# Patient Record
Sex: MALE | Race: WHITE | Employment: OTHER | URBAN - NONMETROPOLITAN AREA
[De-identification: names, ages, dates, MRNs, and addresses within clinical notes are randomized per-mention and may not be internally consistent; named-entity substitution may affect disease eponyms.]

---

## 2017-04-02 DIAGNOSIS — J45.21 MILD INTERMITTENT ASTHMA WITH EXACERBATION: ICD-10-CM

## 2017-04-07 ENCOUNTER — TELEPHONE (OUTPATIENT)
Dept: PULMONOLOGY | Age: 72
End: 2017-04-07

## 2017-04-12 ENCOUNTER — TELEPHONE (OUTPATIENT)
Dept: CARDIOLOGY | Age: 72
End: 2017-04-12

## 2017-04-13 ENCOUNTER — TELEPHONE (OUTPATIENT)
Dept: CARDIOLOGY | Age: 72
End: 2017-04-13

## 2017-06-21 ENCOUNTER — TELEPHONE (OUTPATIENT)
Dept: PULMONOLOGY | Age: 72
End: 2017-06-21

## 2017-06-21 ENCOUNTER — TELEPHONE (OUTPATIENT)
Dept: ADMINISTRATIVE | Age: 72
End: 2017-06-21

## 2017-07-20 ENCOUNTER — OFFICE VISIT (OUTPATIENT)
Dept: PULMONOLOGY | Age: 72
End: 2017-07-20
Payer: MEDICARE

## 2017-07-20 VITALS
DIASTOLIC BLOOD PRESSURE: 78 MMHG | OXYGEN SATURATION: 95 % | WEIGHT: 288.6 LBS | SYSTOLIC BLOOD PRESSURE: 136 MMHG | HEART RATE: 60 BPM | HEIGHT: 74 IN | BODY MASS INDEX: 37.04 KG/M2

## 2017-07-20 DIAGNOSIS — J45.20 MILD INTERMITTENT ASTHMA WITHOUT COMPLICATION: ICD-10-CM

## 2017-07-20 DIAGNOSIS — Z99.89 OSA ON CPAP: Primary | ICD-10-CM

## 2017-07-20 DIAGNOSIS — G47.33 OSA ON CPAP: Primary | ICD-10-CM

## 2017-07-20 PROCEDURE — 99213 OFFICE O/P EST LOW 20 MIN: CPT | Performed by: INTERNAL MEDICINE

## 2017-07-24 ENCOUNTER — HOSPITAL ENCOUNTER (INPATIENT)
Age: 72
LOS: 1 days | Discharge: HOME | DRG: 202 | End: 2017-07-25
Attending: FAMILY MEDICINE | Admitting: FAMILY MEDICINE
Payer: MEDICARE

## 2017-07-24 ENCOUNTER — APPOINTMENT (OUTPATIENT)
Dept: CT IMAGING | Age: 72
DRG: 202 | End: 2017-07-24
Payer: MEDICARE

## 2017-07-24 DIAGNOSIS — J18.9 PNEUMONIA DUE TO ORGANISM: ICD-10-CM

## 2017-07-24 DIAGNOSIS — J45.901 ASTHMA EXACERBATION: Primary | ICD-10-CM

## 2017-07-24 LAB
ALLEN TEST: POSITIVE
ANION GAP SERPL CALCULATED.3IONS-SCNC: 15 MEQ/L (ref 8–16)
BASE EXCESS (CALCULATED): 3.8 MMOL/L (ref -2.5–2.5)
BASOPHILS # BLD: 0.7 %
BASOPHILS ABSOLUTE: 0.1 THOU/MM3 (ref 0–0.1)
BUN BLDV-MCNC: 16 MG/DL (ref 7–22)
CALCIUM SERPL-MCNC: 8.6 MG/DL (ref 8.5–10.5)
CHLORIDE BLD-SCNC: 103 MEQ/L (ref 98–111)
CO2: 25 MEQ/L (ref 23–33)
COLLECTED BY:: ABNORMAL
CREAT SERPL-MCNC: 0.9 MG/DL (ref 0.4–1.2)
DEVICE: ABNORMAL
EOSINOPHIL # BLD: 7.5 %
EOSINOPHILS ABSOLUTE: 0.7 THOU/MM3 (ref 0–0.4)
FLU A ANTIGEN: NEGATIVE
FLU B ANTIGEN: NEGATIVE
GFR SERPL CREATININE-BSD FRML MDRD: 82 ML/MIN/1.73M2
GLUCOSE BLD-MCNC: 108 MG/DL (ref 70–108)
HCO3: 30 MMOL/L (ref 23–28)
HCT VFR BLD CALC: 42.7 % (ref 42–52)
HEMOGLOBIN: 14.5 GM/DL (ref 14–18)
LACTIC ACID: 1.5 MMOL/L (ref 0.5–2.2)
LYMPHOCYTES # BLD: 10.4 %
LYMPHOCYTES ABSOLUTE: 1 THOU/MM3 (ref 1–4.8)
MCH RBC QN AUTO: 32.8 PG (ref 27–31)
MCHC RBC AUTO-ENTMCNC: 34 GM/DL (ref 33–37)
MCV RBC AUTO: 96.3 FL (ref 80–94)
MONOCYTES # BLD: 8.2 %
MONOCYTES ABSOLUTE: 0.8 THOU/MM3 (ref 0.4–1.3)
NUCLEATED RED BLOOD CELLS: 0 /100 WBC
O2 SATURATION: 94 %
OSMOLALITY CALCULATION: 286.7 MOSMOL/KG (ref 275–300)
PCO2: 49 MMHG (ref 35–45)
PDW BLD-RTO: 13.8 % (ref 11.5–14.5)
PH BLOOD GAS: 7.39 (ref 7.35–7.45)
PLATELET # BLD: 188 THOU/MM3 (ref 130–400)
PMV BLD AUTO: 9.4 MCM (ref 7.4–10.4)
PO2: 72 MMHG (ref 71–104)
POTASSIUM SERPL-SCNC: 4 MEQ/L (ref 3.5–5.2)
PRO-BNP: 122.7 PG/ML (ref 0–900)
PROCALCITONIN: 0.06 NG/ML (ref 0.01–0.09)
RBC # BLD: 4.43 MILL/MM3 (ref 4.7–6.1)
RBC # BLD: NORMAL 10*6/UL
SEG NEUTROPHILS: 73.2 %
SEGMENTED NEUTROPHILS ABSOLUTE COUNT: 7.2 THOU/MM3 (ref 1.8–7.7)
SODIUM BLD-SCNC: 143 MEQ/L (ref 135–145)
SOURCE, BLOOD GAS: ABNORMAL
TROPONIN T: < 0.01 NG/ML
WBC # BLD: 9.8 THOU/MM3 (ref 4.8–10.8)

## 2017-07-24 PROCEDURE — 1200000000 HC SEMI PRIVATE

## 2017-07-24 PROCEDURE — 96375 TX/PRO/DX INJ NEW DRUG ADDON: CPT

## 2017-07-24 PROCEDURE — 71275 CT ANGIOGRAPHY CHEST: CPT

## 2017-07-24 PROCEDURE — 87040 BLOOD CULTURE FOR BACTERIA: CPT

## 2017-07-24 PROCEDURE — 84145 PROCALCITONIN (PCT): CPT

## 2017-07-24 PROCEDURE — 6360000002 HC RX W HCPCS: Performed by: FAMILY MEDICINE

## 2017-07-24 PROCEDURE — 99222 1ST HOSP IP/OBS MODERATE 55: CPT | Performed by: FAMILY MEDICINE

## 2017-07-24 PROCEDURE — 80048 BASIC METABOLIC PNL TOTAL CA: CPT

## 2017-07-24 PROCEDURE — 99285 EMERGENCY DEPT VISIT HI MDM: CPT

## 2017-07-24 PROCEDURE — 6370000000 HC RX 637 (ALT 250 FOR IP): Performed by: FAMILY MEDICINE

## 2017-07-24 PROCEDURE — 93005 ELECTROCARDIOGRAM TRACING: CPT

## 2017-07-24 PROCEDURE — 84484 ASSAY OF TROPONIN QUANT: CPT

## 2017-07-24 PROCEDURE — 94644 CONT INHLJ TX 1ST HOUR: CPT

## 2017-07-24 PROCEDURE — 82803 BLOOD GASES ANY COMBINATION: CPT

## 2017-07-24 PROCEDURE — 36415 COLL VENOUS BLD VENIPUNCTURE: CPT

## 2017-07-24 PROCEDURE — 94645 CONT INHLJ TX EACH ADDL HOUR: CPT

## 2017-07-24 PROCEDURE — 85025 COMPLETE CBC W/AUTO DIFF WBC: CPT

## 2017-07-24 PROCEDURE — 6360000004 HC RX CONTRAST MEDICATION: Performed by: FAMILY MEDICINE

## 2017-07-24 PROCEDURE — 83605 ASSAY OF LACTIC ACID: CPT

## 2017-07-24 PROCEDURE — 96374 THER/PROPH/DIAG INJ IV PUSH: CPT

## 2017-07-24 PROCEDURE — 83880 ASSAY OF NATRIURETIC PEPTIDE: CPT

## 2017-07-24 PROCEDURE — 87804 INFLUENZA ASSAY W/OPTIC: CPT

## 2017-07-24 PROCEDURE — 36600 WITHDRAWAL OF ARTERIAL BLOOD: CPT

## 2017-07-24 PROCEDURE — 2580000003 HC RX 258: Performed by: FAMILY MEDICINE

## 2017-07-24 RX ORDER — METHYLPREDNISOLONE SODIUM SUCCINATE 125 MG/2ML
125 INJECTION, POWDER, LYOPHILIZED, FOR SOLUTION INTRAMUSCULAR; INTRAVENOUS ONCE
Status: COMPLETED | OUTPATIENT
Start: 2017-07-24 | End: 2017-07-24

## 2017-07-24 RX ORDER — CLONIDINE HYDROCHLORIDE 0.2 MG/1
0.2 TABLET ORAL ONCE
Status: COMPLETED | OUTPATIENT
Start: 2017-07-24 | End: 2017-07-24

## 2017-07-24 RX ORDER — LEVOFLOXACIN 5 MG/ML
500 INJECTION, SOLUTION INTRAVENOUS ONCE
Status: COMPLETED | OUTPATIENT
Start: 2017-07-24 | End: 2017-07-25

## 2017-07-24 RX ADMIN — IOPAMIDOL 80 ML: 755 INJECTION, SOLUTION INTRAVENOUS at 22:23

## 2017-07-24 RX ADMIN — MAGNESIUM SULFATE HEPTAHYDRATE 2 G: 500 INJECTION, SOLUTION INTRAMUSCULAR; INTRAVENOUS at 22:02

## 2017-07-24 RX ADMIN — METHYLPREDNISOLONE SODIUM SUCCINATE 125 MG: 125 INJECTION, POWDER, FOR SOLUTION INTRAMUSCULAR; INTRAVENOUS at 21:12

## 2017-07-24 RX ADMIN — CLONIDINE HYDROCHLORIDE 0.2 MG: 0.2 TABLET ORAL at 21:44

## 2017-07-24 RX ADMIN — ALBUTEROL SULFATE 0.5 MG/KG/HR: 2.5 SOLUTION RESPIRATORY (INHALATION) at 21:18

## 2017-07-24 RX ADMIN — ALBUTEROL SULFATE 0.5 MG/KG/HR: 2.5 SOLUTION RESPIRATORY (INHALATION) at 22:37

## 2017-07-24 RX ADMIN — LEVOFLOXACIN 500 MG: 5 INJECTION, SOLUTION INTRAVENOUS at 23:29

## 2017-07-24 ASSESSMENT — ENCOUNTER SYMPTOMS
VOMITING: 0
EYE REDNESS: 0
RHINORRHEA: 0
SHORTNESS OF BREATH: 1
COUGH: 1
ABDOMINAL PAIN: 0
EYE DISCHARGE: 0
NAUSEA: 0
SORE THROAT: 0
BACK PAIN: 0
DIARRHEA: 0
WHEEZING: 1

## 2017-07-25 VITALS
OXYGEN SATURATION: 93 % | DIASTOLIC BLOOD PRESSURE: 71 MMHG | HEART RATE: 78 BPM | BODY MASS INDEX: 36.7 KG/M2 | HEIGHT: 74 IN | SYSTOLIC BLOOD PRESSURE: 135 MMHG | WEIGHT: 286 LBS | RESPIRATION RATE: 16 BRPM | TEMPERATURE: 98.4 F

## 2017-07-25 PROBLEM — J18.9 CAP (COMMUNITY ACQUIRED PNEUMONIA): Status: ACTIVE | Noted: 2017-07-25

## 2017-07-25 LAB
ANION GAP SERPL CALCULATED.3IONS-SCNC: 20 MEQ/L (ref 8–16)
BASOPHILS # BLD: 0.3 %
BASOPHILS ABSOLUTE: 0 THOU/MM3 (ref 0–0.1)
BUN BLDV-MCNC: 20 MG/DL (ref 7–22)
CALCIUM SERPL-MCNC: 8.8 MG/DL (ref 8.5–10.5)
CHLORIDE BLD-SCNC: 101 MEQ/L (ref 98–111)
CO2: 23 MEQ/L (ref 23–33)
CREAT SERPL-MCNC: 1.1 MG/DL (ref 0.4–1.2)
EOSINOPHIL # BLD: 0 %
EOSINOPHILS ABSOLUTE: 0 THOU/MM3 (ref 0–0.4)
GFR SERPL CREATININE-BSD FRML MDRD: 65 ML/MIN/1.73M2
GLUCOSE BLD-MCNC: 160 MG/DL (ref 70–108)
HCT VFR BLD CALC: 42.5 % (ref 42–52)
HEMOGLOBIN: 14.5 GM/DL (ref 14–18)
LYMPHOCYTES # BLD: 3.5 %
LYMPHOCYTES ABSOLUTE: 0.3 THOU/MM3 (ref 1–4.8)
MCH RBC QN AUTO: 32.9 PG (ref 27–31)
MCHC RBC AUTO-ENTMCNC: 34 GM/DL (ref 33–37)
MCV RBC AUTO: 96.6 FL (ref 80–94)
MONOCYTES # BLD: 0.5 %
MONOCYTES ABSOLUTE: 0 THOU/MM3 (ref 0.4–1.3)
NUCLEATED RED BLOOD CELLS: 0 /100 WBC
PDW BLD-RTO: 13.5 % (ref 11.5–14.5)
PLATELET # BLD: 195 THOU/MM3 (ref 130–400)
PMV BLD AUTO: 9 MCM (ref 7.4–10.4)
POTASSIUM SERPL-SCNC: 3.9 MEQ/L (ref 3.5–5.2)
RBC # BLD: 4.4 MILL/MM3 (ref 4.7–6.1)
RBC # BLD: NORMAL 10*6/UL
SEG NEUTROPHILS: 95.7 %
SEGMENTED NEUTROPHILS ABSOLUTE COUNT: 9 THOU/MM3 (ref 1.8–7.7)
SODIUM BLD-SCNC: 144 MEQ/L (ref 135–145)
TROPONIN T: < 0.01 NG/ML
TROPONIN T: < 0.01 NG/ML
WBC # BLD: 9.4 THOU/MM3 (ref 4.8–10.8)

## 2017-07-25 PROCEDURE — G0009 ADMIN PNEUMOCOCCAL VACCINE: HCPCS | Performed by: INTERNAL MEDICINE

## 2017-07-25 PROCEDURE — 2580000003 HC RX 258: Performed by: FAMILY MEDICINE

## 2017-07-25 PROCEDURE — 80048 BASIC METABOLIC PNL TOTAL CA: CPT

## 2017-07-25 PROCEDURE — 36415 COLL VENOUS BLD VENIPUNCTURE: CPT

## 2017-07-25 PROCEDURE — 99239 HOSP IP/OBS DSCHRG MGMT >30: CPT | Performed by: INTERNAL MEDICINE

## 2017-07-25 PROCEDURE — 6360000002 HC RX W HCPCS: Performed by: INTERNAL MEDICINE

## 2017-07-25 PROCEDURE — 6360000002 HC RX W HCPCS: Performed by: FAMILY MEDICINE

## 2017-07-25 PROCEDURE — 84484 ASSAY OF TROPONIN QUANT: CPT

## 2017-07-25 PROCEDURE — 6370000000 HC RX 637 (ALT 250 FOR IP): Performed by: FAMILY MEDICINE

## 2017-07-25 PROCEDURE — 90670 PCV13 VACCINE IM: CPT | Performed by: INTERNAL MEDICINE

## 2017-07-25 PROCEDURE — 85025 COMPLETE CBC W/AUTO DIFF WBC: CPT

## 2017-07-25 RX ORDER — OMEPRAZOLE 20 MG/1
20 CAPSULE, DELAYED RELEASE ORAL DAILY
Status: DISCONTINUED | OUTPATIENT
Start: 2017-07-25 | End: 2017-07-25 | Stop reason: CLARIF

## 2017-07-25 RX ORDER — FLUTICASONE PROPIONATE 50 MCG
1 SPRAY, SUSPENSION (ML) NASAL DAILY
Status: DISCONTINUED | OUTPATIENT
Start: 2017-07-25 | End: 2017-07-25 | Stop reason: HOSPADM

## 2017-07-25 RX ORDER — PANTOPRAZOLE SODIUM 40 MG/1
40 TABLET, DELAYED RELEASE ORAL
Status: DISCONTINUED | OUTPATIENT
Start: 2017-07-25 | End: 2017-07-25 | Stop reason: HOSPADM

## 2017-07-25 RX ORDER — ACETAMINOPHEN 325 MG/1
650 TABLET ORAL EVERY 4 HOURS PRN
Status: DISCONTINUED | OUTPATIENT
Start: 2017-07-25 | End: 2017-07-25 | Stop reason: HOSPADM

## 2017-07-25 RX ORDER — MONTELUKAST SODIUM 10 MG/1
10 TABLET ORAL NIGHTLY
Status: DISCONTINUED | OUTPATIENT
Start: 2017-07-25 | End: 2017-07-25 | Stop reason: HOSPADM

## 2017-07-25 RX ORDER — SODIUM CHLORIDE 0.9 % (FLUSH) 0.9 %
10 SYRINGE (ML) INJECTION PRN
Status: DISCONTINUED | OUTPATIENT
Start: 2017-07-25 | End: 2017-07-25 | Stop reason: HOSPADM

## 2017-07-25 RX ORDER — METHYLPREDNISOLONE SODIUM SUCCINATE 40 MG/ML
40 INJECTION, POWDER, LYOPHILIZED, FOR SOLUTION INTRAMUSCULAR; INTRAVENOUS EVERY 6 HOURS
Status: DISCONTINUED | OUTPATIENT
Start: 2017-07-25 | End: 2017-07-25 | Stop reason: HOSPADM

## 2017-07-25 RX ORDER — ALBUTEROL SULFATE 2.5 MG/3ML
2.5 SOLUTION RESPIRATORY (INHALATION) EVERY 6 HOURS PRN
Status: DISCONTINUED | OUTPATIENT
Start: 2017-07-25 | End: 2017-07-25 | Stop reason: HOSPADM

## 2017-07-25 RX ORDER — PREDNISONE 20 MG/1
20 TABLET ORAL DAILY
Qty: 5 TABLET | Refills: 0 | Status: SHIPPED | OUTPATIENT
Start: 2017-07-25 | End: 2017-07-30

## 2017-07-25 RX ORDER — VERAPAMIL HYDROCHLORIDE 240 MG/1
240 TABLET, FILM COATED, EXTENDED RELEASE ORAL EVERY MORNING
Status: DISCONTINUED | OUTPATIENT
Start: 2017-07-25 | End: 2017-07-25 | Stop reason: HOSPADM

## 2017-07-25 RX ORDER — MULTIVITAMIN WITH FOLIC ACID 400 MCG
1 TABLET ORAL DAILY
Status: DISCONTINUED | OUTPATIENT
Start: 2017-07-25 | End: 2017-07-25 | Stop reason: HOSPADM

## 2017-07-25 RX ORDER — SODIUM CHLORIDE 0.9 % (FLUSH) 0.9 %
10 SYRINGE (ML) INJECTION EVERY 12 HOURS SCHEDULED
Status: DISCONTINUED | OUTPATIENT
Start: 2017-07-25 | End: 2017-07-25 | Stop reason: HOSPADM

## 2017-07-25 RX ORDER — ONDANSETRON 2 MG/ML
4 INJECTION INTRAMUSCULAR; INTRAVENOUS EVERY 6 HOURS PRN
Status: DISCONTINUED | OUTPATIENT
Start: 2017-07-25 | End: 2017-07-25 | Stop reason: HOSPADM

## 2017-07-25 RX ORDER — DOXYCYCLINE HYCLATE 100 MG
100 TABLET ORAL 2 TIMES DAILY
Qty: 14 TABLET | Refills: 0 | Status: SHIPPED | OUTPATIENT
Start: 2017-07-25 | End: 2017-08-01

## 2017-07-25 RX ORDER — PRAVASTATIN SODIUM 20 MG
20 TABLET ORAL DAILY
Status: DISCONTINUED | OUTPATIENT
Start: 2017-07-25 | End: 2017-07-25 | Stop reason: HOSPADM

## 2017-07-25 RX ADMIN — PANTOPRAZOLE SODIUM 40 MG: 40 TABLET, DELAYED RELEASE ORAL at 08:36

## 2017-07-25 RX ADMIN — PNEUMOCOCCAL 13-VALENT CONJUGATE VACCINE 0.5 ML: 2.2; 2.2; 2.2; 2.2; 2.2; 4.4; 2.2; 2.2; 2.2; 2.2; 2.2; 2.2; 2.2 INJECTION, SUSPENSION INTRAMUSCULAR at 13:15

## 2017-07-25 RX ADMIN — METHYLPREDNISOLONE SODIUM SUCCINATE 40 MG: 40 INJECTION, POWDER, FOR SOLUTION INTRAMUSCULAR; INTRAVENOUS at 08:36

## 2017-07-25 RX ADMIN — ENOXAPARIN SODIUM 40 MG: 40 INJECTION SUBCUTANEOUS at 08:36

## 2017-07-25 RX ADMIN — Medication 10 ML: at 08:41

## 2017-07-25 RX ADMIN — VERAPAMIL HYDROCHLORIDE 240 MG: 240 TABLET, FILM COATED, EXTENDED RELEASE ORAL at 08:36

## 2017-07-25 RX ADMIN — FLUTICASONE PROPIONATE 1 SPRAY: 50 SPRAY, METERED NASAL at 08:36

## 2017-07-25 RX ADMIN — THERA TABS 1 TABLET: TAB at 08:36

## 2017-07-25 RX ADMIN — METHYLPREDNISOLONE SODIUM SUCCINATE 40 MG: 40 INJECTION, POWDER, FOR SOLUTION INTRAMUSCULAR; INTRAVENOUS at 04:16

## 2017-07-25 ASSESSMENT — PAIN SCALES - GENERAL
PAINLEVEL_OUTOF10: 0

## 2017-07-26 LAB
EKG ATRIAL RATE: 77 BPM
EKG P AXIS: 78 DEGREES
EKG P-R INTERVAL: 136 MS
EKG Q-T INTERVAL: 388 MS
EKG QRS DURATION: 106 MS
EKG QTC CALCULATION (BAZETT): 439 MS
EKG R AXIS: -40 DEGREES
EKG T AXIS: 71 DEGREES
EKG VENTRICULAR RATE: 77 BPM

## 2017-07-30 LAB
BLOOD CULTURE, ROUTINE: NORMAL
BLOOD CULTURE, ROUTINE: NORMAL

## 2017-08-07 ENCOUNTER — TELEPHONE (OUTPATIENT)
Dept: PULMONOLOGY | Age: 72
End: 2017-08-07

## 2017-10-27 ENCOUNTER — OFFICE VISIT (OUTPATIENT)
Dept: CARDIOLOGY CLINIC | Age: 72
End: 2017-10-27
Payer: MEDICARE

## 2017-10-27 VITALS
DIASTOLIC BLOOD PRESSURE: 64 MMHG | SYSTOLIC BLOOD PRESSURE: 122 MMHG | BODY MASS INDEX: 35.16 KG/M2 | HEART RATE: 54 BPM | WEIGHT: 274 LBS | HEIGHT: 74 IN

## 2017-10-27 DIAGNOSIS — I25.10 CORONARY ARTERY DISEASE INVOLVING NATIVE CORONARY ARTERY OF NATIVE HEART WITHOUT ANGINA PECTORIS: ICD-10-CM

## 2017-10-27 DIAGNOSIS — E78.01 FAMILIAL HYPERCHOLESTEROLEMIA: ICD-10-CM

## 2017-10-27 DIAGNOSIS — I10 ESSENTIAL HYPERTENSION: Primary | ICD-10-CM

## 2017-10-27 PROCEDURE — 99213 OFFICE O/P EST LOW 20 MIN: CPT | Performed by: NUCLEAR MEDICINE

## 2017-10-27 NOTE — PROGRESS NOTES
mg by mouth every morning. Indications: Per patient taking medication for migraines prevention. No current facility-administered medications for this visit. Allergies   Allergen Reactions    Molds & Smuts     Sulfa Antibiotics Other (See Comments)     congestion     Health Maintenance   Topic Date Due    DTaP/Tdap/Td vaccine (1 - Tdap) 06/25/1962    Colon cancer screen colonoscopy  06/25/1993    Zostavax vaccine  06/25/2003    Flu vaccine (1) 09/01/2017    Pneumococcal low/med risk (2 of 2 - PPSV23) 07/25/2018    Lipid screen  07/11/2021       Subjective:  Review of Systems  General:   No fever, no chills, No fatigue or weight loss  Pulmonary:    No dyspnea, no wheezing  Cardiac:    Denies recent chest pain,   GI:     No nausea or vomiting, no abdominal pain  Neuro:    No dizziness or light headedness,   Musculoskeletal:  No recent active issues  Extremities:   No edema, good peripheral pulses      Objective:  Physical Exam  /64   Pulse 54   Ht 6' 2\" (1.88 m)   Wt 274 lb (124.3 kg)   BMI 35.18 kg/m²   General:   Well developed, well nourished  Lungs:   Clear to auscultation  Heart:    Normal S1 S2, Slight murmur. no rubs, no gallops  Abdomen:   Soft, non tender, no organomegalies, positive bowel sounds  Extremities:   No edema, no cyanosis, good peripheral pulses  Neurological:   Awake, alert, oriented. No obvious focal deficits  Musculoskelatal:  No obvious deformities    Assessment:     1. Essential hypertension     2. Familial hypercholesterolemia     3. Coronary artery disease involving native coronary artery of native heart without angina pectoris     cardiac stable   ECG in office was done today. I reviewed the ECG. No acute findings      Plan:  No Follow-up on file. As above  Continue risk factor modification and medical management  Thank you for allowing me to participate in the care of your patient.  Please don't hesitate to contact me regarding any further issues related to the patient care    Orders Placed:  No orders of the defined types were placed in this encounter. Medications Prescribed:  No orders of the defined types were placed in this encounter. Discussed use, benefit, and side effects of prescribed medications. All patient questions answered. Pt voiced understanding. Instructed to continue current medications, diet and exercise. Continue risk factor modification and medical management. Patient agreed with treatment plan. Follow up as directed.     Electronically signed by Roopa Heart MD on 10/27/2017 at 10:40 AM

## 2017-10-30 ENCOUNTER — OFFICE VISIT (OUTPATIENT)
Dept: PULMONOLOGY | Age: 72
End: 2017-10-30
Payer: MEDICARE

## 2017-10-30 VITALS
DIASTOLIC BLOOD PRESSURE: 64 MMHG | BODY MASS INDEX: 35.16 KG/M2 | WEIGHT: 274 LBS | HEART RATE: 60 BPM | SYSTOLIC BLOOD PRESSURE: 122 MMHG | HEIGHT: 74 IN | TEMPERATURE: 98.3 F | OXYGEN SATURATION: 96 % | RESPIRATION RATE: 18 BRPM

## 2017-10-30 DIAGNOSIS — Z99.89 OSA ON CPAP: ICD-10-CM

## 2017-10-30 DIAGNOSIS — G47.33 OSA ON CPAP: ICD-10-CM

## 2017-10-30 DIAGNOSIS — J45.41 MODERATE PERSISTENT ASTHMA WITH ACUTE EXACERBATION: Primary | ICD-10-CM

## 2017-10-30 PROCEDURE — 99214 OFFICE O/P EST MOD 30 MIN: CPT | Performed by: PHYSICIAN ASSISTANT

## 2017-10-30 RX ORDER — FLUTICASONE PROPIONATE 50 MCG
1 SPRAY, SUSPENSION (ML) NASAL DAILY PRN
Qty: 1 BOTTLE | Refills: 0 | Status: ON HOLD
Start: 2017-10-30 | End: 2018-08-17

## 2017-10-30 RX ORDER — BUDESONIDE AND FORMOTEROL FUMARATE DIHYDRATE 80; 4.5 UG/1; UG/1
2 AEROSOL RESPIRATORY (INHALATION) 2 TIMES DAILY
Qty: 3 INHALER | Refills: 3 | Status: SHIPPED | OUTPATIENT
Start: 2017-10-30 | End: 2018-04-26

## 2017-10-30 ASSESSMENT — ENCOUNTER SYMPTOMS
WHEEZING: 0
HEARTBURN: 0
SPUTUM PRODUCTION: 0
EYES NEGATIVE: 1
COUGH: 1
GASTROINTESTINAL NEGATIVE: 1
VOMITING: 0
SHORTNESS OF BREATH: 0
SORE THROAT: 0
BACK PAIN: 0
HEMOPTYSIS: 0
NAUSEA: 0

## 2017-10-30 NOTE — PROGRESS NOTES
SURGICAL HISTORY:  Past Surgical History:   Procedure Laterality Date    COLONOSCOPY      EYE SURGERY      SKIN BIOPSY      TONSILLECTOMY      TUR  1990     SOCIAL HISTORY:  Social History   Substance Use Topics    Smoking status: Never Smoker    Smokeless tobacco: Never Used    Alcohol use No     ALLERGIES:  Allergies   Allergen Reactions    Molds & Smuts     Sulfa Antibiotics Other (See Comments)     congestion     FAMILY HISTORY:  Family History   Problem Relation Age of Onset    Diabetes Mother     COPD Mother     Cancer Father      CURRENT MEDICATIONS:  Current Outpatient Prescriptions   Medication Sig Dispense Refill    fluticasone (FLONASE) 50 MCG/ACT nasal spray 1 spray by Nasal route daily as needed Morning and night 1 Bottle 0    budesonide-formoterol (SYMBICORT) 80-4.5 MCG/ACT AERO Inhale 2 puffs into the lungs 2 times daily 3 Inhaler 3    CPAP Machine MISC by Does not apply route Please change CPAP to APAP (autoCPAP) range 13 - 20- cwp. 1 each 0    Multiple Vitamin (MULTI-VITAMIN DAILY PO) Take 1 tablet by mouth daily       albuterol (PROVENTIL) (2.5 MG/3ML) 0.083% nebulizer solution Take 3 mLs by nebulization every 4 hours as needed for Wheezing or Shortness of Breath 360 vial 3    montelukast (SINGULAIR) 10 MG tablet Take 10 mg by mouth daily       pravastatin (PRAVACHOL) 20 MG tablet Take 20 mg by mouth daily      OMEPRAZOLE PO Take 20 mg by mouth daily.  verapamil (VERELAN PM) 240 MG CR capsule Take 240 mg by mouth every morning. Indications: Per patient taking medication for migraines prevention. No current facility-administered medications for this visit. Douglas RIDLEY   Review of Systems   Constitutional: Negative. Negative for chills, fever and weight loss. HENT: Negative. Negative for congestion and sore throat. Eyes: Negative. Respiratory: Positive for cough. Negative for hemoptysis, sputum production, shortness of breath and wheezing. Cardiovascular: Negative. Negative for chest pain and leg swelling. Gastrointestinal: Negative. Negative for heartburn, nausea and vomiting. Genitourinary: Negative. Musculoskeletal: Negative. Negative for back pain and myalgias. Skin: Negative. Neurological: Negative. Negative for dizziness, tremors, weakness and headaches. Psychiatric/Behavioral: Negative. All other systems reviewed and are negative. Physical exam   /64   Pulse 60   Temp 98.3 °F (36.8 °C) (Oral)   Resp 18   Ht 6' 2\" (1.88 m)   Wt 274 lb (124.3 kg)   SpO2 96% Comment: R/A at rest  BMI 35.18 kg/m²    Neck Circumference -   18  Mallampati - 2   Physical Exam   Constitutional: He is oriented to person, place, and time and well-developed, well-nourished, and in no distress. HENT:   Head: Normocephalic and atraumatic. Mouth/Throat: No oropharyngeal exudate. Eyes: Conjunctivae and EOM are normal. Pupils are equal, round, and reactive to light. Neck: Normal range of motion. Neck supple. Cardiovascular: Normal rate, regular rhythm and normal heart sounds. Exam reveals no friction rub. No murmur heard. Pulmonary/Chest: Effort normal and breath sounds normal. No respiratory distress. He has no wheezes. He has no rales. Abdominal: Bowel sounds are normal. He exhibits no distension. There is no tenderness. Musculoskeletal: Normal range of motion. He exhibits no edema or tenderness. Neurological: He is alert and oriented to person, place, and time. Skin: Skin is warm and dry. No rash noted. Psychiatric: Affect and judgment normal.   Nursing note and vitals reviewed. Test results   CTA- 7/24/2017  No PE. Possible developing peribronchial infiltrates in the lingula and left lower lobe          Assessment     1. Moderate persistent asthma with acute exacerbation  Spirometry with bronchodilator   2.  CHARIS on CPAP           Plan   His asthma is not under good control currently- he has had 2 flare ups this year which required prednisone despite ICS  Will stop Qvar and start on Symbicort 2 puffs BID  Symbicort should improve his asthma exacerbations   He needs updated Spirometry also for FAA clearance  He needs  FAA medical clearance for asthma    Will see Joe Alcocer back in: 3 months to ensure stability in asthma  He also needs CHARIS follow up    Luis Angel Brink PA-C, MPAS  10/30/2017

## 2017-11-08 ENCOUNTER — HOSPITAL ENCOUNTER (OUTPATIENT)
Dept: PULMONOLOGY | Age: 72
Discharge: HOME | End: 2017-11-08
Payer: MEDICARE

## 2017-11-08 DIAGNOSIS — J45.41 MODERATE PERSISTENT ASTHMA WITH ACUTE EXACERBATION: ICD-10-CM

## 2017-11-08 PROCEDURE — 94060 EVALUATION OF WHEEZING: CPT

## 2017-11-20 ENCOUNTER — TELEPHONE (OUTPATIENT)
Dept: PULMONOLOGY | Age: 72
End: 2017-11-20

## 2018-04-26 ENCOUNTER — OFFICE VISIT (OUTPATIENT)
Dept: PULMONOLOGY | Age: 73
End: 2018-04-26
Payer: MEDICARE

## 2018-04-26 VITALS
TEMPERATURE: 97.8 F | HEIGHT: 74 IN | SYSTOLIC BLOOD PRESSURE: 130 MMHG | HEART RATE: 55 BPM | DIASTOLIC BLOOD PRESSURE: 76 MMHG | WEIGHT: 261.6 LBS | BODY MASS INDEX: 33.57 KG/M2 | OXYGEN SATURATION: 97 %

## 2018-04-26 DIAGNOSIS — G47.33 OSA ON CPAP: ICD-10-CM

## 2018-04-26 DIAGNOSIS — Z99.89 OSA ON CPAP: ICD-10-CM

## 2018-04-26 DIAGNOSIS — J45.20 MILD INTERMITTENT ASTHMA WITHOUT COMPLICATION: Primary | ICD-10-CM

## 2018-04-26 PROCEDURE — 99213 OFFICE O/P EST LOW 20 MIN: CPT | Performed by: PHYSICIAN ASSISTANT

## 2018-04-26 RX ORDER — BUDESONIDE AND FORMOTEROL FUMARATE DIHYDRATE 160; 4.5 UG/1; UG/1
2 AEROSOL RESPIRATORY (INHALATION) 2 TIMES DAILY
Qty: 3 INHALER | Refills: 3 | Status: SHIPPED | OUTPATIENT
Start: 2018-04-26 | End: 2020-07-13 | Stop reason: SDUPTHER

## 2018-04-26 ASSESSMENT — ENCOUNTER SYMPTOMS
COUGH: 1
SINUS PAIN: 0
SHORTNESS OF BREATH: 0
WHEEZING: 0
VOMITING: 0
SORE THROAT: 0
SPUTUM PRODUCTION: 0
ORTHOPNEA: 0
NAUSEA: 0
HEMOPTYSIS: 0
GASTROINTESTINAL NEGATIVE: 1
EYES NEGATIVE: 1
HEARTBURN: 0
BACK PAIN: 0

## 2018-07-15 ENCOUNTER — APPOINTMENT (OUTPATIENT)
Dept: GENERAL RADIOLOGY | Age: 73
End: 2018-07-15
Payer: MEDICARE

## 2018-07-15 ENCOUNTER — HOSPITAL ENCOUNTER (EMERGENCY)
Age: 73
Discharge: HOME | End: 2018-07-15
Attending: EMERGENCY MEDICINE
Payer: MEDICARE

## 2018-07-15 VITALS
HEART RATE: 58 BPM | TEMPERATURE: 98.3 F | DIASTOLIC BLOOD PRESSURE: 61 MMHG | BODY MASS INDEX: 37.22 KG/M2 | OXYGEN SATURATION: 94 % | SYSTOLIC BLOOD PRESSURE: 142 MMHG | HEIGHT: 74 IN | RESPIRATION RATE: 16 BRPM | WEIGHT: 290 LBS

## 2018-07-15 DIAGNOSIS — R06.00 DYSPNEA, UNSPECIFIED TYPE: Primary | ICD-10-CM

## 2018-07-15 DIAGNOSIS — J45.20 MILD INTERMITTENT ASTHMA WITHOUT COMPLICATION: ICD-10-CM

## 2018-07-15 LAB
ANION GAP SERPL CALCULATED.3IONS-SCNC: 11 MEQ/L (ref 8–16)
BASOPHILS # BLD: 1.1 %
BASOPHILS ABSOLUTE: 0.1 THOU/MM3 (ref 0–0.1)
BUN BLDV-MCNC: 16 MG/DL (ref 7–22)
CALCIUM SERPL-MCNC: 8.4 MG/DL (ref 8.5–10.5)
CHLORIDE BLD-SCNC: 107 MEQ/L (ref 98–111)
CO2: 26 MEQ/L (ref 23–33)
CREAT SERPL-MCNC: 0.9 MG/DL (ref 0.4–1.2)
EKG ATRIAL RATE: 59 BPM
EKG P AXIS: 68 DEGREES
EKG P-R INTERVAL: 142 MS
EKG Q-T INTERVAL: 456 MS
EKG QRS DURATION: 120 MS
EKG QTC CALCULATION (BAZETT): 451 MS
EKG R AXIS: -26 DEGREES
EKG T AXIS: 85 DEGREES
EKG VENTRICULAR RATE: 59 BPM
EOSINOPHIL # BLD: 8.6 %
EOSINOPHILS ABSOLUTE: 0.6 THOU/MM3 (ref 0–0.4)
ERYTHROCYTE [DISTWIDTH] IN BLOOD BY AUTOMATED COUNT: 12.5 % (ref 11.5–14.5)
ERYTHROCYTE [DISTWIDTH] IN BLOOD BY AUTOMATED COUNT: 45.2 FL (ref 35–45)
GFR SERPL CREATININE-BSD FRML MDRD: 82 ML/MIN/1.73M2
GLUCOSE BLD-MCNC: 109 MG/DL (ref 70–108)
HCT VFR BLD CALC: 41.8 % (ref 42–52)
HEMOGLOBIN: 13.8 GM/DL (ref 14–18)
IMMATURE GRANS (ABS): 0.02 THOU/MM3 (ref 0–0.07)
IMMATURE GRANULOCYTES: 0.3 %
LYMPHOCYTES # BLD: 13.8 %
LYMPHOCYTES ABSOLUTE: 1 THOU/MM3 (ref 1–4.8)
MCH RBC QN AUTO: 32.5 PG (ref 26–33)
MCHC RBC AUTO-ENTMCNC: 33 GM/DL (ref 32.2–35.5)
MCV RBC AUTO: 98.4 FL (ref 80–94)
MONOCYTES # BLD: 8.2 %
MONOCYTES ABSOLUTE: 0.6 THOU/MM3 (ref 0.4–1.3)
NUCLEATED RED BLOOD CELLS: 0 /100 WBC
OSMOLALITY CALCULATION: 288.6 MOSMOL/KG (ref 275–300)
PLATELET # BLD: 198 THOU/MM3 (ref 130–400)
PMV BLD AUTO: 10.5 FL (ref 9.4–12.4)
POTASSIUM SERPL-SCNC: 3.9 MEQ/L (ref 3.5–5.2)
PRO-BNP: 88 PG/ML (ref 0–1800)
RBC # BLD: 4.25 MILL/MM3 (ref 4.7–6.1)
SEG NEUTROPHILS: 68 %
SEGMENTED NEUTROPHILS ABSOLUTE COUNT: 5 THOU/MM3 (ref 1.8–7.7)
SODIUM BLD-SCNC: 144 MEQ/L (ref 135–145)
TROPONIN T: < 0.01 NG/ML
WBC # BLD: 7.4 THOU/MM3 (ref 4.8–10.8)

## 2018-07-15 PROCEDURE — 99285 EMERGENCY DEPT VISIT HI MDM: CPT

## 2018-07-15 PROCEDURE — 84484 ASSAY OF TROPONIN QUANT: CPT

## 2018-07-15 PROCEDURE — 93005 ELECTROCARDIOGRAM TRACING: CPT | Performed by: EMERGENCY MEDICINE

## 2018-07-15 PROCEDURE — 94640 AIRWAY INHALATION TREATMENT: CPT

## 2018-07-15 PROCEDURE — 6370000000 HC RX 637 (ALT 250 FOR IP): Performed by: EMERGENCY MEDICINE

## 2018-07-15 PROCEDURE — 80048 BASIC METABOLIC PNL TOTAL CA: CPT

## 2018-07-15 PROCEDURE — 85025 COMPLETE CBC W/AUTO DIFF WBC: CPT

## 2018-07-15 PROCEDURE — 83880 ASSAY OF NATRIURETIC PEPTIDE: CPT

## 2018-07-15 PROCEDURE — 71045 X-RAY EXAM CHEST 1 VIEW: CPT

## 2018-07-15 PROCEDURE — 36415 COLL VENOUS BLD VENIPUNCTURE: CPT

## 2018-07-15 RX ORDER — IPRATROPIUM BROMIDE AND ALBUTEROL SULFATE 2.5; .5 MG/3ML; MG/3ML
1 SOLUTION RESPIRATORY (INHALATION) ONCE
Status: COMPLETED | OUTPATIENT
Start: 2018-07-15 | End: 2018-07-15

## 2018-07-15 RX ADMIN — IPRATROPIUM BROMIDE AND ALBUTEROL SULFATE 1 AMPULE: .5; 3 SOLUTION RESPIRATORY (INHALATION) at 03:02

## 2018-07-15 NOTE — ED NOTES
Pt resting on cot, respires easy and unlabored. Pt stated breathing has improved since duoneb treatment. Updated on POC. Lights dimmed for comfort. Will monitor.       Lowell Del Real RN  07/15/18 [id number]

## 2018-07-15 NOTE — ED PROVIDER NOTES
pulse is 58. His respiration is 16 and oxygen saturation is 94%. Physical Exam   Constitutional:  well-developed and well-nourished. HENT: Head: Normocephalic, atraumatic, Bilateral external ears normal, Oropharynx mosit, No oral exudates, Nose normal.   Eyes: PERRL, EOMI, Conjunctiva normal, No discharge. No scleral icterus  Neck: Normal range of motion, No tenderness, Supple  Lympatics: No lymphadenopathy. Cardiovascular: Normal rate, regular rhythm, S1 normal and S2 normal.  Exam reveals no gallop. Pulmonary/Chest: Effort normal and breath sounds normal. No accessory muscle usage or stridor. No respiratory distress. no wheezes. has no rales. exhibits no tenderness. Abdominal: Hypoactive bowel sounds. Obese. Soft. exhibits no distension. There is no tenderness. There is no rebound and no guarding. Genitourinary:   Extremities: No edema, no tenderness, no cyanosis, no clubbing. Musculoskeletal: Good range of motion in major joints is observed. No major deformities noted. Neurological: Alert and oriented ×3, normal motor function, normal sensory function, no focal deficits. GCS 15  Skin: Skin is warm, dry and intact. No rash noted. No erythema. Psychiatric: Affect normal, judgment normal, mood normal.  DIFFERENTIAL DIAGNOSIS:   Pneumonia, bronchitis, asthma exacerbation, CHF, URI, Viral illness. DIAGNOSTIC RESULTS     EKG: All EKG's are interpreted by the Emergency Department Physician who either signs or Co-signs this chart in the absence of a cardiologist.      RADIOLOGY: non-plain film images(s) such as CT, Ultrasound and MRI are read by the radiologist.  Plain radiographic images are visualized and preliminarily interpreted by the emergency physician unless otherwise stated below. XR CHEST PORTABLE   Final Result      Central pulmonary venous congestion suggestive of very mild CHF without pulmonary edema. Mild bibasilar atelectasis.             **This report has been created using

## 2018-07-15 NOTE — ED NOTES
Ambulated pt in the hernandez with portable pulse ox. Pt's Sp02 with ambulation 95%, heart rate 72. Pt denied SOB with ambulation. Dr. Edilma Nayak notified.       Susanne Escudero RN  07/15/18 0080

## 2018-07-16 PROCEDURE — 93010 ELECTROCARDIOGRAM REPORT: CPT | Performed by: INTERNAL MEDICINE

## 2018-07-19 ENCOUNTER — HOSPITAL ENCOUNTER (OUTPATIENT)
Dept: GENERAL RADIOLOGY | Age: 73
Discharge: HOME | End: 2018-07-19
Payer: MEDICARE

## 2018-07-19 ENCOUNTER — HOSPITAL ENCOUNTER (OUTPATIENT)
Age: 73
Discharge: HOME | End: 2018-07-19
Payer: MEDICARE

## 2018-07-19 DIAGNOSIS — J44.1 COPD EXACERBATION (HCC): ICD-10-CM

## 2018-07-19 PROCEDURE — 71046 X-RAY EXAM CHEST 2 VIEWS: CPT

## 2018-07-25 ENCOUNTER — TELEPHONE (OUTPATIENT)
Dept: CARDIOLOGY CLINIC | Age: 73
End: 2018-07-25

## 2018-08-12 ENCOUNTER — HOSPITAL ENCOUNTER (INPATIENT)
Facility: HOSPITAL | Age: 73
LOS: 1 days | Discharge: HOME | DRG: 176 | End: 2018-08-13
Attending: EMERGENCY MEDICINE | Admitting: HOSPITALIST
Payer: MEDICARE

## 2018-08-12 DIAGNOSIS — R60.0 LEG EDEMA, RIGHT: ICD-10-CM

## 2018-08-12 DIAGNOSIS — I26.99 OTHER ACUTE PULMONARY EMBOLISM WITHOUT ACUTE COR PULMONALE: Primary | ICD-10-CM

## 2018-08-12 DIAGNOSIS — I26.99 PULMONARY EMBOLISM: ICD-10-CM

## 2018-08-12 DIAGNOSIS — R06.00 DYSPNEA: ICD-10-CM

## 2018-08-12 PROBLEM — Z85.828 HISTORY OF SKIN CANCER: Status: ACTIVE | Noted: 2018-08-12

## 2018-08-12 PROBLEM — J45.909 ASTHMA: Status: ACTIVE | Noted: 2018-08-12

## 2018-08-12 PROBLEM — G47.33 OSA ON CPAP: Status: ACTIVE | Noted: 2018-08-12

## 2018-08-12 PROBLEM — I82.431 ACUTE DEEP VEIN THROMBOSIS (DVT) OF RIGHT POPLITEAL VEIN: Status: ACTIVE | Noted: 2018-08-12

## 2018-08-12 PROBLEM — J44.9 COPD (CHRONIC OBSTRUCTIVE PULMONARY DISEASE): Status: ACTIVE | Noted: 2018-08-12

## 2018-08-12 LAB
ALBUMIN SERPL BCP-MCNC: 3.2 G/DL
ALLENS TEST: ABNORMAL
ALP SERPL-CCNC: 85 U/L
ALT SERPL W/O P-5'-P-CCNC: 13 U/L
ANION GAP SERPL CALC-SCNC: 10 MMOL/L
AST SERPL-CCNC: 20 U/L
BASOPHILS # BLD AUTO: 0.04 K/UL
BASOPHILS # BLD AUTO: 0.05 K/UL
BASOPHILS NFR BLD: 0.4 %
BASOPHILS NFR BLD: 0.5 %
BILIRUB SERPL-MCNC: 1.3 MG/DL
BNP SERPL-MCNC: 39 PG/ML
BNP SERPL-MCNC: 42 PG/ML
BUN SERPL-MCNC: 14 MG/DL
CALCIUM SERPL-MCNC: 8.8 MG/DL
CHLORIDE SERPL-SCNC: 104 MMOL/L
CO2 SERPL-SCNC: 25 MMOL/L
CREAT SERPL-MCNC: 1 MG/DL
DIFFERENTIAL METHOD: ABNORMAL
DIFFERENTIAL METHOD: ABNORMAL
EOSINOPHIL # BLD AUTO: 0 K/UL
EOSINOPHIL # BLD AUTO: 0.8 K/UL
EOSINOPHIL NFR BLD: 0.2 %
EOSINOPHIL NFR BLD: 9.1 %
ERYTHROCYTE [DISTWIDTH] IN BLOOD BY AUTOMATED COUNT: 12.7 %
ERYTHROCYTE [DISTWIDTH] IN BLOOD BY AUTOMATED COUNT: 12.7 %
EST. GFR  (AFRICAN AMERICAN): >60 ML/MIN/1.73 M^2
EST. GFR  (NON AFRICAN AMERICAN): >60 ML/MIN/1.73 M^2
FACT X PPP CHRO-ACNC: 0.8 IU/ML
FACT X PPP CHRO-ACNC: <0.1 IU/ML
FACT X PPP CHRO-ACNC: >1.64 IU/ML
GLUCOSE SERPL-MCNC: 105 MG/DL
HCO3 UR-SCNC: 28.7 MMOL/L (ref 24–28)
HCT VFR BLD AUTO: 44 %
HCT VFR BLD AUTO: 44.7 %
HGB BLD-MCNC: 14.6 G/DL
HGB BLD-MCNC: 14.8 G/DL
IMM GRANULOCYTES # BLD AUTO: 0.05 K/UL
IMM GRANULOCYTES # BLD AUTO: 0.05 K/UL
IMM GRANULOCYTES NFR BLD AUTO: 0.5 %
IMM GRANULOCYTES NFR BLD AUTO: 0.5 %
LYMPHOCYTES # BLD AUTO: 0.3 K/UL
LYMPHOCYTES # BLD AUTO: 1.2 K/UL
LYMPHOCYTES NFR BLD: 13.4 %
LYMPHOCYTES NFR BLD: 2.6 %
MCH RBC QN AUTO: 32.9 PG
MCH RBC QN AUTO: 33.5 PG
MCHC RBC AUTO-ENTMCNC: 33.1 G/DL
MCHC RBC AUTO-ENTMCNC: 33.2 G/DL
MCV RBC AUTO: 101 FL
MCV RBC AUTO: 99 FL
MONOCYTES # BLD AUTO: 0.1 K/UL
MONOCYTES # BLD AUTO: 0.7 K/UL
MONOCYTES NFR BLD: 0.8 %
MONOCYTES NFR BLD: 7.4 %
NEUTROPHILS # BLD AUTO: 6.3 K/UL
NEUTROPHILS # BLD AUTO: 9.4 K/UL
NEUTROPHILS NFR BLD: 69.1 %
NEUTROPHILS NFR BLD: 95.5 %
NRBC BLD-RTO: 0 /100 WBC
NRBC BLD-RTO: 0 /100 WBC
PCO2 BLDA: 53.2 MMHG (ref 35–45)
PH SMN: 7.34 [PH] (ref 7.35–7.45)
PLATELET # BLD AUTO: 206 K/UL
PLATELET # BLD AUTO: 207 K/UL
PMV BLD AUTO: 10.2 FL
PMV BLD AUTO: 10.2 FL
PO2 BLDA: 21 MMHG (ref 40–60)
POC BE: 3 MMOL/L
POC SATURATED O2: 31 % (ref 95–100)
POC TCO2: 30 MMOL/L (ref 24–29)
POTASSIUM SERPL-SCNC: 4.2 MMOL/L
PROT SERPL-MCNC: 5.8 G/DL
RBC # BLD AUTO: 4.36 M/UL
RBC # BLD AUTO: 4.5 M/UL
SAMPLE: ABNORMAL
SITE: ABNORMAL
SODIUM SERPL-SCNC: 139 MMOL/L
TROPONIN I SERPL DL<=0.01 NG/ML-MCNC: 0.04 NG/ML
TROPONIN I SERPL DL<=0.01 NG/ML-MCNC: 0.05 NG/ML
WBC # BLD AUTO: 9.15 K/UL
WBC # BLD AUTO: 9.88 K/UL

## 2018-08-12 PROCEDURE — 85520 HEPARIN ASSAY: CPT

## 2018-08-12 PROCEDURE — 84484 ASSAY OF TROPONIN QUANT: CPT

## 2018-08-12 PROCEDURE — 93010 ELECTROCARDIOGRAM REPORT: CPT | Mod: ,,, | Performed by: INTERNAL MEDICINE

## 2018-08-12 PROCEDURE — 63600175 PHARM REV CODE 636 W HCPCS: Performed by: EMERGENCY MEDICINE

## 2018-08-12 PROCEDURE — 85025 COMPLETE CBC W/AUTO DIFF WBC: CPT

## 2018-08-12 PROCEDURE — 85520 HEPARIN ASSAY: CPT | Mod: 91

## 2018-08-12 PROCEDURE — 94761 N-INVAS EAR/PLS OXIMETRY MLT: CPT

## 2018-08-12 PROCEDURE — 84484 ASSAY OF TROPONIN QUANT: CPT | Mod: 91

## 2018-08-12 PROCEDURE — 99223 1ST HOSP IP/OBS HIGH 75: CPT | Mod: AI,,, | Performed by: HOSPITALIST

## 2018-08-12 PROCEDURE — 11000001 HC ACUTE MED/SURG PRIVATE ROOM

## 2018-08-12 PROCEDURE — 82803 BLOOD GASES ANY COMBINATION: CPT

## 2018-08-12 PROCEDURE — 63600175 PHARM REV CODE 636 W HCPCS: Performed by: STUDENT IN AN ORGANIZED HEALTH CARE EDUCATION/TRAINING PROGRAM

## 2018-08-12 PROCEDURE — 83880 ASSAY OF NATRIURETIC PEPTIDE: CPT | Mod: 91

## 2018-08-12 PROCEDURE — 94640 AIRWAY INHALATION TREATMENT: CPT

## 2018-08-12 PROCEDURE — 93005 ELECTROCARDIOGRAM TRACING: CPT

## 2018-08-12 PROCEDURE — 25000242 PHARM REV CODE 250 ALT 637 W/ HCPCS: Performed by: STUDENT IN AN ORGANIZED HEALTH CARE EDUCATION/TRAINING PROGRAM

## 2018-08-12 PROCEDURE — 94660 CPAP INITIATION&MGMT: CPT

## 2018-08-12 PROCEDURE — 99900035 HC TECH TIME PER 15 MIN (STAT)

## 2018-08-12 PROCEDURE — 25500020 PHARM REV CODE 255: Performed by: EMERGENCY MEDICINE

## 2018-08-12 PROCEDURE — 27000221 HC OXYGEN, UP TO 24 HOURS

## 2018-08-12 PROCEDURE — 83880 ASSAY OF NATRIURETIC PEPTIDE: CPT

## 2018-08-12 PROCEDURE — 99285 EMERGENCY DEPT VISIT HI MDM: CPT | Mod: ,,, | Performed by: EMERGENCY MEDICINE

## 2018-08-12 PROCEDURE — 80053 COMPREHEN METABOLIC PANEL: CPT

## 2018-08-12 PROCEDURE — 99285 EMERGENCY DEPT VISIT HI MDM: CPT | Mod: 25

## 2018-08-12 PROCEDURE — 25000242 PHARM REV CODE 250 ALT 637 W/ HCPCS: Performed by: HOSPITALIST

## 2018-08-12 PROCEDURE — 96365 THER/PROPH/DIAG IV INF INIT: CPT

## 2018-08-12 PROCEDURE — 36415 COLL VENOUS BLD VENIPUNCTURE: CPT

## 2018-08-12 RX ORDER — MULTIVITAMIN
1 TABLET ORAL DAILY
COMMUNITY

## 2018-08-12 RX ORDER — HEPARIN SODIUM,PORCINE/D5W 25000/250
18 INTRAVENOUS SOLUTION INTRAVENOUS CONTINUOUS
Status: DISCONTINUED | OUTPATIENT
Start: 2018-08-12 | End: 2018-08-12

## 2018-08-12 RX ORDER — MONTELUKAST SODIUM 10 MG/1
10 TABLET ORAL NIGHTLY
Status: DISCONTINUED | OUTPATIENT
Start: 2018-08-12 | End: 2018-08-13 | Stop reason: HOSPADM

## 2018-08-12 RX ORDER — ACETAMINOPHEN 325 MG/1
650 TABLET ORAL EVERY 6 HOURS PRN
Status: DISCONTINUED | OUTPATIENT
Start: 2018-08-12 | End: 2018-08-13 | Stop reason: HOSPADM

## 2018-08-12 RX ORDER — SODIUM CHLORIDE 0.9 % (FLUSH) 0.9 %
5 SYRINGE (ML) INJECTION
Status: DISCONTINUED | OUTPATIENT
Start: 2018-08-12 | End: 2018-08-13 | Stop reason: HOSPADM

## 2018-08-12 RX ORDER — VERAPAMIL HYDROCHLORIDE 240 MG/1
240 TABLET, FILM COATED, EXTENDED RELEASE ORAL NIGHTLY
COMMUNITY

## 2018-08-12 RX ORDER — MONTELUKAST SODIUM 10 MG/1
10 TABLET ORAL NIGHTLY
COMMUNITY

## 2018-08-12 RX ORDER — PRAVASTATIN SODIUM 20 MG/1
20 TABLET ORAL DAILY
Status: DISCONTINUED | OUTPATIENT
Start: 2018-08-13 | End: 2018-08-13 | Stop reason: HOSPADM

## 2018-08-12 RX ORDER — HEPARIN SODIUM,PORCINE/D5W 25000/250
10 INTRAVENOUS SOLUTION INTRAVENOUS CONTINUOUS
Status: DISCONTINUED | OUTPATIENT
Start: 2018-08-12 | End: 2018-08-12

## 2018-08-12 RX ORDER — IPRATROPIUM BROMIDE AND ALBUTEROL SULFATE 2.5; .5 MG/3ML; MG/3ML
3 SOLUTION RESPIRATORY (INHALATION) EVERY 4 HOURS
Status: DISCONTINUED | OUTPATIENT
Start: 2018-08-12 | End: 2018-08-13 | Stop reason: HOSPADM

## 2018-08-12 RX ORDER — IPRATROPIUM BROMIDE AND ALBUTEROL SULFATE 2.5; .5 MG/3ML; MG/3ML
3 SOLUTION RESPIRATORY (INHALATION)
Status: COMPLETED | OUTPATIENT
Start: 2018-08-12 | End: 2018-08-12

## 2018-08-12 RX ORDER — OMEPRAZOLE 20 MG/1
20 CAPSULE, DELAYED RELEASE ORAL DAILY
COMMUNITY

## 2018-08-12 RX ORDER — BUDESONIDE AND FORMOTEROL FUMARATE DIHYDRATE 160; 4.5 UG/1; UG/1
2 AEROSOL RESPIRATORY (INHALATION) EVERY 12 HOURS
Status: DISCONTINUED | OUTPATIENT
Start: 2018-08-12 | End: 2018-08-13 | Stop reason: HOSPADM

## 2018-08-12 RX ORDER — PRAVASTATIN SODIUM 20 MG/1
20 TABLET ORAL DAILY
COMMUNITY

## 2018-08-12 RX ORDER — VERAPAMIL HYDROCHLORIDE 120 MG/1
240 TABLET, FILM COATED, EXTENDED RELEASE ORAL NIGHTLY
Status: DISCONTINUED | OUTPATIENT
Start: 2018-08-12 | End: 2018-08-13 | Stop reason: HOSPADM

## 2018-08-12 RX ORDER — ALBUTEROL SULFATE 90 UG/1
2 AEROSOL, METERED RESPIRATORY (INHALATION) EVERY 6 HOURS PRN
COMMUNITY

## 2018-08-12 RX ORDER — HEPARIN SODIUM,PORCINE/D5W 25000/250
18 INTRAVENOUS SOLUTION INTRAVENOUS CONTINUOUS
Status: DISPENSED | OUTPATIENT
Start: 2018-08-12 | End: 2018-08-13

## 2018-08-12 RX ADMIN — HEPARIN SODIUM AND DEXTROSE 17 UNITS/KG/HR: 10000; 5 INJECTION INTRAVENOUS at 10:08

## 2018-08-12 RX ADMIN — HEPARIN SODIUM AND DEXTROSE 10 UNITS/KG/HR: 10000; 5 INJECTION INTRAVENOUS at 02:08

## 2018-08-12 RX ADMIN — HEPARIN SODIUM AND DEXTROSE 18 UNITS/KG/HR: 10000; 5 INJECTION INTRAVENOUS at 03:08

## 2018-08-12 RX ADMIN — IOHEXOL 100 ML: 350 INJECTION, SOLUTION INTRAVENOUS at 01:08

## 2018-08-12 RX ADMIN — IPRATROPIUM BROMIDE AND ALBUTEROL SULFATE 3 ML: .5; 3 SOLUTION RESPIRATORY (INHALATION) at 01:08

## 2018-08-12 RX ADMIN — IPRATROPIUM BROMIDE AND ALBUTEROL SULFATE 3 ML: .5; 3 SOLUTION RESPIRATORY (INHALATION) at 10:08

## 2018-08-12 NOTE — ED NOTES
LOC: The patient is awake and alert; oriented x 3 and speaking appropriately.  APPEARANCE: Patient resting comfortably, patient is clean and well groomed  SKIN: warm and dry, normal skin turgor & moist mucus membranes, skin intact, no breakdown noted.  MUSCULOSKELETAL: Patient moving all extremities well, no obvious swelling or deformities noted  RESPIRATORY: Airway is open and patent, SOB w/ slight exertion noted , productive cough w/ clear mucus, respirations are spontaneous, normal effort and rate  CARDIAC: Patient has a normal rate,  peripheral edema and redness rt lower leg noted, capillary refill < 3 seconds; No complaints of chest pain   ABDOMEN: Soft and non tender to palpation,obese  . Bowel sounds present x 4

## 2018-08-12 NOTE — PROGRESS NOTES
Progress Note  Cardiology Service    Admit Date: 8/12/2018   LOS: 0 days     SUBJECTIVE:     Follow up for: <principal problem not specified>    Interval History:   Mr. Cervantes 73 y.o male with mhx of COPD, Asthma, RENEE and Migraine present to ED with SOB and right ankle edema. His right ankle erythema and edema started about 36 hrs ago. Patient lives in [place] and drove down 13 hours to [place] to be onboard a cruise ship today.   Upon ED evaluation he was diagnosed with  Right DVT (popliteal vein thrombosis) and right segmental lobe PE. His pulse oxygenation was 93% on 4 L Ns.   EKG showed NSR with LAD. Troponin was negative. BNP was normal.   Bedside echo showed normal LVEF and preserved RV function and negative Fernandez sign.   He also complained of unproductive cough. Patient denied fever, chill, n/v, and diarrhea.         Scheduled Meds:   heparin (PORCINE)  40 Units/kg (Adjusted) Intravenous Once     Continuous Infusions:   heparin (porcine) in D5W       PRN Meds:    Review of patient's allergies indicates:   Allergen Reactions    Sulfa (sulfonamide antibiotics) Shortness Of Breath       OBJECTIVE:     Vital Signs (Most Recent)  Temp: 97.6 °F (36.4 °C) (08/12/18 1330)  Pulse: 91 (08/12/18 1401)  Resp: 20 (08/12/18 1311)  BP: (!) 143/67 (08/12/18 1401)  SpO2: (!) 94 % (08/12/18 1401)    Vital Signs Range (Last 24H):  Temp:  [97.6 °F (36.4 °C)-98.3 °F (36.8 °C)]   Pulse:  [87-99]   Resp:  [20-24]   BP: (142-160)/(67-80)   SpO2:  [92 %-99 %]     I & O (Last 24H):No intake or output data in the 24 hours ending 08/12/18 1427         Physical Exam  Gen: NAD  Head/Eyes/Ears/Nose: NCAT, MMM   Neck: Soft, supple, no JVD   Lung: decreased breath sounds bilaterally, no wheezes  Heart: Normal S1/S2, regular rate and rhythm, no gallops, normal PMI  Abdomen: Soft, NT/ND, NABS, no masses, no guarding/rebounding, no HSM, no ascitis  Extremities: No LE edema bilaterally, 2+ pulses bilaterally in upper/lower extremities    Skin: Normal color and turgor. No rashes, no petechia, no ecchymoses.   Neuro: AAOx3    Labs:     Recent Labs   Lab  08/12/18   1030  08/12/18   1355   WBC  9.15  9.88   HGB  14.6  14.8   HCT  44.0  44.7   PLT  207  206   LYMPH  13.4*  1.2  2.6*  0.3*   MONO  7.4  0.7  0.8*  0.1*   EOSINOPHIL  9.1*  0.2       No results for input(s): APTT, INR, PTT in the last 168 hours.     Recent Labs   Lab  08/12/18   1030   GLU  105   CALCIUM  8.8   ALBUMIN  3.2*   PROT  5.8*   NA  139   K  4.2   CO2  25   CL  104   BUN  14   CREATININE  1.0   ALKPHOS  85   ALT  13   AST  20   BILITOT  1.3*     Estimated Creatinine Clearance: 92 mL/min (based on SCr of 1 mg/dL).    Recent Labs   Lab  08/12/18   1030   BNP  39       No results for input(s): LDH in the last 168 hours.    Microbiology Results (last 7 days)     ** No results found for the last 168 hours. **            ASSESSMENT AND PLAN:     # Right lung PE and right lower leg DVT  -Not clear if this is a provoked event  -Continue anticoagulation and investigate other possible hypercoagulable states  -Currently does not meet clinical criteria for catheter thrombolysis  -Admit to medicine for anticoagulation and further work up  -Case discussed with Dr Benito Mahoney MD  Interventional Cardiovascular Fellow, PGY VII  Pager: [phone]  8/12/2018 2:32 PM

## 2018-08-12 NOTE — SUBJECTIVE & OBJECTIVE
Past Medical History:   Diagnosis Date    Cancer     Hyperlipidemia     Migraine headache     Pneumonia     Shingles     Skin cancer        Past Surgical History:   Procedure Laterality Date    CARDIAC SURGERY      COLONOSCOPY      TONSILLECTOMY      TRANSURETHRAL RESECTION OF PROSTATE         Review of patient's allergies indicates:   Allergen Reactions    Sulfa (sulfonamide antibiotics) Shortness Of Breath       No current facility-administered medications on file prior to encounter.      Current Outpatient Medications on File Prior to Encounter   Medication Sig    albuterol (PROAIR HFA) 90 mcg/actuation inhaler Inhale 2 puffs into the lungs every 6 (six) hours as needed for Wheezing. Rescue    budesonide/formoterol fumarate (SYMBICORT INHL) Inhale into the lungs.    montelukast (SINGULAIR) 10 mg tablet Take 10 mg by mouth every evening.    multivitamin (THERAGRAN) per tablet Take 1 tablet by mouth once daily.    omeprazole (PRILOSEC) 20 MG capsule Take 20 mg by mouth once daily.    pravastatin (PRAVACHOL) 20 MG tablet Take 20 mg by mouth once daily.    verapamil (CALAN-SR) 240 MG CR tablet Take 240 mg by mouth every evening.     Family History     None        Tobacco Use    Smoking status: Former Smoker     Types: Pipe    Smokeless tobacco: Never Used   Substance and Sexual Activity    Alcohol use: No     Frequency: Never    Drug use: No    Sexual activity: Not on file     Review of Systems   Constitutional: Negative for activity change, appetite change, chills, fatigue, fever and unexpected weight change.   Respiratory: Positive for cough and shortness of breath. Negative for wheezing.    Cardiovascular: Positive for leg swelling. Negative for chest pain and palpitations.   Gastrointestinal: Negative for abdominal distention, abdominal pain, anal bleeding and blood in stool.   Genitourinary: Negative for flank pain, hematuria and urgency.   Musculoskeletal: Negative for arthralgias, back  pain and gait problem.   Neurological: Negative for light-headedness, numbness and headaches.   All other systems reviewed and are negative.    Objective:     Vital Signs (Most Recent):  Temp: 97.6 °F (36.4 °C) (08/12/18 1330)  Pulse: 91 (08/12/18 1401)  Resp: 20 (08/12/18 1311)  BP: (!) 143/67 (08/12/18 1401)  SpO2: (!) 94 % (08/12/18 1401) Vital Signs (24h Range):  Temp:  [97.6 °F (36.4 °C)-98.3 °F (36.8 °C)] 97.6 °F (36.4 °C)  Pulse:  [87-99] 91  Resp:  [20-24] 20  SpO2:  [92 %-99 %] 94 %  BP: (142-160)/(67-80) 143/67     Weight: 131.5 kg (290 lb)  Body mass index is 37.23 kg/m².    Physical Exam   Constitutional: He is oriented to person, place, and time. He appears well-nourished. No distress.   HENT:   Head: Normocephalic and atraumatic.   Nose: Nose normal.   Mouth/Throat: Oropharynx is clear and moist. No oropharyngeal exudate.   Eyes: Conjunctivae and EOM are normal. Pupils are equal, round, and reactive to light. Right eye exhibits no discharge. Left eye exhibits no discharge. No scleral icterus.   Neck: Neck supple. No JVD present. No tracheal deviation present. No thyromegaly present.   Cardiovascular: Normal rate, regular rhythm, normal heart sounds and intact distal pulses. Exam reveals no friction rub.   No murmur heard.  Pulmonary/Chest: Effort normal and breath sounds normal. No stridor. No respiratory distress. He has no wheezes. He has no rales. He exhibits no tenderness.   Abdominal: Soft. Bowel sounds are normal. He exhibits no distension and no mass. There is no tenderness. There is no rebound and no guarding.   Musculoskeletal: Normal range of motion. He exhibits no edema, tenderness or deformity.   RLE with swelling, erythema to tibial region   Lymphadenopathy:     He has no cervical adenopathy.   Neurological: He is alert and oriented to person, place, and time. No cranial nerve deficit. He exhibits normal muscle tone. Coordination normal.   Skin: Skin is warm and dry. No rash noted. He is  not diaphoretic. No erythema. No pallor.   Psychiatric: He has a normal mood and affect. His behavior is normal. Judgment and thought content normal.   Vitals reviewed.        CRANIAL NERVES     CN III, IV, VI   Pupils are equal, round, and reactive to light.  Extraocular motions are normal.        Significant Labs: All pertinent labs within the past 24 hours have been reviewed.    Significant Imaging: I have reviewed and interpreted all pertinent imaging results/findings within the past 24 hours.

## 2018-08-12 NOTE — H&P
[facility] Hospital Medicine  History & Physical    Patient Name: Quentin Cervantes  MRN: [id number]  Admission Date: 8/12/2018  Attending Physician: Lynn Robles MD   Primary Care Provider: No primary care provider on file.    Hospital Medicine Team: [facility] HOSP MED G Lynn Robles MD     Patient information was obtained from patient, spouse/SO and ER records.     Subjective:     Principal Problem:Pulmonary embolus    Chief Complaint:   Chief Complaint   Patient presents with    Shortness of Breath     3 breathing tmts prior to arrival. HX of copd. DC from hospital last week. Right leg swelling and right leg is warm to touch. Pt recieved solumedrol prior to arrival.         HPI: 73M with COPD/asthma, HLD, migraines, RENEE on CPAP, and prior history of skin cancer a few years ago (removed, resolved) who presents to the ED for complaints of shortness of breath for the past 1-2 days. He also has right lower extremity swelling, redness, and pain for the same duration. He drove to [place] from [place] (13 hr ride) with his family to board a cruise to [place]. He denies fever, wheezing, chest pain, or new cough.     Past Medical History:   Diagnosis Date    Cancer     Hyperlipidemia     Migraine headache     Pneumonia     Shingles     Skin cancer        Past Surgical History:   Procedure Laterality Date    CARDIAC SURGERY      COLONOSCOPY      TONSILLECTOMY      TRANSURETHRAL RESECTION OF PROSTATE         Review of patient's allergies indicates:   Allergen Reactions    Sulfa (sulfonamide antibiotics) Shortness Of Breath       No current facility-administered medications on file prior to encounter.      Current Outpatient Medications on File Prior to Encounter   Medication Sig    albuterol (PROAIR HFA) 90 mcg/actuation inhaler Inhale 2 puffs into the lungs every 6 (six) hours as needed for Wheezing. Rescue    budesonide/formoterol fumarate (SYMBICORT INHL) Inhale into the lungs.    montelukast  (SINGULAIR) 10 mg tablet Take 10 mg by mouth every evening.    multivitamin (THERAGRAN) per tablet Take 1 tablet by mouth once daily.    omeprazole (PRILOSEC) 20 MG capsule Take 20 mg by mouth once daily.    pravastatin (PRAVACHOL) 20 MG tablet Take 20 mg by mouth once daily.    verapamil (CALAN-SR) 240 MG CR tablet Take 240 mg by mouth every evening.     Family History     None        Tobacco Use    Smoking status: Former Smoker     Types: Pipe    Smokeless tobacco: Never Used   Substance and Sexual Activity    Alcohol use: No     Frequency: Never    Drug use: No    Sexual activity: Not on file     Review of Systems   Constitutional: Negative for activity change, appetite change, chills, fatigue, fever and unexpected weight change.   Respiratory: Positive for cough and shortness of breath. Negative for wheezing.    Cardiovascular: Positive for leg swelling. Negative for chest pain and palpitations.   Gastrointestinal: Negative for abdominal distention, abdominal pain, anal bleeding and blood in stool.   Genitourinary: Negative for flank pain, hematuria and urgency.   Musculoskeletal: Negative for arthralgias, back pain and gait problem.   Neurological: Negative for light-headedness, numbness and headaches.   All other systems reviewed and are negative.    Objective:     Vital Signs (Most Recent):  Temp: 97.6 °F (36.4 °C) (08/12/18 1330)  Pulse: 91 (08/12/18 1401)  Resp: 20 (08/12/18 1311)  BP: (!) 143/67 (08/12/18 1401)  SpO2: (!) 94 % (08/12/18 1401) Vital Signs (24h Range):  Temp:  [97.6 °F (36.4 °C)-98.3 °F (36.8 °C)] 97.6 °F (36.4 °C)  Pulse:  [87-99] 91  Resp:  [20-24] 20  SpO2:  [92 %-99 %] 94 %  BP: (142-160)/(67-80) 143/67     Weight: 131.5 kg (290 lb)  Body mass index is 37.23 kg/m².    Physical Exam   Constitutional: He is oriented to person, place, and time. He appears well-nourished. No distress.   HENT:   Head: Normocephalic and atraumatic.   Nose: Nose normal.   Mouth/Throat: Oropharynx is  "clear and moist. No oropharyngeal exudate.   Eyes: Conjunctivae and EOM are normal. Pupils are equal, round, and reactive to light. Right eye exhibits no discharge. Left eye exhibits no discharge. No scleral icterus.   Neck: Neck supple. No JVD present. No tracheal deviation present. No thyromegaly present.   Cardiovascular: Normal rate, regular rhythm, normal heart sounds and intact distal pulses. Exam reveals no friction rub.   No murmur heard.  Pulmonary/Chest: Effort normal and breath sounds normal. No stridor. No respiratory distress. He has no wheezes. He has no rales. He exhibits no tenderness.   Abdominal: Soft. Bowel sounds are normal. He exhibits no distension and no mass. There is no tenderness. There is no rebound and no guarding.   Musculoskeletal: Normal range of motion. He exhibits no edema, tenderness or deformity.   RLE with swelling, erythema to tibial region   Lymphadenopathy:     He has no cervical adenopathy.   Neurological: He is alert and oriented to person, place, and time. No cranial nerve deficit. He exhibits normal muscle tone. Coordination normal.   Skin: Skin is warm and dry. No rash noted. He is not diaphoretic. No erythema. No pallor.   Psychiatric: He has a normal mood and affect. His behavior is normal. Judgment and thought content normal.   Vitals reviewed.        CRANIAL NERVES     CN III, IV, VI   Pupils are equal, round, and reactive to light.  Extraocular motions are normal.        Significant Labs: All pertinent labs within the past 24 hours have been reviewed.    Significant Imaging: I have reviewed and interpreted all pertinent imaging results/findings within the past 24 hours.    Assessment/Plan:     * Pulmonary embolus    -Presents with shortness of breath, CTA performed which revealed:  "Right pulmonary embolus extending from right pulmonary artery into the segmental branches of the right upper, middle, and lower lobes.  Additional filling defects within the segmental " "branches of the left upper and lower lobes may represent additional thrombus.  Please note evaluation of the segmental and subsegmental arteries is limited by poor contrast bolus timing. Questionable enlargement of the right ventricle with straightening of the intraventricular septum.  Right heart strain cannot be excluded."  -History of skin cancer in the past, but patient says it was fully removed and issue is resolved. Etiology most likely from immobility during long care ride. Will have patient follow up with home dermatologist.   -Vital signs normal. Troponin mildly elevated, 0.04 - will repeat to make sure downtrending. BNP is in process. -Possible right heart strain on bedside echo by Cardiology.  -Interventional Cardiology was consulted in the ED. No indication for thrombolysis.   -Started on heparin gtt, will continue overnight and plan to transition to NOAC tomorrow if remains clinically stable.   -Formal echo ordered.             Acute deep vein thrombosis (DVT) of right popliteal vein    -DVT u/s revealed complete occlusion of R popliteal and posterior tibial veins  -See plan above          History of skin cancer    -Needs follow up with home dermatologist to make sure no recurrence of skin cancer given acute venous embolism.          RENEE on CPAP    -Stable, resume home CPAP.          Asthma    -No acute issues. Resume home Symbicort, Singulair.           COPD (chronic obstructive pulmonary disease)    -No acute issues. Resume home Symbicort.  -Duonekiana ordered            VTE Risk Mitigation (From admission, onward)        Ordered     heparin 25,000 units in dextrose 5% 250 mL (100 units/mL) bolus from bag; ADDITIONAL PRN BOLUS  As needed (PRN)      08/12/18 1527     heparin 25,000 units in dextrose 5% 250 mL (100 units/mL) bolus from bag; ADDITIONAL PRN BOLUS  As needed (PRN)      08/12/18 1527     heparin 25,000 units in dextrose 5% 250 mL (100 units/mL) infusion  Continuous      08/12/18 6546     " Reason for no Mechanical VTE Prophylaxis  Once      08/12/18 1455     IP VTE HIGH RISK PATIENT  Once      08/12/18 1455             Lynn Robles MD  Department of Hospital Medicine   [facility]

## 2018-08-12 NOTE — ED PROVIDER NOTES
Encounter Date: 8/12/2018       History     Chief Complaint   Patient presents with    Shortness of Breath     3 breathing tmts prior to arrival. HX of copd. DC from hospital last week. Right leg swelling and right leg is warm to touch. Pt recieved solumedrol prior to arrival.      Mr. Cervantes 73 y.o male with mhx of COPD, Asthma, RENEE and Migraine present to ED with SOB and right ankle edema. His right ankle erythema and edema started about 36 hrs ago. Patient lives in [place] and drove down 13 hours to [place] to be onboard a cruise ship today. He uses Prednisolone, albuterol  Montelukast ,and budesonide to control his COPD symptoms. Uses CPAP at night to sleep. They noticed his right ankle was becoming erythema and edematous but they took long road trip down to [place]. Today his SOB got worse and his ankle became warm, painful.  He has slight non productive cough but patient denied fever, chest pain, chill, leg swelling, nausea, and vomiting.           Review of patient's allergies indicates:   Allergen Reactions    Sulfa (sulfonamide antibiotics) Shortness Of Breath     Past Medical History:   Diagnosis Date    Cancer     Hyperlipidemia     Migraine headache     Pneumonia     Shingles     Skin cancer      Past Surgical History:   Procedure Laterality Date    CARDIAC SURGERY      COLONOSCOPY      TONSILLECTOMY      TRANSURETHRAL RESECTION OF PROSTATE       History reviewed. No pertinent family history.  Social History     Tobacco Use    Smoking status: Former Smoker     Types: Pipe    Smokeless tobacco: Never Used   Substance Use Topics    Alcohol use: No     Frequency: Never    Drug use: No     Review of Systems   Constitutional: Negative for activity change, appetite change, chills, diaphoresis, fatigue and fever.   HENT: Negative for congestion, sinus pain and sore throat.    Respiratory: Positive for cough, shortness of breath and wheezing. Negative for chest tightness.    Cardiovascular:  Positive for leg swelling. Negative for chest pain and palpitations.   Gastrointestinal: Negative for abdominal distention, abdominal pain, diarrhea, nausea and vomiting.   Genitourinary: Negative for difficulty urinating and dysuria.   Skin: Positive for color change and rash.   Neurological: Negative for dizziness, weakness and headaches.   Psychiatric/Behavioral: Negative for agitation.       Physical Exam     Initial Vitals [08/12/18 1014]   BP Pulse Resp Temp SpO2   (!) 160/80 99 20 98.3 °F (36.8 °C) (!) 94 %      MAP       --         Physical Exam    Constitutional: He appears well-developed and well-nourished.  Non-toxic appearance. He does not appear ill. No distress.   HENT:   Head: Normocephalic and atraumatic.   Mouth/Throat: Oropharynx is clear and moist.   Eyes: EOM are normal. Pupils are equal, round, and reactive to light.   Neck: Normal range of motion. Neck supple. No JVD present.   Cardiovascular: Normal rate and normal heart sounds. Exam reveals decreased pulses (Could not palpate DP ). Exam reveals no friction rub.    No murmur heard.  Patient tachycardic    Pulmonary/Chest: Effort normal. No accessory muscle usage. No tachypnea. No respiratory distress. He has wheezes in the right upper field. He has no rhonchi. He has no rales.   Abdominal: Soft. Bowel sounds are normal. He exhibits no distension. There is no tenderness.   Musculoskeletal: Normal range of motion.        Right lower leg: He exhibits tenderness and edema.        Left lower leg: Normal.   Neurological: He is alert and oriented to person, place, and time.   Skin: Skin is warm. Capillary refill takes less than 2 seconds. There is erythema.   Skin changes such as thin and flaky skin noticed.    Psychiatric: He has a normal mood and affect. His behavior is normal.         ED Course   Procedures  Labs Reviewed   COMPREHENSIVE METABOLIC PANEL   CBC W/ AUTO DIFFERENTIAL   TROPONIN I   B-TYPE NATRIURETIC PEPTIDE     EKG Readings:  (Independently Interpreted)   Initial Reading: No STEMI.   Normal sinus, left axis deviation, Prolong QTc 494ms.        Imaging Results    None          Medical Decision Making:   Initial Assessment:   Mr. Cervantes 73y.o male with mhx of COPD, Asthma, and Migraine presented to ED today with SOB and right leg swelling. We ordered CBC, CMP, Troponin, BNP, CXR, U/S of Right lower limb, 12 lead EKG, and breathing treatment. Our ddx are but not limited to exacerbation of COPD or asthmas, PE, And sepsis due to Cellulitis of right ankle.   ED Management:  12:45 PM  - ISTAT VBG showed primary respiratory acidosis most likely due to chronic condition.   - CMP did not show any abnormality.  - CBC did not reveal elevated white count which could mean   12:48 PM  -U/S revealed Complete occlusion of the right popliteal and posterior tibial veins. Indicating DVT of right lower limb.   -  Ordered CTA to rule out PE.  - Tropnin elevated 0.043, BNP 39 possible indication for PE.   2:50 PM  - CT result showed that patient has thrombus is identified within the distal right pulmonary artery extending into the segmental branches supplying the right upper, middle, and lower lobes.  Filling defects are also noted within the segmental branches of the left upper and left lower lobes consistent with additional pulmonary emboli.  - Cardiology U/S showed not much of right ventricular strain.   - Patient has diagnosis with PE and will be admitted to hospital medicine.                           Clinical Impression:   The primary encounter diagnosis was Other acute pulmonary embolism without acute cor pulmonale. Diagnoses of Dyspnea and Leg edema, right were also pertinent to this visit.                             Kendell Hernandez MD  Resident  08/12/18 2410

## 2018-08-12 NOTE — ED TRIAGE NOTES
Arrives per EMS from hotel - w/ wife. Pt has been having increasing SOB and difficulty breathing since  Last night using his inhaler and neb w/out relief. Uses CPAP at night.  Has cellulitis in rt lower leg that began last few days. Pt drove from [place] here  approx 12-14 hrs . Denies CO/fever/n/v/abd pain.

## 2018-08-12 NOTE — HPI
73M with COPD/asthma, HLD, migraines, RENEE on CPAP, and prior history of skin cancer a few years ago (removed, resolved) who presents to the ED for complaints of shortness of breath for the past 1-2 days. He also has right lower extremity swelling, redness, and pain for the same duration. He drove to [place] from [place] (13 hr ride) with his family to board a cruise to [place]. He denies fever, wheezing, chest pain, or new cough.     Admitted for acute PE and lower extremity DVT. Possible R heart strain on bedside echo by Cards. Mildly elevated troponin.

## 2018-08-12 NOTE — ASSESSMENT & PLAN NOTE
"-Presents with shortness of breath, CTA performed which revealed:  "Right pulmonary embolus extending from right pulmonary artery into the segmental branches of the right upper, middle, and lower lobes.  Additional filling defects within the segmental branches of the left upper and lower lobes may represent additional thrombus.  Please note evaluation of the segmental and subsegmental arteries is limited by poor contrast bolus timing. Questionable enlargement of the right ventricle with straightening of the intraventricular septum.  Right heart strain cannot be excluded."  -History of skin cancer in the past, but patient says it was fully removed and issue is resolved. Etiology most likely from immobility during long care ride. Will have patient follow up with home dermatologist.   -Vital signs normal. Troponin mildly elevated, 0.04 - will repeat to make sure downtrending. BNP is in process. -Possible right heart strain on bedside echo by Cardiology.  -Interventional Cardiology was consulted in the ED. No indication for thrombolysis.   -Started on heparin gtt, will continue overnight and plan to transition to NOAC tomorrow if remains clinically stable.   -Formal echo ordered.       "

## 2018-08-12 NOTE — ASSESSMENT & PLAN NOTE
-Needs follow up with home dermatologist to make sure no recurrence of skin cancer given acute venous embolism.

## 2018-08-13 VITALS
RESPIRATION RATE: 18 BRPM | WEIGHT: 290 LBS | TEMPERATURE: 99 F | HEIGHT: 74 IN | OXYGEN SATURATION: 92 % | HEART RATE: 71 BPM | BODY MASS INDEX: 37.22 KG/M2 | DIASTOLIC BLOOD PRESSURE: 65 MMHG | SYSTOLIC BLOOD PRESSURE: 123 MMHG

## 2018-08-13 LAB
ALBUMIN SERPL BCP-MCNC: 2.7 G/DL
ALP SERPL-CCNC: 74 U/L
ALT SERPL W/O P-5'-P-CCNC: 12 U/L
ANION GAP SERPL CALC-SCNC: 10 MMOL/L
AORTIC VALVE REGURGITATION: ABNORMAL
AORTIC VALVE STENOSIS: ABNORMAL
AST SERPL-CCNC: 15 U/L
BILIRUB SERPL-MCNC: 0.4 MG/DL
BUN SERPL-MCNC: 20 MG/DL
CALCIUM SERPL-MCNC: 8.8 MG/DL
CHLORIDE SERPL-SCNC: 107 MMOL/L
CO2 SERPL-SCNC: 24 MMOL/L
CREAT SERPL-MCNC: 0.9 MG/DL
DIASTOLIC DYSFUNCTION: YES
EST. GFR  (AFRICAN AMERICAN): >60 ML/MIN/1.73 M^2
EST. GFR  (NON AFRICAN AMERICAN): >60 ML/MIN/1.73 M^2
ESTIMATED PA SYSTOLIC PRESSURE: 68.23
FACT X PPP CHRO-ACNC: 0.71 IU/ML
FACT X PPP CHRO-ACNC: 1.64 IU/ML
GLUCOSE SERPL-MCNC: 138 MG/DL
POTASSIUM SERPL-SCNC: 4 MMOL/L
PROT SERPL-MCNC: 5.7 G/DL
RETIRED EF AND QEF - SEE NOTES: 60 (ref 55–65)
SODIUM SERPL-SCNC: 141 MMOL/L
TRICUSPID VALVE REGURGITATION: ABNORMAL
TROPONIN I SERPL DL<=0.01 NG/ML-MCNC: 0.02 NG/ML

## 2018-08-13 PROCEDURE — 94640 AIRWAY INHALATION TREATMENT: CPT

## 2018-08-13 PROCEDURE — 93306 TTE W/DOPPLER COMPLETE: CPT

## 2018-08-13 PROCEDURE — 63600175 PHARM REV CODE 636 W HCPCS: Performed by: HOSPITALIST

## 2018-08-13 PROCEDURE — 84484 ASSAY OF TROPONIN QUANT: CPT

## 2018-08-13 PROCEDURE — 93306 TTE W/DOPPLER COMPLETE: CPT | Mod: 26,,, | Performed by: INTERNAL MEDICINE

## 2018-08-13 PROCEDURE — 85520 HEPARIN ASSAY: CPT | Mod: 91

## 2018-08-13 PROCEDURE — 80053 COMPREHEN METABOLIC PANEL: CPT

## 2018-08-13 PROCEDURE — 25000003 PHARM REV CODE 250: Performed by: HOSPITALIST

## 2018-08-13 PROCEDURE — 36415 COLL VENOUS BLD VENIPUNCTURE: CPT

## 2018-08-13 PROCEDURE — 25000242 PHARM REV CODE 250 ALT 637 W/ HCPCS: Performed by: HOSPITALIST

## 2018-08-13 PROCEDURE — 94761 N-INVAS EAR/PLS OXIMETRY MLT: CPT

## 2018-08-13 PROCEDURE — 99238 HOSP IP/OBS DSCHRG MGMT 30/<: CPT | Mod: ,,, | Performed by: HOSPITALIST

## 2018-08-13 RX ADMIN — BUDESONIDE AND FORMOTEROL FUMARATE DIHYDRATE 2 PUFF: 160; 4.5 AEROSOL RESPIRATORY (INHALATION) at 09:08

## 2018-08-13 RX ADMIN — IPRATROPIUM BROMIDE AND ALBUTEROL SULFATE 3 ML: .5; 3 SOLUTION RESPIRATORY (INHALATION) at 03:08

## 2018-08-13 RX ADMIN — IPRATROPIUM BROMIDE AND ALBUTEROL SULFATE 3 ML: .5; 3 SOLUTION RESPIRATORY (INHALATION) at 04:08

## 2018-08-13 RX ADMIN — IPRATROPIUM BROMIDE AND ALBUTEROL SULFATE 3 ML: .5; 3 SOLUTION RESPIRATORY (INHALATION) at 11:08

## 2018-08-13 RX ADMIN — RIVAROXABAN 15 MG: 15 TABLET, FILM COATED ORAL at 05:08

## 2018-08-13 RX ADMIN — THERA TABS 1 TABLET: TAB at 09:08

## 2018-08-13 RX ADMIN — HEPARIN SODIUM AND DEXTROSE 16 UNITS/KG/HR: 10000; 5 INJECTION INTRAVENOUS at 01:08

## 2018-08-13 RX ADMIN — RIVAROXABAN 15 MG: 15 TABLET, FILM COATED ORAL at 11:08

## 2018-08-13 NOTE — PLAN OF CARE
CM met with patient and spouse for discharge planning.  Patient is from [place], came to [place] to go on a cruise and ended up in the hospital.  Patient states his wife is staying at the [facility].  He is expecting to be discharged today.  Plan is to discharge from hospital, they will stay in the [facility] for a couple of days and then fly back to [place].      Pharmacy:  [facility]    [facility]   [facility]    PCP:  Dr. Shae Murrell    Payor: [employer]ARE / Plan: [employer] MEDIC[employer] PLAN PPO / Product Type: Medicare Advantage /      08/13/18 1429   Discharge Assessment   Assessment Type Discharge Planning Assessment   Confirmed/corrected address and phone number on facesheet? Yes   Assessment information obtained from? Patient   Expected Length of Stay (days) 3   Communicated expected length of stay with patient/caregiver yes   Prior to hospitilization cognitive status: Alert/Oriented   Prior to hospitalization functional status: Independent   Current cognitive status: Alert/Oriented   Current Functional Status: Independent   Facility Arrived From: Emergency room   Lives With spouse   Able to Return to Prior Arrangements yes   Is patient able to care for self after discharge? Yes   Who are your caregiver(s) and their phone number(s)? Maria T Cervantes - spouse  [phone]   Patient's perception of discharge disposition home or selfcare   Readmission Within The Last 30 Days no previous admission in last 30 days   Patient currently being followed by outpatient case management? No   Patient currently receives any other outside agency services? No   Equipment Currently Used at Home shower chair;bedside commode   Do you have any problems affording any of your prescribed medications? No   Is the patient taking medications as prescribed? yes   Does the patient have transportation home? Yes   Transportation Available family or friend will provide   Does the patient receive services at the  [facility]? No   Discharge Plan A Home with family   Discharge Plan B Home Health   Patient/Family In Agreement With Plan yes

## 2018-08-13 NOTE — PROGRESS NOTES
AVS d/c instructions given to patient, voices understanding. PIV d/cesar x2 sites, LAC 18g cath, RH 20g cath, without redness, edema, d/c, tenderness,  pressure held x5mins, bleeding controlled, gauze dressing applied. Hospital w/c transport ordered, waiting.

## 2018-08-13 NOTE — PLAN OF CARE
Plan is to discharge to the [facility] and patient will fly home with spouse in a couple of days.      CM called patient about making follow up appointment for him and he stated he would rather do it himself.       08/13/18 1702   Final Note   Assessment Type Final Discharge Note   Discharge Disposition Home   Right Care Referral Info   Post Acute Recommendation No Care

## 2018-08-13 NOTE — PLAN OF CARE
Problem: Patient Care Overview  Goal: Plan of Care Review  Outcome: Ongoing (interventions implemented as appropriate)  Report received, care assumed. Safety:  call light in reach, patient oriented to room & instructed how to notify nurse if assistance is needed, questions & concerns addressed, bed in lowest position with wheels locked & side rails up X 2, fall precautions followed, patient free from fall & injury thus far this shift;  VTE/bleeding precautions maintained.  Activity:  patient up per self, weight shifted at least every other hour.  Neurological:  patient A&O X 4, follows commands, equal  strength & dorsi/plantarflexion, neuro checks performed as ordered & WDL.  Respiratory:  patient tolerates room air without distress, denies SOB.  Cardiac:  Denies chest pain, BP stable.  HR stable.  NSR on telemetry, tele discontinued  Afebrile this shift.  GI:  Patient tolerates PO intake well, denies nausea, LBM 8/11/18.  :  patient voids clear yellow urine without foul odor spontaneously & without difficulty, adequate output for shift.  Skin:  CDI.  Devices:  PIV CDI, negative for s/sx of infection & infiltration.  Pain:  patient denies pain.  Will continue to monitor patient. POC reviewed w patient. Continuous heparin drip on-going.

## 2018-08-14 NOTE — DISCHARGE SUMMARY
"[facility] Hospital Medicine  Discharge Summary      Patient Name: Quentin Cervantes  MRN: [id number]  Admission Date: 8/12/2018  Hospital Length of Stay: 1 days  Discharge Date and Time: 8/13/2018  6:57 PM  Attending Physician: Natalee att. providers found   Discharging Provider: Lynn Robles MD  Primary Care Provider: Primary Doctor Natalee  [facility] Medicine Team: [facility] [place] [facility] the [facility]  Lynn Robles MD    HPI:   73M with COPD/asthma, HLD, migraines, RENEE on CPAP, and prior history of skin cancer a few years ago (removed, resolved) who presents to the ED for complaints of shortness of breath for the past 1-2 days. He also has right lower extremity swelling, redness, and pain for the same duration. He drove to [place] from [place] (13 hr ride) with his family to board a cruise to [place]. He denies fever, wheezing, chest pain, or new cough.     Admitted for acute PE and lower extremity DVT. Possible R heart strain on bedside echo by Cards. Mildly elevated troponin.    * No surgery found *      Hospital Course:   Pulmonary embolus     -Presents with shortness of breath, CTA performed which revealed:  "Right pulmonary embolus extending from right pulmonary artery into the segmental branches of the right upper, middle, and lower lobes.  Additional filling defects within the segmental branches of the left upper and lower lobes may represent additional thrombus.  Please note evaluation of the segmental and subsegmental arteries is limited by poor contrast bolus timing. Questionable enlargement of the right ventricle with straightening of the intraventricular septum.  Right heart strain cannot be excluded."  -History of skin cancer in the past, but patient says it was fully removed and issue is resolved. Etiology most likely from immobility during long care ride. Will have patient follow up with home dermatologist.   -Vital signs normal. Troponin mildly elevated, 0.04- quickly downtrended. BNP normal.   -Possible right " heart strain on bedside echo by Cardiology. None on formal echo, only diastolic dysfunction.  -Interventional Cardiology was consulted in the ED. No indication for thrombolysis.   -Started on heparin gtt, continued overnight, transitioned to oral Xarelto on day of discharge. Risks and benefits of anticoagulation explained to patient and wife.                   Acute deep vein thrombosis (DVT) of right popliteal vein     -DVT u/s revealed complete occlusion of R popliteal and posterior tibial veins  -See plan above             History of skin cancer     -Needs follow up with home dermatologist to make sure no recurrence of skin cancer given acute venous embolism.             RENEE on CPAP     -Stable, resume home CPAP.             Asthma     -No acute issues. Resume home Symbicort, Singulair.              COPD (chronic obstructive pulmonary disease)     -No acute issues. Resume home Symbicort.            Consults:     No new Assessment & Plan notes have been filed under this hospital service since the last note was generated.  Service: Hospital Medicine    Final Active Diagnoses:    Diagnosis Date Noted POA    PRINCIPAL PROBLEM:  Pulmonary embolus [I26.99] 08/12/2018 Yes    Acute deep vein thrombosis (DVT) of right popliteal vein [I82.431] 08/12/2018 Yes    COPD (chronic obstructive pulmonary disease) [J44.9] 08/12/2018 Yes    Asthma [J45.909] 08/12/2018 Yes    RENEE on CPAP [G47.33, Z99.89] 08/12/2018 Not Applicable    History of skin cancer [Z85.828] 08/12/2018 Not Applicable      Problems Resolved During this Admission:       Discharged Condition: good    Disposition: Home or Self Care    Follow Up:  Follow-up Information     Schedule an appointment as soon as possible for a visit with Primary Doctor No.               Patient Instructions:      Diet Cardiac       Significant Diagnostic Studies: Labs:   BMP:   Recent Labs   Lab  08/13/18   0332   GLU  138*   NA  141   K  4.0   CL  107   CO2  24   BUN  20    CREATININE  0.9   CALCIUM  8.8    and CBC   Recent Labs   Lab  08/12/18   1355   WBC  9.88   HGB  14.8   HCT  44.7   PLT  206       Pending Diagnostic Studies:     None         Medications:  Reconciled Home Medications:      Medication List      START taking these medications    * XARELTO 15 mg Tab  Generic drug:  rivaroxaban  Take 1 tablet (15 mg total) by mouth 2 (two) times daily with meals. for 21 days     * rivaroxaban 20 mg Tab  Commonly known as:  XARELTO  Take 1 tablet (20 mg total) by mouth daily with dinner or evening meal.         * This list has 2 medication(s) that are the same as other medications prescribed for you. Read the directions carefully, and ask your doctor or other care provider to review them with you.            CONTINUE taking these medications    montelukast 10 mg tablet  Commonly known as:  SINGULAIR  Take 10 mg by mouth every evening.     multivitamin per tablet  Commonly known as:  THERAGRAN  Take 1 tablet by mouth once daily.     omeprazole 20 MG capsule  Commonly known as:  PRILOSEC  Take 20 mg by mouth once daily.     pravastatin 20 MG tablet  Commonly known as:  PRAVACHOL  Take 20 mg by mouth once daily.     PROAIR HFA 90 mcg/actuation inhaler  Generic drug:  albuterol  Inhale 2 puffs into the lungs every 6 (six) hours as needed for Wheezing. Rescue     SYMBICORT INHL  Inhale into the lungs.     verapamil 240 MG CR tablet  Commonly known as:  CALAN-SR  Take 240 mg by mouth every evening.            Indwelling Lines/Drains at time of discharge:   Lines/Drains/Airways          None          Time spent on the discharge of patient: 29 minutes  Patient was seen and examined on the date of discharge and determined to be suitable for discharge.         Lynn Robles MD  Department of Hospital Medicine  [facility]

## 2018-08-14 NOTE — HOSPITAL COURSE
"Pulmonary embolus     -Presents with shortness of breath, CTA performed which revealed:  "Right pulmonary embolus extending from right pulmonary artery into the segmental branches of the right upper, middle, and lower lobes.  Additional filling defects within the segmental branches of the left upper and lower lobes may represent additional thrombus.  Please note evaluation of the segmental and subsegmental arteries is limited by poor contrast bolus timing. Questionable enlargement of the right ventricle with straightening of the intraventricular septum.  Right heart strain cannot be excluded."  -History of skin cancer in the past, but patient says it was fully removed and issue is resolved. Etiology most likely from immobility during long care ride. Will have patient follow up with home dermatologist.   -Vital signs normal. Troponin mildly elevated, 0.04- quickly downtrended. BNP normal.   -Possible right heart strain on bedside echo by Cardiology. None on formal echo, only diastolic dysfunction.  -Interventional Cardiology was consulted in the ED. No indication for thrombolysis.   -Started on heparin gtt, continued overnight, transitioned to oral Xarelto on day of discharge. Risks and benefits of anticoagulation explained to patient and wife.                   Acute deep vein thrombosis (DVT) of right popliteal vein     -DVT u/s revealed complete occlusion of R popliteal and posterior tibial veins  -See plan above             History of skin cancer     -Needs follow up with home dermatologist to make sure no recurrence of skin cancer given acute venous embolism.             RENEE on CPAP     -Stable, resume home CPAP.             Asthma     -No acute issues. Resume home Symbicort, Singulair.              COPD (chronic obstructive pulmonary disease)     -No acute issues. Resume home Symbicort.         "

## 2018-08-16 ENCOUNTER — HOSPITAL ENCOUNTER (INPATIENT)
Age: 73
LOS: 3 days | Discharge: HOME HEALTH | DRG: 202 | End: 2018-08-20
Attending: EMERGENCY MEDICINE | Admitting: FAMILY MEDICINE
Payer: MEDICARE

## 2018-08-16 ENCOUNTER — APPOINTMENT (OUTPATIENT)
Dept: GENERAL RADIOLOGY | Age: 73
DRG: 202 | End: 2018-08-16
Payer: MEDICARE

## 2018-08-16 ENCOUNTER — TELEPHONE (OUTPATIENT)
Dept: PULMONOLOGY | Age: 73
End: 2018-08-16

## 2018-08-16 DIAGNOSIS — Z86.711 HISTORY OF PULMONARY EMBOLUS (PE): ICD-10-CM

## 2018-08-16 DIAGNOSIS — J44.1 COPD EXACERBATION (HCC): Primary | ICD-10-CM

## 2018-08-16 DIAGNOSIS — R06.02 SHORTNESS OF BREATH: ICD-10-CM

## 2018-08-16 DIAGNOSIS — Z79.01 ON CONTINUOUS ORAL ANTICOAGULATION: ICD-10-CM

## 2018-08-16 LAB
ALBUMIN SERPL-MCNC: 3.7 G/DL (ref 3.5–5.1)
ALP BLD-CCNC: 79 U/L (ref 38–126)
ALT SERPL-CCNC: 14 U/L (ref 11–66)
ANION GAP SERPL CALCULATED.3IONS-SCNC: 11 MEQ/L (ref 8–16)
AST SERPL-CCNC: 22 U/L (ref 5–40)
BASOPHILS # BLD: 0.5 %
BASOPHILS ABSOLUTE: 0 THOU/MM3 (ref 0–0.1)
BILIRUB SERPL-MCNC: 0.3 MG/DL (ref 0.3–1.2)
BUN BLDV-MCNC: 15 MG/DL (ref 7–22)
CALCIUM SERPL-MCNC: 8.9 MG/DL (ref 8.5–10.5)
CHLORIDE BLD-SCNC: 103 MEQ/L (ref 98–111)
CO2: 26 MEQ/L (ref 23–33)
CREAT SERPL-MCNC: 1.1 MG/DL (ref 0.4–1.2)
EKG ATRIAL RATE: 75 BPM
EKG P AXIS: 65 DEGREES
EKG P-R INTERVAL: 124 MS
EKG Q-T INTERVAL: 408 MS
EKG QRS DURATION: 110 MS
EKG QTC CALCULATION (BAZETT): 452 MS
EKG R AXIS: -32 DEGREES
EKG T AXIS: 78 DEGREES
EKG VENTRICULAR RATE: 74 BPM
EOSINOPHIL # BLD: 12.3 %
EOSINOPHILS ABSOLUTE: 1.1 THOU/MM3 (ref 0–0.4)
ERYTHROCYTE [DISTWIDTH] IN BLOOD BY AUTOMATED COUNT: 12.8 % (ref 11.5–14.5)
ERYTHROCYTE [DISTWIDTH] IN BLOOD BY AUTOMATED COUNT: 46.1 FL (ref 35–45)
GFR SERPL CREATININE-BSD FRML MDRD: 65 ML/MIN/1.73M2
GLUCOSE BLD-MCNC: 130 MG/DL (ref 70–108)
HCT VFR BLD CALC: 42.1 % (ref 42–52)
HEMOGLOBIN: 14.2 GM/DL (ref 14–18)
IMMATURE GRANS (ABS): 0.06 THOU/MM3 (ref 0–0.07)
IMMATURE GRANULOCYTES: 0.6 %
LYMPHOCYTES # BLD: 9.2 %
LYMPHOCYTES ABSOLUTE: 0.9 THOU/MM3 (ref 1–4.8)
MAGNESIUM: 2.1 MG/DL (ref 1.6–2.4)
MCH RBC QN AUTO: 33 PG (ref 26–33)
MCHC RBC AUTO-ENTMCNC: 33.7 GM/DL (ref 32.2–35.5)
MCV RBC AUTO: 97.9 FL (ref 80–94)
MONOCYTES # BLD: 6.6 %
MONOCYTES ABSOLUTE: 0.6 THOU/MM3 (ref 0.4–1.3)
NUCLEATED RED BLOOD CELLS: 0 /100 WBC
OSMOLALITY CALCULATION: 282 MOSMOL/KG (ref 275–300)
PLATELET # BLD: 278 THOU/MM3 (ref 130–400)
PMV BLD AUTO: 9.8 FL (ref 9.4–12.4)
POTASSIUM SERPL-SCNC: 4.3 MEQ/L (ref 3.5–5.2)
PRO-BNP: 148 PG/ML (ref 0–1800)
RBC # BLD: 4.3 MILL/MM3 (ref 4.7–6.1)
SEG NEUTROPHILS: 70.8 %
SEGMENTED NEUTROPHILS ABSOLUTE COUNT: 6.6 THOU/MM3 (ref 1.8–7.7)
SODIUM BLD-SCNC: 140 MEQ/L (ref 135–145)
TOTAL PROTEIN: 5.8 G/DL (ref 6.1–8)
TROPONIN T: < 0.01 NG/ML
TSH SERPL DL<=0.05 MIU/L-ACNC: 2.55 UIU/ML (ref 0.4–4.2)
WBC # BLD: 9.3 THOU/MM3 (ref 4.8–10.8)

## 2018-08-16 PROCEDURE — 83735 ASSAY OF MAGNESIUM: CPT

## 2018-08-16 PROCEDURE — 99285 EMERGENCY DEPT VISIT HI MDM: CPT

## 2018-08-16 PROCEDURE — 71046 X-RAY EXAM CHEST 2 VIEWS: CPT

## 2018-08-16 PROCEDURE — 93005 ELECTROCARDIOGRAM TRACING: CPT | Performed by: EMERGENCY MEDICINE

## 2018-08-16 PROCEDURE — 85025 COMPLETE CBC W/AUTO DIFF WBC: CPT

## 2018-08-16 PROCEDURE — 83880 ASSAY OF NATRIURETIC PEPTIDE: CPT

## 2018-08-16 PROCEDURE — 80053 COMPREHEN METABOLIC PANEL: CPT

## 2018-08-16 PROCEDURE — 84484 ASSAY OF TROPONIN QUANT: CPT

## 2018-08-16 PROCEDURE — 84443 ASSAY THYROID STIM HORMONE: CPT

## 2018-08-16 PROCEDURE — 36415 COLL VENOUS BLD VENIPUNCTURE: CPT

## 2018-08-16 NOTE — TELEPHONE ENCOUNTER
Pt called in stating he is having a difficult time breathing, really SOB. He informed me that he went on vacation recently for a cruise and did not make it to the cruise. He ended up at [facility] where he was discharged, traveled to [place] and ended up being admitted there also. They diagnosed him with a blood clot in his lung in [place] and put him on a heparin drip, then on Xarelto at discharge. He states they just got back into town last night and his SOB is not getting better. He has used nebulizer treatments throughout the night that do seem to help. Advised pt to go to the ER as we do not have a provider who can see him on an urgent basis at this time. Pt verbalized understanding.

## 2018-08-17 ENCOUNTER — APPOINTMENT (OUTPATIENT)
Dept: CT IMAGING | Age: 73
DRG: 202 | End: 2018-08-17
Payer: MEDICARE

## 2018-08-17 PROBLEM — J44.1 COPD WITH ACUTE EXACERBATION (HCC): Status: RESOLVED | Noted: 2018-08-17 | Resolved: 2018-08-17

## 2018-08-17 PROBLEM — J18.9 CAP (COMMUNITY ACQUIRED PNEUMONIA): Status: RESOLVED | Noted: 2017-07-25 | Resolved: 2018-08-17

## 2018-08-17 PROBLEM — I26.99 PULMONARY EMBOLUS (HCC): Status: ACTIVE | Noted: 2018-08-12

## 2018-08-17 PROBLEM — I82.431 ACUTE DEEP VEIN THROMBOSIS (DVT) OF RIGHT POPLITEAL VEIN (HCC): Status: ACTIVE | Noted: 2018-08-12

## 2018-08-17 PROBLEM — J44.1 COPD EXACERBATION (HCC): Status: ACTIVE | Noted: 2018-08-17

## 2018-08-17 PROBLEM — J45.41 MODERATE PERSISTENT ASTHMA WITH ACUTE EXACERBATION: Status: ACTIVE | Noted: 2018-08-17

## 2018-08-17 LAB
ALLEN TEST: ABNORMAL
ALLEN TEST: POSITIVE
BACTERIA: ABNORMAL /HPF
BASE EXCESS (CALCULATED): 1.1 MMOL/L (ref -2.5–2.5)
BASE EXCESS (CALCULATED): 1.7 MMOL/L (ref -2.5–2.5)
BILIRUBIN URINE: NEGATIVE
BLOOD, URINE: ABNORMAL
CASTS 2: ABNORMAL /LPF
CASTS UA: ABNORMAL /LPF
CHARACTER, URINE: CLEAR
COLLECTED BY:: ABNORMAL
COLLECTED BY:: NORMAL
COLOR: YELLOW
CRYSTALS, UA: ABNORMAL
DEVICE: ABNORMAL
DEVICE: NORMAL
EPITHELIAL CELLS, UA: ABNORMAL /HPF
FLU A ANTIGEN: NEGATIVE
FLU B ANTIGEN: NEGATIVE
GLUCOSE BLD-MCNC: 108 MG/DL (ref 70–108)
GLUCOSE BLD-MCNC: 120 MG/DL (ref 70–108)
GLUCOSE URINE: NEGATIVE MG/DL
GRAM STAIN RESULT: NORMAL
HCO3: 26 MMOL/L (ref 23–28)
HCO3: 28 MMOL/L (ref 23–28)
IFIO2: 3
IFIO2: 4
KETONES, URINE: NEGATIVE
LEUKOCYTE ESTERASE, URINE: NEGATIVE
MISCELLANEOUS 2: ABNORMAL
NITRITE, URINE: NEGATIVE
O2 SATURATION: 96 %
O2 SATURATION: 97 %
PCO2: 43 MMHG (ref 35–45)
PCO2: 47 MMHG (ref 35–45)
PH BLOOD GAS: 7.38 (ref 7.35–7.45)
PH BLOOD GAS: 7.4 (ref 7.35–7.45)
PH UA: 6
PO2: 82 MMHG (ref 71–104)
PO2: 89 MMHG (ref 71–104)
PROTEIN UA: NEGATIVE
RBC URINE: ABNORMAL /HPF
RENAL EPITHELIAL, UA: ABNORMAL
SOURCE, BLOOD GAS: ABNORMAL
SOURCE, BLOOD GAS: NORMAL
SPECIFIC GRAVITY, URINE: 1.01 (ref 1–1.03)
UROBILINOGEN, URINE: 0.2 EU/DL
WBC UA: ABNORMAL /HPF
YEAST: ABNORMAL

## 2018-08-17 PROCEDURE — 70486 CT MAXILLOFACIAL W/O DYE: CPT

## 2018-08-17 PROCEDURE — 2709999900 HC NON-CHARGEABLE SUPPLY

## 2018-08-17 PROCEDURE — 36415 COLL VENOUS BLD VENIPUNCTURE: CPT

## 2018-08-17 PROCEDURE — 87205 SMEAR GRAM STAIN: CPT

## 2018-08-17 PROCEDURE — 6360000002 HC RX W HCPCS: Performed by: FAMILY MEDICINE

## 2018-08-17 PROCEDURE — 6370000000 HC RX 637 (ALT 250 FOR IP): Performed by: FAMILY MEDICINE

## 2018-08-17 PROCEDURE — 94640 AIRWAY INHALATION TREATMENT: CPT

## 2018-08-17 PROCEDURE — 6360000002 HC RX W HCPCS: Performed by: NURSE PRACTITIONER

## 2018-08-17 PROCEDURE — 6360000002 HC RX W HCPCS: Performed by: INTERNAL MEDICINE

## 2018-08-17 PROCEDURE — A6250 SKIN SEAL PROTECT MOISTURIZR: HCPCS

## 2018-08-17 PROCEDURE — 6370000000 HC RX 637 (ALT 250 FOR IP): Performed by: INTERNAL MEDICINE

## 2018-08-17 PROCEDURE — 82948 REAGENT STRIP/BLOOD GLUCOSE: CPT

## 2018-08-17 PROCEDURE — 99223 1ST HOSP IP/OBS HIGH 75: CPT | Performed by: NURSE PRACTITIONER

## 2018-08-17 PROCEDURE — 2700000000 HC OXYGEN THERAPY PER DAY

## 2018-08-17 PROCEDURE — 87804 INFLUENZA ASSAY W/OPTIC: CPT

## 2018-08-17 PROCEDURE — 87040 BLOOD CULTURE FOR BACTERIA: CPT

## 2018-08-17 PROCEDURE — 2580000003 HC RX 258: Performed by: INTERNAL MEDICINE

## 2018-08-17 PROCEDURE — 1200000000 HC SEMI PRIVATE

## 2018-08-17 PROCEDURE — 2580000003 HC RX 258: Performed by: NURSE PRACTITIONER

## 2018-08-17 PROCEDURE — 81001 URINALYSIS AUTO W/SCOPE: CPT

## 2018-08-17 PROCEDURE — 36600 WITHDRAWAL OF ARTERIAL BLOOD: CPT

## 2018-08-17 PROCEDURE — 82803 BLOOD GASES ANY COMBINATION: CPT

## 2018-08-17 PROCEDURE — 93010 ELECTROCARDIOGRAM REPORT: CPT | Performed by: NUCLEAR MEDICINE

## 2018-08-17 PROCEDURE — 99222 1ST HOSP IP/OBS MODERATE 55: CPT | Performed by: INTERNAL MEDICINE

## 2018-08-17 PROCEDURE — 6370000000 HC RX 637 (ALT 250 FOR IP): Performed by: NURSE PRACTITIONER

## 2018-08-17 RX ORDER — ONDANSETRON 4 MG/1
4 TABLET, ORALLY DISINTEGRATING ORAL EVERY 8 HOURS PRN
Status: DISCONTINUED | OUTPATIENT
Start: 2018-08-17 | End: 2018-08-20 | Stop reason: HOSPADM

## 2018-08-17 RX ORDER — IPRATROPIUM BROMIDE AND ALBUTEROL SULFATE 2.5; .5 MG/3ML; MG/3ML
1 SOLUTION RESPIRATORY (INHALATION)
Status: DISCONTINUED | OUTPATIENT
Start: 2018-08-17 | End: 2018-08-17

## 2018-08-17 RX ORDER — SODIUM CHLORIDE 0.9 % (FLUSH) 0.9 %
10 SYRINGE (ML) INJECTION PRN
Status: DISCONTINUED | OUTPATIENT
Start: 2018-08-17 | End: 2018-08-20 | Stop reason: HOSPADM

## 2018-08-17 RX ORDER — DOCUSATE SODIUM 100 MG/1
100 CAPSULE, LIQUID FILLED ORAL 2 TIMES DAILY PRN
Status: DISCONTINUED | OUTPATIENT
Start: 2018-08-17 | End: 2018-08-20 | Stop reason: HOSPADM

## 2018-08-17 RX ORDER — PREDNISONE 20 MG/1
20 TABLET ORAL 2 TIMES DAILY
Status: DISCONTINUED | OUTPATIENT
Start: 2018-08-17 | End: 2018-08-17

## 2018-08-17 RX ORDER — ALBUTEROL SULFATE 2.5 MG/3ML
2.5 SOLUTION RESPIRATORY (INHALATION) EVERY 4 HOURS PRN
Status: DISCONTINUED | OUTPATIENT
Start: 2018-08-17 | End: 2018-08-17

## 2018-08-17 RX ORDER — DEXTROSE MONOHYDRATE 25 G/50ML
12.5 INJECTION, SOLUTION INTRAVENOUS PRN
Status: DISCONTINUED | OUTPATIENT
Start: 2018-08-17 | End: 2018-08-20 | Stop reason: HOSPADM

## 2018-08-17 RX ORDER — PREDNISONE 20 MG/1
40 TABLET ORAL DAILY
Status: DISCONTINUED | OUTPATIENT
Start: 2018-08-18 | End: 2018-08-17

## 2018-08-17 RX ORDER — IBUPROFEN 200 MG
400 TABLET ORAL EVERY 6 HOURS PRN
Status: ON HOLD | COMMUNITY
End: 2018-08-20 | Stop reason: HOSPADM

## 2018-08-17 RX ORDER — IPRATROPIUM BROMIDE AND ALBUTEROL SULFATE 2.5; .5 MG/3ML; MG/3ML
1 SOLUTION RESPIRATORY (INHALATION) EVERY 4 HOURS
COMMUNITY
End: 2018-09-18 | Stop reason: ALTCHOICE

## 2018-08-17 RX ORDER — SODIUM CHLORIDE 0.9 % (FLUSH) 0.9 %
10 SYRINGE (ML) INJECTION EVERY 12 HOURS SCHEDULED
Status: DISCONTINUED | OUTPATIENT
Start: 2018-08-17 | End: 2018-08-20 | Stop reason: HOSPADM

## 2018-08-17 RX ORDER — ACETAMINOPHEN 325 MG/1
650 TABLET ORAL EVERY 4 HOURS PRN
Status: DISCONTINUED | OUTPATIENT
Start: 2018-08-17 | End: 2018-08-20 | Stop reason: HOSPADM

## 2018-08-17 RX ORDER — MONTELUKAST SODIUM 10 MG/1
10 TABLET ORAL DAILY
Status: DISCONTINUED | OUTPATIENT
Start: 2018-08-17 | End: 2018-08-20 | Stop reason: HOSPADM

## 2018-08-17 RX ORDER — PRAVASTATIN SODIUM 20 MG
20 TABLET ORAL NIGHTLY
Status: DISCONTINUED | OUTPATIENT
Start: 2018-08-17 | End: 2018-08-20 | Stop reason: HOSPADM

## 2018-08-17 RX ORDER — METHYLPREDNISOLONE SODIUM SUCCINATE 40 MG/ML
40 INJECTION, POWDER, LYOPHILIZED, FOR SOLUTION INTRAMUSCULAR; INTRAVENOUS EVERY 12 HOURS
Status: DISCONTINUED | OUTPATIENT
Start: 2018-08-17 | End: 2018-08-19

## 2018-08-17 RX ORDER — CLOTRIMAZOLE AND BETAMETHASONE DIPROPIONATE 10; .5 MG/ML; MG/ML
LOTION TOPICAL 2 TIMES DAILY
COMMUNITY
End: 2018-10-26 | Stop reason: ALTCHOICE

## 2018-08-17 RX ORDER — VERAPAMIL HYDROCHLORIDE 240 MG/1
240 TABLET, FILM COATED, EXTENDED RELEASE ORAL EVERY MORNING
Status: DISCONTINUED | OUTPATIENT
Start: 2018-08-17 | End: 2018-08-20 | Stop reason: HOSPADM

## 2018-08-17 RX ORDER — ACETAMINOPHEN 325 MG/1
650 TABLET ORAL EVERY 6 HOURS PRN
Status: ON HOLD | COMMUNITY
End: 2019-11-20 | Stop reason: HOSPADM

## 2018-08-17 RX ORDER — ALBUTEROL SULFATE 2.5 MG/3ML
2.5 SOLUTION RESPIRATORY (INHALATION)
Status: DISCONTINUED | OUTPATIENT
Start: 2018-08-17 | End: 2018-08-20 | Stop reason: HOSPADM

## 2018-08-17 RX ORDER — FLUTICASONE PROPIONATE 50 MCG
1 SPRAY, SUSPENSION (ML) NASAL DAILY PRN
Status: DISCONTINUED | OUTPATIENT
Start: 2018-08-17 | End: 2018-08-20 | Stop reason: HOSPADM

## 2018-08-17 RX ORDER — OMEPRAZOLE 20 MG/1
20 CAPSULE, DELAYED RELEASE ORAL DAILY
COMMUNITY

## 2018-08-17 RX ORDER — NICOTINE POLACRILEX 4 MG
15 LOZENGE BUCCAL PRN
Status: DISCONTINUED | OUTPATIENT
Start: 2018-08-17 | End: 2018-08-20 | Stop reason: HOSPADM

## 2018-08-17 RX ORDER — DEXTROSE MONOHYDRATE 50 MG/ML
100 INJECTION, SOLUTION INTRAVENOUS PRN
Status: DISCONTINUED | OUTPATIENT
Start: 2018-08-17 | End: 2018-08-20 | Stop reason: HOSPADM

## 2018-08-17 RX ADMIN — RIVAROXABAN 15 MG: 15 TABLET, FILM COATED ORAL at 18:17

## 2018-08-17 RX ADMIN — Medication 10 ML: at 09:31

## 2018-08-17 RX ADMIN — IPRATROPIUM BROMIDE AND ALBUTEROL SULFATE 1 AMPULE: .5; 3 SOLUTION RESPIRATORY (INHALATION) at 11:29

## 2018-08-17 RX ADMIN — Medication 10 ML: at 21:47

## 2018-08-17 RX ADMIN — MONTELUKAST SODIUM 10 MG: 10 TABLET, FILM COATED ORAL at 09:29

## 2018-08-17 RX ADMIN — PREDNISONE 20 MG: 20 TABLET ORAL at 09:29

## 2018-08-17 RX ADMIN — ALBUTEROL SULFATE 2.5 MG: 2.5 SOLUTION RESPIRATORY (INHALATION) at 10:32

## 2018-08-17 RX ADMIN — PRAVASTATIN SODIUM 20 MG: 20 TABLET ORAL at 21:47

## 2018-08-17 RX ADMIN — PIPERACILLIN SODIUM AND TAZOBACTAM SODIUM 3.38 G: 3; .375 INJECTION, POWDER, LYOPHILIZED, FOR SOLUTION INTRAVENOUS at 23:51

## 2018-08-17 RX ADMIN — PIPERACILLIN SODIUM AND TAZOBACTAM SODIUM 3.38 G: 3; .375 INJECTION, POWDER, LYOPHILIZED, FOR SOLUTION INTRAVENOUS at 18:17

## 2018-08-17 RX ADMIN — IPRATROPIUM BROMIDE AND ALBUTEROL SULFATE 1 AMPULE: .5; 3 SOLUTION RESPIRATORY (INHALATION) at 07:50

## 2018-08-17 RX ADMIN — RIVAROXABAN 15 MG: 15 TABLET, FILM COATED ORAL at 11:01

## 2018-08-17 RX ADMIN — METHYLPREDNISOLONE SODIUM SUCCINATE 40 MG: 40 INJECTION, POWDER, FOR SOLUTION INTRAMUSCULAR; INTRAVENOUS at 18:17

## 2018-08-17 RX ADMIN — ALBUTEROL SULFATE 2.5 MG: 2.5 SOLUTION RESPIRATORY (INHALATION) at 04:11

## 2018-08-17 RX ADMIN — ALBUTEROL SULFATE 2.5 MG: 2.5 SOLUTION RESPIRATORY (INHALATION) at 20:13

## 2018-08-17 RX ADMIN — AZITHROMYCIN MONOHYDRATE 500 MG: 500 INJECTION, POWDER, LYOPHILIZED, FOR SOLUTION INTRAVENOUS at 11:01

## 2018-08-17 RX ADMIN — ALBUTEROL SULFATE 2.5 MG: 2.5 SOLUTION RESPIRATORY (INHALATION) at 15:39

## 2018-08-17 RX ADMIN — VERAPAMIL HYDROCHLORIDE 240 MG: 240 TABLET, FILM COATED, EXTENDED RELEASE ORAL at 09:29

## 2018-08-17 RX ADMIN — Medication 2 PUFF: at 07:50

## 2018-08-17 ASSESSMENT — ENCOUNTER SYMPTOMS
NAUSEA: 0
VOMITING: 0
ABDOMINAL PAIN: 0
COUGH: 1
SORE THROAT: 0
BLOOD IN STOOL: 0
DIARRHEA: 0
BACK PAIN: 0
WHEEZING: 0
SHORTNESS OF BREATH: 1

## 2018-08-17 ASSESSMENT — PAIN SCALES - GENERAL
PAINLEVEL_OUTOF10: 0
PAINLEVEL_OUTOF10: 0

## 2018-08-17 NOTE — ED NOTES
Pt is assisted to and from the bathroom. PT is updated on POC and pending admission. Will continue to monitor the patient.      Carley Mosher RN  08/17/18 0626

## 2018-08-17 NOTE — CARE COORDINATION
8/17/18, 2:02 PM      Ru Flores       Admitted from: ED 8/16/2018/ [address] day: 0   Location: [address] [place] [address] Reason for admit: COPD exacerbation ([facility] [J44.1] Status: inpatient  Admit order signed?: no  PMH:  has a past medical history of Arthritis; Asthma; Cancer ([facility] [address] [facility] Chickenpox; Hyperlipidemia; Measles; Migraines; Mumps; and Sleep apnea. Procedure: no  Pertinent abnormal Imaging: Chest xray:  Stable chest negative for active pathology. Age-related marking changes are noted.           Medications:  Scheduled Meds:   mometasone-formoterol  2 puff Inhalation BID    verapamil  240 mg Oral QAM    pravastatin  20 mg Oral Nightly    montelukast  10 mg Oral Daily    ipratropium-albuterol  1 ampule Inhalation Q4H WA    sodium chloride flush  10 mL Intravenous 2 times per day    azithromycin  500 mg Intravenous Q24H    rivaroxaban  15 mg Oral BID WC    [START ON 8/18/2018] predniSONE  40 mg Oral Daily     Continuous Infusions:   Pertinent Info/Orders/Treatment Plan: ( recently treated for blot clot in lung that was diagnosed in [place]) Xarelto continued. I&O. Daily weights. Pulmonology consult for copd exacerbation,  Diet: DIET GENERAL;   DVT Prophylaxis: Xarelto stand-alone if home medication  Smoking status:  reports that he has never smoked.  He has never used smokeless tobacco.   Influenza Vaccination Screening Completed: n/a  Pneumonia Vaccination Screening Completed: yes  PCP: YESY White CNP  Readmission: no  Readmission Risk Score: 8%    Discharge Planning  Current Residence:  Private Residence  Living Arrangements:  Spouse/Significant Other   Support Systems:  Spouse/Significant Other, Parent, Children  Current Services PTA:     Potential Assistance Needed:  N/A  Potential Assistance Purchasing Medications:  No  Does patient want to participate in local refill/ meds to beds program?  No  Type of Home Care Services:  None  Patient expects to be discharged to:  home  Expected Discharge date:  08/21/18  Follow Up Appointment: Best Day/ Time: Monday AM    Discharge Plan: I spoke with Danika Aguilar, his wife and his daughter. He plans to return home with his wife at discharge. Has Cpap and a nebulizer at home. Would like HH at discharge.  SW consulted     Evaluation: yes

## 2018-08-17 NOTE — ED PROVIDER NOTES
reviewed. DIFFERENTIAL DIAGNOSIS:   COPD exacerbation, chest pain, PE, less likely ACS    DIAGNOSTIC RESULTS     EKG: All EKG's are interpreted by the Emergency Department Physician who either signs or Co-signs this chart in the absence of a cardiologist.    Interpreted by MD Pérez Portillo. Rate: 74 bpm  FL interval: 124 ms  QRS duration: 110 ms  QTc: 452 ms  P-R-T axes: 65, -32, 78  Normal sinus rhythm  Left axis deviation,   Incomplete RBBB  Minimal voltage criteria for LVH, may be normal variant  Septal infarct, age undetermined  No STEMI  Compared to old EKG on 07-    RADIOLOGY: non-plain film images(s) such as CT, Ultrasound and MRI are read by the radiologist.    XR CHEST STANDARD (2 VW)   Final Result   Stable chest negative for active pathology. Age-related marking changes are noted. **This report has been created using voice recognition software. It may contain minor errors which are inherent in voice recognition technology. **      Final report electronically signed by Dr. Tresa Vieyra on 8/16/2018 10:40 PM          [x] Visualized and interpreted by me   [x] Radiologist's Wet Read Report Reviewed   [] Discussed with Radiologist.    Lia Saleh:   Results for orders placed or performed during the hospital encounter of 08/16/18   CBC auto differential   Result Value Ref Range    WBC 9.3 4.8 - 10.8 thou/mm3    RBC 4.30 (L) 4.70 - 6.10 mill/mm3    Hemoglobin 14.2 14.0 - 18.0 gm/dl    Hematocrit 42.1 42.0 - 52.0 %    MCV 97.9 (H) 80.0 - 94.0 fL    MCH 33.0 26.0 - 33.0 pg    MCHC 33.7 32.2 - 35.5 gm/dl    RDW-CV 12.8 11.5 - 14.5 %    RDW-SD 46.1 (H) 35.0 - 45.0 fL    Platelets 979 886 - 829 thou/mm3    MPV 9.8 9.4 - 12.4 fL    Seg Neutrophils 70.8 %    Lymphocytes 9.2 %    Monocytes 6.6 %    Eosinophils 12.3 %    Basophils 0.5 %    Immature Granulocytes 0.6 %    Segs Absolute 6.6 1.8 - 7.7 thou/mm3    Lymphocytes # 0.9 (L) 1.0 - 4.8 thou/mm3    Monocytes # 0.6 0.4 - 1.3 thou/mm3 Eosinophils # 1.1 (H) 0.0 - 0.4 thou/mm3    Basophils # 0.0 0.0 - 0.1 thou/mm3    Immature Grans (Abs) 0.06 0.00 - 0.07 thou/mm3    nRBC 0 /100 wbc   Comprehensive Metabolic Panel   Result Value Ref Range    Glucose 130 (H) 70 - 108 mg/dL    CREATININE 1.1 0.4 - 1.2 mg/dL    BUN 15 7 - 22 mg/dL    Sodium 140 135 - 145 meq/L    Potassium 4.3 3.5 - 5.2 meq/L    Chloride 103 98 - 111 meq/L    CO2 26 23 - 33 meq/L    Calcium 8.9 8.5 - 10.5 mg/dL    AST 22 5 - 40 U/L    Alkaline Phosphatase 79 38 - 126 U/L    Total Protein 5.8 (L) 6.1 - 8.0 g/dL    Alb 3.7 3.5 - 5.1 g/dL    Total Bilirubin 0.3 0.3 - 1.2 mg/dL    ALT 14 11 - 66 U/L   Troponin   Result Value Ref Range    Troponin T < 0.010 ng/ml   Brain Natriuretic Peptide   Result Value Ref Range    Pro-.0 0.0 - 1800.0 pg/mL   Magnesium   Result Value Ref Range    Magnesium 2.1 1.6 - 2.4 mg/dL   TSH without Reflex   Result Value Ref Range    TSH 2.550 0.400 - 4.20 uIU/mL   Anion Gap   Result Value Ref Range    Anion Gap 11.0 8.0 - 16.0 meq/L   Osmolality   Result Value Ref Range    Osmolality Calc 282.0 275.0 - 300 mOsmol/kg   Glomerular Filtration Rate, Estimated   Result Value Ref Range    Est, Glom Filt Rate 65 (A) ml/min/1.73m2   Blood gas, arterial   Result Value Ref Range    pH, Blood Gas 7.40 7.35 - 7.45    PCO2 43 35 - 45 mmhg    PO2 89 71 - 104 mmhg    HCO3 26 23 - 28 mmol/l    Base Excess (Calculated) 1.1 -2.5 - 2.5 mmol/l    O2 Sat 97 %    IFIO2 3     DEVICE Cannula     Mark Test Positive     Source: L Radial     COLLECTED BY: [id number]    EKG SOB   Result Value Ref Range    Ventricular Rate 74 BPM    Atrial Rate 75 BPM    P-R Interval 124 ms    QRS Duration 110 ms    Q-T Interval 408 ms    QTc Calculation (Bazett) 452 ms    P Axis 65 degrees    R Axis -32 degrees    T Axis 78 degrees       EMERGENCY DEPARTMENT COURSE:   Vitals:    Vitals:    08/16/18 2225 08/17/18 0003 08/17/18 0106 08/17/18 0220   BP: (!) 140/76 (!) 153/76 (!) 155/69 (!) 154/84   Pulse: 76 86 75 75   Resp: 26 24 22 21   Temp: 99 °F (37.2 °C)      TempSrc: Oral      SpO2: 93% 96% 97% 96%   Weight: 290 lb (131.5 kg)      Height: 6' 2\" (1.88 m)        Patient was seen and evaluated emergency department. History physical were completed. Diagnostic labs and imaging studies reviewed. Workup demonstrates no evidence of acute coronary syndrome or pneumonia. The patient has persistent tachypnea and required supplemental O2 to maintain his oxygen levels at a normal range. I recommended consideration for admission and contacted the hospitalist service and they graciously will admit and assume further care and orders for patient at this time. FINAL IMPRESSION      1. COPD exacerbation ([facility] [address]    2. Shortness of breath    3. History of pulmonary embolus (PE)    4. On continuous oral anticoagulation          DISPOSITION/PLAN   DISPOSITION Admitted 08/17/2018 03:14:00 AM      PATIENT REFERRED TO:  Mala Luo MD  [address], APRN [facility]  [address]  [phone]          Scribe:  Mari Lakhani 8/17/18 12:25 AM Scribing for and in the presence of Dr. Bettie Bernardo M.D. Signed by: Jacob Lerner, 08/17/18 3:20 AM    Provider:  I personally performed the services described in the documentation, reviewed and edited the documentation which was dictated to the scribe in my presence, and it accurately records my words and actions.     Dr. Bettie Bernardo M.D 8/17/18 3:20 AM          Bettie Bernardo MD  08/17/18 3564

## 2018-08-17 NOTE — PLAN OF CARE
Problem: Impaired respiratory status  Goal: Clear lung sounds  Clear lung sounds    Outcome: Ongoing  Improve breath sounds, increase aeration and decrease WOB.

## 2018-08-17 NOTE — ED NOTES
Pt is resting on cart with call light in reach. Pt is updated on POC and pending results. Will continue to monitor the patient.      Rachael Montgomery RN  08/17/18 2812

## 2018-08-17 NOTE — ED NOTES
Pt is resting on cart with call light in reach. PT is updated on POC and pending results. Will continue to monitor the patient.      Abdirizak Major RN  08/17/18 0248

## 2018-08-17 NOTE — CONSULTS
paroxysmal nocturnal dyspnea. He denies any fever, chills or rigors at this time. PMHx   Past Medical History      Diagnosis Date    Arthritis     Asthma     Cancer ([address]     skin    Chickenpox     Hyperlipidemia     Measles     Migraines     Mumps     Sleep apnea       Past Surgical History        Procedure Laterality Date    COLONOSCOPY      CORONARY ANGIOPLASTY N/A 2017/2018    Dr. Ifrah ORTEGA  1990     Meds    Current Medications    mometasone-formoterol  2 puff Inhalation BID    verapamil  240 mg Oral QAM    pravastatin  20 mg Oral Nightly    montelukast  10 mg Oral Daily    ipratropium-albuterol  1 ampule Inhalation Q4H WA    sodium chloride flush  10 mL Intravenous 2 times per day    azithromycin  500 mg Intravenous Q24H    rivaroxaban  15 mg Oral BID WC    [START ON 8/18/2018] predniSONE  40 mg Oral Daily     fluticasone, sodium chloride flush, docusate sodium, ondansetron, acetaminophen  IV Drips/Infusions    Home Medications  Prescriptions Prior to Admission: rivaroxaban (XARELTO) 15 MG TABS tablet, 15 mg twice a day for 21 days and then 20 mg once daily  acetaminophen (TYLENOL) 325 MG tablet, Take 650 mg by mouth every 6 hours as needed for Pain  ibuprofen (ADVIL;MOTRIN) 200 MG tablet, Take 400 mg by mouth every 6 hours as needed for Pain  ipratropium-albuterol (DUONEB) 0.5-2.5 (3) MG/3ML SOLN nebulizer solution, Inhale 1 vial into the lungs every 4 hours  omeprazole (PRILOSEC) 20 MG delayed release capsule, Take 20 mg by mouth daily  clotrimazole-betamethasone (LOTRISONE) 1-0.05 % lotion, Apply topically 2 times daily Apply topically 2 times daily. budesonide-formoterol (SYMBICORT) 160-4.5 MCG/ACT AERO, Inhale 2 puffs into the lungs 2 times daily  CPAP Machine MISC, by Does not apply route Please change CPAP to APAP (autoCPAP) range 13 - 20- cwp.   Multiple Vitamin (MULTI-VITAMIN DAILY PO), Take 1 tablet by mouth daily Skin: No itching. Vitals     height is 6' 2\" (1.88 m) and weight is 290 lb (131.5 kg). His oral temperature is 98.9 °F (37.2 °C). His blood pressure is 166/95 (abnormal) and his pulse is 80. His respiration is 20 and oxygen saturation is 96%. Body mass index is 37.23 kg/m². SUPPLEMENTAL O2: O2 Flow Rate (L/min): 4 L/min       I/O      Intake/Output Summary (Last 24 hours) at 08/17/18 1308  Last data filed at 08/17/18 1259   Gross per 24 hour   Intake                0 ml   Output              900 ml   Net             -900 ml     No intake/output data recorded. Patient Vitals for the past 96 hrs (Last 3 readings):   Weight   08/16/18 2225 290 lb (131.5 kg)       Exam   General Appearance: moderately built, moderately nourished in no acute distress on O2 via nasal cannula at 4Lpm  HEENT: Normal, Head is normocephalic, atraumatic. Oropharynx is clear and moist.  No oral thrush. PERRL. Minimal tenderness over maxillary sinuses. Neck - Supple, No JVD present. No tracheal deviation. Lungs - Bilateral air entry present. Bilateral good breath sounds heard. Bilateral diffuse expiratory wheezes. No rales. Cardiovascular - Heart sounds are normal.  Regular rhythm normal rate without murmur, gallop or rub. Abdomen - Soft, nontender, nondistended, no masses or organomegaly  Neurologic - Awake, alert, oriented. There are no focal motor or sensory deficits  Extremities - No cyanosis, clubbing or edema. Right leg swelling > Left leg  Musculoskeletal: Normal range of motion. Patient exhibits no tenderness. Lymphadenopathy:  No cervical adenopathy. Psychiatric: Patient  has a normal mood and affect. Skin - No bruising or bleeding.     Labs  - Old records and notes have been reviewed in [facility]   ABG  Lab Results   Component Value Date    PH 7.40 08/17/2018    PO2 89 08/17/2018    PCO2 43 08/17/2018    HCO3 26 08/17/2018    O2SAT 97 08/17/2018     Lab Results   Component Value Date    IFIO2 3 08/17/2018 CBC  Recent Labs      08/16/18   2319   WBC  9.3   RBC  4.30*   HGB  14.2   HCT  42.1   MCV  97.9*   MCH  33.0   MCHC  33.7   PLT  278   MPV  9.8      BMP  Recent Labs      08/16/18 2319   NA  140   K  4.3   CL  103   CO2  26   BUN  15   CREATININE  1.1   GLUCOSE  130*   MG  2.1   CALCIUM  8.9     LFT  Recent Labs      08/16/18   2319   AST  22   ALT  14   BILITOT  0.3   ALKPHOS  79     TROP  Lab Results   Component Value Date    TROPONINT < 0.010 08/16/2018    TROPONINT < 0.010 07/15/2018    TROPONINT < 0.010 07/25/2017     BNP  No results for input(s): BNP in the last 72 hours. Lactic Acid  No results for input(s): LACTA in the last 72 hours. INR  No results for input(s): INR, PROTIME in the last 72 hours. PTT  No results for input(s): APTT in the last 72 hours. Glucose  No results for input(s): POCGLU in the last 72 hours. UA No results for input(s): SPECGRAV, PHUR, COLORU, CLARITYU, MUCUS, PROTEINU, BLOODU, RBCUA, WBCUA, BACTERIA, NITRU, GLUCOSEU, BILIRUBINUR, UROBILINOGEN, KETUA, LABCAST, LABCASTTY, AMORPHOS in the last 72 hours. Invalid input(s): CRYSTALS. PFTs               Sleep studies                       Cultures    Gram stain- negative so far. Echocardiogram   Transthoracic Echocardiography Report (TTE)   Pulmonic Valve   The pulmonic valve leaflets exhibited normal thickness, no calcification,   and normal cuspal separation. DOPPLER: The transpulmonic velocity was   within the normal range with no evidence for regurgitation. Transthoracic Echocardiography Report (TTE)   Conclusions      Summary   Ejection fraction is visually estimated at 55%. The aortic valve leaflets were not well visualized. Mild aortic regurgitation is noted.       Signature      ----------------------------------------------------------------   Electronically signed by Samira Pierce MD (Interpreting   physician) on 06/10/2016 at 05:09 PM      Conclusions      Summary   Ejection fraction is visually adequate study.         Aorta: The aortic root is normal in size, measuring 3.6 cm at sinotubular junction and 4.0 cm at Sinuses of Valsalva. The proximal ascending aorta is normal in size, measuring 3.9 cm across.         Left Atrium: The left atrial volume index is mildly enlarged, measuring 37.21 cc/m2.         Left Ventricle: The left ventricle is normal in size, with an end-diastolic diameter of 6.0 cm, and an end-systolic diameter of 4.1 cm. LV wall thickness is normal, with the septum and the posterior wall each measuring 1.0 cm across. Relative wall     thickness was normal at 0.33, and the LV mass index was 115.8 g/m2 consistent with normal left ventricular mass. There are no regional wall motion abnormalities. Left ventricular systolic function appears normal. Visually estimated ejection fraction is     60-65%. The LV Doppler derived stroke volume equals 123.0 ccs.         Diastolic indices: E wave velocity 0.7 m/s, E/A ratio 0.8,  msec., E/e' ratio(avg) 8. There is pseudonormalization of mitral inflow pattern consistent with significant diastolic dysfunction.         Right Atrium: The right atrium is normal in size, measuring 4.7 cm in length and 3.6 cm in width in the apical view.         Right Ventricle: The right ventricle is normal in size measuring 3.5 cm at the base in the apical right ventricle-focused view. Global right ventricular systolic function appears normal. Tricuspid annular plane systolic excursion (TAPSE) is 2.5 cm. The     estimated PA systolic pressure is greater than 68 mmHg.         Aortic Valve:  The aortic valve is mildly sclerotic with mildly restricted leaflet mobility. The peak velocity obtained across the aortic valve is 2.25 m/s, which translates to a peak gradient of 20 mmHg. The mean gradient is 9 mmHg.  Using a left     ventricular outflow tract diameter of 2.5 cm, a left ventricular outflow tract velocity time integral of 24 cm, and a peak instantaneous transvalvular plan. He verbalizes understanding. Questions and concerns addressed.     Electronically signed by   Claudene Oh, MD on 8/17/2018 at 1:08 PM

## 2018-08-17 NOTE — ED TRIAGE NOTES
Pt comes to the ED with SOB. Pt states he's been having this for the past couple of days. Pt states he's also been having some wheezing.

## 2018-08-17 NOTE — CARE COORDINATION
Offered Home Health COPD or Pulmonary Rehab to patient? Zone management tool for COPD Diagnosis given to the patient as an education reference. Patient verbalizes understanding that the care team will be referencing this tool throughout their hospital stay and again on discharge. Nurse Lupe Ritter notified of the reference tool being received by the patient.

## 2018-08-17 NOTE — PROGRESS NOTES
Pt admitted to  [phone] from ED. Complaints: Shortness of breath. IV none infusing into the wrist right, condition patent and no redness  IV site free of s/s of infection or infiltration. Vital signs obtained. Assessment and data collection initiated. Oriented to room. All questions answered with no further questions at this time. Fall prevention and safety brochure discussed with patient.    The best day to schedule a follow up Dr appointment is:  Monday p.mTremaine Hopkins RN 8/17/2018 4:56 AM

## 2018-08-17 NOTE — H&P
History & Physical        Patient:  Trina Crowley  YOB: 1945    MRN: [id number]     Acct: [de-identified]    PCP: YESY Siddiqui CNP    Date of Admission: 8/16/2018    Date of Service: Pt seen/examined on 08/17/18 and Admitted to Inpatient with expected LOS greater than two midnights due to medical therapy. Chief Complaint:  Shortness of breath, wheezing and productive cough    History Of Present Illness:      74 y.o. male who presented to [address] [place] [address] for the evaluation of shortness of breath. Johnson Santos has a significant histor of recent DVT to his right leg and a PE. He states that he has done some traveling recently had traveled to [place] to onboard on a cruise ship, was started on Xarelto, Dyslipidemia, COPD, CHARIS-CPAP. The patient states that his shortness of breath occurrs at rest and he does not have access to O2 therapy at home. The patient started xarelto on Sunday. The patient admits a productive cough with clear to yellow sputum and states that he bruises easily. The patient admits seasonal and food allergies. The patient denies any other signs or symptoms at this time. Past Medical History:          Diagnosis Date    Arthritis     Asthma     Cancer ([facility] [address]     skin    Chickenpox     Hyperlipidemia     Measles     Migraines     Mumps     Sleep apnea        Past Surgical History:          Procedure Laterality Date    COLONOSCOPY      CORONARY ANGIOPLASTY N/A 2017/2018    Dr. Chris Hutchinson       Medications Prior to Admission:      Prior to Admission medications    Medication Sig Start Date End Date Taking? Authorizing Provider   rivaroxaban (XARELTO) 15 MG TABS tablet Take 15 mg by mouth 2 times daily 15 mg BID for 21 days and then 15 mg once daily for 20 days.    Yes Historical Provider, MD   budesonide-formoterol (SYMBICORT) 160-4.5 MCG/ACT AERO Inhale 2 puffs into the lungs 2 times daily 4/26/18  Yes Mehdi Wells PA-C   fluticasone [facility]) 50 MCG/ACT nasal spray 1 spray by Nasal route daily as needed Morning and night 10/30/17  Yes Mehdi Wells PA-C   CPAP Machine MISC by Does not apply route Please change CPAP to APAP (autoCPAP) range 13 - 20- cwp. 7/20/17  Yes Leandro Fox MD   Multiple Vitamin (MULTI-VITAMIN DAILY PO) Take 1 tablet by mouth daily    Yes Historical Provider, MD   albuterol (PROVENTIL) (2.5 MG/3ML) 0.083% nebulizer solution Take 3 mLs by nebulization every 4 hours as needed for Wheezing or Shortness of Breath 10/8/15  Yes Leandro Fox MD   montelukast (SINGULAIR) 10 MG tablet Take 10 mg by mouth daily    Yes Historical Provider, MD   pravastatin (PRAVACHOL) 20 MG tablet Take 20 mg by mouth daily   Yes Historical Provider, MD   OMEPRAZOLE PO Take 20 mg by mouth daily. Yes Historical Provider, MD   verapamil (VERELAN PM) 240 MG CR capsule Take 240 mg by mouth every morning. Indications: Per patient taking medication for migraines prevention. Yes Historical Provider, MD       Allergies:  Molds & smuts and Sulfa antibiotics    Social History:      The patient currently lives with wife    TOBACCO:   reports that he has never smoked. He has never used smokeless tobacco.  ETOH:   reports that he does not drink alcohol. Family History:      Family History   Problem Relation Age of Onset   [place] Ger[place] Diabetes Mother     COPD Mother     Cancer Father        Diet:  DIET GENERAL;    REVIEW OF SYSTEMS:       Constitutional: Negative for chills, fever and unexpected weight change. HENT: Negative for congestion, ear pain and sore throat. Eyes: Negative for visual disturbance. Respiratory: Positive for productive cough, dyspnea, wheezing, Negative for stridor. Cardiovascular: Negative for chest pain, palpitations and leg swelling. Gastrointestinal: Negative for abdominal pain, blood in stool, diarrhea, nausea and vomiting.    Genitourinary: Negative for dysuria and hematuria. Musculoskeletal: Negative for back pain, joint swelling and neck pain. Skin: Negative for rash. Allergic/Immunologic: Positive for environmental allergies and food allergies   Neurological: Negative for dizziness, syncope, weakness and headaches. Hematological:bruises/bleeds easily. Psychiatric/Behavioral: Negative for confusion and dysphoric mood. The patient is not nervous/anxious. All other systems reviewed and are negative. PHYSICAL EXAM:    BP (!) 182/98   Pulse 80   Temp 97.5 °F (36.4 °C) (Oral)   Resp 20   Ht 6' 2\" (1.88 m)   Wt 290 lb (131.5 kg)   SpO2 96%   BMI 37.23 kg/m²     General appearance:  No apparent distress, appears stated age and cooperative. HEENT:  Normal cephalic, atraumatic without obvious deformity. Pupils equal, round, and reactive to light. Extra ocular muscles intact. Conjunctivae/corneas clear. Neck: Supple, with full range of motion. No jugular venous distention. Trachea midline. Respiratory:  Normal respiratory effort. Audible expiratory wheezing productive cough. .  Cardiovascular:  Regular rate and rhythm with normal S1/S2 without murmurs, rubs or gallops. Abdomen: Soft, non-tender, non-distended with normal bowel sounds. Musculoskeletal:  No clubbing, cyanosis or edema bilaterally. Full range of motion without deformity. Skin: Skin color, texture, turgor normal.  No rashes or lesions. Neurologic:  Neurovascularly intact without any focal sensory/motor deficits.  Cranial nerves: II-XII intact, grossly non-focal.  Psychiatric:  Alert and oriented, thought content appropriate, normal insight  Capillary Refill: Brisk,< 3 seconds   Peripheral Pulses: +2 palpable, equal bilaterally       Labs:     Recent Labs      08/16/18   2319   WBC  9.3   HGB  14.2   HCT  42.1   PLT  278     Recent Labs      08/16/18   2319   NA  140   K  4.3   CL  103   CO2  26   BUN  15   CREATININE  1.1   CALCIUM  8.9     Recent Labs 08/16/18   2319   AST  22   ALT  14   BILITOT  0.3   ALKPHOS  79     No results for input(s): INR in the last 72 hours. No results for input(s): Tosha Angel in the last 72 hours. Urinalysis:      Lab Results   Component Value Date    NITRU NEGATIVE 11/30/2016    BLOODU NEGATIVE 11/30/2016    GLUCOSEU NEGATIVE 11/30/2016       Intake & Output:  No intake/output data recorded. No intake/output data recorded. Radiology:     XR CHEST STANDARD (2 VW)   Final Result   Stable chest negative for active pathology. Age-related marking changes are noted. **This report has been created using voice recognition software. It may contain minor errors which are inherent in voice recognition technology. **      Final report electronically signed by Dr. Cait Horne on 8/16/2018 10:40 PM            DVT prophylaxis: [] Lovenox                                 [] SCDs                                 [] SQ Heparin                                 [] Encourage ambulation           [x] Already on Anticoagulation    Code Status: Full Code      Disposition:    [x] Home       [] TCU       [] Rehab       [] Psych       [] SNF       [] [place]       [] Other-    ASSESSMENT:    Active Hospital Problems    Diagnosis Date Noted    [facility]ation [facility] [J44.1] 08/17/2018       PLAN:    1. Acute COPD exacerbation, Chest Xray no active process. +productive cough-culture is pending. Prednisone started, Zithromax added  2. PE/DVT-currently anticoagulated on Xarelto  3. CHARIS-CPAP  4. Dyslipidemia  5. Obesity, BMI 37.3        Thank you YESY Snyder CNP for the opportunity to be involved in this patient's care.     Electronically signed by YESY Pacheco CNP on 8/17/2018 at 8:40 AM

## 2018-08-18 LAB
ANION GAP SERPL CALCULATED.3IONS-SCNC: 13 MEQ/L (ref 8–16)
BASOPHILS # BLD: 0.2 %
BASOPHILS ABSOLUTE: 0 THOU/MM3 (ref 0–0.1)
BUN BLDV-MCNC: 16 MG/DL (ref 7–22)
CALCIUM SERPL-MCNC: 9.2 MG/DL (ref 8.5–10.5)
CHLORIDE BLD-SCNC: 103 MEQ/L (ref 98–111)
CO2: 24 MEQ/L (ref 23–33)
CREAT SERPL-MCNC: 0.9 MG/DL (ref 0.4–1.2)
EOSINOPHIL # BLD: 0.1 %
EOSINOPHILS ABSOLUTE: 0 THOU/MM3 (ref 0–0.4)
ERYTHROCYTE [DISTWIDTH] IN BLOOD BY AUTOMATED COUNT: 12.7 % (ref 11.5–14.5)
ERYTHROCYTE [DISTWIDTH] IN BLOOD BY AUTOMATED COUNT: 46.8 FL (ref 35–45)
GFR SERPL CREATININE-BSD FRML MDRD: 82 ML/MIN/1.73M2
GLUCOSE BLD-MCNC: 104 MG/DL (ref 70–108)
GLUCOSE BLD-MCNC: 127 MG/DL (ref 70–108)
GLUCOSE BLD-MCNC: 133 MG/DL (ref 70–108)
GLUCOSE BLD-MCNC: 143 MG/DL (ref 70–108)
GLUCOSE BLD-MCNC: 147 MG/DL (ref 70–108)
HCT VFR BLD CALC: 43.3 % (ref 42–52)
HEMOGLOBIN: 14.2 GM/DL (ref 14–18)
IMMATURE GRANS (ABS): 0.05 THOU/MM3 (ref 0–0.07)
IMMATURE GRANULOCYTES: 0.6 %
LYMPHOCYTES # BLD: 5.2 %
LYMPHOCYTES ABSOLUTE: 0.4 THOU/MM3 (ref 1–4.8)
MAGNESIUM: 2.3 MG/DL (ref 1.6–2.4)
MCH RBC QN AUTO: 32.7 PG (ref 26–33)
MCHC RBC AUTO-ENTMCNC: 32.8 GM/DL (ref 32.2–35.5)
MCV RBC AUTO: 99.8 FL (ref 80–94)
MONOCYTES # BLD: 1.6 %
MONOCYTES ABSOLUTE: 0.1 THOU/MM3 (ref 0.4–1.3)
NUCLEATED RED BLOOD CELLS: 0 /100 WBC
PHOSPHORUS: 3.5 MG/DL (ref 2.4–4.7)
PLATELET # BLD: 304 THOU/MM3 (ref 130–400)
PMV BLD AUTO: 10.6 FL (ref 9.4–12.4)
POTASSIUM SERPL-SCNC: 4.6 MEQ/L (ref 3.5–5.2)
RBC # BLD: 4.34 MILL/MM3 (ref 4.7–6.1)
SEG NEUTROPHILS: 92.3 %
SEGMENTED NEUTROPHILS ABSOLUTE COUNT: 7.5 THOU/MM3 (ref 1.8–7.7)
SODIUM BLD-SCNC: 140 MEQ/L (ref 135–145)
WBC # BLD: 8.1 THOU/MM3 (ref 4.8–10.8)

## 2018-08-18 PROCEDURE — 36415 COLL VENOUS BLD VENIPUNCTURE: CPT

## 2018-08-18 PROCEDURE — 2580000003 HC RX 258: Performed by: NURSE PRACTITIONER

## 2018-08-18 PROCEDURE — 1200000000 HC SEMI PRIVATE

## 2018-08-18 PROCEDURE — 6370000000 HC RX 637 (ALT 250 FOR IP): Performed by: FAMILY MEDICINE

## 2018-08-18 PROCEDURE — 83735 ASSAY OF MAGNESIUM: CPT

## 2018-08-18 PROCEDURE — 93307 TTE W/O DOPPLER COMPLETE: CPT

## 2018-08-18 PROCEDURE — 6360000002 HC RX W HCPCS: Performed by: INTERNAL MEDICINE

## 2018-08-18 PROCEDURE — 84100 ASSAY OF PHOSPHORUS: CPT

## 2018-08-18 PROCEDURE — 80048 BASIC METABOLIC PNL TOTAL CA: CPT

## 2018-08-18 PROCEDURE — 6370000000 HC RX 637 (ALT 250 FOR IP): Performed by: INTERNAL MEDICINE

## 2018-08-18 PROCEDURE — 2709999900 HC NON-CHARGEABLE SUPPLY

## 2018-08-18 PROCEDURE — 99233 SBSQ HOSP IP/OBS HIGH 50: CPT | Performed by: INTERNAL MEDICINE

## 2018-08-18 PROCEDURE — 94640 AIRWAY INHALATION TREATMENT: CPT

## 2018-08-18 PROCEDURE — 82948 REAGENT STRIP/BLOOD GLUCOSE: CPT

## 2018-08-18 PROCEDURE — 6360000002 HC RX W HCPCS: Performed by: NURSE PRACTITIONER

## 2018-08-18 PROCEDURE — 2580000003 HC RX 258: Performed by: INTERNAL MEDICINE

## 2018-08-18 PROCEDURE — 85025 COMPLETE CBC W/AUTO DIFF WBC: CPT

## 2018-08-18 RX ORDER — PANTOPRAZOLE SODIUM 40 MG/1
40 TABLET, DELAYED RELEASE ORAL
Status: DISCONTINUED | OUTPATIENT
Start: 2018-08-19 | End: 2018-08-20 | Stop reason: HOSPADM

## 2018-08-18 RX ADMIN — ALBUTEROL SULFATE 2.5 MG: 2.5 SOLUTION RESPIRATORY (INHALATION) at 18:34

## 2018-08-18 RX ADMIN — PRAVASTATIN SODIUM 20 MG: 20 TABLET ORAL at 21:03

## 2018-08-18 RX ADMIN — AZITHROMYCIN MONOHYDRATE 500 MG: 500 INJECTION, POWDER, LYOPHILIZED, FOR SOLUTION INTRAVENOUS at 12:11

## 2018-08-18 RX ADMIN — METHYLPREDNISOLONE SODIUM SUCCINATE 40 MG: 40 INJECTION, POWDER, FOR SOLUTION INTRAMUSCULAR; INTRAVENOUS at 04:05

## 2018-08-18 RX ADMIN — ALBUTEROL SULFATE 2.5 MG: 2.5 SOLUTION RESPIRATORY (INHALATION) at 15:07

## 2018-08-18 RX ADMIN — MONTELUKAST SODIUM 10 MG: 10 TABLET, FILM COATED ORAL at 08:30

## 2018-08-18 RX ADMIN — ALBUTEROL SULFATE 2.5 MG: 2.5 SOLUTION RESPIRATORY (INHALATION) at 08:07

## 2018-08-18 RX ADMIN — METHYLPREDNISOLONE SODIUM SUCCINATE 40 MG: 40 INJECTION, POWDER, FOR SOLUTION INTRAMUSCULAR; INTRAVENOUS at 16:30

## 2018-08-18 RX ADMIN — RIVAROXABAN 15 MG: 15 TABLET, FILM COATED ORAL at 08:30

## 2018-08-18 RX ADMIN — Medication 10 ML: at 08:30

## 2018-08-18 RX ADMIN — RIVAROXABAN 15 MG: 15 TABLET, FILM COATED ORAL at 18:32

## 2018-08-18 RX ADMIN — VERAPAMIL HYDROCHLORIDE 240 MG: 240 TABLET, FILM COATED, EXTENDED RELEASE ORAL at 08:30

## 2018-08-18 RX ADMIN — PIPERACILLIN SODIUM AND TAZOBACTAM SODIUM 3.38 G: 3; .375 INJECTION, POWDER, LYOPHILIZED, FOR SOLUTION INTRAVENOUS at 16:50

## 2018-08-18 RX ADMIN — ALBUTEROL SULFATE 2.5 MG: 2.5 SOLUTION RESPIRATORY (INHALATION) at 04:10

## 2018-08-18 RX ADMIN — PIPERACILLIN SODIUM AND TAZOBACTAM SODIUM 3.38 G: 3; .375 INJECTION, POWDER, LYOPHILIZED, FOR SOLUTION INTRAVENOUS at 08:26

## 2018-08-18 RX ADMIN — ALBUTEROL SULFATE 2.5 MG: 2.5 SOLUTION RESPIRATORY (INHALATION) at 00:16

## 2018-08-18 ASSESSMENT — PAIN SCALES - GENERAL
PAINLEVEL_OUTOF10: 0

## 2018-08-18 NOTE — FLOWSHEET NOTE
08/18/18 1445   Encounter Summary   Services provided to: Patient and family together   Referral/Consult From: [facility] Spouse;Friends/neighbors   Contact [employer] Completed   Continue Visiting Yes  (8/18/18)   Volunteer Visit Yes   Complexity of Encounter Moderate   Length of Encounter 15 minutes   Spiritual/Faith   Type Spiritual support   Assessment Calm; Approachable; Hopeful   Intervention Active listening;Nurtured hope;Prayer   Outcome Expressed gratitude;Engaged in conversation   8/18/18  S. Patient was sitting up in a chair. His wife and some friends from [place] were there also. O. Wife stated that he had blood clots in his legs. A. Will be here for treatment for a few days. Prayed with all of them. P. Recommend spiritual care follow up.

## 2018-08-18 NOTE — PROGRESS NOTES
ondansetron, acetaminophen, glucose, dextrose, glucagon (rDNA), dextrose      Intake/Output Summary (Last 24 hours) at 08/18/18 1238  Last data filed at 08/18/18 0412   Gross per 24 hour   Intake              841 ml   Output              975 ml   Net             -134 ml       Diet:  DIET GENERAL;    Exam:  BP (!) 164/75   Pulse 90   Temp 98.6 °F (37 °C) (Oral)   Resp 18   Ht 6' 2\" (1.88 m)   Wt 290 lb (131.5 kg)   SpO2 91%   BMI 37.23 kg/m²     General appearance: No apparent distress, appears stated age and cooperative. HEENT: Pupils equal, round, and reactive to light. Conjunctivae/corneas clear. Neck: Supple, with full range of motion. No jugular venous distention. Trachea midline. Respiratory:  Diffuse expiratory wheezing heard throughout all lung fields   Cardiovascular: Regular rate and rhythm with normal S1/S2 without murmurs, rubs or gallops. Abdomen: Soft, non-tender, non-distended with normal bowel sounds. Musculoskeletal: passive and active ROM x 4 extremities. Skin: Skin color, texture, turgor normal.  No rashes or lesions. Neurologic:  Neurovascularly intact without any focal sensory/motor deficits. Cranial nerves: II-XII intact, grossly non-focal.  Psychiatric: Alert and oriented, thought content appropriate, normal insight  Capillary Refill: Brisk,< 3 seconds   Peripheral Pulses: +2 palpable, equal bilaterally       Labs:   Recent Labs      08/16/18 2319 08/18/18   0551   WBC  9.3  8.1   HGB  14.2  14.2   HCT  42.1  43.3   PLT  278  304     Recent Labs      08/16/18 2319  08/18/18   0551   NA  140  140   K  4.3  4.6   CL  103  103   CO2  26  24   BUN  15  16   CREATININE  1.1  0.9   CALCIUM  8.9  9.2   PHOS   --   3.5     Recent Labs      08/16/18 2319   AST  22   ALT  14   BILITOT  0.3   ALKPHOS  79     No results for input(s): INR in the last 72 hours. No results for input(s): Karen Hagan in the last 72 hours.     Urinalysis:    Lab Results   Component Value Date    NITRU with viral Vs bacterial etiology. Moderate obstructive sleep apnea on treatment with a CPAP pressure of 13cm H20. Recent hx of pulmonary embolism and DVT- Xarelto        1. Acute Asthma Exacerbation: Flu swab negative, Blood Cx NGTD,  per pulmonology recommending IV Solumedrol 40 BID, Albuterol q4 ATC, and to start IV Zosyn for possible bacterial infection. Maintain SpO2> 92%. Accuchecks monitoring ACHS/Q6H with low dose insulin sliding scale for coverage as pt is on steroids. 2. Acute DVT/PE: continue Xarelto 15 mg BID, 2D Echo to evaluate PAP pressures and assess RV and LV function     3. CHARIS: Continue CPAP with pressure of 13cm H20 during nights and PRN during daytime. Maintain SpO2> 92%.      4. GERD: continue Protonix daily           Electronically signed by Kyler Gray MD on 8/18/2018 at 12:38 PM

## 2018-08-18 NOTE — PLAN OF CARE
Problem: Cardiovascular  Goal: No DVT, peripheral vascular complications  Outcome: Ongoing  Pt has a DVT in RLE and a PE as well. Pt tolerating Xarelto twice a day. Problem: Respiratory  Goal: O2 Sat > 90%  Outcome: Ongoing  Pt's O2 sat has been 86% with just cpap on. With 4L/O2 bled into cpap connection, O2 sat came up to 95%. When awake pt wears 4L/O2. Pt has expiratory wheezes    Problem: Pain Control  Goal: Maintain pain level at or below patient's acceptable level (or 5 if patient is unable to determine acceptable level)  Outcome: Ongoing  Pt denies pain this shift and his pain goal is to remain pain free    Problem: GI  Goal: No bowel complications  Outcome: Ongoing  No BM this shift. Pt has active bowel sounds, passing gas    Problem:   Goal: No urinary complication  Outcome: Ongoing  Pt voiding adequate amounts of yellow urine    Problem: Skin Integrity/Risk  Goal: No skin breakdown during hospitalization  Outcome: Ongoing  No skin breakdown noted. Pt is ambulatory and turns self in bed    Problem: Daily Care:  Goal: Daily care needs are met  Daily care needs are met  Outcome: Ongoing  Pt needs minimal assistance with ADLs    Problem: Discharge Planning:  Goal: Patients continuum of care needs are met  Patients continuum of care needs are met  Outcome: Ongoing  Pt planning home at discharge with possible home health    Comments: Care plan reviewed with patient. Patient verbalize understanding of the plan of care and contribute to goal setting.

## 2018-08-18 NOTE — PLAN OF CARE
Problem: Impaired respiratory status  Goal: Clear lung sounds  Clear lung sounds     Outcome: Ongoing  Pt continues on aerosols to clear lung sounds/improve aeration and for history of asthma.

## 2018-08-18 NOTE — PROGRESS NOTES
[facility]    Patient - Halina Montero   MRN -  [id number]   [facility] # - [de-identified]   - 1945      Date of Admission -  2018 10:30 PM  Date of evaluation -  2018  Room - [facility]13/013-   Hospital Day - [facility], MD Primary Care Physician - YESY Lora - CNP     Problem List      Active Hospital Problems    Diagnosis Date Noted    Moderate persistent asthma with acute exacerbation [J45.41] 2018    Shortness of breath [R06.02]     Acute deep vein thrombosis (DVT) of right popliteal vein (HCC) [I82.431] 2018    Pulmonary embolus ([address] [I26.99] 2018    CHARIS on CPAP [G47.33, Z99.89] 10/30/2017    GERD (gastroesophageal reflux disease) [K21.9] 2013     Chief complaint   For management of asthma exacerbation. HPI   History Obtained From: Patient and electronic medical record. Halina Montero is a 74 y.o. male  was initially admitted under hospitalist service. Pulmonary medicine was consulted for further management of Asthma exacerbation. The patient is a 74 y.o. male who presented with worsening of shortness of breath for the last 7 days. He was recently admitted and discharged from [facility] for acute left side pulmonary embolism and right lower leg DVT. He came back from his vacation cruise trip. His current illness started as gradual worsening of shortness of breath with cough and wheezing. He also noticed decline in his functional status. He started using his rescue inhalers/nebulizations more frequently at home with no significant improvement in his short ness of breath, cough or wheezing. Due to worsening of his above symptoms he presented to the Emergency room at [facility]. He was treated at [facility] with [id number] po bid, Lasix, Levaquine and solumedrol 40mg IV bid    He denies any history of hemoptysis.  He denies any thickness is normal, with the septum and the posterior wall each measuring 1.0 cm across. Relative wall     thickness was normal at 0.33, and the LV mass index was 115.8 g/m2 consistent with normal left ventricular mass. There are no regional wall motion abnormalities. Left ventricular systolic function appears normal. Visually estimated ejection fraction is     60-65%. The LV Doppler derived stroke volume equals 123.0 ccs.         Diastolic indices: E wave velocity 0.7 m/s, E/A ratio 0.8,  msec., E/e' ratio(avg) 8. There is pseudonormalization of mitral inflow pattern consistent with significant diastolic dysfunction.         Right Atrium: The right atrium is normal in size, measuring 4.7 cm in length and 3.6 cm in width in the apical view.         Right Ventricle: The right ventricle is normal in size measuring 3.5 cm at the base in the apical right ventricle-focused view. Global right ventricular systolic function appears normal. Tricuspid annular plane systolic excursion (TAPSE) is 2.5 cm. The     estimated PA systolic pressure is greater than 68 mmHg.         Aortic Valve:  The aortic valve is mildly sclerotic with mildly restricted leaflet mobility. The peak velocity obtained across the aortic valve is 2.25 m/s, which translates to a peak gradient of 20 mmHg. The mean gradient is 9 mmHg. Using a left     ventricular outflow tract diameter of 2.5 cm, a left ventricular outflow tract velocity time integral of 24 cm, and a peak instantaneous transvalvular velocity time integral of 44 cm, the calculated aortic valve area is 2.8 cm2(AVAi is 1.1 cm2/m2),     consistent with trivial aortic stenosis. Additionally, there is mild aortic regurgitation.         Tricuspid Valve:  The tricuspid valve is normal in structure. There is mild tricuspid regurgitation.         Pulmonary Valve:  The pulmonic valve is normal in structure.  There is trivial pulmonic regurgitation.         IVC: Right atrial pressure as assessed upper and left lower lobes consistent with additional pulmonary emboli.        The trachea is midline and the proximal airways are patent.  There are pulmonary cyst within the lower lobes bilaterally.  Bandlike opacities at the left lung base likely represent atelectasis or parenchymal scarring.  No mass, consolidation, or pneumothorax.  There is no pleural thickening or pleural fluid.        The esophagus is normal in caliber and contour.  The imaged intra-abdominal structures demonstrate a large, partially visualized cyst in the left vidya abdomen with attenuation measuring simple fluid.        The osseous structures demonstrate mild degenerative changes and exaggerated kyphosis of the lower thoracic spine.  No evidence of fracture or bony destructive process.       MMODEL FLUENCY         Procedure Note   Interface, Rad Results In - 08/12/2018 12:40 PM CDT    EXAMINATION:  US LOWER EXTREMITY VEINS BILATERAL    CLINICAL HISTORY:  Localized edema    TECHNIQUE:  Duplex and color flow Doppler and dynamic compression was performed of the bilateral lower extremity veins was performed. COMPARISON:  None    FINDINGS:  Right thigh veins: There is complete thrombosis of the popliteal vein. The common femoral, femoral, upper greater saphenous, and deep femoral veins are patent and free of thrombus. The veins are normally compressible and have normal phasic flow and augmentation response. Right calf veins: There is complete thrombosis the posterior tibial veins. The anterior tibial vein and peroneal veins are patent. Left thigh veins: The common femoral, femoral, popliteal, upper greater saphenous, and deep femoral veins are patent and free of thrombus. The veins are normally compressible and have normal phasic flow and augmentation response. Left calf veins: The visualized calf veins are patent.     Miscellaneous: None    IMPRESSION:     Complete occlusion of the right popliteal and posterior tibial

## 2018-08-19 LAB
ANION GAP SERPL CALCULATED.3IONS-SCNC: 12 MEQ/L (ref 8–16)
BASOPHILS # BLD: 0.1 %
BASOPHILS ABSOLUTE: 0 THOU/MM3 (ref 0–0.1)
BUN BLDV-MCNC: 21 MG/DL (ref 7–22)
CALCIUM SERPL-MCNC: 9.1 MG/DL (ref 8.5–10.5)
CHLORIDE BLD-SCNC: 104 MEQ/L (ref 98–111)
CO2: 26 MEQ/L (ref 23–33)
CREAT SERPL-MCNC: 1 MG/DL (ref 0.4–1.2)
EOSINOPHIL # BLD: 0 %
EOSINOPHILS ABSOLUTE: 0 THOU/MM3 (ref 0–0.4)
ERYTHROCYTE [DISTWIDTH] IN BLOOD BY AUTOMATED COUNT: 12.8 % (ref 11.5–14.5)
ERYTHROCYTE [DISTWIDTH] IN BLOOD BY AUTOMATED COUNT: 45.9 FL (ref 35–45)
GFR SERPL CREATININE-BSD FRML MDRD: 73 ML/MIN/1.73M2
GLUCOSE BLD-MCNC: 121 MG/DL (ref 70–108)
GLUCOSE BLD-MCNC: 129 MG/DL (ref 70–108)
GLUCOSE BLD-MCNC: 130 MG/DL (ref 70–108)
GLUCOSE BLD-MCNC: 132 MG/DL (ref 70–108)
GLUCOSE BLD-MCNC: 144 MG/DL (ref 70–108)
HCT VFR BLD CALC: 41.3 % (ref 42–52)
HEMOGLOBIN: 13.7 GM/DL (ref 14–18)
IMMATURE GRANS (ABS): 0.08 THOU/MM3 (ref 0–0.07)
IMMATURE GRANULOCYTES: 0.6 %
LYMPHOCYTES # BLD: 3.4 %
LYMPHOCYTES ABSOLUTE: 0.5 THOU/MM3 (ref 1–4.8)
MAGNESIUM: 2.3 MG/DL (ref 1.6–2.4)
MCH RBC QN AUTO: 32.5 PG (ref 26–33)
MCHC RBC AUTO-ENTMCNC: 33.2 GM/DL (ref 32.2–35.5)
MCV RBC AUTO: 98.1 FL (ref 80–94)
MONOCYTES # BLD: 3.4 %
MONOCYTES ABSOLUTE: 0.5 THOU/MM3 (ref 0.4–1.3)
NUCLEATED RED BLOOD CELLS: 0 /100 WBC
PHOSPHORUS: 3.6 MG/DL (ref 2.4–4.7)
PLATELET # BLD: 315 THOU/MM3 (ref 130–400)
PMV BLD AUTO: 10.2 FL (ref 9.4–12.4)
POTASSIUM SERPL-SCNC: 4.5 MEQ/L (ref 3.5–5.2)
RBC # BLD: 4.21 MILL/MM3 (ref 4.7–6.1)
SEG NEUTROPHILS: 92.5 %
SEGMENTED NEUTROPHILS ABSOLUTE COUNT: 13 THOU/MM3 (ref 1.8–7.7)
SODIUM BLD-SCNC: 142 MEQ/L (ref 135–145)
WBC # BLD: 14 THOU/MM3 (ref 4.8–10.8)

## 2018-08-19 PROCEDURE — 2580000003 HC RX 258: Performed by: NURSE PRACTITIONER

## 2018-08-19 PROCEDURE — 97535 SELF CARE MNGMENT TRAINING: CPT

## 2018-08-19 PROCEDURE — 97165 OT EVAL LOW COMPLEX 30 MIN: CPT

## 2018-08-19 PROCEDURE — 99232 SBSQ HOSP IP/OBS MODERATE 35: CPT | Performed by: INTERNAL MEDICINE

## 2018-08-19 PROCEDURE — 84100 ASSAY OF PHOSPHORUS: CPT

## 2018-08-19 PROCEDURE — 6360000002 HC RX W HCPCS: Performed by: INTERNAL MEDICINE

## 2018-08-19 PROCEDURE — G8987 SELF CARE CURRENT STATUS: HCPCS

## 2018-08-19 PROCEDURE — 83735 ASSAY OF MAGNESIUM: CPT

## 2018-08-19 PROCEDURE — G8988 SELF CARE GOAL STATUS: HCPCS

## 2018-08-19 PROCEDURE — 85025 COMPLETE CBC W/AUTO DIFF WBC: CPT

## 2018-08-19 PROCEDURE — 99233 SBSQ HOSP IP/OBS HIGH 50: CPT | Performed by: INTERNAL MEDICINE

## 2018-08-19 PROCEDURE — 6370000000 HC RX 637 (ALT 250 FOR IP): Performed by: FAMILY MEDICINE

## 2018-08-19 PROCEDURE — 2580000003 HC RX 258: Performed by: INTERNAL MEDICINE

## 2018-08-19 PROCEDURE — 94640 AIRWAY INHALATION TREATMENT: CPT

## 2018-08-19 PROCEDURE — 36415 COLL VENOUS BLD VENIPUNCTURE: CPT

## 2018-08-19 PROCEDURE — 82948 REAGENT STRIP/BLOOD GLUCOSE: CPT

## 2018-08-19 PROCEDURE — 1200000000 HC SEMI PRIVATE

## 2018-08-19 PROCEDURE — 80048 BASIC METABOLIC PNL TOTAL CA: CPT

## 2018-08-19 PROCEDURE — 94761 N-INVAS EAR/PLS OXIMETRY MLT: CPT

## 2018-08-19 PROCEDURE — 6370000000 HC RX 637 (ALT 250 FOR IP): Performed by: INTERNAL MEDICINE

## 2018-08-19 RX ORDER — CIPROFLOXACIN 500 MG/1
500 TABLET, FILM COATED ORAL EVERY 12 HOURS SCHEDULED
Status: DISCONTINUED | OUTPATIENT
Start: 2018-08-19 | End: 2018-08-20 | Stop reason: HOSPADM

## 2018-08-19 RX ORDER — PREDNISONE 20 MG/1
40 TABLET ORAL DAILY
Status: DISCONTINUED | OUTPATIENT
Start: 2018-08-19 | End: 2018-08-20 | Stop reason: HOSPADM

## 2018-08-19 RX ADMIN — PIPERACILLIN SODIUM AND TAZOBACTAM SODIUM 3.38 G: 3; .375 INJECTION, POWDER, LYOPHILIZED, FOR SOLUTION INTRAVENOUS at 00:13

## 2018-08-19 RX ADMIN — PANTOPRAZOLE SODIUM 40 MG: 40 TABLET, DELAYED RELEASE ORAL at 06:56

## 2018-08-19 RX ADMIN — PRAVASTATIN SODIUM 20 MG: 20 TABLET ORAL at 21:56

## 2018-08-19 RX ADMIN — ALBUTEROL SULFATE 2.5 MG: 2.5 SOLUTION RESPIRATORY (INHALATION) at 00:00

## 2018-08-19 RX ADMIN — ALBUTEROL SULFATE 2.5 MG: 2.5 SOLUTION RESPIRATORY (INHALATION) at 07:48

## 2018-08-19 RX ADMIN — RIVAROXABAN 15 MG: 15 TABLET, FILM COATED ORAL at 18:03

## 2018-08-19 RX ADMIN — Medication 10 ML: at 21:56

## 2018-08-19 RX ADMIN — MONTELUKAST SODIUM 10 MG: 10 TABLET, FILM COATED ORAL at 08:30

## 2018-08-19 RX ADMIN — VERAPAMIL HYDROCHLORIDE 240 MG: 240 TABLET, FILM COATED, EXTENDED RELEASE ORAL at 08:30

## 2018-08-19 RX ADMIN — ALBUTEROL SULFATE 2.5 MG: 2.5 SOLUTION RESPIRATORY (INHALATION) at 16:28

## 2018-08-19 RX ADMIN — PREDNISONE 40 MG: 20 TABLET ORAL at 14:07

## 2018-08-19 RX ADMIN — RIVAROXABAN 15 MG: 15 TABLET, FILM COATED ORAL at 08:30

## 2018-08-19 RX ADMIN — CIPROFLOXACIN 500 MG: 500 TABLET, FILM COATED ORAL at 14:07

## 2018-08-19 RX ADMIN — Medication 10 ML: at 08:31

## 2018-08-19 RX ADMIN — ALBUTEROL SULFATE 2.5 MG: 2.5 SOLUTION RESPIRATORY (INHALATION) at 19:48

## 2018-08-19 RX ADMIN — CIPROFLOXACIN 500 MG: 500 TABLET, FILM COATED ORAL at 21:56

## 2018-08-19 RX ADMIN — ALBUTEROL SULFATE 2.5 MG: 2.5 SOLUTION RESPIRATORY (INHALATION) at 04:08

## 2018-08-19 RX ADMIN — METHYLPREDNISOLONE SODIUM SUCCINATE 40 MG: 40 INJECTION, POWDER, FOR SOLUTION INTRAMUSCULAR; INTRAVENOUS at 04:45

## 2018-08-19 RX ADMIN — ALBUTEROL SULFATE 2.5 MG: 2.5 SOLUTION RESPIRATORY (INHALATION) at 12:11

## 2018-08-19 ASSESSMENT — PAIN SCALES - GENERAL: PAINLEVEL_OUTOF10: 0

## 2018-08-19 NOTE — PLAN OF CARE
Problem: Impaired respiratory status  Goal: Clear lung sounds  Clear lung sounds     Outcome: Ongoing  Pt continues on aerosols to clear lung sounds and pt has history of asthma.

## 2018-08-19 NOTE — PROGRESS NOTES
[facility]    Patient - Lester Park   MRN -  [id number]   [facility]   - 1945      Date of Admission -  2018 10:30 PM  Date of evaluation -  2018  [facility]13/013-A   Hospital Day - Via Seth Fenton MD Primary Care Physician - YESY Jovel - CNP     Problem List      Active Hospital Problems    Diagnosis Date Noted    Moderate persistent asthma with acute exacerbation [J45.41] 2018    Shortness of breath [R06.02]     Acute deep vein thrombosis (DVT) of right popliteal vein (HCC) [I82.431] 2018    Pulmonary embolus ([address] [I26.99] 2018    CHARIS on CPAP [G47.33, Z99.89] 10/30/2017    GERD (gastroesophageal reflux disease) [K21.9] 2013     Chief complaint   For management of asthma exacerbation. HPI   History Obtained From: Patient and electronic medical record. Lester Park is a 74 y.o. male  was initially admitted under hospitalist service. Pulmonary medicine was consulted for further management of Asthma exacerbation. The patient is a 74 y.o. male who presented with worsening of shortness of breath for the last 7 days. He was recently admitted and discharged from [facility] for acute left side pulmonary embolism and right lower leg DVT. He came back from his vacation cruise trip. His current illness started as gradual worsening of shortness of breath with cough and wheezing. He also noticed decline in his functional status. He started using his rescue inhalers/nebulizations more frequently at home with no significant improvement in his short ness of breath, cough or wheezing. Due to worsening of his above symptoms he presented to the Emergency room at [facility] beds at the [facility]. He was treated at [facility] with [id number] po bid, Lasix, Levaquine and solumedrol 40mg IV bid    He denies any history of hemoptysis.  He denies any history of orthopnea or paroxysmal nocturnal dyspnea. He denies any fever, chills or rigors at this time. Subjective/Events Past 24 hours/ROS   Feels better. Improving cough. On room air. No chest pain. Moving in the hernandez ways.  -Rest of the body systems were reviewed. PMHx   Past Medical History      Diagnosis Date    Arthritis     Asthma     Cancer ([facility] [address]     skin    Chickenpox     Hyperlipidemia     Measles     Migraines     Mumps     Sleep apnea      Meds    Current Medications    ciprofloxacin  500 mg Oral 2 times per day    predniSONE  40 mg Oral Daily    pantoprazole  40 mg Oral QAM AC    verapamil  240 mg Oral QAM    pravastatin  20 mg Oral Nightly    montelukast  10 mg Oral Daily    sodium chloride flush  10 mL Intravenous 2 times per day    rivaroxaban  15 mg Oral BID WC    insulin lispro  0-6 Units Subcutaneous TID WC    insulin lispro  0-3 Units Subcutaneous Nightly    albuterol  2.5 mg Nebulization Q4H While awake     fluticasone, sodium chloride flush, docusate sodium, ondansetron, acetaminophen, glucose, dextrose, glucagon (rDNA), dextrose  IV Drips/Infusions   dextrose       Home Medications  Prescriptions Prior to Admission: rivaroxaban (XARELTO) 15 MG TABS tablet, 15 mg twice a day for 21 days and then 20 mg once daily  acetaminophen (TYLENOL) 325 MG tablet, Take 650 mg by mouth every 6 hours as needed for Pain  ibuprofen (ADVIL;MOTRIN) 200 MG tablet, Take 400 mg by mouth every 6 hours as needed for Pain  ipratropium-albuterol (DUONEB) 0.5-2.5 (3) MG/3ML SOLN nebulizer solution, Inhale 1 vial into the lungs every 4 hours  omeprazole (PRILOSEC) 20 MG delayed release capsule, Take 20 mg by mouth daily  clotrimazole-betamethasone (LOTRISONE) 1-0.05 % lotion, Apply topically 2 times daily Apply topically 2 times daily.   budesonide-formoterol (SYMBICORT) 160-4.5 MCG/ACT AERO, Inhale 2 puffs into the lungs 2 times daily  CPAP Machine MISC, by Does not apply route heart sounds. No murmur heard. Pulmonary/Chest: Effort normal and good breath sounds. No stridor. No respiratory distress. Minimal expiratory wheezes. No rales. Abdominal: Soft. Patient exhibits no distension. No tenderness. Musculoskeletal: Normal range of motion. Extremities: Patient exhibits no edema and no tenderness. Lymphadenopathy:  No cervical adenopathy. Neurological: Patient is alert and oriented to person, place, and time. Skin: Skin is warm and dry. Psychiatric: Patient  has a normal mood and affect. Labs  - Old records and notes have been reviewed in [facility]   ABG  Lab Results   Component Value Date    PH 7.38 08/17/2018    PO2 82 08/17/2018    PCO2 47 08/17/2018    HCO3 28 08/17/2018    O2SAT 96 08/17/2018     Lab Results   Component Value Date    IFIO2 4 08/17/2018     CBC  Recent Labs      08/16/18   2319  08/18/18   0551  08/19/18   0659   WBC  9.3  8.1  14.0*   RBC  4.30*  4.34*  4.21*   HGB  14.2  14.2  13.7*   HCT  42.1  43.3  41.3*   MCV  97.9*  99.8*  98.1*   MCH  33.0  32.7  32.5   MCHC  33.7  32.8  33.2   PLT  278  304  315   MPV  9.8  10.6  10.2      BMP  Recent Labs      08/16/18   2319  08/18/18   0551  08/19/18   0659   NA  140  140  142   K  4.3  4.6  4.5   CL  103  103  104   CO2  26  24  26   BUN  15  16  21   CREATININE  1.1  0.9  1.0   GLUCOSE  130*  147*  132*   MG  2.1  2.3  2.3   PHOS   --   3.5  3.6   CALCIUM  8.9  9.2  9.1     LFT  Recent Labs      08/16/18   2319   AST  22   ALT  14   BILITOT  0.3   ALKPHOS  79     TROP  Lab Results   Component Value Date    TROPONINT < 0.010 08/16/2018    TROPONINT < 0.010 07/15/2018    TROPONINT < 0.010 07/25/2017     BNP  No results for input(s): BNP in the last 72 hours. Lactic Acid  No results for input(s): LACTA in the last 72 hours. INR  No results for input(s): INR, PROTIME in the last 72 hours. PTT  No results for input(s): APTT in the last 72 hours.   Glucose  Recent Labs      08/18/18   1629  08/18/18   204 08/19/18   0718   POCGLU  104  127*  121*     UA   Recent Labs      08/17/18   1514   PHUR  6.0   COLORU  YELLOW   PROTEINU  NEGATIVE   BLOODU  TRACE*   RBCUA  0-2   WBCUA  NONE SEEN   BACTERIA  NONE   NITRU  NEGATIVE   GLUCOSEU  NEGATIVE   BILIRUBINUR  NEGATIVE   UROBILINOGEN  0.2   KETUA  NEGATIVE   . PFTs               Sleep studies                       Cultures    Gram stain- negative so far. Rapid swab for influenza A and B: Negative  Blood cultures- No growth. Echocardiogram   8/18/2018  Transthoracic Echocardiography Report (TTE)  Right Ventricle   The right ventricular size was normal with normal systolic function and   wall thickness. Conclusions      Summary   Normal left ventricle size and systolic function. Ejection fraction was   estimated at 55 to 60 %. There were no regional left ventricular wall   motion abnormalities and wall thickness was within normal limits. Right   ventricular systolic pressure measures 40 mmhg. Signature      ----------------------------------------------------------------   Electronically signed by Emily Pinto MD (Interpreting   physician) on 08/18/2018 at 07:38 PM   ----------------------------------------------------------------        Transthoracic Echocardiography Report (TTE)   Pulmonic Valve   The pulmonic valve leaflets exhibited normal thickness, no calcification,   and normal cuspal separation. DOPPLER: The transpulmonic velocity was   within the normal range with no evidence for regurgitation. Transthoracic Echocardiography Report (TTE)   Conclusions      Summary   Ejection fraction is visually estimated at 55%. The aortic valve leaflets were not well visualized. Mild aortic regurgitation is noted.       Signature      ----------------------------------------------------------------   Electronically signed by Ashleigh Cordova MD (Interpreting   physician) on 06/10/2016 at 05:09 PM      Conclusions      Summary   Ejection fraction is visually axial image 36. There is no dominant mass, focal  consolidation, pleural effusion, worrisome nodularity or pneumothorax. A  couple tiny cystic lesions are noted in the posterior lung bases. Tracheobronchial tree: No abnormality. Mediastinum/Eve: No mediastinal or hilar lymphadenopathy. The visualized  thyroid and esophagus are unremarkable. Axilla and Supraclavicular Regions: No axillary or supraclavicular adenopathy. Cardiovascular: The heart size is normal with no pericardial effusion. Mild  calcified metastatic disease is noted in the aorta, particularly in the aortic  arch with milder changes in the coronary arteries. The aorta and central  pulmonary vasculature are of normal caliber. There is a fluid density  structure along the right heart border/pericardium on axial image 42 measuring  4.4 x 2.6 cm. Upper Abdomen: Limited evaluation the upper abdomen shows no active disease. The liver and spleen are of normal size to their visualized extent. The  adrenal glands are unremarkable. There is a partially visualized dominant cyst  at the superior pole the left kidney, exophytic bilaterally. There is no  ascites or pneumoperitoneum. Bones and Soft Tissue: No suspicious osseous lesion. Mild degenerative changes  are associated with the thoracic spine. IMPRESSION  IMPRESSION:    1. Small vague area of groundglass disease in the posterior left upper lobe  that could represent a focal area of infection or inflammatory change. No  other airspace disease, infiltrates or consolidations appreciated. 2. Fluid density structure along the right pericardium, probably representing  a pericardial cyst.    3. Calcified coronary artery disease. 4. Partially visualized dominant left renal cyst.    Electronically Signed By: Xiomara Nolan D.O. on 7/28/2018 1:43 PM         Echocardiogram from [facility]  Date of Procedure: 08/13/2018       TEST DESCRIPTION     Technical Quality:  This is a technically velocity time integral of 44 cm, the calculated aortic valve area is 2.8 cm2(AVAi is 1.1 cm2/m2),     consistent with trivial aortic stenosis. Additionally, there is mild aortic regurgitation.         Tricuspid Valve:  The tricuspid valve is normal in structure. There is mild tricuspid regurgitation.         Pulmonary Valve:  The pulmonic valve is normal in structure. There is trivial pulmonic regurgitation.         IVC: Right atrial pressure as assessed by IVC dynamics is normal at not visualized mmHg.         Intracavitary: There is no evidence of pericardial effusion, intracavity mass, thrombi, or vegetation.                 CONCLUSIONS       1 - Normal left ventricular systolic function (EF 99-33%).       2 - Mild left atrial enlargement.       3 - Impaired LV relaxation, elevated LAP (grade 2 diastolic dysfunction).       4 - Trivial aortic stenosis, RALF = 2.8 cm2, AVAi = 1.1 cm2/m2, peak velocity = 2.25 m/s, mean gradient = 9 mmHg.       5 - Mild aortic regurgitation.       6 - Normal right ventricular systolic function .       7 - Mild tricuspid regurgitation.       4 - Pulmonary hypertension. The estimated PA systolic pressure is greater than 68 mmHg.         This document has been electronically     SIGNED BY: Dhiraj Rubi MD On: 08/13/2018 10:49      CTA Chest Non-Coronary - PE Study (08/12/2018 1:47 PM CDT)  CTA Chest Non-Coronary - PE Study (08/12/2018 1:47 PM CDT)   Impressions Performed At       1.  Right pulmonary embolus extending from right pulmonary artery into the segmental branches of the right upper, middle, and lower lobes.  Additional filling defects within the segmental branches of the left upper and lower lobes may represent additional thrombus.  Please note evaluation of the segmental and subsegmental arteries is limited by poor contrast bolus timing.    2. Questionable enlargement of the right ventricle with straightening of the intraventricular septum.  Right heart strain cannot be excluded.    3. Fluid collection within the right costophrenic angle measuring up to 3.5 cm, probable pericardial cyst.    4. Cystic structure partially visualized within the left upper abdomen could reflect a large renal cyst but is indeterminate.    5. Mild coronary artery atherosclerosis.         COMMUNICATION    This critical result was discovered/received at 1400. The critical information above was relayed directly by telephone to Dr. Enrique Ferguson on 08/12/2018 at [phone] by Dr. Jacque Hawkins on behalf of Dr. Lidia Salvador       Electronically signed by resident: Laura Blackman   Date:08/12/2018    Time:13:56        Electronically signed Jon Bennett MD    Date:08/12/2018    Time:14:22   MMODEL FLUENCY       CTA Chest Non-Coronary - PE Study (08/12/2018 1:47 PM CDT)   Narrative Performed At   EXAMINATION:    CTA CHEST NON CORONARY        CLINICAL HISTORY:    sob, dvt present;        TECHNIQUE:    Low dose axial images, sagittal and coronal reformations were obtained from the thoracic inlet to the lung bases following the IV administration of 100 mL of Omnipaque 350.  Contrast timing was optimized to evaluate the pulmonary arteries.  MIP images were performed.  Examination limited by poor contrast bolus timing.        COMPARISON:    Radiograph 08/12/2018        FINDINGS:    The vascular and soft tissues structures at the base of the neck are unremarkable.        There is a left-sided aortic arch with 3 branch vessels. The aorta is normal in caliber, contour, and course without significant calcific atherosclerosis.         There is no cardiac enlargement or pericardial fluid.  There is mild calcific atherosclerosis of the coronary arteries.  Questionable enlargement of the right ventricle with straightening of the intraventricular septum.  Right heart strain cannot be excluded.        There is no axillary, hilar, or mediastinal lymphadenopathy.  There is a fluid collection measuring 3.2 x 3.0 x 3.5 within the right veins.  The anterior tibial vein and peroneal veins are patent. Left thigh veins: The common femoral, femoral, popliteal, upper greater saphenous, and deep femoral veins are patent and free of thrombus. The veins are normally compressible and have normal phasic flow and augmentation response. Left calf veins: The visualized calf veins are patent. Miscellaneous: None    IMPRESSION:     Complete occlusion of the right popliteal and posterior tibial veins. No evidence of DVT in the left lower extremity. Preliminary findings discussed with Dr. Gerard Larsen at 12:16 p.m. on 08/12/2018. Electronically signed by resident: Yissel Nugent  Date:08/12/2018  Time:12:12    Electronically signed by:Beau Soliz MD         CT sinuses:  PROCEDURE: CT FACIAL BONES WO CONTRAST  1. Maxillofacial CT negative for acute appearing pathology. 2. Minimal mucoperiosteal changes of the middle nasal turbinate noted. Assessment   Severe persistent asthma with exacerbation due to acute bronchitis- he is clinically better ( he had 2 exacerbations this year)  Moderate obstructive sleep apnea on treatment with a CPAP pressure of 13cm H20. Recent hx of pulmonary embolism and DVT. On Xarelto. He denies any previous hx of pulmonary embolism  Elevated WBC counts due to steroids. Pulmonary hypertension noted on echocardiogram done at [facility]- Improved per the latest Echocardiogram.    Plan   -Change Solumedrol to Prednisone 40mg PO daily  -Follow pending cultures.  -Will change current antibiotics to Cipro 500mg po bid X7days. -Accuchecks monitoring ACHS/Q6H with low dose insulin sliding scale for coverage.  -Continue Albuterol 3ml via nebs Q4h while awake. -Will restart Dulera at the time of discharge for now   -Titrate Oxygen to keep Spo2 >90%.   -Continue CPAP with pressure of 13cm H20 during nights and PRN during daytime.  -Xarelto 15mg po bid for DVT prophylaxis.  -He needs anticoagulation for a period of at least

## 2018-08-19 NOTE — PROGRESS NOTES
Hospitalist Progress Note    Patient:  Josh Rosenthal      Unit/Bed:5K-13/013-A    YOB: 1945    MRN: [id number]       Acct: [de-identified]     PCP: YESY Talavera CNP    Date of Admission: 8/16/2018    Chief Complaint:  Shortness of breath, wheezing and productive cough      Hospital Course: Hospital Course: 74 y. o. male who presented with worsening of shortness of breath for the last 7 days. He was recently admitted and discharged from [facility] for acute left side pulmonary embolism and right lower leg DVT. He [place] back home to [place] after his hospitalization. His current illness started as gradual worsening of shortness of breath with cough and wheezing. He also noticed decline in his functional status. He started using his rescue inhalers/nebulizations more frequently at home with no significant improvement in his short ness of breath, cough or wheezing. Due to worsening of his above symptoms he presented to the Emergency room at [facility].     He denies any history of hemoptysis. He denies any history of orthopnea or paroxysmal nocturnal dyspnea. He denies any fever, chills or rigors at this time      Subjective: no acute events overnight. Wheezing resolved. Breathing significantly improved. No fevers, chills, diarrhea, dizziness or lightheadedness.        Medications:  Reviewed    Infusion Medications    dextrose       Scheduled Medications    ciprofloxacin  500 mg Oral 2 times per day    predniSONE  40 mg Oral Daily    pantoprazole  40 mg Oral QAM AC    verapamil  240 mg Oral QAM    pravastatin  20 mg Oral Nightly    montelukast  10 mg Oral Daily    sodium chloride flush  10 mL Intravenous 2 times per day    rivaroxaban  15 mg Oral BID WC    insulin lispro  0-6 Units Subcutaneous TID     insulin lispro  0-3 Units Subcutaneous Nightly    albuterol  2.5 mg Nebulization Q4H While awake     PRN Meds: fluticasone, sodium chloride flush, docusate sodium, ondansetron, acetaminophen, glucose, dextrose, glucagon (rDNA), dextrose      Intake/Output Summary (Last 24 hours) at 08/19/18 0987  Last data filed at 08/19/18 0441   Gross per 24 hour   Intake              545 ml   Output              650 ml   Net             -105 ml       Diet:  DIET GENERAL;    Exam:  BP (!) 158/73   Pulse 73   Temp 98 °F (36.7 °C) (Oral)   Resp 17   Ht 6' 2\" (1.88 m)   Wt 290 lb (131.5 kg)   SpO2 91%   BMI 37.23 kg/m²     General appearance: No apparent distress, appears stated age and cooperative. HEENT: Pupils equal, round, and reactive to light. Conjunctivae/corneas clear. Neck: Supple, with full range of motion. No jugular venous distention. Trachea midline. Respiratory: no wheezing heard on any of lung fields   Cardiovascular: Regular rate and rhythm with normal S1/S2 without murmurs, rubs or gallops. Abdomen: Soft, non-tender, non-distended with normal bowel sounds. Musculoskeletal: passive and active ROM x 4 extremities. Skin: Skin color, texture, turgor normal.  No rashes or lesions. Neurologic:  Neurovascularly intact without any focal sensory/motor deficits. Cranial nerves: II-XII intact, grossly non-focal.  Psychiatric: Alert and oriented, thought content appropriate, normal insight  Capillary Refill: Brisk,< 3 seconds   Peripheral Pulses: +2 palpable, equal bilaterally       Labs:   Recent Labs      08/16/18   2319  08/18/18   0551  08/19/18   0659   WBC  9.3  8.1  14.0*   HGB  14.2  14.2  13.7*   HCT  42.1  43.3  41.3*   PLT  278  304  315     Recent Labs      08/16/18   2319  08/18/18   0551  08/19/18   0659   NA  140  140  142   K  4.3  4.6  4.5   CL  103  103  104   CO2  26  24  26   BUN  15  16  21   CREATININE  1.1  0.9  1.0   CALCIUM  8.9  9.2  9.1   PHOS   --   3.5  3.6     Recent Labs      08/16/18   2319   AST  22   ALT  14   BILITOT  0.3   ALKPHOS  79     No results for input(s): INR in the last 72 hours.   No results for input(s): Tez Mancia in the last 72 hours. Urinalysis:    Lab Results   Component Value Date    NITRU NEGATIVE 08/17/2018    WBCUA NONE SEEN 08/17/2018    BACTERIA NONE 08/17/2018    RBCUA 0-2 08/17/2018    BLOODU TRACE 08/17/2018    GLUCOSEU NEGATIVE 08/17/2018       Radiology:  CT Facial Bones WO Contrast   Final Result   1. Maxillofacial CT negative for acute appearing pathology. 2. Minimal mucoperiosteal changes of the middle nasal turbinate noted. **This report has been created using voice recognition software. It may contain minor errors which are inherent in voice recognition technology. **      Final report electronically signed by Dr. Diana Heredia on 8/18/2018 1:02 AM      XR CHEST STANDARD (2 VW)   Final Result   Stable chest negative for active pathology. Age-related marking changes are noted. **This report has been created using voice recognition software. It may contain minor errors which are inherent in voice recognition technology. **      Final report electronically signed by Dr. Diana Heredia on 8/16/2018 10:40 PM          Diet: DIET GENERAL;    DVT prophylaxis: [] Lovenox                                 [] SCDs                                 [] SQ Heparin                                 [] Encourage ambulation           [x] Already on Anticoagulation     Disposition:    [x] Home       [] TCU       [] Rehab       [] Psych       [] SNF       [] [place]       [] Other-    Code Status: Full Code    PT/OT Eval Status: home with assist     Assessment/Plan:    Anticipated Discharge in : 8/20/18     Active Hospital Problems    Diagnosis Date Noted    Moderate persistent asthma with acute exacerbation [J45.41] 08/17/2018     Priority: High    Shortness of breath [R06.02]     Acute deep vein thrombosis (DVT) of right popliteal vein ([address] [I82.431] 08/12/2018    Pulmonary embolus ([address] [I26.99] 08/12/2018    CHARIS on CPAP [G47.33, Z99.89] 10/30/2017    GERD

## 2018-08-19 NOTE — PROGRESS NOTES
Currently in Pain: Denies       Social/Functional History:  Lives With: Spouse  Type of Home: House  Home Layout: One level  Home Access: Stairs to enter with rails     Bathroom Shower/Tub: Walk-in shower  Bathroom Toilet: Standard  Bathroom Accessibility: Accessible       ADL Assistance: Independent          Ambulation Assistance: Independent  Transfer Assistance: Independent    Active : Yes     Additional Comments: Pt reporting being very indep with all ADL tasks. Objective        Overall Cognitive Status: WNL    Perception  Overall Perceptual Status: WFL    Sensation  Overall Sensation Status: WNL                           LUE AROM (degrees)  LUE AROM : WNL          RUE AROM (degrees)  RUE AROM : WNL       LUE Strength  Gross LUE Strength: WFL                RUE Strength  Gross RUE Strength: WFL                     RUE Tone: Normotonic  LUE Tone: Normotonic                    ADL  Grooming: Stand by assistance (standing )  LE Dressing: Stand by assistance (sitting to straightne socks)          Transfers  Sit to stand: Stand by assistance  Stand to sit: Stand by assistance    Balance  Sitting Balance: Modified independent   Standing Balance: Stand by assistance     Time: x2 min   Activity: talking to therapist      Functional Mobility  Functional - Mobility Device: No device  Assist Level: Stand by assistance  Functional Mobility Comments: into hallways with sligh tincrease in SOB requiring a min to recover                                                                    Activity Tolerance:  Activity Tolerance: Patient Tolerated treatment well  Activity Tolerance: Treatment Initiated: OT beti completed this date. Pt dmeo functional mobility in halls ith no AD and SBA this date. pt demo slight increase in SOB requiring a min to recovery prior to further activity. Pt educated ont he need to continue to move when returning home.        Assessment:  Assessment: Pt demo ability to complete ADL tasks and 0-100% Score: 42.8  ADL Inpatient CMS G-Code Modifier : CK

## 2018-08-19 NOTE — PROGRESS NOTES
A home oxygen evaluation has been completed. [x]Patient is an inpatient. It is expected that the patient will be discharged within the next 48 hours. Qualified provider to write order for home prescription if patient qualifies. Social service/care managers will arrange for home oxygen. If patient is active, arrange for Home Medical supplier to assess for Oxygen Conserving Device per pulse oximetry. []Patient is an outpatient. Results will be faxed to the ordering provider. Qualified provider to write order for home prescription if patient qualifies and arranges for home oxygen. Patient has been on room air all morning, so at least 3 hours. SpO2 was 92 % on room air at rest. Patients SpO2 was above 88% and did not qualify for home oxygen. Patient was walked for 7 minutes. SpO2 was 90 % during walking. Patients SpO2 was above 88% and did not qualify for home oxygen. Patient does have a positive pressure airway device at home. Patient is  diagnosed with Obstructive Sleep Apnea. Patient will not be set up/instructed on a nocturnal study.   Drs order cancelled the nocturnal pulse ox part of the home O2 eval.

## 2018-08-20 VITALS
TEMPERATURE: 98.4 F | SYSTOLIC BLOOD PRESSURE: 149 MMHG | DIASTOLIC BLOOD PRESSURE: 72 MMHG | BODY MASS INDEX: 35.19 KG/M2 | HEIGHT: 74 IN | OXYGEN SATURATION: 94 % | WEIGHT: 274.2 LBS | RESPIRATION RATE: 18 BRPM | HEART RATE: 67 BPM

## 2018-08-20 LAB
ANION GAP SERPL CALCULATED.3IONS-SCNC: 17 MEQ/L (ref 8–16)
BASOPHILS # BLD: 0.2 %
BASOPHILS ABSOLUTE: 0 THOU/MM3 (ref 0–0.1)
BUN BLDV-MCNC: 21 MG/DL (ref 7–22)
CALCIUM SERPL-MCNC: 9 MG/DL (ref 8.5–10.5)
CHLORIDE BLD-SCNC: 104 MEQ/L (ref 98–111)
CO2: 24 MEQ/L (ref 23–33)
CREAT SERPL-MCNC: 1 MG/DL (ref 0.4–1.2)
EOSINOPHIL # BLD: 0.1 %
EOSINOPHILS ABSOLUTE: 0 THOU/MM3 (ref 0–0.4)
ERYTHROCYTE [DISTWIDTH] IN BLOOD BY AUTOMATED COUNT: 12.9 % (ref 11.5–14.5)
ERYTHROCYTE [DISTWIDTH] IN BLOOD BY AUTOMATED COUNT: 46.6 FL (ref 35–45)
GFR SERPL CREATININE-BSD FRML MDRD: 73 ML/MIN/1.73M2
GLUCOSE BLD-MCNC: 105 MG/DL (ref 70–108)
HCT VFR BLD CALC: 43.1 % (ref 42–52)
HEMOGLOBIN: 14.1 GM/DL (ref 14–18)
IMMATURE GRANS (ABS): 0.15 THOU/MM3 (ref 0–0.07)
IMMATURE GRANULOCYTES: 1.3 %
LYMPHOCYTES # BLD: 9.2 %
LYMPHOCYTES ABSOLUTE: 1.1 THOU/MM3 (ref 1–4.8)
MAGNESIUM: 2.2 MG/DL (ref 1.6–2.4)
MCH RBC QN AUTO: 32.5 PG (ref 26–33)
MCHC RBC AUTO-ENTMCNC: 32.7 GM/DL (ref 32.2–35.5)
MCV RBC AUTO: 99.3 FL (ref 80–94)
MONOCYTES # BLD: 8.9 %
MONOCYTES ABSOLUTE: 1 THOU/MM3 (ref 0.4–1.3)
NUCLEATED RED BLOOD CELLS: 0 /100 WBC
PHOSPHORUS: 3.7 MG/DL (ref 2.4–4.7)
PLATELET # BLD: 330 THOU/MM3 (ref 130–400)
PMV BLD AUTO: 9.7 FL (ref 9.4–12.4)
POTASSIUM SERPL-SCNC: 4.1 MEQ/L (ref 3.5–5.2)
RBC # BLD: 4.34 MILL/MM3 (ref 4.7–6.1)
SEG NEUTROPHILS: 80.3 %
SEGMENTED NEUTROPHILS ABSOLUTE COUNT: 9.4 THOU/MM3 (ref 1.8–7.7)
SODIUM BLD-SCNC: 145 MEQ/L (ref 135–145)
WBC # BLD: 11.7 THOU/MM3 (ref 4.8–10.8)

## 2018-08-20 PROCEDURE — 85025 COMPLETE CBC W/AUTO DIFF WBC: CPT

## 2018-08-20 PROCEDURE — 84100 ASSAY OF PHOSPHORUS: CPT

## 2018-08-20 PROCEDURE — 36415 COLL VENOUS BLD VENIPUNCTURE: CPT

## 2018-08-20 PROCEDURE — 80048 BASIC METABOLIC PNL TOTAL CA: CPT

## 2018-08-20 PROCEDURE — 2580000003 HC RX 258: Performed by: NURSE PRACTITIONER

## 2018-08-20 PROCEDURE — 6370000000 HC RX 637 (ALT 250 FOR IP): Performed by: INTERNAL MEDICINE

## 2018-08-20 PROCEDURE — 94640 AIRWAY INHALATION TREATMENT: CPT

## 2018-08-20 PROCEDURE — G8979 MOBILITY GOAL STATUS: HCPCS

## 2018-08-20 PROCEDURE — G8978 MOBILITY CURRENT STATUS: HCPCS

## 2018-08-20 PROCEDURE — 97161 PT EVAL LOW COMPLEX 20 MIN: CPT

## 2018-08-20 PROCEDURE — 6360000002 HC RX W HCPCS: Performed by: INTERNAL MEDICINE

## 2018-08-20 PROCEDURE — 99239 HOSP IP/OBS DSCHRG MGMT >30: CPT | Performed by: INTERNAL MEDICINE

## 2018-08-20 PROCEDURE — 83735 ASSAY OF MAGNESIUM: CPT

## 2018-08-20 PROCEDURE — 99232 SBSQ HOSP IP/OBS MODERATE 35: CPT | Performed by: INTERNAL MEDICINE

## 2018-08-20 PROCEDURE — 6370000000 HC RX 637 (ALT 250 FOR IP): Performed by: FAMILY MEDICINE

## 2018-08-20 PROCEDURE — G8980 MOBILITY D/C STATUS: HCPCS

## 2018-08-20 RX ORDER — FLUTICASONE PROPIONATE 50 MCG
1 SPRAY, SUSPENSION (ML) NASAL DAILY PRN
Qty: 1 BOTTLE | Refills: 3 | Status: SHIPPED | OUTPATIENT
Start: 2018-08-20 | End: 2018-08-20

## 2018-08-20 RX ORDER — PREDNISONE 20 MG/1
40 TABLET ORAL DAILY
Qty: 10 TABLET | Refills: 0 | Status: SHIPPED | OUTPATIENT
Start: 2018-08-20 | End: 2018-08-25

## 2018-08-20 RX ORDER — CIPROFLOXACIN 500 MG/1
500 TABLET, FILM COATED ORAL EVERY 12 HOURS SCHEDULED
Qty: 10 TABLET | Refills: 0 | Status: SHIPPED | OUTPATIENT
Start: 2018-08-20 | End: 2018-08-20

## 2018-08-20 RX ORDER — CIPROFLOXACIN 500 MG/1
500 TABLET, FILM COATED ORAL EVERY 12 HOURS SCHEDULED
Qty: 10 TABLET | Refills: 0 | Status: SHIPPED | OUTPATIENT
Start: 2018-08-20 | End: 2018-08-25

## 2018-08-20 RX ORDER — FLUTICASONE PROPIONATE 50 MCG
1 SPRAY, SUSPENSION (ML) NASAL DAILY PRN
Qty: 1 BOTTLE | Refills: 3 | Status: SHIPPED | OUTPATIENT
Start: 2018-08-20 | End: 2019-11-18 | Stop reason: SDUPTHER

## 2018-08-20 RX ORDER — PREDNISONE 20 MG/1
40 TABLET ORAL DAILY
Qty: 10 TABLET | Refills: 0 | Status: SHIPPED | OUTPATIENT
Start: 2018-08-20 | End: 2018-08-20

## 2018-08-20 RX ADMIN — MONTELUKAST SODIUM 10 MG: 10 TABLET, FILM COATED ORAL at 09:49

## 2018-08-20 RX ADMIN — CIPROFLOXACIN 500 MG: 500 TABLET, FILM COATED ORAL at 09:49

## 2018-08-20 RX ADMIN — RIVAROXABAN 15 MG: 15 TABLET, FILM COATED ORAL at 09:49

## 2018-08-20 RX ADMIN — VERAPAMIL HYDROCHLORIDE 240 MG: 240 TABLET, FILM COATED, EXTENDED RELEASE ORAL at 09:49

## 2018-08-20 RX ADMIN — ALBUTEROL SULFATE 2.5 MG: 2.5 SOLUTION RESPIRATORY (INHALATION) at 04:00

## 2018-08-20 RX ADMIN — PREDNISONE 40 MG: 20 TABLET ORAL at 09:49

## 2018-08-20 RX ADMIN — Medication 10 ML: at 09:50

## 2018-08-20 RX ADMIN — PANTOPRAZOLE SODIUM 40 MG: 40 TABLET, DELAYED RELEASE ORAL at 06:59

## 2018-08-20 RX ADMIN — ALBUTEROL SULFATE 2.5 MG: 2.5 SOLUTION RESPIRATORY (INHALATION) at 00:16

## 2018-08-20 RX ADMIN — ALBUTEROL SULFATE 2.5 MG: 2.5 SOLUTION RESPIRATORY (INHALATION) at 10:25

## 2018-08-20 ASSESSMENT — PAIN SCALES - GENERAL
PAINLEVEL_OUTOF10: 0
PAINLEVEL_OUTOF10: 0

## 2018-08-20 NOTE — PLAN OF CARE
Problem: Cardiovascular  Goal: No DVT, peripheral vascular complications  Outcome: Ongoing  Pt continues to have a DVT in right lower leg, has no pain at this time, swelling is +2 with pitting, and no numbness/tingling. Pt is currently on xaralto    Problem: Respiratory  Goal: O2 Sat > 90%  Outcome: Ongoing  Pt continues to have good SP02 level, last was 94% on room air. Problem: GI  Goal: No bowel complications  Outcome: Ongoing  Pt has had a BM within the last 24 hrs, was on 8/19. Problem:   Goal: No urinary complication  Outcome: Ongoing  Pt having adequate urine output over 45mL/hr. Problem: Skin Integrity/Risk  Goal: No skin breakdown during hospitalization  Outcome: Ongoing  Pt had no new skin breakdown during this shift--is up ad patricio in room and very active. Problem: Discharge Planning:  Goal: Patients continuum of care needs are met  Patients continuum of care needs are met   Outcome: Ongoing  Pt planning d/c to home in AM, denied any need for [facility] at this time--will continue to monitor. Comments: Care plan reviewed with patient this shift. Patient verbalizes understanding of the plan of care and is contributing to goal setting.

## 2018-08-20 NOTE — PROGRESS NOTES
any history of orthopnea or paroxysmal nocturnal dyspnea. He denies any fever, chills or rigors at this time. Subjective/Events Past 24 hours/ROS   Feels better. Improved cough. On room air. No chest pain. Moving in the hernandez ways.  -Rest of the body systems were reviewed. PMHx   Past Medical History      Diagnosis Date    Arthritis     Asthma     Cancer [address] [address]     skin    Chickenpox     Hyperlipidemia     Measles     Migraines     Mumps     Sleep apnea      Meds    Current Medications       IV Drips/Infusions    Home Medications  No prescriptions prior to admission. Diet       Allergies    Molds & smuts and Sulfa antibiotics  Sleep History    Never diagnosed with sleep apnea in the past    Vitals     height is 6' 2\" (1.88 m) and weight is 274 lb 3.2 oz (124.4 kg). His temperature is 98.4 °F (36.9 °C). His blood pressure is 149/72 (abnormal) and his pulse is 67. His respiration is 18 and oxygen saturation is 94%. Body mass index is 35.21 kg/m². SUPPLEMENTAL O2: O2 Flow Rate (L/min): 4 L/min       I/O        Intake/Output Summary (Last 24 hours) at 08/20/18 1623  Last data filed at 08/20/18 0405   Gross per 24 hour   Intake              550 ml   Output              500 ml   Net               50 ml     I/O last 3 completed shifts: In: 550 [P.O.:540; I.V.:10]  Out: 500 [Urine:500]   Patient Vitals for the past 96 hrs (Last 3 readings):   Weight   08/20/18 0016 274 lb 3.2 oz (124.4 kg)   08/16/18 2225 290 lb (131.5 kg)       Exam   Constitutional: Patient appears in no distress. Mouth/Throat: Oropharynx is clear and moist.  No oral thrush. Neck: Neck supple. Cardiovascular: S1 and S2 heard. No murmurs. Pulmonary/Chest: Bilateral air entry present. Good breath sounds on both sides. No wheezes. No rales. Abdominal: Soft. No tenderness. Extremities: Patient exhibits no edema. Neurological: Patient is awake and alert.     Labs  - Old records and notes have been reviewed in [facility] Interfaces - 07/28/2018 1:47 PM EDT    EXAM: CT CHEST WITHOUT CONTRAST, 07/28/2018 11:59 AM    COMPARISON: Chest radiograph July 25, 2018    CLINICAL INDICATIONS: Bacterial pneumonia, not elsewhere classified;     RELEVANT CLINICAL HISTORY:    TECHNIQUE: Axial CT images were reconstructed from the volumetric data set,  from the thoracic inlet through the adrenal glands. No intravenous contrast  was used. Coronal MIP images were also reconstructed. This patient underwent a CT examination using radiation exposure as low as  reasonably achievable. CTDIvol and DLP radiation exposure values for each  series were:    Exposure: 1; Series: 2; Anatomy: Chest; Phantom: 32 cm; CTDIvol: 9; DLP:  310    The dose indicators for CT are the volume Computed Tomography (CT) Dose Index  (CTDIvol) and the Dose Length Product (DLP), and are measured in units of mGy  and mGy-cm, respectively. These indicators are not patient dose, but values  generated from the CT scanner acquisition factors and may substantially  underestimate or overestimate the absorbed dose based on patient size and  other factors. FINDINGS:   Lungs and Pleura: The lungs demonstrate some minimal dependent atelectasis in  the bases but are otherwise grossly well aerated. There is some mild vague  groundglass airspace disease in the posterior left upper lobe abutting the  major fissure on axial image 36. There is no dominant mass, focal  consolidation, pleural effusion, worrisome nodularity or pneumothorax. A  couple tiny cystic lesions are noted in the posterior lung bases. Tracheobronchial tree: No abnormality. Mediastinum/Eve: No mediastinal or hilar lymphadenopathy. The visualized  thyroid and esophagus are unremarkable. Axilla and Supraclavicular Regions: No axillary or supraclavicular adenopathy. Cardiovascular: The heart size is normal with no pericardial effusion.  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 There are no regional wall motion abnormalities. Left ventricular systolic function appears normal. Visually estimated ejection fraction is     60-65%. The LV Doppler derived stroke volume equals 123.0 ccs.         Diastolic indices: E wave velocity 0.7 m/s, E/A ratio 0.8,  msec., E/e' ratio(avg) 8. There is pseudonormalization of mitral inflow pattern consistent with significant diastolic dysfunction.         Right Atrium: The right atrium is normal in size, measuring 4.7 cm in length and 3.6 cm in width in the apical view.         Right Ventricle: The right ventricle is normal in size measuring 3.5 cm at the base in the apical right ventricle-focused view. Global right ventricular systolic function appears normal. Tricuspid annular plane systolic excursion (TAPSE) is 2.5 cm. The     estimated PA systolic pressure is greater than 68 mmHg.         Aortic Valve:  The aortic valve is mildly sclerotic with mildly restricted leaflet mobility. The peak velocity obtained across the aortic valve is 2.25 m/s, which translates to a peak gradient of 20 mmHg. The mean gradient is 9 mmHg. Using a left     ventricular outflow tract diameter of 2.5 cm, a left ventricular outflow tract velocity time integral of 24 cm, and a peak instantaneous transvalvular velocity time integral of 44 cm, the calculated aortic valve area is 2.8 cm2(AVAi is 1.1 cm2/m2),     consistent with trivial aortic stenosis. Additionally, there is mild aortic regurgitation.         Tricuspid Valve:  The tricuspid valve is normal in structure. There is mild tricuspid regurgitation.         Pulmonary Valve:  The pulmonic valve is normal in structure. There is trivial pulmonic regurgitation.         IVC: Right atrial pressure as assessed by IVC dynamics is normal at not visualized mmHg.         Intracavitary:  There is no evidence of pericardial effusion, the proximal airways are patent.  There are pulmonary cyst within the lower lobes bilaterally.  Bandlike opacities at the left lung base likely represent atelectasis or parenchymal scarring.  No mass, consolidation, or pneumothorax.  There is no pleural thickening or pleural fluid.        The esophagus is normal in caliber and contour.  The imaged intra-abdominal structures demonstrate a large, partially visualized cyst in the left vidya abdomen with attenuation measuring simple fluid.        The osseous structures demonstrate mild degenerative changes and exaggerated kyphosis of the lower thoracic spine.  No evidence of fracture or bony destructive process.       MMODEL FLUENCY         Procedure Note   Interface, Rad Results In - 08/12/2018 12:40 PM CDT    EXAMINATION:  US LOWER EXTREMITY VEINS BILATERAL    CLINICAL HISTORY:  Localized edema    TECHNIQUE:  Duplex and color flow Doppler and dynamic compression was performed of the bilateral lower extremity veins was performed. COMPARISON:  None    FINDINGS:  Right thigh veins: There is complete thrombosis of the popliteal vein. The common femoral, femoral, upper greater saphenous, and deep femoral veins are patent and free of thrombus. The veins are normally compressible and have normal phasic flow and augmentation response. Right calf veins: There is complete thrombosis the posterior tibial veins. The anterior tibial vein and peroneal veins are patent. Left thigh veins: The common femoral, femoral, popliteal, upper greater saphenous, and deep femoral veins are patent and free of thrombus. The veins are normally compressible and have normal phasic flow and augmentation response. Left calf veins: The visualized calf veins are patent. Miscellaneous: None    IMPRESSION:     Complete occlusion of the right popliteal and posterior tibial veins. No evidence of DVT in the left lower extremity.     Preliminary findings discussed with Dr. Sarah Haney at 12:16

## 2018-08-20 NOTE — PROGRESS NOTES
CLINICAL PHARMACY: DISCHARGE MED RECONCILIATION/REVIEW    [facility]) Select Patient?: No  Total # of Interventions Recommended: 0   -   Total # Interventions Accepted: 0  Intervention Severity:   - Level 1 Intervention Present?: No   - Level 2 #: 0   - Level 3 #: 0   Time Spent (min): 15    Additional Documentation:  AVS reviewed for discharge. Medication section completed.

## 2018-08-20 NOTE — DISCHARGE SUMMARY
nebulizer solution  Commonly known as:  PROVENTIL  Take 3 mLs by nebulization every 4 hours as needed for Wheezing or Shortness of Breath     budesonide-formoterol 160-4.5 MCG/ACT Aero  Commonly known as:  SYMBICORT  Inhale 2 puffs into the lungs 2 times daily     clotrimazole-betamethasone 1-0.05 % lotion  Commonly known as:  LOTRISONE     CPAP Machine Misc  by Does not apply route Please change CPAP to APAP (autoCPAP) range 13 - 20- cwp.     ipratropium-albuterol 0.5-2.5 (3) MG/3ML Soln nebulizer solution  Commonly known as:  DUONEB     montelukast 10 MG tablet  Commonly known as:  SINGULAIR     MULTI-VITAMIN DAILY PO     omeprazole 20 MG delayed release capsule  Commonly known as:  PRILOSEC     pravastatin 20 MG tablet  Commonly known as:  PRAVACHOL     rivaroxaban 15 MG Tabs tablet  Commonly known as:  XARELTO     verapamil 240 MG extended release capsule  Commonly known as:  VERELAN        STOP taking these medications    ibuprofen 200 MG tablet  Commonly known as:  ADVIL;MOTRIN  Notes to patient:  Avoid while on blood thinner           Where to Get Your Medications      These medications were sent to [facility] [address]    Phone:  [phone]   · ciprofloxacin 500 MG tablet  · fluticasone 50 MCG/ACT nasal spray  · predniSONE 20 MG tablet         Intake/Output Summary (Last 24 hours) at 08/20/18 1115  Last data filed at 08/20/18 0405   Gross per 24 hour   Intake              550 ml   Output              500 ml   Net               50 ml       Diet:  DIET GENERAL;    Exam:  BP (!) 149/72   Pulse 67   Temp 98.4 °F (36.9 °C)   Resp 18   Ht 6' 2\" (1.88 m)   Wt 274 lb 3.2 oz (124.4 kg)   SpO2 94%   BMI 35.21 kg/m²     General appearance: No apparent distress, appears stated age and cooperative. HEENT: Pupils equal, round, and reactive to light. Conjunctivae/corneas clear.   Neck: Supple, with full range of motion. No jugular venous distention. Trachea midline. Respiratory: no wheezing heard on any of lung fields   Cardiovascular: Regular rate and rhythm with normal S1/S2 without murmurs, rubs or gallops. Abdomen: Soft, non-tender, non-distended with normal bowel sounds. Musculoskeletal: passive and active ROM x 4 extremities. Skin: Skin color, texture, turgor normal.  No rashes or lesions. Neurologic:  Neurovascularly intact without any focal sensory/motor deficits. Cranial nerves: II-XII intact, grossly non-focal.  Psychiatric: Alert and oriented, thought content appropriate, normal insight  Capillary Refill: Brisk,< 3 seconds   Peripheral Pulses: +2 palpable, equal bilaterally       Labs:   Recent Labs      08/18/18   0551  08/19/18   0659  08/20/18   0733   WBC  8.1  14.0*  11.7*   HGB  14.2  13.7*  14.1   HCT  43.3  41.3*  43.1   PLT  304  315  330     Recent Labs      08/18/18   0551  08/19/18   0659  08/20/18   0733   NA  140  142  145   K  4.6  4.5  4.1   CL  103  104  104   CO2  24  26  24   BUN  16  21  21   CREATININE  0.9  1.0  1.0   CALCIUM  9.2  9.1  9.0   PHOS  3.5  3.6  3.7     No results for input(s): AST, ALT, BILIDIR, BILITOT, ALKPHOS in the last 72 hours. No results for input(s): INR in the last 72 hours. No results for input(s): Tamara Bones in the last 72 hours. Urinalysis:      Lab Results   Component Value Date    NITRU NEGATIVE 08/17/2018    WBCUA NONE SEEN 08/17/2018    BACTERIA NONE 08/17/2018    RBCUA 0-2 08/17/2018    BLOODU TRACE 08/17/2018    GLUCOSEU NEGATIVE 08/17/2018       Radiology:  CT Facial Bones WO Contrast   Final Result   1. Maxillofacial CT negative for acute appearing pathology. 2. Minimal mucoperiosteal changes of the middle nasal turbinate noted. **This report has been created using voice recognition software. It may contain minor errors which are inherent in voice recognition technology. **      Final report electronically signed by Dr. Armand Baltazar on 8/18/2018 1:02 AM      XR CHEST STANDARD (2 VW)   Final Result   Stable chest negative for active pathology. Age-related marking changes are noted. **This report has been created using voice recognition software. It may contain minor errors which are inherent in voice recognition technology. **      Final report electronically signed by Dr. Armand Baltazar on 8/16/2018 10:40 PM          Diet: DIET GENERAL;    DVT prophylaxis: [] Lovenox                                 [] SCDs                                 [] SQ Heparin                                 [] Encourage ambulation           [x] Already on Anticoagulation     Disposition:    [x] Home       [] TCU       [] Rehab       [] Psych       [] SNF       [] [place]       [] Other-    Code Status: Full Code    PT/OT Eval Status: home with assist     Assessment/Plan:    Anticipated Discharge in : 8/20/18     Active Hospital Problems    Diagnosis Date Noted    Moderate persistent asthma with acute exacerbation [J45.41] 08/17/2018    Shortness of breath [R06.02]     Acute deep vein thrombosis (DVT) of right popliteal vein ([address] [I82.431] 08/12/2018    Pulmonary embolus ([address] [I26.99] 08/12/2018    CHARIS on CPAP [G47.33, Z99.89] 10/30/2017    GERD (gastroesophageal reflux disease) [K21.9] 06/13/2013       74year old male presenting with mild persistent asthma with exacerbation due to acute bronchitis with viral Vs bacterial etiology. Moderate obstructive sleep apnea on treatment with a CPAP pressure of 13cm H20. Recent hx of pulmonary embolism and DVT- Xarelto           1. Acute Asthma Exacerbation: Flu swab negative, Blood Cx NGTD,  per pulmonology recommending: transition from IV to PO Prednisone, Ciprofloxacin 500 BID total x 7 days, Albuterol q4 prn, Maintain SpO2> 92%. OK FOR DISCHARGE PER PILM       2.  Acute DVT/PE: continue Xarelto 15 mg BID, 2D Echo to evaluate PAP pressures and assess RV and LV function      3. CHARIS: Continue CPAP with pressure of 13cm H20 during nights and PRN during daytime. Maintain SpO2> 92%.      4.  GERD: continue Protonix daily         PCP IN ONE WEEK  PULM AS SCHEDULED    STABLE FOR HOME WITH HOME HEALTH    DISCHARGE TIME: 35MINS    Electronically signed by Marya Iverson MD on 8/20/2018 at 11:15 AM

## 2018-08-20 NOTE — CARE COORDINATION
8/20/18, 12:05 PM    DISCHARGE BARRIERS    Patient is to be discharged home today. Message left with Patty with [facility] with referral information. No other needs at this time. 8/20/18, 12:06 PM    Discharge plan discussed by  and . Discharge plan reviewed with patient/ family. Patient/ family verbalize understanding of discharge plan and are in agreement with plan. Understanding was demonstrated using the teach back method.    Services After Discharge  Services At/After Discharge: PT, OT, Nursing Services ([facility])   IMM Letter  IMM Letter given to Patient/Family/Significant other/Guardian/POA/by[de-identified] CM  IMM Letter date given[de-identified] 08/20/18  IMM Letter time given[de-identified] 0459

## 2018-08-20 NOTE — PROGRESS NOTES
Complexitybased on patient assessment and decision making process of determining plan of care and establishing reasonable expectations for measurable functional outcomes    REQUIRES PT FOLLOW UP: No  Discharge Recommendations: Home with assist PRN    Patient Education:  Patient Education: POC    Equipment Recommendations:  Equipment Needed: No    Safety:  Type of devices: All fall risk precautions in place, Call light within reach, Left in chair  Restraints  Initially in place: No    Plan:  Times per week: NA    Goals:  Patient goals : To go home. Evaluation Complexity: Based on the findings of patient history, examination, clinical presentation, and decision making during this evaluation, the evaluation of Joe MCGREGOR Leodan  is of low complexity.     PT G-Codes  Functional Limitation: Mobility: Walking and moving around  Mobility: Walking and Moving Around Current Status (): 0 percent impaired, limited or restricted  Mobility: Walking and Moving Around Goal Status (): 0 percent impaired, limited or restricted  Mobility: Walking and Moving Around Discharge Status (): 0 percent impaired, limited or restricted       AM-PAC Inpatient Mobility without Stair Climbing Raw Score : 20  AM-PAC Inpatient without Stair Climbing T-Scale Score : 60.57  Mobility Inpatient CMS 0-100% Score: 0  Mobility Inpatient without Stair CMS G-Code Modifier : [address]

## 2018-08-23 LAB
BLOOD CULTURE, ROUTINE: NORMAL
BLOOD CULTURE, ROUTINE: NORMAL

## 2018-08-31 ENCOUNTER — HOSPITAL ENCOUNTER (OUTPATIENT)
Age: 73
Discharge: HOME | End: 2018-08-31
Payer: MEDICARE

## 2018-08-31 LAB
ANION GAP SERPL CALCULATED.3IONS-SCNC: 11 MEQ/L (ref 8–16)
BASOPHILS # BLD: 0.6 %
BASOPHILS ABSOLUTE: 0.1 THOU/MM3 (ref 0–0.1)
BUN BLDV-MCNC: 15 MG/DL (ref 7–22)
CALCIUM SERPL-MCNC: 9.1 MG/DL (ref 8.5–10.5)
CHLORIDE BLD-SCNC: 104 MEQ/L (ref 98–111)
CO2: 29 MEQ/L (ref 23–33)
CREAT SERPL-MCNC: 1 MG/DL (ref 0.4–1.2)
EOSINOPHIL # BLD: 3.9 %
EOSINOPHILS ABSOLUTE: 0.4 THOU/MM3 (ref 0–0.4)
ERYTHROCYTE [DISTWIDTH] IN BLOOD BY AUTOMATED COUNT: 13.1 % (ref 11.5–14.5)
ERYTHROCYTE [DISTWIDTH] IN BLOOD BY AUTOMATED COUNT: 49.1 FL (ref 35–45)
FOLATE: > 20 NG/ML (ref 4.8–24.2)
GFR SERPL CREATININE-BSD FRML MDRD: 73 ML/MIN/1.73M2
GLUCOSE BLD-MCNC: 96 MG/DL (ref 70–108)
HCT VFR BLD CALC: 44.7 % (ref 42–52)
HEMOGLOBIN: 14.6 GM/DL (ref 14–18)
IMMATURE GRANS (ABS): 0.09 THOU/MM3 (ref 0–0.07)
IMMATURE GRANULOCYTES: 0.9 %
LYMPHOCYTES # BLD: 8.3 %
LYMPHOCYTES ABSOLUTE: 0.8 THOU/MM3 (ref 1–4.8)
MAGNESIUM: 2.3 MG/DL (ref 1.6–2.4)
MCH RBC QN AUTO: 33.3 PG (ref 26–33)
MCHC RBC AUTO-ENTMCNC: 32.7 GM/DL (ref 32.2–35.5)
MCV RBC AUTO: 101.8 FL (ref 80–94)
MONOCYTES # BLD: 6.9 %
MONOCYTES ABSOLUTE: 0.7 THOU/MM3 (ref 0.4–1.3)
NUCLEATED RED BLOOD CELLS: 0 /100 WBC
PLATELET # BLD: 222 THOU/MM3 (ref 130–400)
PMV BLD AUTO: 11 FL (ref 9.4–12.4)
POTASSIUM SERPL-SCNC: 4.7 MEQ/L (ref 3.5–5.2)
RBC # BLD: 4.39 MILL/MM3 (ref 4.7–6.1)
SEG NEUTROPHILS: 79.4 %
SEGMENTED NEUTROPHILS ABSOLUTE COUNT: 7.9 THOU/MM3 (ref 1.8–7.7)
SODIUM BLD-SCNC: 144 MEQ/L (ref 135–145)
VITAMIN B-12: 439 PG/ML (ref 211–911)
WBC # BLD: 10 THOU/MM3 (ref 4.8–10.8)

## 2018-08-31 PROCEDURE — 83735 ASSAY OF MAGNESIUM: CPT

## 2018-08-31 PROCEDURE — 82607 VITAMIN B-12: CPT

## 2018-08-31 PROCEDURE — 82746 ASSAY OF FOLIC ACID SERUM: CPT

## 2018-08-31 PROCEDURE — 85025 COMPLETE CBC W/AUTO DIFF WBC: CPT

## 2018-08-31 PROCEDURE — 80048 BASIC METABOLIC PNL TOTAL CA: CPT

## 2018-08-31 PROCEDURE — 36415 COLL VENOUS BLD VENIPUNCTURE: CPT

## 2018-09-06 ENCOUNTER — NURSE TRIAGE (OUTPATIENT)
Dept: ADMINISTRATIVE | Age: 73
End: 2018-09-06

## 2018-09-06 ENCOUNTER — TELEPHONE (OUTPATIENT)
Dept: PULMONOLOGY | Age: 73
End: 2018-09-06

## 2018-09-06 ENCOUNTER — HOSPITAL ENCOUNTER (EMERGENCY)
Age: 73
Discharge: HOME | End: 2018-09-06
Attending: FAMILY MEDICINE
Payer: MEDICARE

## 2018-09-06 VITALS
OXYGEN SATURATION: 94 % | RESPIRATION RATE: 18 BRPM | TEMPERATURE: 98.4 F | DIASTOLIC BLOOD PRESSURE: 71 MMHG | SYSTOLIC BLOOD PRESSURE: 156 MMHG | WEIGHT: 274 LBS | HEART RATE: 57 BPM | BODY MASS INDEX: 35.16 KG/M2 | HEIGHT: 74 IN

## 2018-09-06 DIAGNOSIS — I87.8 VENOUS STASIS OF BOTH LOWER EXTREMITIES: Primary | ICD-10-CM

## 2018-09-06 LAB
ALBUMIN SERPL-MCNC: 3.7 G/DL (ref 3.5–5.1)
ALP BLD-CCNC: 69 U/L (ref 38–126)
ALT SERPL-CCNC: 10 U/L (ref 11–66)
ANION GAP SERPL CALCULATED.3IONS-SCNC: 11 MEQ/L (ref 8–16)
AST SERPL-CCNC: 17 U/L (ref 5–40)
BILIRUB SERPL-MCNC: 0.4 MG/DL (ref 0.3–1.2)
BILIRUBIN DIRECT: < 0.2 MG/DL (ref 0–0.3)
BUN BLDV-MCNC: 17 MG/DL (ref 7–22)
CALCIUM SERPL-MCNC: 8.8 MG/DL (ref 8.5–10.5)
CHLORIDE BLD-SCNC: 104 MEQ/L (ref 98–111)
CO2: 29 MEQ/L (ref 23–33)
CREAT SERPL-MCNC: 0.9 MG/DL (ref 0.4–1.2)
GFR SERPL CREATININE-BSD FRML MDRD: 82 ML/MIN/1.73M2
GLUCOSE BLD-MCNC: 91 MG/DL (ref 70–108)
OSMOLALITY CALCULATION: 288 MOSMOL/KG (ref 275–300)
POTASSIUM SERPL-SCNC: 4.3 MEQ/L (ref 3.5–5.2)
SODIUM BLD-SCNC: 144 MEQ/L (ref 135–145)
TOTAL PROTEIN: 6.1 G/DL (ref 6.1–8)

## 2018-09-06 PROCEDURE — 82248 BILIRUBIN DIRECT: CPT

## 2018-09-06 PROCEDURE — 99283 EMERGENCY DEPT VISIT LOW MDM: CPT

## 2018-09-06 PROCEDURE — 80053 COMPREHEN METABOLIC PANEL: CPT

## 2018-09-06 PROCEDURE — 36415 COLL VENOUS BLD VENIPUNCTURE: CPT

## 2018-09-06 ASSESSMENT — ENCOUNTER SYMPTOMS
SHORTNESS OF BREATH: 0
VOMITING: 0
CHOKING: 0
CHEST TIGHTNESS: 0
COUGH: 0
STRIDOR: 0
ABDOMINAL DISTENTION: 0
APNEA: 0
BLOOD IN STOOL: 0
NAUSEA: 0
DIARRHEA: 0
WHEEZING: 0
CONSTIPATION: 0
ABDOMINAL PAIN: 0

## 2018-09-06 NOTE — ED PROVIDER NOTES
Venous stasis of both lower extremities          DISPOSITION/PLAN   Discharge Home    PATIENT REFERRED TO:  Krystle Caldwell, APRN - CNP  [address] [facility]  [phone]    In 2 days  bilateral lower leg edema      DISCHARGE MEDICATIONS:  New Prescriptions    No medications on file       (Please note that portions of this note were completed with a voice recognition program.  Efforts were made to edit the dictations but occasionally words are mis-transcribed.)      Provider:  I personally performed the services described in the documentation, reviewed and edited the documentation which was dictated to the scribe in my presence, and it accurately records my words and actions.     Kelli Gardner MD 9/6/18 7:20 PM        Kelli Gardner MD  09/06/18 0505

## 2018-09-06 NOTE — TELEPHONE ENCOUNTER
Pt called and his right leg is swollen back up. He was D/C a couple weeks ago from [facility] after having a blood clot. He is 21/2 hours away from here but is wondering if he should be concerned. No pain and on a blood thinner.

## 2018-09-06 NOTE — TELEPHONE ENCOUNTER
Reason for Disposition   [1] Very swollen joint AND [2] no fever    Answer Assessment - Initial Assessment Questions  1. LOCATION: \"Which joint is swollen? \"      R ankle area   2. ONSET: \"When did the swelling start? \"      Noted this morning   3. SIZE: \"How large is the swelling? \"      At the ankle only,  4. PAIN: \"Is there any pain? \" If so, ask: \"How bad is it? \" (Scale 1-10; or mild, moderate, severe)      None   5. CAUSE: \"What do you think caused the swollen joint? \"      Blood clot in that leg and then went to the lungs  6. OTHER SYMPTOMS: \"Do you have any other symptoms? \" (e.g., fever, chest pain, difficulty breathing, calf pain)      none  7. PREGNANCY: \"Is there any chance you are pregnant? \" \"When was your last menstrual period? \"      none    Protocols used: ANKLE SWELLING-ADULT-

## 2018-09-06 NOTE — ED TRIAGE NOTES
Pt was discharged from hospital 2 weeks ago after having PE. Pt sent home on xarelto which he is currently taking. Pt today c/o right ankle swelling, which is how it started last time. Pt denies injury. Area swollen, not warm to touch.

## 2018-09-09 ENCOUNTER — HOSPITAL ENCOUNTER (EMERGENCY)
Age: 73
Discharge: HOME | End: 2018-09-09
Attending: FAMILY MEDICINE
Payer: MEDICARE

## 2018-09-09 ENCOUNTER — APPOINTMENT (OUTPATIENT)
Dept: CT IMAGING | Age: 73
End: 2018-09-09
Payer: MEDICARE

## 2018-09-09 ENCOUNTER — APPOINTMENT (OUTPATIENT)
Dept: GENERAL RADIOLOGY | Age: 73
End: 2018-09-09
Payer: MEDICARE

## 2018-09-09 VITALS
TEMPERATURE: 98 F | RESPIRATION RATE: 20 BRPM | WEIGHT: 274 LBS | HEIGHT: 74 IN | BODY MASS INDEX: 35.16 KG/M2 | HEART RATE: 61 BPM | OXYGEN SATURATION: 94 % | DIASTOLIC BLOOD PRESSURE: 64 MMHG | SYSTOLIC BLOOD PRESSURE: 144 MMHG

## 2018-09-09 DIAGNOSIS — Q24.8 PERICARDIAL CYST: ICD-10-CM

## 2018-09-09 DIAGNOSIS — Z76.0 ENCOUNTER FOR MEDICATION REFILL: ICD-10-CM

## 2018-09-09 DIAGNOSIS — R91.8 PULMONARY NODULES: ICD-10-CM

## 2018-09-09 DIAGNOSIS — J44.1 COPD EXACERBATION (HCC): Primary | ICD-10-CM

## 2018-09-09 LAB
ALBUMIN SERPL-MCNC: 3.9 G/DL (ref 3.5–5.1)
ALLEN TEST: ABNORMAL
ALP BLD-CCNC: 65 U/L (ref 38–126)
ALT SERPL-CCNC: 9 U/L (ref 11–66)
ANION GAP SERPL CALCULATED.3IONS-SCNC: 12 MEQ/L (ref 8–16)
AST SERPL-CCNC: 16 U/L (ref 5–40)
BASE EXCESS (CALCULATED): 2.3 MMOL/L (ref -2.5–2.5)
BASOPHILS # BLD: 0.9 %
BASOPHILS ABSOLUTE: 0.1 THOU/MM3 (ref 0–0.1)
BILIRUB SERPL-MCNC: 0.4 MG/DL (ref 0.3–1.2)
BUN BLDV-MCNC: 16 MG/DL (ref 7–22)
CALCIUM SERPL-MCNC: 8.8 MG/DL (ref 8.5–10.5)
CHLORIDE BLD-SCNC: 107 MEQ/L (ref 98–111)
CO2: 24 MEQ/L (ref 23–33)
COLLECTED BY:: ABNORMAL
CREAT SERPL-MCNC: 0.9 MG/DL (ref 0.4–1.2)
DEVICE: ABNORMAL
EKG ATRIAL RATE: 62 BPM
EKG P AXIS: 48 DEGREES
EKG P-R INTERVAL: 128 MS
EKG Q-T INTERVAL: 438 MS
EKG QRS DURATION: 110 MS
EKG QTC CALCULATION (BAZETT): 444 MS
EKG R AXIS: -26 DEGREES
EKG T AXIS: 72 DEGREES
EKG VENTRICULAR RATE: 62 BPM
EOSINOPHIL # BLD: 13.7 %
EOSINOPHILS ABSOLUTE: 1.1 THOU/MM3 (ref 0–0.4)
ERYTHROCYTE [DISTWIDTH] IN BLOOD BY AUTOMATED COUNT: 13 % (ref 11.5–14.5)
ERYTHROCYTE [DISTWIDTH] IN BLOOD BY AUTOMATED COUNT: 46.4 FL (ref 35–45)
GFR SERPL CREATININE-BSD FRML MDRD: 82 ML/MIN/1.73M2
GLUCOSE BLD-MCNC: 130 MG/DL (ref 70–108)
HCO3: 28 MMOL/L (ref 23–28)
HCT VFR BLD CALC: 42.2 % (ref 42–52)
HEMOGLOBIN: 14.1 GM/DL (ref 14–18)
IMMATURE GRANS (ABS): 0.02 THOU/MM3 (ref 0–0.07)
IMMATURE GRANULOCYTES: 0.3 %
LYMPHOCYTES # BLD: 13.9 %
LYMPHOCYTES ABSOLUTE: 1.1 THOU/MM3 (ref 1–4.8)
MCH RBC QN AUTO: 32.9 PG (ref 26–33)
MCHC RBC AUTO-ENTMCNC: 33.4 GM/DL (ref 32.2–35.5)
MCV RBC AUTO: 98.6 FL (ref 80–94)
MONOCYTES # BLD: 7.6 %
MONOCYTES ABSOLUTE: 0.6 THOU/MM3 (ref 0.4–1.3)
NUCLEATED RED BLOOD CELLS: 0 /100 WBC
O2 SATURATION: 94 %
OSMOLALITY CALCULATION: 287.9 MOSMOL/KG (ref 275–300)
PCO2: 47 MMHG (ref 35–45)
PH BLOOD GAS: 7.38 (ref 7.35–7.45)
PLATELET # BLD: 204 THOU/MM3 (ref 130–400)
PMV BLD AUTO: 10.1 FL (ref 9.4–12.4)
PO2: 72 MMHG (ref 71–104)
POTASSIUM SERPL-SCNC: 4 MEQ/L (ref 3.5–5.2)
PRO-BNP: 165.2 PG/ML (ref 0–1800)
RBC # BLD: 4.28 MILL/MM3 (ref 4.7–6.1)
SEG NEUTROPHILS: 63.6 %
SEGMENTED NEUTROPHILS ABSOLUTE COUNT: 5 THOU/MM3 (ref 1.8–7.7)
SODIUM BLD-SCNC: 143 MEQ/L (ref 135–145)
SOURCE, BLOOD GAS: ABNORMAL
TOTAL PROTEIN: 6.1 G/DL (ref 6.1–8)
TROPONIN T: < 0.01 NG/ML
WBC # BLD: 7.9 THOU/MM3 (ref 4.8–10.8)

## 2018-09-09 PROCEDURE — 84484 ASSAY OF TROPONIN QUANT: CPT

## 2018-09-09 PROCEDURE — 71046 X-RAY EXAM CHEST 2 VIEWS: CPT

## 2018-09-09 PROCEDURE — 96374 THER/PROPH/DIAG INJ IV PUSH: CPT

## 2018-09-09 PROCEDURE — 93005 ELECTROCARDIOGRAM TRACING: CPT | Performed by: FAMILY MEDICINE

## 2018-09-09 PROCEDURE — 82803 BLOOD GASES ANY COMBINATION: CPT

## 2018-09-09 PROCEDURE — 71275 CT ANGIOGRAPHY CHEST: CPT

## 2018-09-09 PROCEDURE — 36600 WITHDRAWAL OF ARTERIAL BLOOD: CPT

## 2018-09-09 PROCEDURE — 36415 COLL VENOUS BLD VENIPUNCTURE: CPT

## 2018-09-09 PROCEDURE — 6370000000 HC RX 637 (ALT 250 FOR IP): Performed by: FAMILY MEDICINE

## 2018-09-09 PROCEDURE — 6360000002 HC RX W HCPCS: Performed by: FAMILY MEDICINE

## 2018-09-09 PROCEDURE — 94640 AIRWAY INHALATION TREATMENT: CPT

## 2018-09-09 PROCEDURE — 99285 EMERGENCY DEPT VISIT HI MDM: CPT

## 2018-09-09 PROCEDURE — 85025 COMPLETE CBC W/AUTO DIFF WBC: CPT

## 2018-09-09 PROCEDURE — 80053 COMPREHEN METABOLIC PANEL: CPT

## 2018-09-09 PROCEDURE — 6360000004 HC RX CONTRAST MEDICATION: Performed by: FAMILY MEDICINE

## 2018-09-09 PROCEDURE — 83880 ASSAY OF NATRIURETIC PEPTIDE: CPT

## 2018-09-09 RX ORDER — METHYLPREDNISOLONE SODIUM SUCCINATE 125 MG/2ML
125 INJECTION, POWDER, LYOPHILIZED, FOR SOLUTION INTRAMUSCULAR; INTRAVENOUS ONCE
Status: COMPLETED | OUTPATIENT
Start: 2018-09-09 | End: 2018-09-09

## 2018-09-09 RX ORDER — PREDNISONE 20 MG/1
40 TABLET ORAL DAILY
Qty: 10 TABLET | Refills: 0 | Status: SHIPPED | OUTPATIENT
Start: 2018-09-09 | End: 2018-09-14

## 2018-09-09 RX ORDER — IPRATROPIUM BROMIDE AND ALBUTEROL SULFATE 2.5; .5 MG/3ML; MG/3ML
1 SOLUTION RESPIRATORY (INHALATION) ONCE
Status: COMPLETED | OUTPATIENT
Start: 2018-09-09 | End: 2018-09-09

## 2018-09-09 RX ORDER — DOXYCYCLINE HYCLATE 100 MG
100 TABLET ORAL 2 TIMES DAILY
Qty: 14 TABLET | Refills: 0 | Status: SHIPPED | OUTPATIENT
Start: 2018-09-09 | End: 2018-09-16

## 2018-09-09 RX ADMIN — METHYLPREDNISOLONE SODIUM SUCCINATE 125 MG: 125 INJECTION, POWDER, FOR SOLUTION INTRAMUSCULAR; INTRAVENOUS at 21:07

## 2018-09-09 RX ADMIN — IOPAMIDOL 80 ML: 755 INJECTION, SOLUTION INTRAVENOUS at 22:05

## 2018-09-09 RX ADMIN — IPRATROPIUM BROMIDE AND ALBUTEROL SULFATE 1 AMPULE: .5; 3 SOLUTION RESPIRATORY (INHALATION) at 21:09

## 2018-09-09 RX ADMIN — IPRATROPIUM BROMIDE AND ALBUTEROL SULFATE 1 AMPULE: .5; 3 SOLUTION RESPIRATORY (INHALATION) at 21:19

## 2018-09-09 ASSESSMENT — ENCOUNTER SYMPTOMS
SHORTNESS OF BREATH: 1
ABDOMINAL PAIN: 0
SORE THROAT: 0
CHEST TIGHTNESS: 0
NAUSEA: 0
VOMITING: 0
RHINORRHEA: 0
BLOOD IN STOOL: 0
CONSTIPATION: 0
WHEEZING: 0
DIARRHEA: 0
BACK PAIN: 0
COUGH: 1

## 2018-09-10 PROCEDURE — 93010 ELECTROCARDIOGRAM REPORT: CPT | Performed by: NUCLEAR MEDICINE

## 2018-09-10 NOTE — ED NOTES
Pt denies improvement from breathing tx's. Upper airways with less wheezing than prior, no change in lower airways. Pt in no acute distress, on room air. No eldirani made aware. Pt updated on results so far.       Para CARLTON Coyle  09/09/18 2985

## 2018-09-10 NOTE — ED PROVIDER NOTES
weakness, light-headedness, numbness and headaches. Hematological: Negative for adenopathy. Psychiatric/Behavioral: Negative for confusion and suicidal ideas. The patient is not nervous/anxious. PAST MEDICAL HISTORY    has a past medical history of Arthritis; Asthma; Cancer ([address] Chickenpox; Hyperlipidemia; Measles; Migraines; Mumps; and Sleep apnea. SURGICAL HISTORY      has a past surgical history that includes Tonsillectomy; TURP (1990); Colonoscopy; eye surgery; skin biopsy; and Coronary angioplasty (N/A, 2017/2018). CURRENT MEDICATIONS       Discharge Medication List as of 9/9/2018 11:31 PM      CONTINUE these medications which have NOT CHANGED    Details   fluticasone (FLONASE) 50 MCG/ACT nasal spray 1 spray by Nasal route daily as needed for Rhinitis, Disp-1 Bottle, R-3Normal      acetaminophen (TYLENOL) 325 MG tablet Take 650 mg by mouth every 6 hours as needed for PainHistorical Med      ipratropium-albuterol (DUONEB) 0.5-2.5 (3) MG/3ML SOLN nebulizer solution Inhale 1 vial into the lungs every 4 hoursHistorical Med      omeprazole (PRILOSEC) 20 MG delayed release capsule Take 20 mg by mouth dailyHistorical Med      clotrimazole-betamethasone (LOTRISONE) 1-0.05 % lotion Apply topically 2 times daily Apply topically 2 times daily. , Topical, 2 TIMES DAILY, Historical Med      budesonide-formoterol (SYMBICORT) 160-4.5 MCG/ACT AERO Inhale 2 puffs into the lungs 2 times daily, Disp-3 Inhaler, R-3Normal      CPAP Machine MISC Starting 7/20/2017, Until Discontinued, Disp-1 each, R-0, PrintPlease change CPAP to APAP (autoCPAP) range 13 - 20- cwp.       Multiple Vitamin (MULTI-VITAMIN DAILY PO) Take 1 tablet by mouth daily       albuterol (PROVENTIL) (2.5 MG/3ML) 0.083% nebulizer solution Take 3 mLs by nebulization every 4 hours as needed for Wheezing or Shortness of Breath, Disp-360 vial, R-3      montelukast (SINGULAIR) 10 MG tablet Take 10 mg by mouth daily Historical Med      pravastatin (PRAVACHOL) 20 MG tablet Take 20 mg by mouth daily      verapamil (VERELAN PM) 240 MG CR capsule Take 240 mg by mouth every morning. Indications: Per patient taking medication for migraines prevention. ALLERGIES     is allergic to molds & smuts and sulfa antibiotics. FAMILY HISTORY     indicated that his mother is alive. He indicated that his father is . He indicated that his sister is alive. family history includes COPD in his mother; Cancer in his father; Diabetes in his mother. SOCIAL HISTORY      reports that he has never smoked. He has never used smokeless tobacco. He reports that he does not drink alcohol or use drugs. PHYSICAL EXAM     INITIAL VITALS:  height is 6' 2\" (1.88 m) and weight is 274 lb (124.3 kg). His oral temperature is 98 °F (36.7 °C). His blood pressure is 144/64 (abnormal) and his pulse is 61. His respiration is 20 and oxygen saturation is 94%. Physical Exam   Constitutional: He is oriented to person, place, and time. He appears well-developed and well-nourished. He is active and cooperative. Non-toxic appearance. HENT:   Head: Normocephalic and atraumatic. Right Ear: External ear normal.   Left Ear: External ear normal.   Eyes: Conjunctivae, EOM and lids are normal. Right eye exhibits no exudate. Left eye exhibits no exudate. No scleral icterus. Neck: Normal range of motion. Neck supple. No JVD present. No tracheal deviation present. Cardiovascular: Normal rate, regular rhythm, S1 normal, S2 normal, normal heart sounds and intact distal pulses. Exam reveals no gallop. No murmur heard. Pulmonary/Chest: Effort normal. No accessory muscle usage or stridor. No respiratory distress. He has no decreased breath sounds. He has wheezes (diffuse expiratory). He has no rhonchi. He has no rales. He exhibits no tenderness. Patient was speaking in full sentences. No nasal flare or grunting was noted. Abdominal: Soft.  Normal appearance and bowel sounds are normal. He exhibits no distension. There is no tenderness. There is no rigidity, no rebound and no guarding. Musculoskeletal: Normal range of motion. Neurological: He is alert and oriented to person, place, and time. He displays no atrophy and no tremor. GCS eye subscore is 4. GCS verbal subscore is 5. GCS motor subscore is 6. Skin: Skin is warm and dry. No rash noted. Psychiatric: He has a normal mood and affect. His speech is normal and behavior is normal.   Nursing note and vitals reviewed. DIFFERENTIAL DIAGNOSIS:   Including, but not limited to: COPD exacerbation, CHF, pneumonia, PE, bronchitis, URI    DIAGNOSTIC RESULTS     EKG: All EKG's are interpreted by the Emergency Department Physician who either signs or Co-signs this chart in the absence of a cardiologist.  EKG interpreted by Nanci Day MD:    Vent. Rate: 62 bpm  WI interval: 128 ms  QRS duration: 110 ms  QTc: 444 ms  P-R-T axes: 48, -26, 72  Sinus rhythm with Premature atrial complexes in a pattern of bigeminy  Incomplete right bundle branch block  No STEMI  Compared to old EKG on 8-    RADIOLOGY: non-plain film images(s) such as CT, Ultrasound and MRI are read by the radiologist.    CTA Chest W WO Contrast   Final Result      No acute pulmonary embolism. No acute cardiopulmonary disease. Stable right basilar pulmonary nodules as discussed above, no follow-up necessary. Stable 2.6 x 4 cm pericardial cyst along the right heart border. **This report has been created using voice recognition software. It may contain minor errors which are inherent in voice recognition technology. **      Final report electronically signed by Dr. Phani Del Angel on 9/9/2018 11:08 PM      XR CHEST STANDARD (2 VW)   Final Result      No acute cardiopulmonary disease. **This report has been created using voice recognition software. It may contain minor errors which are inherent in voice recognition technology. **      Final report electronically signed by Dr. Karly Castillo on 9/9/2018 9:12 PM          LABS:   Labs Reviewed   BLOOD GAS, ARTERIAL - Abnormal; Notable for the following:        Result Value    PCO2 47 (*)     All other components within normal limits   COMPREHENSIVE METABOLIC PANEL - Abnormal; Notable for the following:     Glucose 130 (*)     ALT 9 (*)     All other components within normal limits   CBC WITH AUTO DIFFERENTIAL - Abnormal; Notable for the following:     RBC 4.28 (*)     MCV 98.6 (*)     RDW-SD 46.4 (*)     Eosinophils # 1.1 (*)     All other components within normal limits   GLOMERULAR FILTRATION RATE, ESTIMATED - Abnormal; Notable for the following:     Est, Glom Filt Rate 82 (*)     All other components within normal limits   BRAIN NATRIURETIC PEPTIDE   TROPONIN   ANION GAP   OSMOLALITY       EMERGENCY DEPARTMENT COURSE:   Vitals:    Vitals:    09/09/18 2130 09/09/18 2139 09/09/18 2253 09/09/18 2324   BP:  (!) 144/64 (!) 144/64    Pulse: 64 64 61    Resp: 23  23 20   Temp:       TempSrc:       SpO2: 96% 94% 94%    Weight:       Height:           8:55 PM: The patient was seen and evaluated. MDM:  The patient was seen within the ED today for the evaluation of shortness of breath and a productive cough. The patient arrived in no acute distress and in stable condition. Within the department, I observed the patient's vital signs to be within acceptable range. On exam, I appreciated diffuse expiratory wheezing, normal heart sounds, that the patient was able to speak in complete sentences, no abdominal pain, and that the patient was neurologically intact. Radiological studies within the department revealed no acute findings. Laboratory work was reassuring as his troponin was negative. Within the department, the patient was treated with Solumedrol and Duoneb for shortness of breath and wheezing. I observed the patient's condition to improve during the duration of his stay.  I explained my proposed course of treatment to the patient, who was amenable to my decision, and I answered all questions that were asked. Patient requested that his prescription Xarelto be refilled. He was discharged home in stable condition with prescriptions for Doxycycline, prednisone, and the patient will return to the ED if his symptoms become more severe in nature or otherwise change. Patient requested medication refill for his Xarelto until he follows up with PCP, prescription was provided. I advised the patient to follow-up with his PCP as soon as possible and Dr. Yanna Muhammad (Pulmonology) in 3 days. I also discussed return to ED precautions with the patient who verbalized understanding. I estimate there is LOW risk for PULMONARY EMBOLISM, PULMONARY EDEMA, PNEUMONIA, PNEUMOTHORAX, STATUS ASTHMATICUS, ACUTE RESPIRATORY FAILURE, OR ACUTE CORONARY SYNDROME, thus I consider the discharge disposition reasonable. CRITICAL CARE:   None     CONSULTS:  None    PROCEDURES:  None     FINAL IMPRESSION      1. COPD exacerbation ([facility]    2. Encounter for medication refill    3. Pulmonary nodules    4.  Pericardial cyst          DISPOSITION/PLAN   Patient was discharged in stable condition    PATIENT REFERRED TO:  Jordan Arguelles, APRN - CNP  [address] [place] [address]  [phone]    Schedule an appointment as soon as possible for a visit       Caroline Myrick MD  [address] [address]  [phone]    Schedule an appointment as soon as possible for a visit in 3 days        DISCHARGE MEDICATIONS:  Discharge Medication List as of 9/9/2018 11:31 PM      START taking these medications    Details   doxycycline hyclate (VIBRA-TABS) 100 MG tablet Take 1 tablet by mouth 2 times daily for 7 days, Disp-14 tablet, R-0Print      predniSONE (DELTASONE) 20 MG tablet Take 2 tablets by mouth daily for 5 days, Disp-10 tablet, R-0Print             (Please note that portions of this note were completed with a voice

## 2018-09-11 ENCOUNTER — HOSPITAL ENCOUNTER (OUTPATIENT)
Age: 73
Discharge: HOME | End: 2018-09-11
Payer: MEDICARE

## 2018-09-11 LAB
BASOPHILS # BLD: 0.3 %
BASOPHILS ABSOLUTE: 0 THOU/MM3 (ref 0–0.1)
EOSINOPHIL # BLD: 0.8 %
EOSINOPHILS ABSOLUTE: 0.1 THOU/MM3 (ref 0–0.4)
ERYTHROCYTE [DISTWIDTH] IN BLOOD BY AUTOMATED COUNT: 13.5 % (ref 11.5–14.5)
ERYTHROCYTE [DISTWIDTH] IN BLOOD BY AUTOMATED COUNT: 49.7 FL (ref 35–45)
HCT VFR BLD CALC: 42.9 % (ref 42–52)
HEMOGLOBIN: 14 GM/DL (ref 14–18)
IMMATURE GRANS (ABS): 0.06 THOU/MM3 (ref 0–0.07)
IMMATURE GRANULOCYTES: 0.5 %
LYMPHOCYTES # BLD: 7 %
LYMPHOCYTES ABSOLUTE: 0.9 THOU/MM3 (ref 1–4.8)
MCH RBC QN AUTO: 32.9 PG (ref 26–33)
MCHC RBC AUTO-ENTMCNC: 32.6 GM/DL (ref 32.2–35.5)
MCV RBC AUTO: 100.7 FL (ref 80–94)
MONOCYTES # BLD: 5.6 %
MONOCYTES ABSOLUTE: 0.7 THOU/MM3 (ref 0.4–1.3)
NUCLEATED RED BLOOD CELLS: 0 /100 WBC
PLATELET # BLD: 220 THOU/MM3 (ref 130–400)
PMV BLD AUTO: 10.5 FL (ref 9.4–12.4)
RBC # BLD: 4.26 MILL/MM3 (ref 4.7–6.1)
SEG NEUTROPHILS: 85.8 %
SEGMENTED NEUTROPHILS ABSOLUTE COUNT: 11 THOU/MM3 (ref 1.8–7.7)
WBC # BLD: 12.8 THOU/MM3 (ref 4.8–10.8)

## 2018-09-11 PROCEDURE — 85025 COMPLETE CBC W/AUTO DIFF WBC: CPT

## 2018-09-11 PROCEDURE — 36415 COLL VENOUS BLD VENIPUNCTURE: CPT

## 2018-09-16 NOTE — PROGRESS NOTES
axial image 42 measuring  4.4 x 2.6 cm. Upper Abdomen: Limited evaluation the upper abdomen shows no active disease. The liver and spleen are of normal size to their visualized extent. The  adrenal glands are unremarkable. There is a partially visualized dominant cyst  at the superior pole the left kidney, exophytic bilaterally. There is no  ascites or pneumoperitoneum. Bones and Soft Tissue: No suspicious osseous lesion. Mild degenerative changes  are associated with the thoracic spine. IMPRESSION  IMPRESSION:    1. Small vague area of groundglass disease in the posterior left upper lobe  that could represent a focal area of infection or inflammatory change. No  other airspace disease, infiltrates or consolidations appreciated. 2. Fluid density structure along the right pericardium, probably representing  a pericardial cyst.    3. Calcified coronary artery disease. 4. Partially visualized dominant left renal cyst.    Electronically Signed By: Annetta Graff D.O. on 7/28/2018 1:43 PM           Echocardiogram from [facility]  Date of Procedure: 08/13/2018       TEST DESCRIPTION     Technical Quality: This is a technically adequate study.         Aorta: The aortic root is normal in size, measuring 3.6 cm at sinotubular junction and 4.0 cm at Sinuses of Valsalva. The proximal ascending aorta is normal in size, measuring 3.9 cm across.         Left Atrium: The left atrial volume index is mildly enlarged, measuring 37.21 cc/m2.         Left Ventricle: The left ventricle is normal in size, with an end-diastolic diameter of 6.0 cm, and an end-systolic diameter of 4.1 cm. LV wall thickness is normal, with the septum and the posterior wall each measuring 1.0 cm across. Relative wall     thickness was normal at 0.33, and the LV mass index was 115.8 g/m2 consistent with normal left ventricular mass. There are no regional wall motion abnormalities.  Left ventricular systolic function appears normal. Visually estimated ejection fraction is     60-65%. The LV Doppler derived stroke volume equals 123.0 ccs.         Diastolic indices: E wave velocity 0.7 m/s, E/A ratio 0.8,  msec., E/e' ratio(avg) 8. There is pseudonormalization of mitral inflow pattern consistent with significant diastolic dysfunction.         Right Atrium: The right atrium is normal in size, measuring 4.7 cm in length and 3.6 cm in width in the apical view.         Right Ventricle: The right ventricle is normal in size measuring 3.5 cm at the base in the apical right ventricle-focused view. Global right ventricular systolic function appears normal. Tricuspid annular plane systolic excursion (TAPSE) is 2.5 cm. The     estimated PA systolic pressure is greater than 68 mmHg.         Aortic Valve:  The aortic valve is mildly sclerotic with mildly restricted leaflet mobility. The peak velocity obtained across the aortic valve is 2.25 m/s, which translates to a peak gradient of 20 mmHg. The mean gradient is 9 mmHg. Using a left     ventricular outflow tract diameter of 2.5 cm, a left ventricular outflow tract velocity time integral of 24 cm, and a peak instantaneous transvalvular velocity time integral of 44 cm, the calculated aortic valve area is 2.8 cm2(AVAi is 1.1 cm2/m2),     consistent with trivial aortic stenosis. Additionally, there is mild aortic regurgitation.         Tricuspid Valve:  The tricuspid valve is normal in structure. There is mild tricuspid regurgitation.         Pulmonary Valve:  The pulmonic valve is normal in structure. There is trivial pulmonic regurgitation.         IVC: Right atrial pressure as assessed by IVC dynamics is normal at not visualized mmHg.         Intracavitary: There is no evidence of pericardial effusion, intracavity mass, thrombi, or vegetation.                 CONCLUSIONS       1 - Normal left ventricular systolic function (EF 65-88%).        2 - Mild left atrial enlargement.       3 - Impaired LV relaxation, consolidation, or pneumothorax.  There is no pleural thickening or pleural fluid.        The esophagus is normal in caliber and contour.  The imaged intra-abdominal structures demonstrate a large, partially visualized cyst in the left vidya abdomen with attenuation measuring simple fluid.        The osseous structures demonstrate mild degenerative changes and exaggerated kyphosis of the lower thoracic spine.  No evidence of fracture or bony destructive process.       MMODEL FLUENCY           Procedure Note   Interface, Rad Results In - 08/12/2018 12:40 PM CDT    EXAMINATION:  US LOWER EXTREMITY VEINS BILATERAL    CLINICAL HISTORY:  Localized edema    TECHNIQUE:  Duplex and color flow Doppler and dynamic compression was performed of the bilateral lower extremity veins was performed. COMPARISON:  None    FINDINGS:  Right thigh veins: There is complete thrombosis of the popliteal vein. The common femoral, femoral, upper greater saphenous, and deep femoral veins are patent and free of thrombus. The veins are normally compressible and have normal phasic flow and augmentation response. Right calf veins: There is complete thrombosis the posterior tibial veins. The anterior tibial vein and peroneal veins are patent. Left thigh veins: The common femoral, femoral, popliteal, upper greater saphenous, and deep femoral veins are patent and free of thrombus. The veins are normally compressible and have normal phasic flow and augmentation response. Left calf veins: The visualized calf veins are patent. Miscellaneous: None    IMPRESSION:     Complete occlusion of the right popliteal and posterior tibial veins. No evidence of DVT in the left lower extremity. Preliminary findings discussed with Dr. Onelia Farias at 12:16 p.m. on 08/12/2018.     Electronically signed by resident: Leon Saucedo  Date:08/12/2018  Time:12:12    Electronically signed by:Beau Mackey MD         Assessment:  Severe persistent asthma with recent exacerbation- he is clinically better ( he had 2 exacerbations this year)- stable  Moderate obstructive sleep apnea on treatment with a CPAP pressure of 13cm H20. Recent hx of pulmonary embolism and DVT diagnosed on 08/12/2018. On Xarelto. He denies any previous hx of pulmonary embolism  Pulmonary hypertension noted on echocardiogram done at [facility]- Improved per the latest Echocardiogram.  Allergic rhinitis on treatment with Flonase. 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 Acute maxillary sinusitis      Recommendations/Plan:  - Continue Symbicort 160/4.5mcg spray MDI, 2 puffs via inhalation BID. - Albuterol 2.5mg nebs Q6h prn (the nebulizer). - Stop Duonebs nebs Q6h prn (the nebulizer) nebs Q4h prn.  - Singulair 10mg 1 tablet at bed time daily. - Continue Flonase 50mcg/INH 2sprays each nostril daily.  - Doxycycline 100mg po bid for 10 days . No refills.  -He instructed to use Albuterol HFA  inhaler 2 puffs Q6h prn or Albuterol 2.5mg nebs Q6h prn (the nebulizer) at one time as rescue medication not both at the same time. He verbalizes understanding.   - Schedule patient for Ear, Nose and Throat specialist consultation with Dr. 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  -Continue CPAP with pressure of 13cm H20 during nights and PRN during daytime.  - Start patient on Mucinex 600mg po bid prn for cough. Hewas informed about adverse effects of Mucinex 600mg. He verbalizes understanding.  -Continue Xarelto 20 mg po daily for pulmonary embolism and DVT diagnosed on 08/12/2018.  He is tolerating well with no side effects.  -He needs anticoagulation for a period of at least 3months.  - Schedule patient for venous duplex scan of Right lower extremity to follow right popliteal and posterior tibial veins DVT noted on 08/12/2018  before clinic visit.  -Keep follow with Ms. Neto Santizo, SUGEY vazquez

## 2018-09-18 ENCOUNTER — TELEPHONE (OUTPATIENT)
Dept: PULMONOLOGY | Age: 73
End: 2018-09-18

## 2018-09-18 ENCOUNTER — OFFICE VISIT (OUTPATIENT)
Dept: PULMONOLOGY | Age: 73
End: 2018-09-18
Payer: MEDICARE

## 2018-09-18 VITALS
DIASTOLIC BLOOD PRESSURE: 72 MMHG | OXYGEN SATURATION: 95 % | BODY MASS INDEX: 36.32 KG/M2 | SYSTOLIC BLOOD PRESSURE: 134 MMHG | HEIGHT: 74 IN | HEART RATE: 57 BPM | WEIGHT: 283 LBS | TEMPERATURE: 98.5 F

## 2018-09-18 DIAGNOSIS — I26.99 OTHER ACUTE PULMONARY EMBOLISM WITHOUT ACUTE COR PULMONALE (HCC): ICD-10-CM

## 2018-09-18 DIAGNOSIS — I82.401 ACUTE DEEP VEIN THROMBOSIS (DVT) OF RIGHT LOWER EXTREMITY, UNSPECIFIED VEIN (HCC): Primary | ICD-10-CM

## 2018-09-18 DIAGNOSIS — J01.00 ACUTE MAXILLARY SINUSITIS, RECURRENCE NOT SPECIFIED: ICD-10-CM

## 2018-09-18 DIAGNOSIS — J34.2 DNS (DEVIATED NASAL SEPTUM): ICD-10-CM

## 2018-09-18 DIAGNOSIS — J45.50 SEVERE PERSISTENT ASTHMA WITHOUT COMPLICATION: Primary | ICD-10-CM

## 2018-09-18 DIAGNOSIS — I82.431 ACUTE DEEP VEIN THROMBOSIS (DVT) OF POPLITEAL VEIN OF RIGHT LOWER EXTREMITY (HCC): ICD-10-CM

## 2018-09-18 PROCEDURE — 99215 OFFICE O/P EST HI 40 MIN: CPT | Performed by: INTERNAL MEDICINE

## 2018-09-18 RX ORDER — ALBUTEROL SULFATE 2.5 MG/3ML
2.5 SOLUTION RESPIRATORY (INHALATION) EVERY 6 HOURS PRN
Qty: 120 VIAL | Refills: 8 | Status: SHIPPED | OUTPATIENT
Start: 2018-09-18 | End: 2020-05-04

## 2018-09-18 RX ORDER — GUAIFENESIN 600 MG/1
600 TABLET, EXTENDED RELEASE ORAL 2 TIMES DAILY
Qty: 60 TABLET | Refills: 3 | Status: SHIPPED | OUTPATIENT
Start: 2018-09-18 | End: 2018-09-21 | Stop reason: SDUPTHER

## 2018-09-18 RX ORDER — ALBUTEROL SULFATE 90 UG/1
2 AEROSOL, METERED RESPIRATORY (INHALATION) EVERY 6 HOURS PRN
COMMUNITY

## 2018-09-18 RX ORDER — DOXYCYCLINE HYCLATE 100 MG/1
100 CAPSULE ORAL 2 TIMES DAILY
Qty: 20 CAPSULE | Refills: 0 | Status: SHIPPED | OUTPATIENT
Start: 2018-09-18 | End: 2018-09-21 | Stop reason: SDUPTHER

## 2018-09-18 NOTE — PATIENT INSTRUCTIONS
Health Maintenance Due   Topic Date Due    DTaP/Tdap/Td vaccine (1 - Tdap) 06/[birth year]/1962    Shingles Vaccine (1 of 2 - 2 Dose Series) 06/25/1993    Colon cancer screen colonoscopy  06/25/1993    Pneumococcal low/med risk (2 of 2 - PPSV23) 07/25/2018    Flu vaccine (1) 09/01/2018     Recommendations/Plan:  - Continue Symbicort 160/4.5mcg spray MDI, 2 puffs via inhalation BID. - Albuterol 2.5mg nebs Q6h prn (the nebulizer). - Stop Duonebs nebs Q6h prn (the nebulizer) nebs Q4h prn.  - Singulair 10mg 1 tablet at bed time daily. - Continue Flonase 50mcg/INH 2sprays each nostril daily.  - Doxycycline 100mg po bid for 10 days . No refills.  -He instructed to use Albuterol HFA  inhaler 2 puffs Q6h prn or Albuterol 2.5mg nebs Q6h prn (the nebulizer) at one time as rescue medication not both at the same time. He verbalizes understanding.   - Schedule patient for Ear, Nose and Throat specialist consultation with Dr. 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  -Continue CPAP with pressure of 13cm H20 during nights and PRN during daytime.  - Start patient on Mucinex 600mg po bid prn for cough. Hewas informed about adverse effects of Mucinex 600mg. He verbalizes understanding.  -Continue Xarelto 20 mg po daily for pulmonary embolism and DVT diagnosed on 08/12/2018.  He is tolerating well with no side effects.  -He needs anticoagulation for a period of at least 3months.  - Schedule patient for venous duplex scan of Right lower extremity to follow right popliteal and posterior tibial veins DVT noted on 08/12/2018  before clinic visit.  -[facility] follow [facility]. Helen Lane[facility] with latest CPAP down load to follow his sleep apnea   - Patient educated to update his pneumococcal vaccine with family physician and take influenza vaccine in coming season with out

## 2018-09-21 DIAGNOSIS — J34.2 DNS (DEVIATED NASAL SEPTUM): ICD-10-CM

## 2018-09-21 DIAGNOSIS — J45.50 SEVERE PERSISTENT ASTHMA WITHOUT COMPLICATION: ICD-10-CM

## 2018-09-21 DIAGNOSIS — J01.00 ACUTE MAXILLARY SINUSITIS, RECURRENCE NOT SPECIFIED: ICD-10-CM

## 2018-09-21 RX ORDER — DOXYCYCLINE HYCLATE 100 MG/1
100 CAPSULE ORAL 2 TIMES DAILY
Qty: 20 CAPSULE | Refills: 0 | Status: SHIPPED | OUTPATIENT
Start: 2018-09-21 | End: 2018-10-01

## 2018-09-21 RX ORDER — GUAIFENESIN 600 MG/1
600 TABLET, EXTENDED RELEASE ORAL 2 TIMES DAILY
Qty: 60 TABLET | Refills: 3 | Status: SHIPPED | OUTPATIENT
Start: 2018-09-21 | End: 2019-08-22

## 2018-10-04 ENCOUNTER — HOSPITAL ENCOUNTER (EMERGENCY)
Age: 73
Discharge: HOME | End: 2018-10-04
Attending: FAMILY MEDICINE
Payer: MEDICARE

## 2018-10-04 ENCOUNTER — APPOINTMENT (OUTPATIENT)
Dept: GENERAL RADIOLOGY | Age: 73
End: 2018-10-04
Payer: MEDICARE

## 2018-10-04 VITALS
BODY MASS INDEX: 35.94 KG/M2 | DIASTOLIC BLOOD PRESSURE: 67 MMHG | SYSTOLIC BLOOD PRESSURE: 127 MMHG | WEIGHT: 280 LBS | HEART RATE: 66 BPM | TEMPERATURE: 98.1 F | HEIGHT: 74 IN | RESPIRATION RATE: 20 BRPM | OXYGEN SATURATION: 93 %

## 2018-10-04 DIAGNOSIS — J44.1 COPD EXACERBATION (HCC): Primary | ICD-10-CM

## 2018-10-04 LAB
ALLEN TEST: POSITIVE
ANION GAP SERPL CALCULATED.3IONS-SCNC: 13 MEQ/L (ref 8–16)
BASE EXCESS (CALCULATED): 0.4 MMOL/L (ref -2.5–2.5)
BASOPHILS # BLD: 0.8 %
BASOPHILS ABSOLUTE: 0.1 THOU/MM3 (ref 0–0.1)
BUN BLDV-MCNC: 13 MG/DL (ref 7–22)
CALCIUM SERPL-MCNC: 8.8 MG/DL (ref 8.5–10.5)
CHLORIDE BLD-SCNC: 104 MEQ/L (ref 98–111)
CO2: 26 MEQ/L (ref 23–33)
COLLECTED BY:: NORMAL
CREAT SERPL-MCNC: 0.9 MG/DL (ref 0.4–1.2)
DEVICE: NORMAL
EKG ATRIAL RATE: 65 BPM
EKG P AXIS: 68 DEGREES
EKG P-R INTERVAL: 134 MS
EKG Q-T INTERVAL: 446 MS
EKG QRS DURATION: 114 MS
EKG QTC CALCULATION (BAZETT): 463 MS
EKG R AXIS: -10 DEGREES
EKG T AXIS: 73 DEGREES
EKG VENTRICULAR RATE: 65 BPM
EOSINOPHIL # BLD: 12.3 %
EOSINOPHILS ABSOLUTE: 0.8 THOU/MM3 (ref 0–0.4)
ERYTHROCYTE [DISTWIDTH] IN BLOOD BY AUTOMATED COUNT: 13.2 % (ref 11.5–14.5)
ERYTHROCYTE [DISTWIDTH] IN BLOOD BY AUTOMATED COUNT: 48.9 FL (ref 35–45)
GFR SERPL CREATININE-BSD FRML MDRD: 82 ML/MIN/1.73M2
GLUCOSE BLD-MCNC: 129 MG/DL (ref 70–108)
HCO3: 26 MMOL/L (ref 23–28)
HCT VFR BLD CALC: 42.2 % (ref 42–52)
HEMOGLOBIN: 13.8 GM/DL (ref 14–18)
IMMATURE GRANS (ABS): 0.02 THOU/MM3 (ref 0–0.07)
IMMATURE GRANULOCYTES: 0.3 %
LYMPHOCYTES # BLD: 17.2 %
LYMPHOCYTES ABSOLUTE: 1.1 THOU/MM3 (ref 1–4.8)
MCH RBC QN AUTO: 32.9 PG (ref 26–33)
MCHC RBC AUTO-ENTMCNC: 32.7 GM/DL (ref 32.2–35.5)
MCV RBC AUTO: 100.5 FL (ref 80–94)
MONOCYTES # BLD: 10.4 %
MONOCYTES ABSOLUTE: 0.7 THOU/MM3 (ref 0.4–1.3)
NUCLEATED RED BLOOD CELLS: 0 /100 WBC
O2 SATURATION: 96 %
OSMOLALITY CALCULATION: 286.8 MOSMOL/KG (ref 275–300)
PCO2: 44 MMHG (ref 35–45)
PH BLOOD GAS: 7.38 (ref 7.35–7.45)
PLATELET # BLD: 221 THOU/MM3 (ref 130–400)
PMV BLD AUTO: 10.4 FL (ref 9.4–12.4)
PO2: 82 MMHG (ref 71–104)
POTASSIUM SERPL-SCNC: 3.9 MEQ/L (ref 3.5–5.2)
PRO-BNP: 48.7 PG/ML (ref 0–1800)
RBC # BLD: 4.2 MILL/MM3 (ref 4.7–6.1)
SEG NEUTROPHILS: 59 %
SEGMENTED NEUTROPHILS ABSOLUTE COUNT: 3.9 THOU/MM3 (ref 1.8–7.7)
SODIUM BLD-SCNC: 143 MEQ/L (ref 135–145)
SOURCE, BLOOD GAS: NORMAL
TROPONIN T: < 0.01 NG/ML
WBC # BLD: 6.6 THOU/MM3 (ref 4.8–10.8)

## 2018-10-04 PROCEDURE — 2709999900 HC NON-CHARGEABLE SUPPLY

## 2018-10-04 PROCEDURE — 99285 EMERGENCY DEPT VISIT HI MDM: CPT

## 2018-10-04 PROCEDURE — 80048 BASIC METABOLIC PNL TOTAL CA: CPT

## 2018-10-04 PROCEDURE — 85025 COMPLETE CBC W/AUTO DIFF WBC: CPT

## 2018-10-04 PROCEDURE — 94640 AIRWAY INHALATION TREATMENT: CPT

## 2018-10-04 PROCEDURE — 83880 ASSAY OF NATRIURETIC PEPTIDE: CPT

## 2018-10-04 PROCEDURE — 93010 ELECTROCARDIOGRAM REPORT: CPT | Performed by: INTERNAL MEDICINE

## 2018-10-04 PROCEDURE — 6360000002 HC RX W HCPCS: Performed by: FAMILY MEDICINE

## 2018-10-04 PROCEDURE — 96374 THER/PROPH/DIAG INJ IV PUSH: CPT

## 2018-10-04 PROCEDURE — 6370000000 HC RX 637 (ALT 250 FOR IP): Performed by: FAMILY MEDICINE

## 2018-10-04 PROCEDURE — 93005 ELECTROCARDIOGRAM TRACING: CPT | Performed by: FAMILY MEDICINE

## 2018-10-04 PROCEDURE — 82803 BLOOD GASES ANY COMBINATION: CPT

## 2018-10-04 PROCEDURE — 36600 WITHDRAWAL OF ARTERIAL BLOOD: CPT

## 2018-10-04 PROCEDURE — 36415 COLL VENOUS BLD VENIPUNCTURE: CPT

## 2018-10-04 PROCEDURE — 71046 X-RAY EXAM CHEST 2 VIEWS: CPT

## 2018-10-04 PROCEDURE — 84484 ASSAY OF TROPONIN QUANT: CPT

## 2018-10-04 RX ORDER — IPRATROPIUM BROMIDE AND ALBUTEROL SULFATE 2.5; .5 MG/3ML; MG/3ML
1 SOLUTION RESPIRATORY (INHALATION) ONCE
Status: COMPLETED | OUTPATIENT
Start: 2018-10-04 | End: 2018-10-04

## 2018-10-04 RX ORDER — PREDNISONE 20 MG/1
40 TABLET ORAL DAILY
Qty: 10 TABLET | Refills: 0 | Status: SHIPPED | OUTPATIENT
Start: 2018-10-04 | End: 2018-10-09

## 2018-10-04 RX ORDER — METHYLPREDNISOLONE SODIUM SUCCINATE 125 MG/2ML
125 INJECTION, POWDER, LYOPHILIZED, FOR SOLUTION INTRAMUSCULAR; INTRAVENOUS ONCE
Status: COMPLETED | OUTPATIENT
Start: 2018-10-04 | End: 2018-10-04

## 2018-10-04 RX ADMIN — IPRATROPIUM BROMIDE AND ALBUTEROL SULFATE 1 AMPULE: .5; 3 SOLUTION RESPIRATORY (INHALATION) at 21:26

## 2018-10-04 RX ADMIN — METHYLPREDNISOLONE SODIUM SUCCINATE 125 MG: 125 INJECTION, POWDER, FOR SOLUTION INTRAMUSCULAR; INTRAVENOUS at 21:43

## 2018-10-04 ASSESSMENT — ENCOUNTER SYMPTOMS
COUGH: 1
SORE THROAT: 0
EYE DISCHARGE: 0
DIARRHEA: 0
BACK PAIN: 0
WHEEZING: 0
EYE REDNESS: 0
NAUSEA: 0
ABDOMINAL PAIN: 0
VOMITING: 0
SHORTNESS OF BREATH: 1
RHINORRHEA: 0

## 2018-10-05 NOTE — ED PROVIDER NOTES
Topical, 2 TIMES DAILY, Historical Med      budesonide-formoterol (SYMBICORT) 160-4.5 MCG/ACT AERO Inhale 2 puffs into the lungs 2 times daily, Disp-3 Inhaler, R-3Normal      CPAP Machine MISC Starting 2017, Until Discontinued, Disp-1 each, R-0, PrintPlease change CPAP to APAP (autoCPAP) range 13 - 20- cwp. Multiple Vitamin (MULTI-VITAMIN DAILY PO) Take 1 tablet by mouth daily       pravastatin (PRAVACHOL) 20 MG tablet Take 20 mg by mouth daily      verapamil (VERELAN PM) 240 MG CR capsule Take 240 mg by mouth every morning. Indications: Per patient taking medication for migraines prevention. ALLERGIES     is allergic to molds & smuts and sulfa antibiotics. FAMILY HISTORY     indicated that his mother is alive. He indicated that his father is . He indicated that his sister is alive. family history includes COPD in his mother; Cancer in his father; Diabetes in his mother. SOCIAL HISTORY      reports that he has never smoked. He has never used smokeless tobacco. He reports that he does not drink alcohol or use drugs. PHYSICAL EXAM     INITIAL VITALS:  height is 6' 2\" (1.88 m) and weight is 280 lb (127 kg). His oral temperature is 98.1 °F (36.7 °C). His blood pressure is 127/67 and his pulse is 66. His respiration is 20 and oxygen saturation is 93%. Physical Exam   Constitutional: He is oriented to person, place, and time. He appears well-developed and well-nourished. Non-toxic appearance. HENT:   Head: Normocephalic and atraumatic. Right Ear: Tympanic membrane and external ear normal.   Left Ear: Tympanic membrane and external ear normal.   Nose: Nose normal.   Mouth/Throat: Oropharynx is clear and moist and mucous membranes are normal. No oropharyngeal exudate, posterior oropharyngeal edema or posterior oropharyngeal erythema. Eyes: Conjunctivae and EOM are normal.   Neck: Normal range of motion. Neck supple. No JVD present.    Cardiovascular: Normal rate, regular rhythm, normal heart sounds, intact distal pulses and normal pulses. Exam reveals no gallop and no friction rub. No murmur heard. Pulmonary/Chest: Effort normal. No respiratory distress. He has decreased breath sounds (decreased air entry). He has wheezes (bilateral expiratory). He has no rhonchi. He has no rales. Abdominal: Soft. Bowel sounds are normal. He exhibits no distension. There is no tenderness. There is no rebound, no guarding and no CVA tenderness. Musculoskeletal: Normal range of motion. He exhibits edema (pedal edema, +1 bilaterally). Neurological: He is alert and oriented to person, place, and time. He exhibits normal muscle tone. Coordination normal.   Skin: Skin is warm and dry. No rash noted. He is not diaphoretic. Nursing note and vitals reviewed. DIFFERENTIAL DIAGNOSIS:   Including but not limited to: allergic bronchitis, COPD, CHF, pneumonia, bronchitis, PE, viral URI    DIAGNOSTIC RESULTS     EKG: All EKG's are interpreted by the Emergency Department Physician who either signs or Co-signs this chart in the absence of a cardiologist.  EKG interpreted by Josué Yousif MD:    Vent. Rate: 65 bpm  NV interval: 134 ms  QRS duration: 114 ms  QTc: 463 ms  P-R-T axes: 68, -10, 73  Normal sinus rhythm,  Incomplete RBBB  Septal infarct, age undetermined  No STEMI  Compared to old EKG on 09-    RADIOLOGY: non-plain film images(s) such as CT, Ultrasound and MRI are read by the radiologist.    XR CHEST STANDARD (2 VW)   Final Result      No acute cardiopulmonary disease. Mild cardiomegaly. **This report has been created using voice recognition software. It may contain minor errors which are inherent in voice recognition technology. **      Final report electronically signed by Dr. Sebastien Roberts on 10/4/2018 9:56 PM          LABS:   Labs Reviewed   CBC WITH AUTO DIFFERENTIAL - Abnormal; Notable for the following:        Result Value    RBC 4.20 (*)     Hemoglobin 13.8 (*) .5 (*)     RDW-SD 48.9 (*)     Eosinophils # 0.8 (*)     All other components within normal limits   BASIC METABOLIC PANEL - Abnormal; Notable for the following:     Glucose 129 (*)     All other components within normal limits   GLOMERULAR FILTRATION RATE, ESTIMATED - Abnormal; Notable for the following:     Est, Glom Filt Rate 82 (*)     All other components within normal limits   BRAIN NATRIURETIC PEPTIDE   TROPONIN   BLOOD GAS, ARTERIAL   ANION GAP   OSMOLALITY       EMERGENCY DEPARTMENT COURSE:   Vitals:    Vitals:    10/04/18 2051 10/04/18 2146 10/04/18 2205   BP: 113/87 127/67    Pulse: 65 66    Resp: 20 24 20   Temp: 98.1 °F (36.7 °C)     TempSrc: Oral     SpO2: 94% 93%    Weight: 280 lb (127 kg)     Height: 6' 2\" (1.88 m)         9:22 PM: The patient was seen and evaluated. MDM:  The patient was seen and evaluated in a timely manner for shortness of breath. His vital signs were stable. During the physical exam I noted decreased air entry, bilateral expiratory wheezes, and 1+ pedal edema bilaterally. I ordered appropriate labs and a chest x-ray. The patient's troponin, BNP, and anion gap levels were within normal limits. The chest x-ray showed mild cardiomegaly with no acute cardiopulmonary disease. Laboratory and imaging results were reviewed and discussed with the patient. The patient's EKG was unconcerning. Within the department, the patient was treated with Duoneb and solu-medrol. The patient responded well to treatment, and I noted his condition to improve. I decided that the patient could be discharged home with instructions to follow up with his PCP, and Dr. Cheikh Barney as written below. I wrote him a prescription for deltasone which will be taken as directed. I explained my proposed course of discharge to the patient and his wife at bedside, and they verbalized understanding and agreement with my proposed plan. All thier questions were addressed at bedside.  The patient will

## 2018-10-26 ENCOUNTER — OFFICE VISIT (OUTPATIENT)
Dept: ENT CLINIC | Age: 73
End: 2018-10-26
Payer: MEDICARE

## 2018-10-26 ENCOUNTER — OFFICE VISIT (OUTPATIENT)
Dept: CARDIOLOGY CLINIC | Age: 73
End: 2018-10-26
Payer: MEDICARE

## 2018-10-26 VITALS
DIASTOLIC BLOOD PRESSURE: 70 MMHG | HEART RATE: 64 BPM | RESPIRATION RATE: 16 BRPM | HEIGHT: 74 IN | WEIGHT: 293.2 LBS | SYSTOLIC BLOOD PRESSURE: 134 MMHG | BODY MASS INDEX: 37.63 KG/M2 | TEMPERATURE: 98.1 F

## 2018-10-26 VITALS
WEIGHT: 292 LBS | SYSTOLIC BLOOD PRESSURE: 140 MMHG | DIASTOLIC BLOOD PRESSURE: 72 MMHG | HEIGHT: 74 IN | HEART RATE: 64 BPM | BODY MASS INDEX: 37.47 KG/M2

## 2018-10-26 DIAGNOSIS — E78.01 FAMILIAL HYPERCHOLESTEROLEMIA: ICD-10-CM

## 2018-10-26 DIAGNOSIS — I10 ESSENTIAL HYPERTENSION: Primary | ICD-10-CM

## 2018-10-26 DIAGNOSIS — Z85.828 HISTORY OF SCC (SQUAMOUS CELL CARCINOMA) OF SKIN: ICD-10-CM

## 2018-10-26 DIAGNOSIS — I25.10 CORONARY ARTERY DISEASE INVOLVING NATIVE CORONARY ARTERY OF NATIVE HEART WITHOUT ANGINA PECTORIS: ICD-10-CM

## 2018-10-26 DIAGNOSIS — J34.2 NASAL SEPTAL DEVIATION: ICD-10-CM

## 2018-10-26 DIAGNOSIS — J34.89 MASS OF PARANASAL SINUS: ICD-10-CM

## 2018-10-26 PROCEDURE — 99203 OFFICE O/P NEW LOW 30 MIN: CPT | Performed by: OTOLARYNGOLOGY

## 2018-10-26 PROCEDURE — 99213 OFFICE O/P EST LOW 20 MIN: CPT | Performed by: NUCLEAR MEDICINE

## 2018-10-26 RX ORDER — FLUOROURACIL 50 MG/G
CREAM TOPICAL 2 TIMES DAILY
COMMUNITY
End: 2019-11-13 | Stop reason: ALTCHOICE

## 2018-10-26 ASSESSMENT — ENCOUNTER SYMPTOMS
CHOKING: 0
APNEA: 0
TROUBLE SWALLOWING: 0
FACIAL SWELLING: 0
NAUSEA: 0
STRIDOR: 0
VOICE CHANGE: 0
SORE THROAT: 0
WHEEZING: 1
RHINORRHEA: 0
VOMITING: 0
SHORTNESS OF BREATH: 0
COLOR CHANGE: 0
ABDOMINAL PAIN: 0
SINUS PRESSURE: 0
COUGH: 0
CHEST TIGHTNESS: 0
DIARRHEA: 0

## 2018-10-26 NOTE — PROGRESS NOTES
hours as needed for Wheezing      albuterol (PROVENTIL) (2.5 MG/3ML) 0.083% nebulizer solution Take 3 mLs by nebulization every 6 hours as needed for Wheezing or Shortness of Breath 120 vial 8    rivaroxaban (XARELTO) 20 MG TABS tablet Take 1 tablet by mouth daily (with breakfast) 30 tablet 1    fluticasone (FLONASE) 50 MCG/ACT nasal spray 1 spray by Nasal route daily as needed for Rhinitis 1 Bottle 3    acetaminophen (TYLENOL) 325 MG tablet Take 650 mg by mouth every 6 hours as needed for Pain      omeprazole (PRILOSEC) 20 MG delayed release capsule Take 20 mg by mouth daily      budesonide-formoterol (SYMBICORT) 160-4.5 MCG/ACT AERO Inhale 2 puffs into the lungs 2 times daily 3 Inhaler 3    CPAP Machine MISC by Does not apply route Please change CPAP to APAP (autoCPAP) range 13 - 20- cwp. 1 each 0    pravastatin (PRAVACHOL) 20 MG tablet Take 20 mg by mouth daily      verapamil (VERELAN PM) 240 MG CR capsule Take 240 mg by mouth every morning. Indications: Per patient taking medication for migraines prevention. No current facility-administered medications for this visit. Past Medical History:   Diagnosis Date    Arthritis     Asthma     Cancer ([facility] [address]     skin    Chickenpox     Hyperlipidemia     Measles     Migraines     Mumps     Sleep apnea       Past Surgical History:   Procedure Laterality Date    COLONOSCOPY      CORONARY ANGIOPLASTY N/A 2017/2018    Dr. Jorgito Lima     Family History   Problem Relation Age of Onset    Diabetes Mother     COPD Mother     Cancer Father      Social History   Substance Use Topics    Smoking status: Never Smoker    Smokeless tobacco: Never Used    Alcohol use No       Subjective:      Review of Systems   Constitutional: Negative for activity change, appetite change, chills, diaphoresis, fatigue, fever and unexpected weight change.    HENT: Negative for congestion, dental problem, ear

## 2018-10-29 ENCOUNTER — TELEPHONE (OUTPATIENT)
Dept: ENT CLINIC | Age: 73
End: 2018-10-29

## 2018-10-30 ENCOUNTER — OFFICE VISIT (OUTPATIENT)
Dept: PULMONOLOGY | Age: 73
End: 2018-10-30
Payer: MEDICARE

## 2018-10-30 VITALS
BODY MASS INDEX: 37.58 KG/M2 | OXYGEN SATURATION: 94 % | TEMPERATURE: 98.8 F | HEART RATE: 60 BPM | WEIGHT: 292.8 LBS | HEIGHT: 74 IN | SYSTOLIC BLOOD PRESSURE: 138 MMHG | DIASTOLIC BLOOD PRESSURE: 80 MMHG

## 2018-10-30 DIAGNOSIS — R06.02 SHORTNESS OF BREATH: ICD-10-CM

## 2018-10-30 DIAGNOSIS — Z99.89 OSA ON CPAP: Primary | ICD-10-CM

## 2018-10-30 DIAGNOSIS — E66.01 CLASS 2 SEVERE OBESITY DUE TO EXCESS CALORIES WITH SERIOUS COMORBIDITY AND BODY MASS INDEX (BMI) OF 37.0 TO 37.9 IN ADULT (HCC): ICD-10-CM

## 2018-10-30 DIAGNOSIS — I26.99 OTHER ACUTE PULMONARY EMBOLISM WITHOUT ACUTE COR PULMONALE (HCC): ICD-10-CM

## 2018-10-30 DIAGNOSIS — I27.20 PULMONARY HYPERTENSION (HCC): ICD-10-CM

## 2018-10-30 DIAGNOSIS — G47.33 OSA ON CPAP: Primary | ICD-10-CM

## 2018-10-30 DIAGNOSIS — J45.41 MODERATE PERSISTENT ASTHMA WITH ACUTE EXACERBATION: ICD-10-CM

## 2018-10-30 PROCEDURE — 99215 OFFICE O/P EST HI 40 MIN: CPT | Performed by: PHYSICIAN ASSISTANT

## 2018-10-30 ASSESSMENT — ENCOUNTER SYMPTOMS
EYES NEGATIVE: 1
SHORTNESS OF BREATH: 1
CHEST TIGHTNESS: 1
COUGH: 1
WHEEZING: 1
ALLERGIC/IMMUNOLOGIC NEGATIVE: 1
DIARRHEA: 0
NAUSEA: 0
BACK PAIN: 0
STRIDOR: 0

## 2018-10-30 NOTE — PROGRESS NOTES
[facility]      Mando Chávez         [id number]  10/30/2018   Chief Complaint   Patient presents with    Sleep Apnea     CHARIS 12 month sleep f/u with [facility]. Asthma         Pt of Dr. Al Davis    PAP Download:   Original or initialAHI: 25.4     Date of initial study: 2013     [x] Compliant  96.7%   []  Noncompliant  %     PAP Type  CPAP Auto  Level  13-20  Avg Hrs/Day 7hrs 55min  AHI: 4.5    Recorded compliance dates , 9/30/18-10/29/18  Machine/Mfg: Respironics Interface: FFM     Provider:  [][facility]  [][facility]  [x][facility]  [][facility]         [][facility] []Other:   Neck Size: 17.5   Mallampati 4  ESS: 3    Here is a scan of the most recent download:                Presentation:   Shankar Lea presents for sleep medicine follow up for obstructive sleep apnea  Since the last visit, Shankar Lea is doing reasonably well with their sleep machine. Other comments: He is wearing PAP well. He is having mask leak. He is having irritation on face and seeing dermatologist.  He feels his  Mask is comfortable and not interested in changing mask. He is complaining of productive cough with colored sputum. He is having increased dyspnea and cough the past few weeks. He feels better when on steroids. He is using Symbicort 160 with no improvement. He has been in the hospital and ED several times in the past 3 months. He has been treated for asthma exacerbations. He was found to have PE and was started on Xarelto. He had dyspnea with any exertion. Only relief is when he is on steroids. He is following with allergy and they have started the process to get him on Laurey Caul but he has not heard anything yet. He was also tried on a different inhaler through his allergist and he is not sure which one but described it and is likely [place]. He is getting allergy shots also. He has been seen at [address] and not source identified as cause of his cough. Equipment issues:   The pressure

## 2018-11-07 ENCOUNTER — HOSPITAL ENCOUNTER (OUTPATIENT)
Dept: CT IMAGING | Age: 73
Discharge: HOME | End: 2018-11-07
Payer: MEDICARE

## 2018-11-07 DIAGNOSIS — J34.89 MASS OF PARANASAL SINUS: ICD-10-CM

## 2018-11-07 LAB — POC CREATININE WHOLE BLOOD: 1.1 MG/DL (ref 0.5–1.2)

## 2018-11-07 PROCEDURE — 6360000004 HC RX CONTRAST MEDICATION: Performed by: OTOLARYNGOLOGY

## 2018-11-07 PROCEDURE — 70492 CT SFT TSUE NCK W/O & W/DYE: CPT

## 2018-11-07 PROCEDURE — 82565 ASSAY OF CREATININE: CPT

## 2018-11-07 RX ADMIN — IOPAMIDOL 70 ML: 755 INJECTION, SOLUTION INTRAVENOUS at 09:25

## 2018-11-19 ENCOUNTER — OFFICE VISIT (OUTPATIENT)
Dept: PULMONOLOGY | Age: 73
End: 2018-11-19
Payer: MEDICARE

## 2018-11-19 ENCOUNTER — HOSPITAL ENCOUNTER (OUTPATIENT)
Dept: INTERVENTIONAL RADIOLOGY/VASCULAR | Age: 73
Discharge: HOME | End: 2018-11-19
Payer: MEDICARE

## 2018-11-19 VITALS
HEART RATE: 56 BPM | DIASTOLIC BLOOD PRESSURE: 78 MMHG | TEMPERATURE: 97.6 F | BODY MASS INDEX: 37.99 KG/M2 | WEIGHT: 296 LBS | SYSTOLIC BLOOD PRESSURE: 138 MMHG | OXYGEN SATURATION: 97 % | HEIGHT: 74 IN

## 2018-11-19 DIAGNOSIS — G47.33 OSA ON CPAP: ICD-10-CM

## 2018-11-19 DIAGNOSIS — I82.401 ACUTE DEEP VEIN THROMBOSIS (DVT) OF RIGHT LOWER EXTREMITY, UNSPECIFIED VEIN (HCC): ICD-10-CM

## 2018-11-19 DIAGNOSIS — Z99.89 OSA ON CPAP: ICD-10-CM

## 2018-11-19 DIAGNOSIS — I26.99 OTHER ACUTE PULMONARY EMBOLISM WITHOUT ACUTE COR PULMONALE (HCC): Primary | ICD-10-CM

## 2018-11-19 DIAGNOSIS — I82.431 ACUTE DEEP VEIN THROMBOSIS (DVT) OF POPLITEAL VEIN OF RIGHT LOWER EXTREMITY (HCC): ICD-10-CM

## 2018-11-19 DIAGNOSIS — K21.9 GASTROESOPHAGEAL REFLUX DISEASE, ESOPHAGITIS PRESENCE NOT SPECIFIED: ICD-10-CM

## 2018-11-19 DIAGNOSIS — I27.20 PULMONARY HYPERTENSION (HCC): ICD-10-CM

## 2018-11-19 DIAGNOSIS — J45.50 SEVERE PERSISTENT ASTHMA WITHOUT COMPLICATION: ICD-10-CM

## 2018-11-19 DIAGNOSIS — I82.531 CHRONIC DEEP VEIN THROMBOSIS (DVT) OF POPLITEAL VEIN OF RIGHT LOWER EXTREMITY (HCC): ICD-10-CM

## 2018-11-19 PROCEDURE — 95012 NITRIC OXIDE EXP GAS DETER: CPT | Performed by: NURSE PRACTITIONER

## 2018-11-19 PROCEDURE — 99214 OFFICE O/P EST MOD 30 MIN: CPT | Performed by: NURSE PRACTITIONER

## 2018-11-19 PROCEDURE — 93971 EXTREMITY STUDY: CPT

## 2018-11-19 ASSESSMENT — ENCOUNTER SYMPTOMS
WHEEZING: 0
BACK PAIN: 0
CHEST TIGHTNESS: 1
ALLERGIC/IMMUNOLOGIC NEGATIVE: 1
DIARRHEA: 0
SHORTNESS OF BREATH: 1
COUGH: 1
STRIDOR: 0
NAUSEA: 0
EYES NEGATIVE: 1

## 2018-11-19 NOTE — PROGRESS NOTES
for sinusitis management  -PRN Muccinex   -CPAP with pressure 13 cm H20 nightly- follows with Saran Sample     Will see Joe Alcocer back in: 3 months    Electronically signed by YESY Fowler - CNP on 11/19/2018 at 12:01 PM  11/19/2018

## 2018-11-21 ENCOUNTER — OFFICE VISIT (OUTPATIENT)
Dept: ENT CLINIC | Age: 73
End: 2018-11-21
Payer: MEDICARE

## 2018-11-21 VITALS
BODY MASS INDEX: 37.4 KG/M2 | HEART RATE: 64 BPM | SYSTOLIC BLOOD PRESSURE: 124 MMHG | WEIGHT: 291.4 LBS | HEIGHT: 74 IN | RESPIRATION RATE: 16 BRPM | TEMPERATURE: 97.6 F | DIASTOLIC BLOOD PRESSURE: 70 MMHG

## 2018-11-21 DIAGNOSIS — R06.83 SNORES: ICD-10-CM

## 2018-11-21 DIAGNOSIS — J34.89 MASS OF PARANASAL SINUS: ICD-10-CM

## 2018-11-21 DIAGNOSIS — Z85.828 HISTORY OF SCC (SQUAMOUS CELL CARCINOMA) OF SKIN: ICD-10-CM

## 2018-11-21 DIAGNOSIS — J34.2 NASAL SEPTAL DEVIATION: ICD-10-CM

## 2018-11-21 DIAGNOSIS — R06.5 MOUTH BREATHING: ICD-10-CM

## 2018-11-21 PROCEDURE — 99213 OFFICE O/P EST LOW 20 MIN: CPT | Performed by: OTOLARYNGOLOGY

## 2018-11-21 ASSESSMENT — ENCOUNTER SYMPTOMS
WHEEZING: 0
VOMITING: 0
CHOKING: 0
COLOR CHANGE: 0
STRIDOR: 0
SINUS PRESSURE: 0
NAUSEA: 0
ABDOMINAL PAIN: 0
FACIAL SWELLING: 0
COUGH: 0
VOICE CHANGE: 0
CHEST TIGHTNESS: 0
DIARRHEA: 0
SORE THROAT: 0
SHORTNESS OF BREATH: 0
APNEA: 0
RHINORRHEA: 0
TROUBLE SWALLOWING: 0

## 2018-11-21 NOTE — PROGRESS NOTES
[facility]  [facility] [address]  [facility] [address] [address]  Dept: [phone]  Dept Fax: [phone]  Loc: [phone]    Joe Dolan is a 74 y.o. male who was referred byNo ref. provider found for:  Chief Complaint   Patient presents with   [facility]     Patient is here for follow up of CT Neck 11/7/18 for left neck mass   . HPI:     Joe Dolan is a 74 y.o. male who presents today for results of his CT scan. Results were reviewed with the patient. CT Soft Tissue Neck results:  Impression       1. There is a nodular lesion within the left posterior parotid gland measuring up to 1.3 cm. This may represent a mass or enlarged lymph node.       2. There is asymmetry of the right true vocal fold which is larger and extends more inferior compared to the left. An underlying mass lesion is not excluded.        3. Note is made that the right cervical internal carotid artery takes a medial, retropharyngeal course.                   **This report has been created using voice recognition software. It may contain minor errors which are inherent in voice recognition technology. **       Final report electronically signed by Dr. Jimmy Whitley on 11/7/2018 4:48 PM     He is on Xarelto for blood clots in his legs. He was put on it in August.  The leg blood clots caused  a pulmonary embolism. He was recently told he would need to be on it for at least another 2 months. His neck mass is not sore and does not have any pain. He does mouth breathe a lot. He snores. He has a CPAP machine. The face mask does not fit very well. History:      Allergies   Allergen Reactions    Molds & Smuts Other (See Comments)     congestion    Sulfa Antibiotics Other (See Comments)     congestion     Current Outpatient Prescriptions   Medication Sig Dispense Refill    rivaroxaban (XARELTO) 20 MG TABS tablet Take 1 tablet by mouth daily (with breakfast) 90

## 2018-11-29 ENCOUNTER — HOSPITAL ENCOUNTER (OUTPATIENT)
Dept: ULTRASOUND IMAGING | Age: 73
Discharge: HOME | End: 2018-11-29
Payer: MEDICARE

## 2018-11-29 PROCEDURE — 88172 CYTP DX EVAL FNA 1ST EA SITE: CPT

## 2018-11-29 PROCEDURE — 88177 CYTP FNA EVAL EA ADDL: CPT

## 2018-11-29 PROCEDURE — 76942 ECHO GUIDE FOR BIOPSY: CPT

## 2018-11-29 PROCEDURE — 88305 TISSUE EXAM BY PATHOLOGIST: CPT

## 2018-11-29 PROCEDURE — 88173 CYTOPATH EVAL FNA REPORT: CPT

## 2018-12-05 RX ORDER — PREDNISONE 10 MG/1
10 TABLET ORAL DAILY
Qty: 30 TABLET | Refills: 2 | Status: SHIPPED | OUTPATIENT
Start: 2018-12-05 | End: 2019-01-07 | Stop reason: SDUPTHER

## 2018-12-06 ENCOUNTER — OFFICE VISIT (OUTPATIENT)
Dept: ENT CLINIC | Age: 73
End: 2018-12-06
Payer: MEDICARE

## 2018-12-06 ENCOUNTER — TELEPHONE (OUTPATIENT)
Dept: CARDIOLOGY CLINIC | Age: 73
End: 2018-12-06

## 2018-12-06 VITALS
TEMPERATURE: 98.1 F | RESPIRATION RATE: 12 BRPM | HEART RATE: 64 BPM | HEIGHT: 74 IN | SYSTOLIC BLOOD PRESSURE: 120 MMHG | DIASTOLIC BLOOD PRESSURE: 78 MMHG | WEIGHT: 290.6 LBS | BODY MASS INDEX: 37.29 KG/M2

## 2018-12-06 DIAGNOSIS — J45.41 MODERATE PERSISTENT ASTHMA WITH ACUTE EXACERBATION: ICD-10-CM

## 2018-12-06 DIAGNOSIS — I26.99 OTHER ACUTE PULMONARY EMBOLISM WITHOUT ACUTE COR PULMONALE (HCC): ICD-10-CM

## 2018-12-06 DIAGNOSIS — J34.89 MASS OF PARANASAL SINUS: ICD-10-CM

## 2018-12-06 DIAGNOSIS — J34.2 NASAL SEPTAL DEVIATION: ICD-10-CM

## 2018-12-06 DIAGNOSIS — K21.9 GASTROESOPHAGEAL REFLUX DISEASE, ESOPHAGITIS PRESENCE NOT SPECIFIED: ICD-10-CM

## 2018-12-06 DIAGNOSIS — G47.33 OSA ON CPAP: ICD-10-CM

## 2018-12-06 DIAGNOSIS — Z99.89 OSA ON CPAP: ICD-10-CM

## 2018-12-06 DIAGNOSIS — D11.0 PAROTID PLEOMORPHIC ADENOMA: Primary | ICD-10-CM

## 2018-12-06 DIAGNOSIS — I82.431 ACUTE DEEP VEIN THROMBOSIS (DVT) OF RIGHT POPLITEAL VEIN (HCC): ICD-10-CM

## 2018-12-06 PROCEDURE — 99213 OFFICE O/P EST LOW 20 MIN: CPT | Performed by: OTOLARYNGOLOGY

## 2018-12-06 RX ORDER — LISINOPRIL AND HYDROCHLOROTHIAZIDE 12.5; 1 MG/1; MG/1
0.5 TABLET ORAL DAILY
Status: ON HOLD | COMMUNITY
End: 2019-11-20 | Stop reason: HOSPADM

## 2018-12-06 ASSESSMENT — ENCOUNTER SYMPTOMS
FACIAL SWELLING: 0
SORE THROAT: 0
VOMITING: 0
DIARRHEA: 0
ABDOMINAL PAIN: 0
SINUS PRESSURE: 0
SHORTNESS OF BREATH: 0
CHEST TIGHTNESS: 0
CHOKING: 0
VOICE CHANGE: 0
TROUBLE SWALLOWING: 0
COLOR CHANGE: 0
WHEEZING: 0
RHINORRHEA: 0
COUGH: 0
APNEA: 0
STRIDOR: 0
NAUSEA: 0

## 2018-12-06 NOTE — PROGRESS NOTES
(FLONASE) 50 MCG/ACT nasal spray 1 spray by Nasal route daily as needed for Rhinitis 1 Bottle 3    acetaminophen (TYLENOL) 325 MG tablet Take 650 mg by mouth every 6 hours as needed for Pain      omeprazole (PRILOSEC) 20 MG delayed release capsule Take 20 mg by mouth daily      budesonide-formoterol (SYMBICORT) 160-4.5 MCG/ACT AERO Inhale 2 puffs into the lungs 2 times daily 3 Inhaler 3    CPAP Machine MISC by Does not apply route Please change CPAP to APAP (autoCPAP) range 13 - 20- cwp. 1 each 0    pravastatin (PRAVACHOL) 20 MG tablet Take 20 mg by mouth daily      verapamil (VERELAN PM) 240 MG CR capsule Take 240 mg by mouth every morning. Indications: Per patient taking medication for migraines prevention. No current facility-administered medications for this visit. Past Medical History:   Diagnosis Date    Arthritis     Asthma     Cancer ([facility] [address]     skin    Chickenpox     Hyperlipidemia     Measles     Migraines     Mumps     Sleep apnea       Past Surgical History:   Procedure Laterality Date    COLONOSCOPY      CORONARY ANGIOPLASTY N/A 2017/2018    Dr. Shilpa Cruz     Family History   Problem Relation Age of Onset    Diabetes Mother     COPD Mother     Cancer Father      Social History   Substance Use Topics    Smoking status: Never Smoker    Smokeless tobacco: Never Used    Alcohol use No       Subjective:      Review of Systems   Constitutional: Negative for activity change, appetite change, chills, diaphoresis, fatigue, fever and unexpected weight change. HENT: Negative for congestion, dental problem, ear discharge, ear pain, facial swelling, hearing loss, mouth sores, nosebleeds, postnasal drip, rhinorrhea, sinus pressure, sneezing, sore throat, tinnitus, trouble swallowing and voice change. Eyes: Negative for visual disturbance.    Respiratory: Negative for apnea, cough, choking, chest tightness, shortness Kenna Ho CMA ([facility]), am scribing for, and in the presence of Dr. Nicki Truong. Electronically signed by Vicki Colindres on 12/6/18 at 8:36 AM.     (Please note that portions of this note were completed with a voice recognition program. Efforts were made to edit the dictations butoccasionally words are mis-transcribed.)    I agree to the above documentation placed by my scribe. I have personally evaluated this patient. Additional findings are as noted. I reviewed the scribe's note and agree with the documented findings and plan of care. Any areas of disagreement are corrected. I agree with the chief complaint, past medical history, past surgical history, allergies, medications, social and family history as documented unless otherwise noted below.      Electronically signed by Gustavo Sandoval MD on 12/23/2018 at 10:36 PM

## 2018-12-06 NOTE — TELEPHONE ENCOUNTER
Patient on xeralto for PE and not A fib   Pulmonary to answer that   Clear cardiac wise but xerlato is not my area   If the PE is that recent he might want to wait for 6-9 months but check with pulmonary

## 2018-12-06 NOTE — TELEPHONE ENCOUNTER
Request for pre op clearance:  Requested by: Charlene Horvath @ Dr. Severa Sjogren office  Date of surgery: Not scheduled, hoping for 12/17 or 12/19/18  Procedure: Sinus surgery (IGS ethmoidectomy)  Office phone # [phone]  Fax #  [phone]  Is the patient on anti-coag medication?  On Xarelto, seeing pulmonary Lashell Woody 12/10/18)  If yes, how many days does the surgeon want anti-coag medication held:     Date of last visit with cardiologist: 10/26/18

## 2018-12-07 ENCOUNTER — TELEPHONE (OUTPATIENT)
Dept: ENT CLINIC | Age: 73
End: 2018-12-07

## 2018-12-10 ENCOUNTER — OFFICE VISIT (OUTPATIENT)
Dept: PULMONOLOGY | Age: 73
End: 2018-12-10
Payer: MEDICARE

## 2018-12-10 VITALS
WEIGHT: 292 LBS | BODY MASS INDEX: 37.47 KG/M2 | HEIGHT: 74 IN | OXYGEN SATURATION: 96 % | SYSTOLIC BLOOD PRESSURE: 134 MMHG | DIASTOLIC BLOOD PRESSURE: 76 MMHG | TEMPERATURE: 99 F | HEART RATE: 57 BPM

## 2018-12-10 DIAGNOSIS — Z86.711 HISTORY OF PULMONARY EMBOLUS (PE): ICD-10-CM

## 2018-12-10 DIAGNOSIS — J45.50 SEVERE PERSISTENT ASTHMA WITHOUT COMPLICATION: Primary | ICD-10-CM

## 2018-12-10 DIAGNOSIS — I82.543 CHRONIC BILATERAL DEEP VEIN THROMBOSIS (DVT) OF TIBIAL VEINS (HCC): ICD-10-CM

## 2018-12-10 DIAGNOSIS — K11.8 PAROTID MASS: ICD-10-CM

## 2018-12-10 DIAGNOSIS — J34.2 DNS (DEVIATED NASAL SEPTUM): ICD-10-CM

## 2018-12-10 DIAGNOSIS — I82.531 CHRONIC DEEP VEIN THROMBOSIS (DVT) OF POPLITEAL VEIN OF RIGHT LOWER EXTREMITY (HCC): ICD-10-CM

## 2018-12-10 PROCEDURE — 99214 OFFICE O/P EST MOD 30 MIN: CPT | Performed by: NURSE PRACTITIONER

## 2018-12-10 ASSESSMENT — ENCOUNTER SYMPTOMS
SHORTNESS OF BREATH: 1
BACK PAIN: 0
WHEEZING: 1
RHINORRHEA: 1
EYES NEGATIVE: 1
CHEST TIGHTNESS: 0
STRIDOR: 0
COUGH: 0
NAUSEA: 0
DIARRHEA: 0

## 2018-12-10 NOTE — TELEPHONE ENCOUNTER
Called patient to find out who his allergist is. He sees Dr Rosemarie Nolan. Told patient Dr Josiane Ward wanted to know so he can call and speak to them in regards to the injections they were wanting him to get. Patient then stated he had an appointment today with Dr Damaris Decker, his cardiologist. Patient stated Dr Damaris Decker stated he would prefer patient wait until March for surgery with Dr Josiane Ward due to the blood clots he had. Patient stated Dr Loco Collins office is going to send the information and his recommendation to us. Will print off encounter for Dr Josiane Ward to call Dr Lawanda Banks office.

## 2018-12-13 ENCOUNTER — HOSPITAL ENCOUNTER (OUTPATIENT)
Dept: CT IMAGING | Age: 73
Discharge: HOME | End: 2018-12-13
Payer: MEDICARE

## 2018-12-13 DIAGNOSIS — J34.89 MASS OF PARANASAL SINUS: ICD-10-CM

## 2018-12-13 PROCEDURE — 70486 CT MAXILLOFACIAL W/O DYE: CPT

## 2018-12-18 ENCOUNTER — OFFICE VISIT (OUTPATIENT)
Dept: ENT CLINIC | Age: 73
End: 2018-12-18
Payer: MEDICARE

## 2018-12-18 VITALS
SYSTOLIC BLOOD PRESSURE: 142 MMHG | TEMPERATURE: 99.1 F | DIASTOLIC BLOOD PRESSURE: 76 MMHG | HEART RATE: 70 BPM | BODY MASS INDEX: 37.22 KG/M2 | RESPIRATION RATE: 16 BRPM | WEIGHT: 290 LBS | HEIGHT: 74 IN

## 2018-12-18 DIAGNOSIS — I82.431 ACUTE DEEP VEIN THROMBOSIS (DVT) OF RIGHT POPLITEAL VEIN (HCC): ICD-10-CM

## 2018-12-18 DIAGNOSIS — J32.2 CHRONIC ETHMOIDAL SINUSITIS: ICD-10-CM

## 2018-12-18 DIAGNOSIS — G47.33 OSA ON CPAP: ICD-10-CM

## 2018-12-18 DIAGNOSIS — Z99.89 OSA ON CPAP: ICD-10-CM

## 2018-12-18 DIAGNOSIS — Z79.01 ANTICOAGULATED: ICD-10-CM

## 2018-12-18 PROCEDURE — 99213 OFFICE O/P EST LOW 20 MIN: CPT | Performed by: OTOLARYNGOLOGY

## 2018-12-18 ASSESSMENT — ENCOUNTER SYMPTOMS
STRIDOR: 0
NAUSEA: 0
SORE THROAT: 0
COLOR CHANGE: 0
WHEEZING: 0
SINUS PRESSURE: 0
CHEST TIGHTNESS: 0
DIARRHEA: 0
FACIAL SWELLING: 0
VOICE CHANGE: 0
SHORTNESS OF BREATH: 0
VOMITING: 0
RHINORRHEA: 0
APNEA: 0
COUGH: 0
CHOKING: 0
ABDOMINAL PAIN: 0
TROUBLE SWALLOWING: 0

## 2018-12-18 NOTE — PROGRESS NOTES
Indications:  Chest pain



Technique:  Portable AP chest



Findings:



Comparison:  14 2016



Cardiac silhouette remains normal in size. Pulmonary vasculature remains within

normal limits. Linear density persists in left upper lung. Lungs and pleura 

remain

otherwise clear. Mild elongation of thoracic aorta, elevation of left hemidiaphragm

are unchanged.



IMPRESSION:



No evidence of acute disease, unchanged



Stable chronic changes as described
pressure, sneezing, sore throat, tinnitus, trouble swallowing and voice change. Eyes: Negative for visual disturbance. Respiratory: Negative for apnea, cough, choking, chest tightness, shortness of breath, wheezing and stridor. Cardiovascular: Negative for chest pain, palpitations and leg swelling. Gastrointestinal: Negative for abdominal pain, diarrhea, nausea and vomiting. Endocrine: Negative for cold intolerance, heat intolerance, polydipsia and polyuria. Genitourinary: Negative for dysuria, enuresis and hematuria. Musculoskeletal: Negative for arthralgias, gait problem, neck pain and neck stiffness. Skin: Negative for color change and rash. Allergic/Immunologic: Negative for environmental allergies, food allergies and immunocompromised state. Neurological: Negative for dizziness, syncope, facial asymmetry, speech difficulty, light-headedness and headaches. Hematological: Negative for adenopathy. Does not bruise/bleed easily. Psychiatric/Behavioral: Negative for confusion and sleep disturbance. The patient is not nervous/anxious. Objective:   BP (!) 142/76 (Site: Right Upper Arm, Position: Sitting)   Pulse 70   Temp 99.1 °F (37.3 °C) (Oral)   Resp 16   Ht 6' 2\" (1.88 m)   Wt 290 lb (131.5 kg)   BMI 37.23 kg/m²     Physical Exam   Neck mass is in the tail of the left parotid. No particularly worrisome features. Data:  All of the past medical history, past surgical history, family history,social history, allergies and current medications were reviewed with the patient. Assessment & Plan   Diagnoses and all orders for this visit:     Diagnosis Orders   1. Pleomorphic adenoma     2. Chronic ethmoidal sinusitis     3. CHARIS on CPAP     4. Acute deep vein thrombosis (DVT) of right popliteal vein (HCC)     5. Anticoagulated         The findings were explained and his questions were answered. The removal of the pleomorphic adenoma can wait.   It was suggested that he contact us

## 2019-01-09 RX ORDER — PREDNISONE 10 MG/1
TABLET ORAL
Qty: 90 TABLET | Refills: 0 | Status: SHIPPED | OUTPATIENT
Start: 2019-01-09 | End: 2019-08-26

## 2019-02-11 ENCOUNTER — TELEPHONE (OUTPATIENT)
Dept: PULMONOLOGY | Age: 74
End: 2019-02-11

## 2019-05-10 ENCOUNTER — TELEPHONE (OUTPATIENT)
Dept: CARDIOLOGY CLINIC | Age: 74
End: 2019-05-10

## 2019-05-10 DIAGNOSIS — R00.1 SLOW HEART RATE: Primary | ICD-10-CM

## 2019-05-10 NOTE — TELEPHONE ENCOUNTER
Patient's PCP told him to call and let Dr. Adolfo Tello know his HR has been in the low 50's  For about 2 months now and patient states he is experiencing some fatigue.

## 2019-05-14 ENCOUNTER — HOSPITAL ENCOUNTER (OUTPATIENT)
Age: 74
Discharge: HOME | End: 2019-05-14
Payer: MEDICARE

## 2019-05-14 LAB
ALBUMIN SERPL-MCNC: 3.6 G/DL (ref 3.5–5.1)
ALP BLD-CCNC: 74 U/L (ref 38–126)
ALT SERPL-CCNC: 7 U/L (ref 11–66)
ANION GAP SERPL CALCULATED.3IONS-SCNC: 10 MEQ/L (ref 8–16)
AST SERPL-CCNC: 15 U/L (ref 5–40)
BASOPHILS # BLD: 0.3 %
BASOPHILS ABSOLUTE: 0 THOU/MM3 (ref 0–0.1)
BILIRUB SERPL-MCNC: 0.5 MG/DL (ref 0.3–1.2)
BUN BLDV-MCNC: 19 MG/DL (ref 7–22)
CALCIUM SERPL-MCNC: 9.2 MG/DL (ref 8.5–10.5)
CHLORIDE BLD-SCNC: 103 MEQ/L (ref 98–111)
CHOLESTEROL, TOTAL: 201 MG/DL (ref 100–199)
CO2: 28 MEQ/L (ref 23–33)
CREAT SERPL-MCNC: 1 MG/DL (ref 0.4–1.2)
EOSINOPHIL # BLD: 0 %
EOSINOPHILS ABSOLUTE: 0 THOU/MM3 (ref 0–0.4)
ERYTHROCYTE [DISTWIDTH] IN BLOOD BY AUTOMATED COUNT: 12.7 % (ref 11.5–14.5)
ERYTHROCYTE [DISTWIDTH] IN BLOOD BY AUTOMATED COUNT: 45.1 FL (ref 35–45)
GFR SERPL CREATININE-BSD FRML MDRD: 73 ML/MIN/1.73M2
GLUCOSE BLD-MCNC: 97 MG/DL (ref 70–108)
HCT VFR BLD CALC: 44.5 % (ref 42–52)
HDLC SERPL-MCNC: 71 MG/DL
HEMOGLOBIN: 15 GM/DL (ref 14–18)
IMMATURE GRANS (ABS): 0.03 THOU/MM3 (ref 0–0.07)
IMMATURE GRANULOCYTES: 0.5 %
LDL CHOLESTEROL CALCULATED: 108 MG/DL
LYMPHOCYTES # BLD: 15.6 %
LYMPHOCYTES ABSOLUTE: 1 THOU/MM3 (ref 1–4.8)
MCH RBC QN AUTO: 32.8 PG (ref 26–33)
MCHC RBC AUTO-ENTMCNC: 33.7 GM/DL (ref 32.2–35.5)
MCV RBC AUTO: 97.2 FL (ref 80–94)
MONOCYTES # BLD: 9 %
MONOCYTES ABSOLUTE: 0.6 THOU/MM3 (ref 0.4–1.3)
NUCLEATED RED BLOOD CELLS: 0 /100 WBC
PLATELET # BLD: 239 THOU/MM3 (ref 130–400)
PMV BLD AUTO: 10.4 FL (ref 9.4–12.4)
POTASSIUM SERPL-SCNC: 4.4 MEQ/L (ref 3.5–5.2)
RBC # BLD: 4.58 MILL/MM3 (ref 4.7–6.1)
SEG NEUTROPHILS: 74.6 %
SEGMENTED NEUTROPHILS ABSOLUTE COUNT: 4.9 THOU/MM3 (ref 1.8–7.7)
SODIUM BLD-SCNC: 141 MEQ/L (ref 135–145)
TOTAL PROTEIN: 6.1 G/DL (ref 6.1–8)
TRIGL SERPL-MCNC: 111 MG/DL (ref 0–199)
WBC # BLD: 6.6 THOU/MM3 (ref 4.8–10.8)

## 2019-05-14 PROCEDURE — 36415 COLL VENOUS BLD VENIPUNCTURE: CPT

## 2019-05-14 PROCEDURE — 80053 COMPREHEN METABOLIC PANEL: CPT

## 2019-05-14 PROCEDURE — 85025 COMPLETE CBC W/AUTO DIFF WBC: CPT

## 2019-05-14 PROCEDURE — 80061 LIPID PANEL: CPT

## 2019-05-20 ENCOUNTER — HOSPITAL ENCOUNTER (OUTPATIENT)
Dept: NON INVASIVE DIAGNOSTICS | Age: 74
Discharge: HOME | End: 2019-05-20
Payer: MEDICARE

## 2019-05-20 DIAGNOSIS — R00.1 SLOW HEART RATE: ICD-10-CM

## 2019-05-20 PROCEDURE — 93225 XTRNL ECG REC<48 HRS REC: CPT

## 2019-05-20 PROCEDURE — 93226 XTRNL ECG REC<48 HR SCAN A/R: CPT

## 2019-05-20 NOTE — PROCEDURES
The skin was prepped and a holter monitor was applied. The patient was instructed on the documentation of symptoms and the purpose of the holter as well as the things to avoid while wearing the holter. The patient was instructed to remove and return the holter on 5/27/19. The serial number of the holter that was applied is [id number].

## 2019-05-22 LAB
ACQUISITION DURATION: NORMAL S
AVERAGE HEART RATE: 55 BPM
FASTEST SUPRAVENTRICULAR RATE: 80 BPM
HOOKUP DATE: NORMAL
HOOKUP TIME: NORMAL
LONGEST SUPRAVENTRICULAR RATE: 74 BPM
MAX HEART RATE TIME/DATE: NORMAL
MAX HEART RATE: 105 BPM
MIN HEART RATE TIME/DATE: NORMAL
MIN HEART RATE: 40 BPM
NUMBER OF FASTEST SUPRAVENTRICULAR BEATS: 3
NUMBER OF LONGEST SUPRAVENTRICULAR BEATS: 4
NUMBER OF QRS COMPLEXES: NORMAL
NUMBER OF SUPRAVENTRICULAR BEATS IN RUNS: 16
NUMBER OF SUPRAVENTRICULAR COUPLETS: 29
NUMBER OF SUPRAVENTRICULAR ECTOPICS: 2380
NUMBER OF SUPRAVENTRICULAR ISOLATED BEATS: 2306
NUMBER OF SUPRAVENTRICULAR RUNS: 5
NUMBER OF VENTRICULAR BEATS IN RUNS: 0
NUMBER OF VENTRICULAR BIGEMINAL CYCLES: 0
NUMBER OF VENTRICULAR COUPLETS: 2
NUMBER OF VENTRICULAR ECTOPICS: 965
NUMBER OF VENTRICULAR ISOLATED BEATS: 961
NUMBER OF VENTRICULAR RUNS: 0

## 2019-06-20 ENCOUNTER — TELEPHONE (OUTPATIENT)
Dept: CARDIOLOGY CLINIC | Age: 74
End: 2019-06-20

## 2019-06-20 NOTE — TELEPHONE ENCOUNTER
Pt calling for results. Any med changes? CONCLUSION:   Normal sinus rhythm  occasional PVCs total 961  frequent PACs total 2306  No other form of arrhythmia noted.

## 2019-07-03 ENCOUNTER — OFFICE VISIT (OUTPATIENT)
Dept: BARIATRICS/WEIGHT MGMT | Age: 74
End: 2019-07-03
Payer: MEDICARE

## 2019-07-03 VITALS
DIASTOLIC BLOOD PRESSURE: 80 MMHG | HEIGHT: 73 IN | RESPIRATION RATE: 18 BRPM | TEMPERATURE: 97.8 F | SYSTOLIC BLOOD PRESSURE: 109 MMHG | BODY MASS INDEX: 38.09 KG/M2 | HEART RATE: 72 BPM | WEIGHT: 287.4 LBS

## 2019-07-03 DIAGNOSIS — K21.9 GASTROESOPHAGEAL REFLUX DISEASE, ESOPHAGITIS PRESENCE NOT SPECIFIED: ICD-10-CM

## 2019-07-03 DIAGNOSIS — E78.5 HYPERLIPIDEMIA, UNSPECIFIED HYPERLIPIDEMIA TYPE: ICD-10-CM

## 2019-07-03 DIAGNOSIS — E66.09 CLASS 2 OBESITY DUE TO EXCESS CALORIES WITH BODY MASS INDEX (BMI) OF 38.0 TO 38.9 IN ADULT, UNSPECIFIED WHETHER SERIOUS COMORBIDITY PRESENT: Primary | ICD-10-CM

## 2019-07-03 DIAGNOSIS — Z99.89 OSA ON CPAP: ICD-10-CM

## 2019-07-03 DIAGNOSIS — G47.33 OSA ON CPAP: ICD-10-CM

## 2019-07-03 PROCEDURE — 99214 OFFICE O/P EST MOD 30 MIN: CPT | Performed by: PHYSICIAN ASSISTANT

## 2019-07-03 NOTE — PROGRESS NOTES
file     Gets together: Not on file     Attends Buddhism service: Not on file     Active member of club or organization: Not on file     Attends meetings of clubs or organizations: Not on file     Relationship status: Not on file    Intimate partner violence:     Fear of current or ex partner: Not on file     Emotionally abused: Not on file     Physically abused: Not on file     Forced sexual activity: Not on file   Other Topics Concern    Not on file   Social History Narrative    Not on file       Current Medications:  Outpatient Medications Marked as Taking for the 7/3/19 encounter (Office Visit) with LORNA Luque   Medication Sig Dispense Refill    rivaroxaban (Luevenia Shown) 20 MG TABS tablet Take 1 tablet by mouth daily (with breakfast) 90 tablet 2    albuterol sulfate  (90 Base) MCG/ACT inhaler Inhale 2 puffs into the lungs every 6 hours as needed for Wheezing      omeprazole (PRILOSEC) 20 MG delayed release capsule Take 20 mg by mouth daily      pravastatin (PRAVACHOL) 20 MG tablet Take 20 mg by mouth daily      verapamil (VERELAN PM) 240 MG CR capsule Take 240 mg by mouth every morning. Indications: Per patient taking medication for migraines prevention. 24 hour diet recall and physical activity reviewed. Review of Systems:   Constitutional: Denies any fever, chills, fatigue. Wound: Denies any rash, skin color changes or wound problems. Resp: Denies any cough, (+)shortness of breath. CV: Denies any chest pain, orthopnea or syncope. Endocrine: Denies heat intolerance, cold intolerance,polydipsia, polyuria  MS: Denies any myalgias, arthralgias  GI: Denies any nausea, vomiting, diarrhea, constipation. : Denies any hematuria, hesitancy or dysuria. Neuro: Denies dizziness, syncope, headaches.     Objective:    /80 (Site: Right Upper Arm, Position: Sitting, Cuff Size: Large Adult)   Pulse 72   Temp 97.8 °F (36.6 °C) (Oral)   Resp 18   Ht 6' 0.75\" (1.848 m)   Wt

## 2019-08-02 ENCOUNTER — OFFICE VISIT (OUTPATIENT)
Dept: BARIATRICS/WEIGHT MGMT | Age: 74
End: 2019-08-02

## 2019-08-02 VITALS — BODY MASS INDEX: 38.97 KG/M2 | WEIGHT: 294 LBS | HEIGHT: 73 IN

## 2019-08-02 NOTE — PROGRESS NOTES
spouse  Cooking in household done by: spouse    Pt wakes up around 6:30a-7:00am  24 hour recall/food frequency chart: \"I find it difficult to put the potato chips down\"  Breakfast: yes. [employer] Breakfast sandwich , or loo and eggs  Snack: no.  Lunch: yes. 12p-2pm - may eat out or cooks out- had salad bar in hospital yesterday  Snack: no.  Dinner: yes. [place] last nite- shrimp, fried rice, egg roll or if spouse cooks at home - bbq boneless ribs, ham slice, pastas, likes steam vegetables  Snack: yes. - baked goods, chips- pt states when these foods are in the house has difficulty  Drinks throughout the day: decafe coffee makes three pots per day between pt and spouse- pt sips on this all day, ~ 1- 2 cups per day      Patient describes self as fast eater    Patient describes level of activity as sedentary. Pt given chair exercise booklet today and encouraged to start doing these specifically in the evening vs snacking on foods. Pt is also interested in starting to use [employer]. Patient plans to attend Fitness Orientation on: August 7th    Assessment  Nutritional Needs: Derrel [place] = 2089 kcal x 1.2 (activity factor)= 2507 kcal - 500- 1000 (for 1-2 lb weight loss/week)= 3854-1662 kcals per day  Food recall reveals often increased snacking/grazing on non-nutritive foods later in the day. Portion sizes larger at meals and increased eating out. Increased coffee intake with little water  Nutrition Diagnosis: Overweight/Obesity related to currently undergoing MNT as evidenced by  Body mass index is 39.06 kg/m². Sanju Lemus Plan  Plan/Recommendations:  Reviewed food label reading with patient and spouse to identify serving sizes and total carbohydrates. Suggested ~ 60 grams carbohydrate per meal and ~ 15-30 g carbohydrate per snack. Pt and spouse open to begin working on lifestyle changes specifically drinking more water   Handouts given:   1. Food Journal  2. My Plate Meal Planning Picture Guide  3.

## 2019-08-14 ENCOUNTER — PATIENT MESSAGE (OUTPATIENT)
Dept: PULMONOLOGY | Age: 74
End: 2019-08-14

## 2019-08-14 DIAGNOSIS — I82.531 CHRONIC DEEP VEIN THROMBOSIS (DVT) OF POPLITEAL VEIN OF RIGHT LOWER EXTREMITY (HCC): Primary | ICD-10-CM

## 2019-08-14 DIAGNOSIS — I26.99 OTHER ACUTE PULMONARY EMBOLISM WITHOUT ACUTE COR PULMONALE (HCC): ICD-10-CM

## 2019-08-14 DIAGNOSIS — Z86.711 HISTORY OF PULMONARY EMBOLUS (PE): ICD-10-CM

## 2019-08-14 DIAGNOSIS — I82.543 CHRONIC BILATERAL DEEP VEIN THROMBOSIS (DVT) OF TIBIAL VEINS (HCC): ICD-10-CM

## 2019-08-16 ENCOUNTER — PATIENT MESSAGE (OUTPATIENT)
Dept: PULMONOLOGY | Age: 74
End: 2019-08-16

## 2019-08-21 ENCOUNTER — HOSPITAL ENCOUNTER (OUTPATIENT)
Dept: INTERVENTIONAL RADIOLOGY/VASCULAR | Age: 74
Discharge: HOME | End: 2019-08-21
Payer: MEDICARE

## 2019-08-21 DIAGNOSIS — I82.531 CHRONIC DEEP VEIN THROMBOSIS (DVT) OF POPLITEAL VEIN OF RIGHT LOWER EXTREMITY (HCC): ICD-10-CM

## 2019-08-21 DIAGNOSIS — I82.543 CHRONIC BILATERAL DEEP VEIN THROMBOSIS (DVT) OF TIBIAL VEINS (HCC): ICD-10-CM

## 2019-08-21 DIAGNOSIS — Z86.711 HISTORY OF PULMONARY EMBOLUS (PE): ICD-10-CM

## 2019-08-21 PROCEDURE — 93970 EXTREMITY STUDY: CPT

## 2019-08-22 ENCOUNTER — OFFICE VISIT (OUTPATIENT)
Dept: PULMONOLOGY | Age: 74
End: 2019-08-22
Payer: MEDICARE

## 2019-08-22 VITALS
WEIGHT: 287.2 LBS | OXYGEN SATURATION: 97 % | HEART RATE: 59 BPM | SYSTOLIC BLOOD PRESSURE: 124 MMHG | BODY MASS INDEX: 38.06 KG/M2 | DIASTOLIC BLOOD PRESSURE: 62 MMHG | HEIGHT: 73 IN | TEMPERATURE: 98.4 F

## 2019-08-22 DIAGNOSIS — Z86.718 HISTORY OF DEEP VEIN THROMBOSIS (DVT) OF LOWER EXTREMITY: ICD-10-CM

## 2019-08-22 DIAGNOSIS — J45.50 SEVERE PERSISTENT ASTHMA WITHOUT COMPLICATION: Primary | ICD-10-CM

## 2019-08-22 DIAGNOSIS — Z86.711 HISTORY OF PULMONARY EMBOLUS (PE): ICD-10-CM

## 2019-08-22 DIAGNOSIS — G47.33 OSA ON CPAP: ICD-10-CM

## 2019-08-22 DIAGNOSIS — Z99.89 OSA ON CPAP: ICD-10-CM

## 2019-08-22 PROCEDURE — 99214 OFFICE O/P EST MOD 30 MIN: CPT | Performed by: NURSE PRACTITIONER

## 2019-08-22 ASSESSMENT — ENCOUNTER SYMPTOMS
ABDOMINAL PAIN: 0
DIARRHEA: 0
CHEST TIGHTNESS: 0
EYES NEGATIVE: 1
NAUSEA: 0
VOMITING: 0
TROUBLE SWALLOWING: 0
SHORTNESS OF BREATH: 1
COUGH: 0
WHEEZING: 1

## 2019-08-26 ENCOUNTER — OFFICE VISIT (OUTPATIENT)
Dept: BARIATRICS/WEIGHT MGMT | Age: 74
End: 2019-08-26
Payer: MEDICARE

## 2019-08-26 VITALS
HEART RATE: 56 BPM | BODY MASS INDEX: 38.14 KG/M2 | WEIGHT: 287.8 LBS | DIASTOLIC BLOOD PRESSURE: 60 MMHG | RESPIRATION RATE: 18 BRPM | HEIGHT: 73 IN | TEMPERATURE: 98.1 F | SYSTOLIC BLOOD PRESSURE: 112 MMHG

## 2019-08-26 DIAGNOSIS — Z99.89 OSA ON CPAP: ICD-10-CM

## 2019-08-26 DIAGNOSIS — K21.9 GASTROESOPHAGEAL REFLUX DISEASE, ESOPHAGITIS PRESENCE NOT SPECIFIED: ICD-10-CM

## 2019-08-26 DIAGNOSIS — E78.5 HYPERLIPIDEMIA, UNSPECIFIED HYPERLIPIDEMIA TYPE: ICD-10-CM

## 2019-08-26 DIAGNOSIS — G47.33 OSA ON CPAP: ICD-10-CM

## 2019-08-26 DIAGNOSIS — E66.09 CLASS 2 OBESITY DUE TO EXCESS CALORIES WITH BODY MASS INDEX (BMI) OF 38.0 TO 38.9 IN ADULT, UNSPECIFIED WHETHER SERIOUS COMORBIDITY PRESENT: Primary | ICD-10-CM

## 2019-08-26 DIAGNOSIS — J45.909 UNCOMPLICATED ASTHMA, UNSPECIFIED ASTHMA SEVERITY, UNSPECIFIED WHETHER PERSISTENT: ICD-10-CM

## 2019-08-26 PROCEDURE — 99214 OFFICE O/P EST MOD 30 MIN: CPT | Performed by: PHYSICIAN ASSISTANT

## 2019-08-26 NOTE — PROGRESS NOTES
Sleep apnea        Past Surgical History:  Past Surgical History:   Procedure Laterality Date    COLONOSCOPY      CORONARY ANGIOPLASTY N/A 2017/2018    Dr. Lorraine Shore       Past Social History:  Social History     Socioeconomic History    Marital status:      Spouse name: Angelito Benites Number of children: 2    Years of education: 25    Highest education level: Not on file   Occupational History    Occupation: retired   Social Needs    Financial resource strain: Not on file    Food insecurity:     Worry: Not on file     Inability: Not on file   [employer] needs:     Medical: Not on file     Non-medical: Not on file   Tobacco Use    Smoking status: Never Smoker    Smokeless tobacco: Never Used   Substance and Sexual Activity    Alcohol use: No     Alcohol/week: 0.0 standard drinks    Drug use: No    Sexual activity: Not on file   Lifestyle    Physical activity:     Days per week: Not on file     Minutes per session: Not on file    Stress: Not on file   Relationships    Social connections:     Talks on phone: Not on file     Gets together: Not on file     Attends Evangelical service: Not on file     Active member of club or organization: Not on file     Attends meetings of clubs or organizations: Not on file     Relationship status: Not on file    Intimate partner violence:     Fear of current or ex partner: Not on file     Emotionally abused: Not on file     Physically abused: Not on file     Forced sexual activity: Not on file   Other Topics Concern    Not on file   Social History Narrative    Not on file        Medications:   Current Outpatient Medications   Medication Sig Dispense Refill    lisinopril-hydrochlorothiazide (PRINZIDE;ZESTORETIC) 10-12.5 MG per tablet Take 0.5 tablets by mouth daily       albuterol sulfate  (90 Base) MCG/ACT inhaler Inhale 2 puffs into the lungs every 6 hours as needed for Wheezing No results found for: IRON     TIBC  No results found for: TIBC    FERRITIN  No results found for: FERRITIN    VITAMIN A  No results found for: RETINOL    NICOTINE  No results found for: NMET    UDS  No results found for: UDP    PSA  No results found for: LABPSA    GFR  Lab Results   Component Value Date    LABGLOM 73 05/14/2019       DEXA  No results found for this or any previous visit. Assessment:       Diagnosis Orders   1. Class 2 obesity due to excess calories with body mass index (BMI) of 38.0 to 38.9 in adult, unspecified whether serious comorbidity present     2. Gastroesophageal reflux disease, esophagitis presence not specified     3. Hyperlipidemia, unspecified hyperlipidemia type     4. CHARIS on CPAP     5. Uncomplicated asthma, unspecified asthma severity, unspecified whether persistent         Plan:    · Behavior modification discussed in detail in regards to dietary habits. · Nutritional education occurred during visit. Continue following recommendations  per dietitian. · Improvement in fitness/exercise discussed with patient and the need for this with/without surgery. · Completed orientation with Ninoska Weiner, Exercise Physiologist, at the fitness center. · Psychology evaluation prn  · Physical Therapy referral prn  · Encouraged to attend support groups. 2019 list given. · Goal to lose 1-2# per week  · Seca scale completed and reviewed  · Increase water intake to 4 bottles daily or 64oz  · Start Naturally Slim-info given  Return in about 1 month (around 9/26/2019) for Follow up. I spent over 25 minutes with the patient, with greater that 50% of that time spent on education, counseling and coordination of care.      Electronically signed by LORNA Barajas on 8/26/2019 at 4:17 PM

## 2019-08-27 ENCOUNTER — OFFICE VISIT (OUTPATIENT)
Dept: ENT CLINIC | Age: 74
End: 2019-08-27
Payer: MEDICARE

## 2019-08-27 VITALS
DIASTOLIC BLOOD PRESSURE: 60 MMHG | HEART RATE: 56 BPM | BODY MASS INDEX: 37.99 KG/M2 | WEIGHT: 286 LBS | RESPIRATION RATE: 12 BRPM | SYSTOLIC BLOOD PRESSURE: 108 MMHG | TEMPERATURE: 98.2 F

## 2019-08-27 DIAGNOSIS — I26.99 OTHER ACUTE PULMONARY EMBOLISM WITHOUT ACUTE COR PULMONALE (HCC): ICD-10-CM

## 2019-08-27 DIAGNOSIS — G47.33 OSA ON CPAP: ICD-10-CM

## 2019-08-27 DIAGNOSIS — Z99.89 OSA ON CPAP: ICD-10-CM

## 2019-08-27 DIAGNOSIS — D11.0 PAROTID PLEOMORPHIC ADENOMA: Primary | ICD-10-CM

## 2019-08-27 DIAGNOSIS — Z86.72 HISTORY OF THROMBOPHLEBITIS: ICD-10-CM

## 2019-08-27 DIAGNOSIS — Z01.818 PREOP TESTING: ICD-10-CM

## 2019-08-27 PROCEDURE — 99213 OFFICE O/P EST LOW 20 MIN: CPT | Performed by: OTOLARYNGOLOGY

## 2019-08-27 ASSESSMENT — ENCOUNTER SYMPTOMS
CHEST TIGHTNESS: 0
WHEEZING: 0
NAUSEA: 0
STRIDOR: 0
VOMITING: 0
SINUS PRESSURE: 0
COLOR CHANGE: 0
TROUBLE SWALLOWING: 0
DIARRHEA: 0
SORE THROAT: 0
RHINORRHEA: 0
SHORTNESS OF BREATH: 0
FACIAL SWELLING: 0
CHOKING: 0
VOICE CHANGE: 0
ABDOMINAL PAIN: 0
APNEA: 0
COUGH: 0

## 2019-08-27 NOTE — LETTER
examination of tissues removed during the surgery. The patient was further informed that the nerve that controls motion to the face (the facial nerve) runs through the parotid gland. This nerve is important in closing the eyes, wrinkling the nose, and moving the lips. Most often the parotid gland can be removed without permanent damage to the nerve, however, the size and position of the diseased tissue may require that the nerve, or small branches of the nerve, be cut to assure complete removal. Even if the nerve is not permanently injured, there may be decreased motion of the facial muscles as the nerve recovers from the surgical procedure. If facial motion does not fully return we will discuss ways to rehabilitate facial movement. Other possible short term complications include bleeding and infection. Although rare in parotid surgery, some patients may develop a thick scar or keloid. Many patients experience numbing of the earlobe and outer edge of the ear after parotid surgery. This generally resolves slowly over time. In a small portion of patients, the face on the side of the parotidectomy sweats at mealtimes, (\"gustatory sweating\"). This develops several months after surgery and occurs because nerves are \"rerouted\" during the healing process. Most often this gustatory sweating goes unnoticed. However, if it should become bothersome medication and sometimes surgery are available. Depending on the final diagnosis after the tissue is reviewed by a pathologist, additional diagnostic tests and follow-up examinations may be needed. Most often masses of the parotid are benign and complete removal is the only treatment needed. General anesthesia will be required and unfavorable reaction to anesthesia is another uncommon risk. Other unexpected and uncommon complications can occur. Return for Pre-op visit. If you have questions, please do not hesitate to call me.  I look forward

## 2019-08-27 NOTE — PROGRESS NOTES
[facility] PROFESSIONAL SERVS  [facility]'S EAR, NOSE AND THROAT  [facility]  Dept: [phone]  Dept Fax: [phone]  Loc: [phone]    Nila Gutierres is a 66 y.o. male who was referred byNo ref. provider found for:  Chief Complaint   Patient presents with    Other     Patient here to discuss surgery. Zakia Truong HPI:     Nila Gutierres is a 66 y.o. male who presents today for discussion of removal of the left parotid mass. He had a thrombophlebitis and is now off his Xarelto and in fact is on no anticoagulation at all. Not even aspirin. Zakia Truong History: Allergies   Allergen Reactions    Molds & Smuts Other (See Comments)     congestion    Sulfa Antibiotics Other (See Comments)     congestion     Current Outpatient Medications   Medication Sig Dispense Refill    lisinopril-hydrochlorothiazide (PRINZIDE;ZESTORETIC) 10-12.5 MG per tablet Take 0.5 tablets by mouth daily       fluorouracil (EFUDEX) 5 % cream Apply topically 2 times daily Apply topically 2 times daily.       albuterol sulfate  (90 Base) MCG/ACT inhaler Inhale 2 puffs into the lungs every 6 hours as needed for Wheezing      albuterol (PROVENTIL) (2.5 MG/3ML) 0.083% nebulizer solution Take 3 mLs by nebulization every 6 hours as needed for Wheezing or Shortness of Breath 120 vial 8    fluticasone (FLONASE) 50 MCG/ACT nasal spray 1 spray by Nasal route daily as needed for Rhinitis 1 Bottle 3    acetaminophen (TYLENOL) 325 MG tablet Take 650 mg by mouth every 6 hours as needed for Pain      omeprazole (PRILOSEC) 20 MG delayed release capsule Take 20 mg by mouth daily      budesonide-formoterol (SYMBICORT) 160-4.5 MCG/ACT AERO Inhale 2 puffs into the lungs 2 times daily 3 Inhaler 3    CPAP Machine MISC by Does not apply route Please change CPAP to APAP (autoCPAP) range 13 - 20- cwp. 1 each 0    pravastatin (PRAVACHOL) 20 MG tablet Take 20 mg by mouth daily      verapamil (VERELAN PM) allergies, food allergies and immunocompromised state. Neurological: Negative for dizziness, syncope, facial asymmetry, speech difficulty, light-headedness and headaches. Hematological: Negative for adenopathy. Does not bruise/bleed easily. Psychiatric/Behavioral: Negative for confusion and sleep disturbance. The patient is not nervous/anxious. Objective:   /60 (Site: Left Upper Arm, Position: Sitting)   Pulse 56   Temp 98.2 °F (36.8 °C) (Oral)   Resp 12   Wt 286 lb (129.7 kg)   BMI 37.99 kg/m²     Physical Exam   2 cm left tail of parotid mass. This is larger than before. Data:  All of the past medical history, past surgical history, family history,social history, allergies and current medications were reviewed with the patient. Assessment & Plan   Diagnoses and all orders for this visit:     Diagnosis Orders   1. Parotid pleomorphic adenoma  PAROTIDECTOMY    Left tail of parotid, 2 cm   2. Preop testing  CBC    Comprehensive Metabolic Panel    EKG 12 lead    XR CHEST STANDARD (2 VW)   3. History of thrombophlebitis     4. CHARIS on CPAP     5. Other acute pulmonary embolism without acute cor pulmonale (HCC)         The findings were explained and his questions were answered. Natural history of a pleomorphic adenoma and this was already doubled in size, his wife would like this removed. He will need medical clearance, and he would need to be off the lisinopril for 2 days before and 2 days after, and be covered by an H2 blocker perioperatively and for approximately a month. Risk and benefits of parotid surgery were discussed. The patient was informed that the planned procedure is partial removal of the large salivary gland located in front and just below the ear. This is being done to remove a mass in the gland. Such masses are usually benign, but about 20% are malignant.  The amount of parotid gland to be removed is often determined at the time of surgery based on the size and location of the neoplasm. The extent of surgery may also depend on pathological examination of tissues removed during the surgery. The patient was further informed that the nerve that controls motion to the face (the facial nerve) runs through the parotid gland. This nerve is important in closing the eyes, wrinkling the nose, and moving the lips. Most often the parotid gland can be removed without permanent damage to the nerve, however, the size and position of the diseased tissue may require that the nerve, or small branches of the nerve, be cut to assure complete removal. Even if the nerve is not permanently injured, there may be decreased motion of the facial muscles as the nerve recovers from the surgical procedure. If facial motion does not fully return we will discuss ways to rehabilitate facial movement. Other possible short term complications include bleeding and infection. Although rare in parotid surgery, some patients may develop a thick scar or keloid. Many patients experience numbing of the earlobe and outer edge of the ear after parotid surgery. This generally resolves slowly over time. In a small portion of patients, the face on the side of the parotidectomy sweats at mealtimes, (\"gustatory sweating\"). This develops several months after surgery and occurs because nerves are \"rerouted\" during the healing process. Most often this gustatory sweating goes unnoticed. However, if it should become bothersome medication and sometimes surgery are available. Depending on the final diagnosis after the tissue is reviewed by a pathologist, additional diagnostic tests and follow-up examinations may be needed. Most often masses of the parotid are benign and complete removal is the only treatment needed. General anesthesia will be required and unfavorable reaction to anesthesia is another uncommon risk. Other unexpected and uncommon complications can occur. Return for Pre-op visit. [facility]. Nathan Cordoba MD    **This report has been created using voice recognition software. It may contain minor errors which are inherent in voicerecognition technology. **

## 2019-10-14 ENCOUNTER — HOSPITAL ENCOUNTER (OUTPATIENT)
Age: 74
Discharge: HOME | End: 2019-10-14
Payer: MEDICARE

## 2019-10-14 ENCOUNTER — HOSPITAL ENCOUNTER (OUTPATIENT)
Dept: GENERAL RADIOLOGY | Age: 74
Discharge: HOME | End: 2019-10-14
Payer: MEDICARE

## 2019-10-14 DIAGNOSIS — Z01.818 PREOP TESTING: ICD-10-CM

## 2019-10-14 LAB
ALBUMIN SERPL-MCNC: 3.8 G/DL (ref 3.5–5.1)
ALP BLD-CCNC: 70 U/L (ref 38–126)
ALT SERPL-CCNC: 10 U/L (ref 11–66)
ANION GAP SERPL CALCULATED.3IONS-SCNC: 8 MEQ/L (ref 8–16)
AST SERPL-CCNC: 17 U/L (ref 5–40)
BILIRUB SERPL-MCNC: 0.5 MG/DL (ref 0.3–1.2)
BUN BLDV-MCNC: 13 MG/DL (ref 7–22)
CALCIUM SERPL-MCNC: 9.1 MG/DL (ref 8.5–10.5)
CHLORIDE BLD-SCNC: 104 MEQ/L (ref 98–111)
CO2: 30 MEQ/L (ref 23–33)
CREAT SERPL-MCNC: 1 MG/DL (ref 0.4–1.2)
EKG ATRIAL RATE: 55 BPM
EKG P AXIS: 68 DEGREES
EKG P-R INTERVAL: 142 MS
EKG Q-T INTERVAL: 454 MS
EKG QRS DURATION: 112 MS
EKG QTC CALCULATION (BAZETT): 434 MS
EKG R AXIS: -29 DEGREES
EKG T AXIS: 59 DEGREES
EKG VENTRICULAR RATE: 55 BPM
ERYTHROCYTE [DISTWIDTH] IN BLOOD BY AUTOMATED COUNT: 12.7 % (ref 11.5–14.5)
ERYTHROCYTE [DISTWIDTH] IN BLOOD BY AUTOMATED COUNT: 47.7 FL (ref 35–45)
GFR SERPL CREATININE-BSD FRML MDRD: 73 ML/MIN/1.73M2
GLUCOSE BLD-MCNC: 105 MG/DL (ref 70–108)
HCT VFR BLD CALC: 43.7 % (ref 42–52)
HEMOGLOBIN: 14.4 GM/DL (ref 14–18)
MCH RBC QN AUTO: 33.3 PG (ref 26–33)
MCHC RBC AUTO-ENTMCNC: 33 GM/DL (ref 32.2–35.5)
MCV RBC AUTO: 101.2 FL (ref 80–94)
PLATELET # BLD: 245 THOU/MM3 (ref 130–400)
PMV BLD AUTO: 9.9 FL (ref 9.4–12.4)
POTASSIUM SERPL-SCNC: 4.7 MEQ/L (ref 3.5–5.2)
RBC # BLD: 4.32 MILL/MM3 (ref 4.7–6.1)
SODIUM BLD-SCNC: 142 MEQ/L (ref 135–145)
TOTAL PROTEIN: 6.1 G/DL (ref 6.1–8)
WBC # BLD: 6.5 THOU/MM3 (ref 4.8–10.8)

## 2019-10-14 PROCEDURE — 36415 COLL VENOUS BLD VENIPUNCTURE: CPT

## 2019-10-14 PROCEDURE — 71046 X-RAY EXAM CHEST 2 VIEWS: CPT

## 2019-10-14 PROCEDURE — 93005 ELECTROCARDIOGRAM TRACING: CPT

## 2019-10-14 PROCEDURE — 80053 COMPREHEN METABOLIC PANEL: CPT

## 2019-10-14 PROCEDURE — 85027 COMPLETE CBC AUTOMATED: CPT

## 2019-10-15 PROCEDURE — 93010 ELECTROCARDIOGRAM REPORT: CPT | Performed by: INTERNAL MEDICINE

## 2019-10-30 ENCOUNTER — OFFICE VISIT (OUTPATIENT)
Dept: PULMONOLOGY | Age: 74
End: 2019-10-30
Payer: MEDICARE

## 2019-10-30 VITALS
BODY MASS INDEX: 37.19 KG/M2 | HEIGHT: 74 IN | WEIGHT: 289.8 LBS | SYSTOLIC BLOOD PRESSURE: 126 MMHG | DIASTOLIC BLOOD PRESSURE: 68 MMHG | OXYGEN SATURATION: 95 % | HEART RATE: 62 BPM

## 2019-10-30 DIAGNOSIS — Z99.89 OSA ON CPAP: Primary | ICD-10-CM

## 2019-10-30 DIAGNOSIS — Z86.711 HX PULMONARY EMBOLISM: ICD-10-CM

## 2019-10-30 DIAGNOSIS — Z01.818 PREOPERATIVE CLEARANCE: ICD-10-CM

## 2019-10-30 DIAGNOSIS — G47.33 OSA ON CPAP: Primary | ICD-10-CM

## 2019-10-30 DIAGNOSIS — E66.9 OBESITY (BMI 30-39.9): ICD-10-CM

## 2019-10-30 DIAGNOSIS — D11.0 PAROTID ADENOMA: ICD-10-CM

## 2019-10-30 PROCEDURE — 99214 OFFICE O/P EST MOD 30 MIN: CPT | Performed by: PHYSICIAN ASSISTANT

## 2019-10-30 ASSESSMENT — ENCOUNTER SYMPTOMS
NAUSEA: 0
WHEEZING: 0
BACK PAIN: 0
CHEST TIGHTNESS: 0
SHORTNESS OF BREATH: 0
ALLERGIC/IMMUNOLOGIC NEGATIVE: 1
DIARRHEA: 0
EYES NEGATIVE: 1
STRIDOR: 0
COUGH: 1

## 2019-11-01 ENCOUNTER — OFFICE VISIT (OUTPATIENT)
Dept: CARDIOLOGY CLINIC | Age: 74
End: 2019-11-01
Payer: MEDICARE

## 2019-11-01 VITALS
HEART RATE: 60 BPM | SYSTOLIC BLOOD PRESSURE: 120 MMHG | BODY MASS INDEX: 36.57 KG/M2 | DIASTOLIC BLOOD PRESSURE: 64 MMHG | HEIGHT: 74 IN | WEIGHT: 285 LBS

## 2019-11-01 DIAGNOSIS — J44.9 CHRONIC OBSTRUCTIVE PULMONARY DISEASE, UNSPECIFIED COPD TYPE (HCC): ICD-10-CM

## 2019-11-01 DIAGNOSIS — I25.10 CORONARY ARTERY DISEASE INVOLVING NATIVE CORONARY ARTERY OF NATIVE HEART WITHOUT ANGINA PECTORIS: ICD-10-CM

## 2019-11-01 DIAGNOSIS — I10 ESSENTIAL HYPERTENSION: Primary | ICD-10-CM

## 2019-11-01 PROCEDURE — 99213 OFFICE O/P EST LOW 20 MIN: CPT | Performed by: NUCLEAR MEDICINE

## 2019-11-07 ENCOUNTER — OFFICE VISIT (OUTPATIENT)
Dept: ENT CLINIC | Age: 74
End: 2019-11-07

## 2019-11-07 VITALS
HEART RATE: 68 BPM | WEIGHT: 288.2 LBS | SYSTOLIC BLOOD PRESSURE: 110 MMHG | RESPIRATION RATE: 16 BRPM | DIASTOLIC BLOOD PRESSURE: 68 MMHG | BODY MASS INDEX: 37 KG/M2

## 2019-11-07 DIAGNOSIS — Z86.72 HISTORY OF THROMBOPHLEBITIS: ICD-10-CM

## 2019-11-07 DIAGNOSIS — J44.9 CHRONIC OBSTRUCTIVE PULMONARY DISEASE, UNSPECIFIED COPD TYPE (HCC): ICD-10-CM

## 2019-11-07 DIAGNOSIS — Z99.89 OSA ON CPAP: ICD-10-CM

## 2019-11-07 DIAGNOSIS — I26.99 OTHER ACUTE PULMONARY EMBOLISM WITHOUT ACUTE COR PULMONALE (HCC): ICD-10-CM

## 2019-11-07 DIAGNOSIS — G47.33 OSA ON CPAP: ICD-10-CM

## 2019-11-07 DIAGNOSIS — I25.10 CORONARY ARTERY DISEASE INVOLVING NATIVE CORONARY ARTERY OF NATIVE HEART WITHOUT ANGINA PECTORIS: ICD-10-CM

## 2019-11-07 DIAGNOSIS — I10 ESSENTIAL HYPERTENSION: ICD-10-CM

## 2019-11-07 DIAGNOSIS — D11.0 PAROTID PLEOMORPHIC ADENOMA: Primary | ICD-10-CM

## 2019-11-07 PROCEDURE — 99999 PR OFFICE/OUTPT VISIT,PROCEDURE ONLY: CPT | Performed by: OTOLARYNGOLOGY

## 2019-11-07 ASSESSMENT — ENCOUNTER SYMPTOMS
SINUS PRESSURE: 0
STRIDOR: 0
NAUSEA: 0
APNEA: 0
RHINORRHEA: 0
CHOKING: 0
TROUBLE SWALLOWING: 0
CHEST TIGHTNESS: 0
SORE THROAT: 0
VOICE CHANGE: 0
COLOR CHANGE: 0
FACIAL SWELLING: 0
COUGH: 0
SHORTNESS OF BREATH: 0
DIARRHEA: 0
WHEEZING: 0
VOMITING: 0
ABDOMINAL PAIN: 0

## 2019-11-18 ENCOUNTER — TELEPHONE (OUTPATIENT)
Dept: ENT CLINIC | Age: 74
End: 2019-11-18

## 2019-11-18 ENCOUNTER — OFFICE VISIT (OUTPATIENT)
Dept: PULMONOLOGY | Age: 74
End: 2019-11-18
Payer: MEDICARE

## 2019-11-18 VITALS
OXYGEN SATURATION: 94 % | TEMPERATURE: 97.9 F | DIASTOLIC BLOOD PRESSURE: 76 MMHG | SYSTOLIC BLOOD PRESSURE: 118 MMHG | HEART RATE: 56 BPM | BODY MASS INDEX: 37.73 KG/M2 | WEIGHT: 294 LBS | HEIGHT: 74 IN

## 2019-11-18 DIAGNOSIS — Z01.818 PRE-OP EVALUATION: Primary | ICD-10-CM

## 2019-11-18 DIAGNOSIS — J45.50 SEVERE PERSISTENT ASTHMA WITHOUT COMPLICATION: ICD-10-CM

## 2019-11-18 DIAGNOSIS — Z86.711 HISTORY OF PULMONARY EMBOLUS (PE): ICD-10-CM

## 2019-11-18 DIAGNOSIS — R93.89 ABNORMAL CXR: ICD-10-CM

## 2019-11-18 DIAGNOSIS — Z86.718 HISTORY OF DEEP VEIN THROMBOSIS (DVT) OF LOWER EXTREMITY: ICD-10-CM

## 2019-11-18 PROCEDURE — 99214 OFFICE O/P EST MOD 30 MIN: CPT | Performed by: NURSE PRACTITIONER

## 2019-11-18 RX ORDER — FLUTICASONE PROPIONATE 50 MCG
1 SPRAY, SUSPENSION (ML) NASAL DAILY PRN
Qty: 3 BOTTLE | Refills: 3 | Status: SHIPPED | OUTPATIENT
Start: 2019-11-18 | End: 2020-12-31

## 2019-11-18 ASSESSMENT — ENCOUNTER SYMPTOMS
WHEEZING: 0
NAUSEA: 0
SHORTNESS OF BREATH: 1
ALLERGIC/IMMUNOLOGIC NEGATIVE: 1
CHEST TIGHTNESS: 0
BACK PAIN: 0
DIARRHEA: 0
STRIDOR: 0
EYES NEGATIVE: 1
COUGH: 1

## 2019-11-20 ENCOUNTER — ANESTHESIA EVENT (OUTPATIENT)
Dept: OPERATING ROOM | Age: 74
End: 2019-11-20
Payer: MEDICARE

## 2019-11-20 ENCOUNTER — ANESTHESIA (OUTPATIENT)
Dept: OPERATING ROOM | Age: 74
End: 2019-11-20
Payer: MEDICARE

## 2019-11-20 ENCOUNTER — HOSPITAL ENCOUNTER (OUTPATIENT)
Age: 74
Setting detail: OUTPATIENT SURGERY
Discharge: HOME | End: 2019-11-20
Attending: OTOLARYNGOLOGY | Admitting: OTOLARYNGOLOGY
Payer: MEDICARE

## 2019-11-20 VITALS
DIASTOLIC BLOOD PRESSURE: 52 MMHG | SYSTOLIC BLOOD PRESSURE: 80 MMHG | RESPIRATION RATE: 11 BRPM | OXYGEN SATURATION: 99 % | TEMPERATURE: 98.4 F

## 2019-11-20 VITALS
BODY MASS INDEX: 37.09 KG/M2 | HEART RATE: 70 BPM | TEMPERATURE: 98.1 F | RESPIRATION RATE: 16 BRPM | SYSTOLIC BLOOD PRESSURE: 115 MMHG | HEIGHT: 74 IN | WEIGHT: 289 LBS | OXYGEN SATURATION: 92 % | DIASTOLIC BLOOD PRESSURE: 59 MMHG

## 2019-11-20 DIAGNOSIS — D11.0 PLEOMORPHIC ADENOMA OF PAROTID GLAND: Primary | ICD-10-CM

## 2019-11-20 PROBLEM — Z86.72 HISTORY OF THROMBOPHLEBITIS: Status: ACTIVE | Noted: 2019-11-20

## 2019-11-20 LAB — POTASSIUM SERPL-SCNC: 4.1 MEQ/L (ref 3.5–5.2)

## 2019-11-20 PROCEDURE — 42410 EXCISE PAROTID GLAND/LESION: CPT | Performed by: OTOLARYNGOLOGY

## 2019-11-20 PROCEDURE — 7100000001 HC PACU RECOVERY - ADDTL 15 MIN: Performed by: OTOLARYNGOLOGY

## 2019-11-20 PROCEDURE — 84132 ASSAY OF SERUM POTASSIUM: CPT

## 2019-11-20 PROCEDURE — 2720000010 HC SURG SUPPLY STERILE: Performed by: OTOLARYNGOLOGY

## 2019-11-20 PROCEDURE — 2580000003 HC RX 258: Performed by: OTOLARYNGOLOGY

## 2019-11-20 PROCEDURE — 2500000003 HC RX 250 WO HCPCS: Performed by: OTOLARYNGOLOGY

## 2019-11-20 PROCEDURE — 3600000014 HC SURGERY LEVEL 4 ADDTL 15MIN: Performed by: OTOLARYNGOLOGY

## 2019-11-20 PROCEDURE — 3700000001 HC ADD 15 MINUTES (ANESTHESIA): Performed by: OTOLARYNGOLOGY

## 2019-11-20 PROCEDURE — 7100000011 HC PHASE II RECOVERY - ADDTL 15 MIN: Performed by: OTOLARYNGOLOGY

## 2019-11-20 PROCEDURE — 2500000003 HC RX 250 WO HCPCS: Performed by: NURSE ANESTHETIST, CERTIFIED REGISTERED

## 2019-11-20 PROCEDURE — 3600000004 HC SURGERY LEVEL 4 BASE: Performed by: OTOLARYNGOLOGY

## 2019-11-20 PROCEDURE — 88305 TISSUE EXAM BY PATHOLOGIST: CPT

## 2019-11-20 PROCEDURE — 36415 COLL VENOUS BLD VENIPUNCTURE: CPT

## 2019-11-20 PROCEDURE — 2709999900 HC NON-CHARGEABLE SUPPLY: Performed by: OTOLARYNGOLOGY

## 2019-11-20 PROCEDURE — 7100000000 HC PACU RECOVERY - FIRST 15 MIN: Performed by: OTOLARYNGOLOGY

## 2019-11-20 PROCEDURE — 3700000000 HC ANESTHESIA ATTENDED CARE: Performed by: OTOLARYNGOLOGY

## 2019-11-20 PROCEDURE — 6360000002 HC RX W HCPCS: Performed by: NURSE ANESTHETIST, CERTIFIED REGISTERED

## 2019-11-20 PROCEDURE — 88307 TISSUE EXAM BY PATHOLOGIST: CPT

## 2019-11-20 PROCEDURE — 7100000010 HC PHASE II RECOVERY - FIRST 15 MIN: Performed by: OTOLARYNGOLOGY

## 2019-11-20 PROCEDURE — 88331 PATH CONSLTJ SURG 1 BLK 1SPC: CPT

## 2019-11-20 RX ORDER — FENTANYL CITRATE 50 UG/ML
25 INJECTION, SOLUTION INTRAMUSCULAR; INTRAVENOUS EVERY 5 MIN PRN
Status: DISCONTINUED | OUTPATIENT
Start: 2019-11-20 | End: 2019-11-20 | Stop reason: HOSPADM

## 2019-11-20 RX ORDER — HYDROCODONE BITARTRATE AND ACETAMINOPHEN 7.5; 325 MG/1; MG/1
1 TABLET ORAL EVERY 6 HOURS PRN
Qty: 20 TABLET | Refills: 0 | Status: SHIPPED | OUTPATIENT
Start: 2019-11-20 | End: 2019-11-25

## 2019-11-20 RX ORDER — METOCLOPRAMIDE HYDROCHLORIDE 5 MG/ML
10 INJECTION INTRAMUSCULAR; INTRAVENOUS
Status: DISCONTINUED | OUTPATIENT
Start: 2019-11-20 | End: 2019-11-20 | Stop reason: HOSPADM

## 2019-11-20 RX ORDER — SODIUM CHLORIDE 9 MG/ML
INJECTION, SOLUTION INTRAVENOUS CONTINUOUS
Status: DISCONTINUED | OUTPATIENT
Start: 2019-11-20 | End: 2019-11-20 | Stop reason: HOSPADM

## 2019-11-20 RX ORDER — DIPHENHYDRAMINE HYDROCHLORIDE 50 MG/ML
12.5 INJECTION INTRAMUSCULAR; INTRAVENOUS
Status: DISCONTINUED | OUTPATIENT
Start: 2019-11-20 | End: 2019-11-20 | Stop reason: HOSPADM

## 2019-11-20 RX ORDER — PROPOFOL 10 MG/ML
INJECTION, EMULSION INTRAVENOUS PRN
Status: DISCONTINUED | OUTPATIENT
Start: 2019-11-20 | End: 2019-11-20 | Stop reason: SDUPTHER

## 2019-11-20 RX ORDER — FENTANYL CITRATE 50 UG/ML
50 INJECTION, SOLUTION INTRAMUSCULAR; INTRAVENOUS EVERY 5 MIN PRN
Status: DISCONTINUED | OUTPATIENT
Start: 2019-11-20 | End: 2019-11-20 | Stop reason: HOSPADM

## 2019-11-20 RX ORDER — PROMETHAZINE HYDROCHLORIDE 25 MG/ML
12.5 INJECTION, SOLUTION INTRAMUSCULAR; INTRAVENOUS
Status: DISCONTINUED | OUTPATIENT
Start: 2019-11-20 | End: 2019-11-20 | Stop reason: HOSPADM

## 2019-11-20 RX ORDER — MEPERIDINE HYDROCHLORIDE 25 MG/ML
12.5 INJECTION INTRAMUSCULAR; INTRAVENOUS; SUBCUTANEOUS EVERY 5 MIN PRN
Status: DISCONTINUED | OUTPATIENT
Start: 2019-11-20 | End: 2019-11-20 | Stop reason: HOSPADM

## 2019-11-20 RX ORDER — ROCURONIUM BROMIDE 10 MG/ML
INJECTION, SOLUTION INTRAVENOUS PRN
Status: DISCONTINUED | OUTPATIENT
Start: 2019-11-20 | End: 2019-11-20 | Stop reason: SDUPTHER

## 2019-11-20 RX ORDER — FAMOTIDINE 20 MG/1
20 TABLET, FILM COATED ORAL 2 TIMES DAILY
Qty: 60 TABLET | Refills: 0 | Status: ON HOLD | OUTPATIENT
Start: 2019-11-20 | End: 2020-05-03 | Stop reason: HOSPADM

## 2019-11-20 RX ORDER — ONDANSETRON 2 MG/ML
INJECTION INTRAMUSCULAR; INTRAVENOUS PRN
Status: DISCONTINUED | OUTPATIENT
Start: 2019-11-20 | End: 2019-11-20 | Stop reason: SDUPTHER

## 2019-11-20 RX ORDER — LABETALOL 20 MG/4 ML (5 MG/ML) INTRAVENOUS SYRINGE
5 EVERY 10 MIN PRN
Status: DISCONTINUED | OUTPATIENT
Start: 2019-11-20 | End: 2019-11-20 | Stop reason: HOSPADM

## 2019-11-20 RX ORDER — FENTANYL CITRATE 50 UG/ML
INJECTION, SOLUTION INTRAMUSCULAR; INTRAVENOUS PRN
Status: DISCONTINUED | OUTPATIENT
Start: 2019-11-20 | End: 2019-11-20 | Stop reason: SDUPTHER

## 2019-11-20 RX ORDER — SUCCINYLCHOLINE/SOD CL,ISO/PF 200MG/10ML
SYRINGE (ML) INTRAVENOUS PRN
Status: DISCONTINUED | OUTPATIENT
Start: 2019-11-20 | End: 2019-11-20 | Stop reason: SDUPTHER

## 2019-11-20 RX ORDER — LIDOCAINE HCL/PF 100 MG/5ML
SYRINGE (ML) INJECTION PRN
Status: DISCONTINUED | OUTPATIENT
Start: 2019-11-20 | End: 2019-11-20 | Stop reason: SDUPTHER

## 2019-11-20 RX ORDER — EPHEDRINE SULFATE/0.9% NACL/PF 50 MG/5 ML
SYRINGE (ML) INTRAVENOUS PRN
Status: DISCONTINUED | OUTPATIENT
Start: 2019-11-20 | End: 2019-11-20 | Stop reason: SDUPTHER

## 2019-11-20 RX ADMIN — SODIUM CHLORIDE: 9 INJECTION, SOLUTION INTRAVENOUS at 12:48

## 2019-11-20 RX ADMIN — SODIUM CHLORIDE: 9 INJECTION, SOLUTION INTRAVENOUS at 11:13

## 2019-11-20 RX ADMIN — FENTANYL CITRATE 50 MCG: 50 INJECTION INTRAMUSCULAR; INTRAVENOUS at 12:51

## 2019-11-20 RX ADMIN — Medication 10 MG: at 13:51

## 2019-11-20 RX ADMIN — Medication 120 MG: at 12:53

## 2019-11-20 RX ADMIN — Medication 100 MG: at 12:53

## 2019-11-20 RX ADMIN — Medication 10 MG: at 15:26

## 2019-11-20 RX ADMIN — PROPOFOL 200 MG: 10 INJECTION, EMULSION INTRAVENOUS at 12:53

## 2019-11-20 RX ADMIN — Medication 10 MG: at 15:04

## 2019-11-20 RX ADMIN — FENTANYL CITRATE 50 MCG: 50 INJECTION INTRAMUSCULAR; INTRAVENOUS at 12:57

## 2019-11-20 RX ADMIN — Medication 10 MG: at 13:17

## 2019-11-20 RX ADMIN — FENTANYL CITRATE 50 MCG: 50 INJECTION INTRAMUSCULAR; INTRAVENOUS at 15:17

## 2019-11-20 RX ADMIN — FAMOTIDINE 10 MG: 10 INJECTION, SOLUTION INTRAVENOUS at 11:17

## 2019-11-20 RX ADMIN — ONDANSETRON HYDROCHLORIDE 4 MG: 4 INJECTION, SOLUTION INTRAMUSCULAR; INTRAVENOUS at 15:31

## 2019-11-20 RX ADMIN — FENTANYL CITRATE 50 MCG: 50 INJECTION INTRAMUSCULAR; INTRAVENOUS at 14:13

## 2019-11-20 RX ADMIN — ROCURONIUM BROMIDE 5 MG: 10 INJECTION INTRAVENOUS at 12:53

## 2019-11-20 RX ADMIN — Medication 10 MG: at 13:06

## 2019-11-20 ASSESSMENT — PULMONARY FUNCTION TESTS
PIF_VALUE: 26
PIF_VALUE: 26
PIF_VALUE: 24
PIF_VALUE: 31
PIF_VALUE: 22
PIF_VALUE: 25
PIF_VALUE: 26
PIF_VALUE: 26
PIF_VALUE: 25
PIF_VALUE: 23
PIF_VALUE: 26
PIF_VALUE: 26
PIF_VALUE: 23
PIF_VALUE: 25
PIF_VALUE: 26
PIF_VALUE: 26
PIF_VALUE: 25
PIF_VALUE: 1
PIF_VALUE: 26
PIF_VALUE: 25
PIF_VALUE: 25
PIF_VALUE: 26
PIF_VALUE: 24
PIF_VALUE: 25
PIF_VALUE: 24
PIF_VALUE: 26
PIF_VALUE: 25
PIF_VALUE: 25
PIF_VALUE: 23
PIF_VALUE: 33
PIF_VALUE: 26
PIF_VALUE: 25
PIF_VALUE: 26
PIF_VALUE: 25
PIF_VALUE: 26
PIF_VALUE: 25
PIF_VALUE: 25
PIF_VALUE: 24
PIF_VALUE: 25
PIF_VALUE: 25
PIF_VALUE: 1
PIF_VALUE: 25
PIF_VALUE: 26
PIF_VALUE: 26
PIF_VALUE: 25
PIF_VALUE: 26
PIF_VALUE: 25
PIF_VALUE: 26
PIF_VALUE: 24
PIF_VALUE: 1
PIF_VALUE: 32
PIF_VALUE: 25
PIF_VALUE: 26
PIF_VALUE: 26
PIF_VALUE: 25
PIF_VALUE: 23
PIF_VALUE: 24
PIF_VALUE: 25
PIF_VALUE: 24
PIF_VALUE: 23
PIF_VALUE: 25
PIF_VALUE: 25
PIF_VALUE: 26
PIF_VALUE: 17
PIF_VALUE: 25
PIF_VALUE: 25
PIF_VALUE: 33
PIF_VALUE: 25
PIF_VALUE: 24
PIF_VALUE: 25
PIF_VALUE: 25
PIF_VALUE: 26
PIF_VALUE: 26
PIF_VALUE: 25
PIF_VALUE: 25
PIF_VALUE: 26
PIF_VALUE: 25
PIF_VALUE: 28
PIF_VALUE: 25
PIF_VALUE: 26
PIF_VALUE: 25
PIF_VALUE: 25
PIF_VALUE: 26
PIF_VALUE: 25
PIF_VALUE: 26
PIF_VALUE: 9
PIF_VALUE: 25
PIF_VALUE: 23
PIF_VALUE: 25
PIF_VALUE: 26
PIF_VALUE: 25
PIF_VALUE: 26
PIF_VALUE: 26
PIF_VALUE: 19
PIF_VALUE: 25
PIF_VALUE: 26
PIF_VALUE: 25
PIF_VALUE: 26
PIF_VALUE: 25
PIF_VALUE: 25
PIF_VALUE: 22
PIF_VALUE: 25
PIF_VALUE: 25
PIF_VALUE: 26
PIF_VALUE: 25
PIF_VALUE: 25
PIF_VALUE: 23
PIF_VALUE: 25
PIF_VALUE: 26
PIF_VALUE: 25
PIF_VALUE: 23
PIF_VALUE: 25
PIF_VALUE: 4
PIF_VALUE: 25
PIF_VALUE: 25
PIF_VALUE: 26
PIF_VALUE: 42
PIF_VALUE: 25
PIF_VALUE: 26
PIF_VALUE: 25
PIF_VALUE: 25
PIF_VALUE: 27
PIF_VALUE: 26
PIF_VALUE: 26
PIF_VALUE: 23
PIF_VALUE: 25
PIF_VALUE: 25
PIF_VALUE: 23
PIF_VALUE: 25
PIF_VALUE: 26
PIF_VALUE: 25
PIF_VALUE: 5
PIF_VALUE: 23
PIF_VALUE: 25
PIF_VALUE: 23
PIF_VALUE: 25
PIF_VALUE: 23
PIF_VALUE: 25
PIF_VALUE: 23
PIF_VALUE: 27
PIF_VALUE: 25
PIF_VALUE: 25
PIF_VALUE: 26
PIF_VALUE: 25
PIF_VALUE: 26
PIF_VALUE: 25
PIF_VALUE: 25
PIF_VALUE: 1
PIF_VALUE: 25
PIF_VALUE: 24
PIF_VALUE: 25
PIF_VALUE: 5
PIF_VALUE: 24
PIF_VALUE: 25
PIF_VALUE: 21
PIF_VALUE: 25
PIF_VALUE: 26
PIF_VALUE: 25
PIF_VALUE: 41
PIF_VALUE: 25
PIF_VALUE: 25
PIF_VALUE: 26
PIF_VALUE: 26
PIF_VALUE: 3
PIF_VALUE: 24
PIF_VALUE: 25
PIF_VALUE: 25

## 2019-11-20 ASSESSMENT — PAIN SCALES - GENERAL
PAINLEVEL_OUTOF10: 0

## 2019-11-20 ASSESSMENT — PAIN - FUNCTIONAL ASSESSMENT: PAIN_FUNCTIONAL_ASSESSMENT: 0-10

## 2019-11-20 ASSESSMENT — ENCOUNTER SYMPTOMS: SHORTNESS OF BREATH: 1

## 2019-11-21 ENCOUNTER — TELEPHONE (OUTPATIENT)
Dept: ENT CLINIC | Age: 74
End: 2019-11-21

## 2019-11-25 ENCOUNTER — OFFICE VISIT (OUTPATIENT)
Dept: ENT CLINIC | Age: 74
End: 2019-11-25

## 2019-11-25 VITALS
DIASTOLIC BLOOD PRESSURE: 82 MMHG | HEART RATE: 64 BPM | BODY MASS INDEX: 38.52 KG/M2 | SYSTOLIC BLOOD PRESSURE: 128 MMHG | RESPIRATION RATE: 12 BRPM | WEIGHT: 300 LBS

## 2019-11-25 DIAGNOSIS — D11.0 PAROTID PLEOMORPHIC ADENOMA: ICD-10-CM

## 2019-11-25 DIAGNOSIS — Z98.890 H/O SUPERFICIAL PAROTIDECTOMY: Primary | ICD-10-CM

## 2019-11-25 DIAGNOSIS — K59.00 CONSTIPATION, UNSPECIFIED CONSTIPATION TYPE: ICD-10-CM

## 2019-11-25 PROCEDURE — 99024 POSTOP FOLLOW-UP VISIT: CPT | Performed by: NURSE PRACTITIONER

## 2019-11-25 RX ORDER — RANITIDINE 150 MG/1
150 TABLET ORAL 2 TIMES DAILY
Qty: 60 TABLET | Refills: 0 | Status: SHIPPED | OUTPATIENT
Start: 2019-11-25 | End: 2020-04-28

## 2019-11-25 RX ORDER — LACTULOSE 10 G/15ML
10 SOLUTION ORAL 3 TIMES DAILY PRN
Qty: 150 ML | Refills: 0 | Status: ON HOLD | OUTPATIENT
Start: 2019-11-25 | End: 2020-04-28

## 2019-11-25 ASSESSMENT — ENCOUNTER SYMPTOMS
RHINORRHEA: 0
VOMITING: 0
DIARRHEA: 0
SORE THROAT: 0
FACIAL SWELLING: 0
COUGH: 0
SHORTNESS OF BREATH: 0
NAUSEA: 0
CHOKING: 0
CHEST TIGHTNESS: 0
TROUBLE SWALLOWING: 0
STRIDOR: 0
VOICE CHANGE: 0
APNEA: 0
ABDOMINAL PAIN: 0
CONSTIPATION: 1
WHEEZING: 0
COLOR CHANGE: 0
SINUS PRESSURE: 0

## 2019-12-03 ENCOUNTER — TELEPHONE (OUTPATIENT)
Dept: ENT CLINIC | Age: 74
End: 2019-12-03

## 2019-12-20 ENCOUNTER — HOSPITAL ENCOUNTER (OUTPATIENT)
Dept: INTERVENTIONAL RADIOLOGY/VASCULAR | Age: 74
Discharge: HOME | End: 2019-12-20
Payer: MEDICARE

## 2019-12-20 ENCOUNTER — OFFICE VISIT (OUTPATIENT)
Dept: ENT CLINIC | Age: 74
End: 2019-12-20

## 2019-12-20 ENCOUNTER — HOSPITAL ENCOUNTER (OUTPATIENT)
Age: 74
Discharge: HOME | End: 2019-12-20
Payer: MEDICARE

## 2019-12-20 VITALS
WEIGHT: 295 LBS | BODY MASS INDEX: 37.86 KG/M2 | HEART RATE: 64 BPM | RESPIRATION RATE: 16 BRPM | HEIGHT: 74 IN | SYSTOLIC BLOOD PRESSURE: 120 MMHG | DIASTOLIC BLOOD PRESSURE: 64 MMHG

## 2019-12-20 DIAGNOSIS — I26.99 OTHER ACUTE PULMONARY EMBOLISM WITHOUT ACUTE COR PULMONALE (HCC): ICD-10-CM

## 2019-12-20 DIAGNOSIS — R52 PAIN: ICD-10-CM

## 2019-12-20 DIAGNOSIS — Z86.72 HISTORY OF THROMBOPHLEBITIS: ICD-10-CM

## 2019-12-20 DIAGNOSIS — R60.9 SWELLING: ICD-10-CM

## 2019-12-20 DIAGNOSIS — Z98.890 H/O SUPERFICIAL PAROTIDECTOMY: ICD-10-CM

## 2019-12-20 DIAGNOSIS — D11.0 PAROTID PLEOMORPHIC ADENOMA: Primary | ICD-10-CM

## 2019-12-20 LAB — D-DIMER QUANTITATIVE: 1191 NG/ML FEU (ref 0–500)

## 2019-12-20 PROCEDURE — 85379 FIBRIN DEGRADATION QUANT: CPT

## 2019-12-20 PROCEDURE — 99024 POSTOP FOLLOW-UP VISIT: CPT | Performed by: OTOLARYNGOLOGY

## 2019-12-20 PROCEDURE — 93971 EXTREMITY STUDY: CPT

## 2019-12-20 PROCEDURE — 36415 COLL VENOUS BLD VENIPUNCTURE: CPT

## 2019-12-20 RX ORDER — LISINOPRIL AND HYDROCHLOROTHIAZIDE 25; 20 MG/1; MG/1
TABLET ORAL
Status: ON HOLD | COMMUNITY
Start: 2019-11-23 | End: 2020-04-28

## 2019-12-20 ASSESSMENT — ENCOUNTER SYMPTOMS
ABDOMINAL PAIN: 0
VOICE CHANGE: 0
SHORTNESS OF BREATH: 0
DIARRHEA: 0
WHEEZING: 0
CHEST TIGHTNESS: 0
SORE THROAT: 0
FACIAL SWELLING: 0
VOMITING: 0
RHINORRHEA: 0
NAUSEA: 0
COLOR CHANGE: 0
SINUS PRESSURE: 0
CHOKING: 0
TROUBLE SWALLOWING: 0
COUGH: 0
APNEA: 0
STRIDOR: 0

## 2020-04-28 ENCOUNTER — APPOINTMENT (OUTPATIENT)
Dept: INTERVENTIONAL RADIOLOGY/VASCULAR | Age: 75
DRG: 163 | End: 2020-04-28
Payer: MEDICARE

## 2020-04-28 ENCOUNTER — APPOINTMENT (OUTPATIENT)
Dept: GENERAL RADIOLOGY | Age: 75
DRG: 163 | End: 2020-04-28
Payer: MEDICARE

## 2020-04-28 ENCOUNTER — APPOINTMENT (OUTPATIENT)
Dept: CT IMAGING | Age: 75
DRG: 163 | End: 2020-04-28
Payer: MEDICARE

## 2020-04-28 ENCOUNTER — HOSPITAL ENCOUNTER (INPATIENT)
Age: 75
LOS: 5 days | Discharge: HOME | DRG: 163 | End: 2020-05-03
Attending: INTERNAL MEDICINE | Admitting: INTERNAL MEDICINE
Payer: MEDICARE

## 2020-04-28 PROBLEM — I26.92 ACUTE SADDLE PULMONARY EMBOLISM (HCC): Status: ACTIVE | Noted: 2020-04-28

## 2020-04-28 LAB
ALBUMIN SERPL-MCNC: 3.4 G/DL (ref 3.5–5.1)
ALP BLD-CCNC: 76 U/L (ref 38–126)
ALT SERPL-CCNC: 6 U/L (ref 11–66)
ANION GAP SERPL CALCULATED.3IONS-SCNC: 10 MEQ/L (ref 8–16)
APTT: 30.3 SECONDS (ref 22–38)
APTT: > 200 SECONDS (ref 22–38)
AST SERPL-CCNC: 17 U/L (ref 5–40)
BASOPHILS # BLD: 0.6 %
BASOPHILS ABSOLUTE: 0.1 THOU/MM3 (ref 0–0.1)
BILIRUB SERPL-MCNC: 0.9 MG/DL (ref 0.3–1.2)
BUN BLDV-MCNC: 17 MG/DL (ref 7–22)
CALCIUM SERPL-MCNC: 8.7 MG/DL (ref 8.5–10.5)
CHLORIDE BLD-SCNC: 101 MEQ/L (ref 98–111)
CO2: 25 MEQ/L (ref 23–33)
CREAT SERPL-MCNC: 0.8 MG/DL (ref 0.4–1.2)
D-DIMER QUANTITATIVE: 6402 NG/ML FEU (ref 0–500)
EKG ATRIAL RATE: 88 BPM
EKG P AXIS: 62 DEGREES
EKG P-R INTERVAL: 138 MS
EKG Q-T INTERVAL: 408 MS
EKG QRS DURATION: 112 MS
EKG QTC CALCULATION (BAZETT): 493 MS
EKG R AXIS: -41 DEGREES
EKG T AXIS: 55 DEGREES
EKG VENTRICULAR RATE: 88 BPM
EOSINOPHIL # BLD: 9.5 %
EOSINOPHILS ABSOLUTE: 0.8 THOU/MM3 (ref 0–0.4)
ERYTHROCYTE [DISTWIDTH] IN BLOOD BY AUTOMATED COUNT: 13.4 % (ref 11.5–14.5)
ERYTHROCYTE [DISTWIDTH] IN BLOOD BY AUTOMATED COUNT: 50.1 FL (ref 35–45)
GFR SERPL CREATININE-BSD FRML MDRD: > 90 ML/MIN/1.73M2
GLUCOSE BLD-MCNC: 109 MG/DL (ref 70–108)
HCT VFR BLD CALC: 42.1 % (ref 42–52)
HEMOGLOBIN: 13.7 GM/DL (ref 14–18)
IMMATURE GRANS (ABS): 0.07 THOU/MM3 (ref 0–0.07)
IMMATURE GRANULOCYTES: 0.8 %
LACTIC ACID: 1 MMOL/L (ref 0.5–2.2)
LYMPHOCYTES # BLD: 9.6 %
LYMPHOCYTES ABSOLUTE: 0.8 THOU/MM3 (ref 1–4.8)
MCH RBC QN AUTO: 32.8 PG (ref 26–33)
MCHC RBC AUTO-ENTMCNC: 32.5 GM/DL (ref 32.2–35.5)
MCV RBC AUTO: 100.7 FL (ref 80–94)
MONOCYTES # BLD: 8.6 %
MONOCYTES ABSOLUTE: 0.7 THOU/MM3 (ref 0.4–1.3)
NUCLEATED RED BLOOD CELLS: 0 /100 WBC
OSMOLALITY CALCULATION: 274.1 MOSMOL/KG (ref 275–300)
PLATELET # BLD: 198 THOU/MM3 (ref 130–400)
PMV BLD AUTO: 9.9 FL (ref 9.4–12.4)
POTASSIUM REFLEX MAGNESIUM: 3.8 MEQ/L (ref 3.5–5.2)
PRO-BNP: 134 PG/ML (ref 0–1800)
PROCALCITONIN: 0.11 NG/ML (ref 0.01–0.09)
RBC # BLD: 4.18 MILL/MM3 (ref 4.7–6.1)
SARS-COV-2, NAAT: NOT DETECTED
SEG NEUTROPHILS: 70.9 %
SEGMENTED NEUTROPHILS ABSOLUTE COUNT: 6 THOU/MM3 (ref 1.8–7.7)
SODIUM BLD-SCNC: 136 MEQ/L (ref 135–145)
TOTAL PROTEIN: 5.8 G/DL (ref 6.1–8)
TROPONIN T: < 0.01 NG/ML
WBC # BLD: 8.5 THOU/MM3 (ref 4.8–10.8)

## 2020-04-28 PROCEDURE — 85379 FIBRIN DEGRADATION QUANT: CPT

## 2020-04-28 PROCEDURE — 93970 EXTREMITY STUDY: CPT

## 2020-04-28 PROCEDURE — C1887 CATHETER, GUIDING: HCPCS

## 2020-04-28 PROCEDURE — 37799 UNLISTED PX VASCULAR SURGERY: CPT | Performed by: RADIOLOGY

## 2020-04-28 PROCEDURE — 36014 PLACE CATHETER IN ARTERY: CPT | Performed by: RADIOLOGY

## 2020-04-28 PROCEDURE — 71045 X-RAY EXAM CHEST 1 VIEW: CPT

## 2020-04-28 PROCEDURE — 85730 THROMBOPLASTIN TIME PARTIAL: CPT

## 2020-04-28 PROCEDURE — 6360000002 HC RX W HCPCS: Performed by: RADIOLOGY

## 2020-04-28 PROCEDURE — 94640 AIRWAY INHALATION TREATMENT: CPT

## 2020-04-28 PROCEDURE — 02CR3ZZ EXTIRPATION OF MATTER FROM LEFT PULMONARY ARTERY, PERCUTANEOUS APPROACH: ICD-10-PCS | Performed by: RADIOLOGY

## 2020-04-28 PROCEDURE — 87040 BLOOD CULTURE FOR BACTERIA: CPT

## 2020-04-28 PROCEDURE — 99285 EMERGENCY DEPT VISIT HI MDM: CPT

## 2020-04-28 PROCEDURE — 94761 N-INVAS EAR/PLS OXIMETRY MLT: CPT

## 2020-04-28 PROCEDURE — U0002 COVID-19 LAB TEST NON-CDC: HCPCS

## 2020-04-28 PROCEDURE — APPNB180 APP NON BILLABLE TIME > 60 MINS: Performed by: NURSE PRACTITIONER

## 2020-04-28 PROCEDURE — 6370000000 HC RX 637 (ALT 250 FOR IP)

## 2020-04-28 PROCEDURE — 6370000000 HC RX 637 (ALT 250 FOR IP): Performed by: NURSE PRACTITIONER

## 2020-04-28 PROCEDURE — 6360000004 HC RX CONTRAST MEDICATION: Performed by: RADIOLOGY

## 2020-04-28 PROCEDURE — 71275 CT ANGIOGRAPHY CHEST: CPT

## 2020-04-28 PROCEDURE — 84145 PROCALCITONIN (PCT): CPT

## 2020-04-28 PROCEDURE — 2709999900 HC NON-CHARGEABLE SUPPLY

## 2020-04-28 PROCEDURE — 6370000000 HC RX 637 (ALT 250 FOR IP): Performed by: EMERGENCY MEDICINE

## 2020-04-28 PROCEDURE — 93010 ELECTROCARDIOGRAM REPORT: CPT | Performed by: INTERNAL MEDICINE

## 2020-04-28 PROCEDURE — 2500000003 HC RX 250 WO HCPCS

## 2020-04-28 PROCEDURE — 2000000000 HC ICU R&B

## 2020-04-28 PROCEDURE — 96375 TX/PRO/DX INJ NEW DRUG ADDON: CPT

## 2020-04-28 PROCEDURE — 6360000002 HC RX W HCPCS

## 2020-04-28 PROCEDURE — 83605 ASSAY OF LACTIC ACID: CPT

## 2020-04-28 PROCEDURE — 36415 COLL VENOUS BLD VENIPUNCTURE: CPT

## 2020-04-28 PROCEDURE — C1894 INTRO/SHEATH, NON-LASER: HCPCS

## 2020-04-28 PROCEDURE — 6360000004 HC RX CONTRAST MEDICATION: Performed by: EMERGENCY MEDICINE

## 2020-04-28 PROCEDURE — 84484 ASSAY OF TROPONIN QUANT: CPT

## 2020-04-28 PROCEDURE — 83880 ASSAY OF NATRIURETIC PEPTIDE: CPT

## 2020-04-28 PROCEDURE — 2580000003 HC RX 258: Performed by: RADIOLOGY

## 2020-04-28 PROCEDURE — 2700000000 HC OXYGEN THERAPY PER DAY

## 2020-04-28 PROCEDURE — 93005 ELECTROCARDIOGRAM TRACING: CPT | Performed by: EMERGENCY MEDICINE

## 2020-04-28 PROCEDURE — 85025 COMPLETE CBC W/AUTO DIFF WBC: CPT

## 2020-04-28 PROCEDURE — C1769 GUIDE WIRE: HCPCS

## 2020-04-28 PROCEDURE — 2580000003 HC RX 258: Performed by: NURSE PRACTITIONER

## 2020-04-28 PROCEDURE — 02CQ3ZZ EXTIRPATION OF MATTER FROM RIGHT PULMONARY ARTERY, PERCUTANEOUS APPROACH: ICD-10-PCS | Performed by: RADIOLOGY

## 2020-04-28 PROCEDURE — 80053 COMPREHEN METABOLIC PANEL: CPT

## 2020-04-28 PROCEDURE — 99223 1ST HOSP IP/OBS HIGH 75: CPT | Performed by: INTERNAL MEDICINE

## 2020-04-28 PROCEDURE — 6360000002 HC RX W HCPCS: Performed by: NURSE PRACTITIONER

## 2020-04-28 PROCEDURE — 96374 THER/PROPH/DIAG INJ IV PUSH: CPT

## 2020-04-28 PROCEDURE — 6360000002 HC RX W HCPCS: Performed by: EMERGENCY MEDICINE

## 2020-04-28 RX ORDER — PRAVASTATIN SODIUM 20 MG
20 TABLET ORAL DAILY
Status: DISCONTINUED | OUTPATIENT
Start: 2020-04-29 | End: 2020-04-28 | Stop reason: CLARIF

## 2020-04-28 RX ORDER — PANTOPRAZOLE SODIUM 40 MG/1
40 TABLET, DELAYED RELEASE ORAL
Status: DISCONTINUED | OUTPATIENT
Start: 2020-04-29 | End: 2020-05-03 | Stop reason: HOSPADM

## 2020-04-28 RX ORDER — SODIUM CHLORIDE 450 MG/100ML
INJECTION, SOLUTION INTRAVENOUS CONTINUOUS
Status: DISCONTINUED | OUTPATIENT
Start: 2020-04-28 | End: 2020-04-28

## 2020-04-28 RX ORDER — ONDANSETRON 2 MG/ML
4 INJECTION INTRAMUSCULAR; INTRAVENOUS EVERY 6 HOURS PRN
Status: DISCONTINUED | OUTPATIENT
Start: 2020-04-28 | End: 2020-05-03 | Stop reason: HOSPADM

## 2020-04-28 RX ORDER — PRAVASTATIN SODIUM 20 MG
20 TABLET ORAL NIGHTLY
Status: DISCONTINUED | OUTPATIENT
Start: 2020-04-28 | End: 2020-05-03 | Stop reason: HOSPADM

## 2020-04-28 RX ORDER — METHYLPREDNISOLONE SODIUM SUCCINATE 125 MG/2ML
80 INJECTION, POWDER, LYOPHILIZED, FOR SOLUTION INTRAMUSCULAR; INTRAVENOUS ONCE
Status: COMPLETED | OUTPATIENT
Start: 2020-04-28 | End: 2020-04-28

## 2020-04-28 RX ORDER — VERAPAMIL HYDROCHLORIDE 240 MG/1
240 TABLET, FILM COATED, EXTENDED RELEASE ORAL EVERY MORNING
Status: DISCONTINUED | OUTPATIENT
Start: 2020-04-29 | End: 2020-05-03 | Stop reason: HOSPADM

## 2020-04-28 RX ORDER — ACETAMINOPHEN 325 MG/1
650 TABLET ORAL EVERY 6 HOURS PRN
Status: DISCONTINUED | OUTPATIENT
Start: 2020-04-28 | End: 2020-04-30 | Stop reason: SDUPTHER

## 2020-04-28 RX ORDER — PROMETHAZINE HYDROCHLORIDE 25 MG/1
12.5 TABLET ORAL EVERY 6 HOURS PRN
Status: DISCONTINUED | OUTPATIENT
Start: 2020-04-28 | End: 2020-05-03 | Stop reason: HOSPADM

## 2020-04-28 RX ORDER — DOCUSATE SODIUM 100 MG/1
100 CAPSULE, LIQUID FILLED ORAL DAILY
COMMUNITY

## 2020-04-28 RX ORDER — M-VIT,TX,IRON,MINS/CALC/FOLIC 27MG-0.4MG
1 TABLET ORAL DAILY
COMMUNITY
End: 2020-05-19 | Stop reason: ALTCHOICE

## 2020-04-28 RX ORDER — M-VIT,TX,IRON,MINS/CALC/FOLIC 27MG-0.4MG
1 TABLET ORAL DAILY
Status: DISCONTINUED | OUTPATIENT
Start: 2020-04-29 | End: 2020-05-03 | Stop reason: HOSPADM

## 2020-04-28 RX ORDER — LISINOPRIL 10 MG/1
10 TABLET ORAL DAILY
COMMUNITY
End: 2020-12-09

## 2020-04-28 RX ORDER — DIAPER,BRIEF,INFANT-TODD,DISP
EACH MISCELLANEOUS ONCE
Status: DISCONTINUED | OUTPATIENT
Start: 2020-04-28 | End: 2020-05-03 | Stop reason: HOSPADM

## 2020-04-28 RX ORDER — HEPARIN SODIUM 10000 [USP'U]/100ML
16 INJECTION, SOLUTION INTRAVENOUS CONTINUOUS
Status: DISCONTINUED | OUTPATIENT
Start: 2020-04-28 | End: 2020-04-30

## 2020-04-28 RX ORDER — HEPARIN SODIUM 1000 [USP'U]/ML
5000 INJECTION, SOLUTION INTRAVENOUS; SUBCUTANEOUS PRN
Status: DISCONTINUED | OUTPATIENT
Start: 2020-04-28 | End: 2020-04-30

## 2020-04-28 RX ORDER — HEPARIN SODIUM 1000 [USP'U]/ML
10000 INJECTION, SOLUTION INTRAVENOUS; SUBCUTANEOUS PRN
Status: DISCONTINUED | OUTPATIENT
Start: 2020-04-28 | End: 2020-04-30

## 2020-04-28 RX ORDER — HEPARIN SODIUM 1000 [USP'U]/ML
10000 INJECTION, SOLUTION INTRAVENOUS; SUBCUTANEOUS ONCE
Status: COMPLETED | OUTPATIENT
Start: 2020-04-28 | End: 2020-04-28

## 2020-04-28 RX ORDER — BUDESONIDE AND FORMOTEROL FUMARATE DIHYDRATE 160; 4.5 UG/1; UG/1
2 AEROSOL RESPIRATORY (INHALATION) 2 TIMES DAILY
Status: DISCONTINUED | OUTPATIENT
Start: 2020-04-28 | End: 2020-05-03 | Stop reason: HOSPADM

## 2020-04-28 RX ORDER — ACETAMINOPHEN 650 MG/1
650 SUPPOSITORY RECTAL EVERY 6 HOURS PRN
Status: DISCONTINUED | OUTPATIENT
Start: 2020-04-28 | End: 2020-05-03 | Stop reason: HOSPADM

## 2020-04-28 RX ORDER — ALBUTEROL SULFATE 90 UG/1
6 AEROSOL, METERED RESPIRATORY (INHALATION) ONCE
Status: COMPLETED | OUTPATIENT
Start: 2020-04-28 | End: 2020-04-28

## 2020-04-28 RX ORDER — MIDAZOLAM HYDROCHLORIDE 1 MG/ML
1 INJECTION INTRAMUSCULAR; INTRAVENOUS ONCE
Status: COMPLETED | OUTPATIENT
Start: 2020-04-28 | End: 2020-04-28

## 2020-04-28 RX ORDER — OMEPRAZOLE 20 MG/1
20 CAPSULE, DELAYED RELEASE ORAL DAILY
Status: DISCONTINUED | OUTPATIENT
Start: 2020-04-29 | End: 2020-04-28 | Stop reason: CLARIF

## 2020-04-28 RX ORDER — DOCUSATE SODIUM 100 MG/1
100 CAPSULE, LIQUID FILLED ORAL DAILY
Status: DISCONTINUED | OUTPATIENT
Start: 2020-04-29 | End: 2020-04-30

## 2020-04-28 RX ORDER — SODIUM CHLORIDE 0.9 % (FLUSH) 0.9 %
10 SYRINGE (ML) INJECTION PRN
Status: DISCONTINUED | OUTPATIENT
Start: 2020-04-28 | End: 2020-04-30 | Stop reason: SDUPTHER

## 2020-04-28 RX ORDER — POLYETHYLENE GLYCOL 3350 17 G/17G
17 POWDER, FOR SOLUTION ORAL DAILY PRN
Status: DISCONTINUED | OUTPATIENT
Start: 2020-04-28 | End: 2020-05-03 | Stop reason: HOSPADM

## 2020-04-28 RX ORDER — SODIUM CHLORIDE 9 MG/ML
INJECTION, SOLUTION INTRAVENOUS CONTINUOUS
Status: DISCONTINUED | OUTPATIENT
Start: 2020-04-28 | End: 2020-04-29

## 2020-04-28 RX ORDER — SODIUM CHLORIDE 0.9 % (FLUSH) 0.9 %
10 SYRINGE (ML) INJECTION EVERY 12 HOURS SCHEDULED
Status: DISCONTINUED | OUTPATIENT
Start: 2020-04-28 | End: 2020-04-30 | Stop reason: SDUPTHER

## 2020-04-28 RX ORDER — FENTANYL CITRATE 50 UG/ML
50 INJECTION, SOLUTION INTRAMUSCULAR; INTRAVENOUS ONCE
Status: COMPLETED | OUTPATIENT
Start: 2020-04-28 | End: 2020-04-28

## 2020-04-28 RX ORDER — LISINOPRIL 10 MG/1
10 TABLET ORAL DAILY
Status: DISCONTINUED | OUTPATIENT
Start: 2020-04-29 | End: 2020-05-03 | Stop reason: HOSPADM

## 2020-04-28 RX ADMIN — IOPAMIDOL 85 ML: 755 INJECTION, SOLUTION INTRAVENOUS at 12:01

## 2020-04-28 RX ADMIN — MIDAZOLAM 1 MG: 1 INJECTION INTRAMUSCULAR; INTRAVENOUS at 14:55

## 2020-04-28 RX ADMIN — ALBUTEROL SULFATE 2.5 MG: 2.5 SOLUTION RESPIRATORY (INHALATION) at 20:07

## 2020-04-28 RX ADMIN — HEPARIN SODIUM 10000 UNITS: 1000 INJECTION, SOLUTION INTRAVENOUS; SUBCUTANEOUS at 12:51

## 2020-04-28 RX ADMIN — IOPAMIDOL 50 ML: 612 INJECTION, SOLUTION INTRAVENOUS at 16:14

## 2020-04-28 RX ADMIN — METHYLPREDNISOLONE SODIUM SUCCINATE 80 MG: 125 INJECTION, POWDER, FOR SOLUTION INTRAMUSCULAR; INTRAVENOUS at 10:09

## 2020-04-28 RX ADMIN — SODIUM CHLORIDE: 4.5 INJECTION, SOLUTION INTRAVENOUS at 14:55

## 2020-04-28 RX ADMIN — PRAVASTATIN SODIUM 20 MG: 20 TABLET ORAL at 19:50

## 2020-04-28 RX ADMIN — ALBUTEROL SULFATE 6 PUFF: 90 AEROSOL, METERED RESPIRATORY (INHALATION) at 10:03

## 2020-04-28 RX ADMIN — SODIUM CHLORIDE: 9 INJECTION, SOLUTION INTRAVENOUS at 18:13

## 2020-04-28 RX ADMIN — HEPARIN SODIUM 2089.6 UNITS/HR: 10000 INJECTION, SOLUTION INTRAVENOUS at 12:47

## 2020-04-28 RX ADMIN — FENTANYL CITRATE 50 MCG: 50 INJECTION, SOLUTION INTRAMUSCULAR; INTRAVENOUS at 14:56

## 2020-04-28 ASSESSMENT — ENCOUNTER SYMPTOMS
COUGH: 1
BACK PAIN: 0
DIARRHEA: 0
WHEEZING: 1
NAUSEA: 0
ABDOMINAL PAIN: 0
COLOR CHANGE: 0
VOMITING: 0
SHORTNESS OF BREATH: 1

## 2020-04-28 ASSESSMENT — PAIN SCALES - GENERAL
PAINLEVEL_OUTOF10: 0

## 2020-04-28 NOTE — PROGRESS NOTES
1440 Pt in specials radiology for pulmonary mechanical thrombectomy. Explained procedure to pt and pt verbalizes understanding. Dr Gisele Fabry to speak to pt. Consent signed. 1 Pt assisted to remove pants. Positioned on table and attached to monitor. Right groin prepped and draped. 1456 Procedure started. 1507 Pressure measurements taken-Pulmonary trunk-55/8(26).  1519 Pulmonary thrombectomy with Triever 20 catheter. 1535 Pulmonary thrombectomy with Triever 16 catheter. 1600 Pt coughing. No blood noted. 1610 Pressure measurements repeated-Pulmonary trunk-47/21-pt cont to cough. 1614 Pt coughing up small amount of blood. 1615 Sheath in right femoral vein pulled as purse string suture inserted. Manual pressure applied. 1625 Report called to [facility]. 1630 Manual pressure removed. No bleeding noted. Pressure dressing applied. 1635 Pt positioned in bed for comfort. Transferred to -3 per bed.

## 2020-04-28 NOTE — H&P
H20.  Download 2 weeks 10/30/19  Yes Geovanna Grayson PA-C   albuterol (PROVENTIL) (2.5 MG/3ML) 0.083% nebulizer solution Take 3 mLs by nebulization every 6 hours as needed for Wheezing or Shortness of Breath 9/18/18 4/28/20 Yes Luna Wray MD   omeprazole (PRILOSEC) 20 MG delayed release capsule Take 20 mg by mouth daily   Yes Historical Provider, MD   budesonide-formoterol (SYMBICORT) 160-4.5 MCG/ACT AERO Inhale 2 puffs into the lungs 2 times daily 4/26/18  Yes Geovanna Grayson PA-C   pravastatin (PRAVACHOL) 20 MG tablet Take 20 mg by mouth daily   Yes Historical Provider, MD   verapamil (VERELAN PM) 240 MG CR capsule Take 240 mg by mouth every morning. Indications: Per patient taking medication for migraines prevention.    Yes Historical Provider, MD   famotidine (PEPCID) 20 MG tablet Take 1 tablet by mouth 2 times daily  Patient not taking: Reported on 12/20/2019 11/20/19 12/20/19  Bruna Iyer MD   Bisacodyl (DULCOLAX PO) Take by mouth    Historical Provider, MD   albuterol sulfate  (90 Base) MCG/ACT inhaler Inhale 2 puffs into the lungs every 6 hours as needed for Wheezing    Historical Provider, MD     Additional information:       VITAL SIGNS   Vitals:    04/28/20 1326   BP: 116/82   Pulse: 91   Resp: 19   Temp:    SpO2:        PHYSICAL:   Heart:  [x]Regular rate and rhythm  []Other:    Lungs:  [x]Clear    []Other:    Abdomen: [x]Soft    []Other:    Mental Status: [x]Alert & Oriented  []Other:      PLANNED PROCEDURE   []Biospy []Arteriogram              []Drainage   []Mediport Insertion  []Fistulogram []IV access       []Vertebroplasty / Augmentation  []IVC filter []Dialysis catheter []Biliary drainage  [x]Other: thromboaspiration []CAPD Catheter []Nephrostomy Tube / Stent  SEDATION  Planned agent:[x]Midazolam []Meperidine [x]Sublimaze []Dilaudid []Morphine     []Diazepam  []Other:     ASA Classification:  []1 [x]2 []3 []4 []5  Class 1: A normal healthy patient  Class 2: Pt with mild

## 2020-04-28 NOTE — ED PROVIDER NOTES
[facility]  EMERGENCY DEPARTMENT ENCOUNTER          CHIEF COMPLAINT     No chief complaint on file. Nurses Notes reviewed and I agree except as noted in the HPI. HISTORY OF PRESENT ILLNESS    Kingsley Hines is a 66 y.o. male who presents to the Emergency Department for the evaluation of shortness of breath x1 month. Patient states he has a history of asthma. He states that his asthma has been acting up for nearly a month. Last night his albuterol nebulizer machine broke. He states he was up several times at night wheezing, coughing, shortness of breath. He was doing the albuterol treatments and then the machine stopped working. This morning he was feeling short of breath and could not do a treatment. He did use his albuterol inhaler but states that this did not help as much. Patient states he has a cough, has clear phlegm when it is productive, he is feeling short of breath. No known fevers. The patient was able to call his pulmonology nurse practitioner and an order for a new nebulizer machine was ordered per them. This should be available today. Patient denies any chest pain with this. He has had a previous pulmonary embolism in the remote past.  He is not currently on blood thinners. Patient also has history of CHARIS on CPAP, coronary artery disease, essential hypertension, obesity, COPD. The HPI was provided by the patient. REVIEW OF SYSTEMS     Review of Systems   Constitutional: Negative for diaphoresis, fatigue and fever. Respiratory: Positive for cough, shortness of breath and wheezing. Cardiovascular: Negative for chest pain and leg swelling. Gastrointestinal: Negative for abdominal pain, diarrhea, nausea and vomiting. Musculoskeletal: Negative for back pain. Skin: Negative for color change. Neurological: Negative for dizziness and light-headedness. Psychiatric/Behavioral: Negative for behavioral problems.        PAST MEDICAL HISTORY    has Emergency Department Physician who either signs or Co-signs this chart in the absence of a cardiologist.    Normal sinus rhythm with occasional PVC ventricular rate 88 bpm, AZ interval 138, QRS duration 112, corrected  ms    RADIOLOGY: non-plainfilm images(s) such as CT, Ultrasound and MRI are read by the radiologist.    CTA [address]   Final Result       1. Saddle pulmonary embolism extending in segmental branches of all lobes of the lungs. The right to left ventricular ratio appears to be slightly greater than 1 which may be indicative of right heart strain. 2. Stable pulmonary nodule in the right lung. Critical findings discussed by Dr. Shawn Gaitan with ER charge nurse Franco at approximately 12:15 PM 4/28/2020 via telephone. **This report has been created using voice recognition software. It may contain minor errors which are inherent in voice recognition technology. **         Final report electronically signed by Dr Lenin Montgomery on 4/28/2020 12:18 PM      XR CHEST PORTABLE   Final Result      Minimal bibasilar atelectasis/infiltrate. **This report has been created using voice recognition software. It may contain minor errors which are inherent in voice recognition technology. **      Final report electronically signed by Dr. Nini Ring on 4/28/2020 10:10 AM      IR GUIDED PERC TRANSLUM ART THROMBECTOMY INIT    (Results Pending)       LABS:     Labs Reviewed   CBC WITH AUTO DIFFERENTIAL - Abnormal; Notable for the following components:       Result Value    RBC 4.18 (*)     Hemoglobin 13.7 (*)     .7 (*)     RDW-SD 50.1 (*)     Lymphocytes Absolute 0.8 (*)     Eosinophils Absolute 0.8 (*)     All other components within normal limits   COMPREHENSIVE METABOLIC PANEL W/ REFLEX TO MG FOR LOW K - Abnormal; Notable for the following components:    Glucose 109 (*)     Total Protein 5.8 (*)     Alb 3.4 (*)     ALT 6 (*)     All other components within normal limits

## 2020-04-28 NOTE — H&P
CRITICAL CARE PROGRESS NOTE      [facility]    Unit/Bed[facility]4D-03/003-A  YOB: 1945  MRN: [id number]   PCP: YESY Don - CNP  Date of Admission: 4/28/2020  Chief Complaint:- saddle embolism    Assessment and Plan:    1. Acute submassive high risk PE, saddle pulmonary embolism: 4/28/20-CTA of chest saddle pulmonary embolism extending and segmental branches of all lobes of the lungs, right heart strain. 4/28/2020 status post IR intervention of mechanical formal aspiration from both main pulmonary arteries. Repeat pulmonary artery pressures were measured revealing mild improvement post procedure. Heparin drip will continue. ECHO is pending  2. Acute extensive DVT: 4/28/2020-venous ultrasound positive for extensive acute DVT throughout the right lower extremity. Lack of compressibility and absence of flow involving the mid to distal portion of the superficial femoral vein, both peroneal veins and one posterior tibial vein. There is partial compressibility and diminished flow in the popliteal vein. Patient will likely be on anticoagulation for the rest of his life. Early on heparin drip which will be continued at this time. 3. Hemoptysis, acute: Noted PTT greater than 200 while on heparin drip, likely secondary to above, will monitor. 4. Asthma,:  History, Symbicort and Albuterol have been reordered. 5. CHARIS-CPAP:  Will continue, home unit, will monitor  6. Nonobstructive CAD:  History, monitor  7. Essential HPTN:  Calan SR, Lisinopril have been continued with parameters to hold. 8. Dyslipidemia:  History, Pravachol continued. 9. Obesity:  BMI 36.8      INITIAL H AND P AND ICU COURSE:  Maria Del Carmen Lazo is a 66year old gentlemen admitted to the ICU s/p IR intervention. Patient has a significant history of lifetime nonsmoker, Persistent Asthma, CHARIS-CPAP, LHC-2016 diffuse nonobstructive disease,  essential hypertension, ECHO-2018 LVEF 55 to 60%.   Right ventricular systolic pressure measures 40, assistant asthma, DVT and PE diagnosed 2018 patient was on DOAC till 2019, Dyslipidemia, Skin Cancer. Recent car travel back home with RV from [place]. He presented to [address] [place] [address] ER 2020  for the evaluation of shortness of breath that has been progressively getting worse over the past  month. 24 hours prior to admission his albuterol nebulizer machine broke. He states he was up several times at night wheezing, coughing, shortness of breath. The AM of admission he awoken short of breath and his rescue inhaler was not helpful.  Presented to the ER hypoxic and tachycardic. CTA of chest was completed noting saddle pulmonary embolism extending in segmental branches of all lobes of the lung, with right heart strain. Under the care of Dr. Luciano Lombardo a mechanical aspiration from both pulmonary arteries were completed. Repeat pulmonary artery pressures were measured revealing mild improvement. Heparin drip was started. Past Medical History:  See HPI. Family History: Mother alive DMT2, COPD, Father  COPE. Social History:  Lifetime nonsmoker, Denies any ETOH or illicit drug. ROS   GENERAL: Positive for fatigue, poor appetite   SKN: No lesions or rashes.   HEAD: No headaches or recent injury  EYES: No acute changes in vision, no diplopia or blurred vision  EARS: No hearing loss, no tinnitus  NOSE/THROAT: No rhinorrhea or pharyngitis, no nasal drainage  NECK: No lumps or unusual neck stiffness  PULMONARY: Positive for dyspnea orthopnea exertional dyspnea cough hemoptysis expiratory wheezing denies any paroxysmal nocturnal dyspnea   CARDIAC: No chest pain, pressure, positive for tachycardia and right lower leg edema   GI: No history melena or hematochezia, no diarrhea or constipation  PERIPHERAL VASCULAR: No intermittent claudication or unusual leg cramps  MUSCULOSKELETAL: Occasional arthralgias, myalgias  NEUROLOGICAL: Denies any dizziness headache near syncope is 134 troponin is less than 0.010 albumin 3.4 alk phos is 76 ALT is 6 AST 17 total bili 0.9, white count 8.5 hemoglobin 13.7 hematocrit is 42.1 platelet count is 947, PTT is greater than 200, d-dimer 6402,    COVID negative-4/28/2020   CTA of chest-saddle pulmonary embolism extending and segmental branches of all lobes of the lung. The right to left ventricular ratio appears to be slightly greater than 1 which may be indicative of right heart strain. Stable pulmonary nodule in the right lung. No pleural effusion or pneumothorax.  Chest x-ray-minimal bibasilar atelectasis-infiltrate   Venous Doppler extensive acute DVT throughout the right lower extremity extending into the calf veins. There is lack of compressibility absence of flow involving the mid to distal portion of the superficial femoral vein, both peroneal veins, and one posterior tibial vein. There is partial compressibility and diminished flow in the popliteal vein.  EKG normal sinus rhythm heart rate is 88 AL is 0.13 QT 0.408 with ST depression in leads V1 through V6 no acute ST elevation noted. Occasional PVC noted   Cardiac telemetry sinus tachycardia      Seen with multidisciplinary ICU team yes. Meets Continued ICU Level Care Criteria:    [x] Yes   [] No - Transfer Planned to listed location:  [] HOSPITALIST CONTACTED-      Case and plan discussed with Dr. Annalee Maurer. Electronically signed by YESY Carl - CNP  CRITICAL CARE SPECIALIST  Patient seen by me. Case discussed with nurse practitioner. Patient status post emergent thrombectomy for saddle pulmonary embolism. Electronically signed by Greer Maurer MD.

## 2020-04-29 LAB
ANION GAP SERPL CALCULATED.3IONS-SCNC: 8 MEQ/L (ref 8–16)
APTT: 109.4 SECONDS (ref 22–38)
APTT: 41.3 SECONDS (ref 22–38)
APTT: 80.1 SECONDS (ref 22–38)
APTT: 86.8 SECONDS (ref 22–38)
BASOPHILS # BLD: 0.1 %
BASOPHILS ABSOLUTE: 0 THOU/MM3 (ref 0–0.1)
BUN BLDV-MCNC: 26 MG/DL (ref 7–22)
CALCIUM SERPL-MCNC: 8.4 MG/DL (ref 8.5–10.5)
CHLORIDE BLD-SCNC: 101 MEQ/L (ref 98–111)
CO2: 22 MEQ/L (ref 23–33)
CREAT SERPL-MCNC: 1 MG/DL (ref 0.4–1.2)
EOSINOPHIL # BLD: 0 %
EOSINOPHILS ABSOLUTE: 0 THOU/MM3 (ref 0–0.4)
ERYTHROCYTE [DISTWIDTH] IN BLOOD BY AUTOMATED COUNT: 13.2 % (ref 11.5–14.5)
ERYTHROCYTE [DISTWIDTH] IN BLOOD BY AUTOMATED COUNT: 49.4 FL (ref 35–45)
GFR SERPL CREATININE-BSD FRML MDRD: 72 ML/MIN/1.73M2
GLUCOSE BLD-MCNC: 155 MG/DL (ref 70–108)
HCT VFR BLD CALC: 38.3 % (ref 42–52)
HEMOGLOBIN: 12.3 GM/DL (ref 14–18)
IMMATURE GRANS (ABS): 0.09 THOU/MM3 (ref 0–0.07)
IMMATURE GRANULOCYTES: 0.7 %
LV EF: 60 %
LVEF MODALITY: NORMAL
LYMPHOCYTES # BLD: 4 %
LYMPHOCYTES ABSOLUTE: 0.5 THOU/MM3 (ref 1–4.8)
MCH RBC QN AUTO: 32.7 PG (ref 26–33)
MCHC RBC AUTO-ENTMCNC: 32.1 GM/DL (ref 32.2–35.5)
MCV RBC AUTO: 101.9 FL (ref 80–94)
MONOCYTES # BLD: 5.9 %
MONOCYTES ABSOLUTE: 0.8 THOU/MM3 (ref 0.4–1.3)
NUCLEATED RED BLOOD CELLS: 0 /100 WBC
PLATELET # BLD: 202 THOU/MM3 (ref 130–400)
PMV BLD AUTO: 10.5 FL (ref 9.4–12.4)
POTASSIUM SERPL-SCNC: 4.2 MEQ/L (ref 3.5–5.2)
RBC # BLD: 3.76 MILL/MM3 (ref 4.7–6.1)
SEG NEUTROPHILS: 89.3 %
SEGMENTED NEUTROPHILS ABSOLUTE COUNT: 11.9 THOU/MM3 (ref 1.8–7.7)
SODIUM BLD-SCNC: 131 MEQ/L (ref 135–145)
TROPONIN T: 0.02 NG/ML
WBC # BLD: 13.3 THOU/MM3 (ref 4.8–10.8)

## 2020-04-29 PROCEDURE — 6360000002 HC RX W HCPCS: Performed by: NURSE PRACTITIONER

## 2020-04-29 PROCEDURE — 84484 ASSAY OF TROPONIN QUANT: CPT

## 2020-04-29 PROCEDURE — 2709999900 HC NON-CHARGEABLE SUPPLY

## 2020-04-29 PROCEDURE — 36415 COLL VENOUS BLD VENIPUNCTURE: CPT

## 2020-04-29 PROCEDURE — 99233 SBSQ HOSP IP/OBS HIGH 50: CPT | Performed by: INTERNAL MEDICINE

## 2020-04-29 PROCEDURE — 85730 THROMBOPLASTIN TIME PARTIAL: CPT

## 2020-04-29 PROCEDURE — 2060000000 HC ICU INTERMEDIATE R&B

## 2020-04-29 PROCEDURE — 80048 BASIC METABOLIC PNL TOTAL CA: CPT

## 2020-04-29 PROCEDURE — 6360000002 HC RX W HCPCS: Performed by: EMERGENCY MEDICINE

## 2020-04-29 PROCEDURE — 94640 AIRWAY INHALATION TREATMENT: CPT

## 2020-04-29 PROCEDURE — 6370000000 HC RX 637 (ALT 250 FOR IP): Performed by: NURSE PRACTITIONER

## 2020-04-29 PROCEDURE — APPSS180 APP SPLIT SHARED TIME > 60 MINUTES: Performed by: NURSE PRACTITIONER

## 2020-04-29 PROCEDURE — 93306 TTE W/DOPPLER COMPLETE: CPT

## 2020-04-29 PROCEDURE — 2580000003 HC RX 258: Performed by: NURSE PRACTITIONER

## 2020-04-29 PROCEDURE — 85025 COMPLETE CBC W/AUTO DIFF WBC: CPT

## 2020-04-29 RX ADMIN — ALBUTEROL SULFATE 2.5 MG: 2.5 SOLUTION RESPIRATORY (INHALATION) at 00:00

## 2020-04-29 RX ADMIN — DOCUSATE SODIUM 100 MG: 100 CAPSULE, LIQUID FILLED ORAL at 09:11

## 2020-04-29 RX ADMIN — PRAVASTATIN SODIUM 20 MG: 20 TABLET ORAL at 22:01

## 2020-04-29 RX ADMIN — MULTIPLE VITAMINS W/ MINERALS TAB 1 TABLET: TAB at 09:11

## 2020-04-29 RX ADMIN — HEPARIN SODIUM 5000 UNITS: 1000 INJECTION, SOLUTION INTRAVENOUS; SUBCUTANEOUS at 13:46

## 2020-04-29 RX ADMIN — PROMETHAZINE HYDROCHLORIDE 12.5 MG: 25 TABLET ORAL at 23:38

## 2020-04-29 RX ADMIN — HEPARIN SODIUM 10.03 UNITS/KG/HR: 10000 INJECTION, SOLUTION INTRAVENOUS at 02:47

## 2020-04-29 RX ADMIN — PANTOPRAZOLE SODIUM 40 MG: 40 TABLET, DELAYED RELEASE ORAL at 06:44

## 2020-04-29 RX ADMIN — HEPARIN SODIUM 11 UNITS/KG/HR: 10000 INJECTION, SOLUTION INTRAVENOUS at 22:44

## 2020-04-29 RX ADMIN — BUDESONIDE AND FORMOTEROL FUMARATE DIHYDRATE 2 PUFF: 160; 4.5 AEROSOL RESPIRATORY (INHALATION) at 13:06

## 2020-04-29 RX ADMIN — ACETAMINOPHEN 650 MG: 325 TABLET ORAL at 08:13

## 2020-04-29 RX ADMIN — LISINOPRIL 10 MG: 10 TABLET ORAL at 09:11

## 2020-04-29 RX ADMIN — ONDANSETRON 4 MG: 2 INJECTION INTRAMUSCULAR; INTRAVENOUS at 15:45

## 2020-04-29 RX ADMIN — ALBUTEROL SULFATE 2.5 MG: 2.5 SOLUTION RESPIRATORY (INHALATION) at 20:14

## 2020-04-29 RX ADMIN — SODIUM CHLORIDE: 9 INJECTION, SOLUTION INTRAVENOUS at 13:06

## 2020-04-29 RX ADMIN — VERAPAMIL HYDROCHLORIDE 240 MG: 240 TABLET, FILM COATED, EXTENDED RELEASE ORAL at 09:11

## 2020-04-29 ASSESSMENT — ENCOUNTER SYMPTOMS
BACK PAIN: 0
ABDOMINAL PAIN: 0
COLOR CHANGE: 0
VOMITING: 0
WHEEZING: 0
PHOTOPHOBIA: 0
SORE THROAT: 0
SHORTNESS OF BREATH: 1
TROUBLE SWALLOWING: 0
CHEST TIGHTNESS: 0
NAUSEA: 0

## 2020-04-29 ASSESSMENT — PAIN SCALES - GENERAL
PAINLEVEL_OUTOF10: 0
PAINLEVEL_OUTOF10: 3
PAINLEVEL_OUTOF10: 0
PAINLEVEL_OUTOF10: 3

## 2020-04-29 ASSESSMENT — PAIN DESCRIPTION - LOCATION: LOCATION: HEAD

## 2020-04-29 ASSESSMENT — PAIN DESCRIPTION - ONSET: ONSET: GRADUAL

## 2020-04-29 ASSESSMENT — PAIN - FUNCTIONAL ASSESSMENT: PAIN_FUNCTIONAL_ASSESSMENT: ACTIVITIES ARE NOT PREVENTED

## 2020-04-29 ASSESSMENT — PAIN DESCRIPTION - PROGRESSION: CLINICAL_PROGRESSION: NOT CHANGED

## 2020-04-29 ASSESSMENT — PAIN DESCRIPTION - FREQUENCY: FREQUENCY: INTERMITTENT

## 2020-04-29 ASSESSMENT — PAIN DESCRIPTION - DESCRIPTORS: DESCRIPTORS: HEADACHE

## 2020-04-29 ASSESSMENT — PAIN DESCRIPTION - PAIN TYPE: TYPE: ACUTE PAIN

## 2020-04-29 NOTE — PROGRESS NOTES
location: 4K  [x] HOSPITALIST CONTACTED- DR Savita Neely      Case and plan discussed with Dr. Evan Desai. Electronically signed by Vanessa Restrepo. YESY Jansen - [facility]  CRITICAL CARE SPECIALIST  Patient seen by me. Case discussed with nurse practitioner. Patient status post saddle pulmonary embolism with mechanical extraction. Now on heparin drip. Clinical improvement. Electronically signed by Dayami Desai MD.

## 2020-04-29 NOTE — FLOWSHEET NOTE
04/29/20 0920   Encounter Summary   Services provided to: Patient   Referral/Consult From: Love   Continue Visiting Yes  (4/29/2020  P)   Complexity of Encounter Moderate   Length of Encounter 15 minutes   Spiritual Assessment Completed Yes   Advance Care Planning Yes   Routine   Type Initial   Assessment Approachable   Intervention Nurtured hope   Outcome Comfort   Advance Directives (For Healthcare)   Healthcare Directive Yes, patient has an advance directive for healthcare treatment   S- During my contact with the 66 yr old  patient I wanted to assess the spiritual and emotional        needs. The pt was acute saddle pulmonary embolism. O- The patient was in bed and was receptive to my presences. The pt didnt share any             major concerns at the time. The pt does have support through their family. A- I offered emotional support as well as words of encouragement. P-  Continued support would be helpful in order to meet the future Spiritual needs of the         patient. The pt was in 4D/ ICU and will be moved to 4K.

## 2020-04-29 NOTE — PROGRESS NOTES
Patient admitted to [facility] room 14. Patient oriented to room and call light. Heparin gtt infusing at 8 u/kg/hr. 0.9 NS infusing at 50 ml/hr in right FA. No complaints of pain.

## 2020-04-30 ENCOUNTER — APPOINTMENT (OUTPATIENT)
Dept: GENERAL RADIOLOGY | Age: 75
DRG: 163 | End: 2020-04-30
Payer: MEDICARE

## 2020-04-30 LAB
ANION GAP SERPL CALCULATED.3IONS-SCNC: 11 MEQ/L (ref 8–16)
APTT: 56.7 SECONDS (ref 22–38)
APTT: 65 SECONDS (ref 22–38)
APTT: 70.5 SECONDS (ref 22–38)
APTT: 78.1 SECONDS (ref 22–38)
BUN BLDV-MCNC: 30 MG/DL (ref 7–22)
CALCIUM SERPL-MCNC: 9.1 MG/DL (ref 8.5–10.5)
CHLORIDE BLD-SCNC: 100 MEQ/L (ref 98–111)
CO2: 28 MEQ/L (ref 23–33)
CREAT SERPL-MCNC: 1 MG/DL (ref 0.4–1.2)
EKG ATRIAL RATE: 103 BPM
EKG P AXIS: 51 DEGREES
EKG P-R INTERVAL: 132 MS
EKG Q-T INTERVAL: 376 MS
EKG QRS DURATION: 118 MS
EKG QTC CALCULATION (BAZETT): 492 MS
EKG R AXIS: -40 DEGREES
EKG T AXIS: 46 DEGREES
EKG VENTRICULAR RATE: 103 BPM
ERYTHROCYTE [DISTWIDTH] IN BLOOD BY AUTOMATED COUNT: 13.6 % (ref 11.5–14.5)
ERYTHROCYTE [DISTWIDTH] IN BLOOD BY AUTOMATED COUNT: 13.7 % (ref 11.5–14.5)
ERYTHROCYTE [DISTWIDTH] IN BLOOD BY AUTOMATED COUNT: 51.3 FL (ref 35–45)
ERYTHROCYTE [DISTWIDTH] IN BLOOD BY AUTOMATED COUNT: 51.4 FL (ref 35–45)
GFR SERPL CREATININE-BSD FRML MDRD: 72 ML/MIN/1.73M2
GLUCOSE BLD-MCNC: 137 MG/DL (ref 70–108)
HCT VFR BLD CALC: 39.4 % (ref 42–52)
HCT VFR BLD CALC: 42.4 % (ref 42–52)
HEMOGLOBIN: 12.8 GM/DL (ref 14–18)
HEMOGLOBIN: 13.6 GM/DL (ref 14–18)
LACTIC ACID, SEPSIS: 1.1 MMOL/L (ref 0.5–1.9)
MCH RBC QN AUTO: 32.9 PG (ref 26–33)
MCH RBC QN AUTO: 33.2 PG (ref 26–33)
MCHC RBC AUTO-ENTMCNC: 32.1 GM/DL (ref 32.2–35.5)
MCHC RBC AUTO-ENTMCNC: 32.5 GM/DL (ref 32.2–35.5)
MCV RBC AUTO: 102.1 FL (ref 80–94)
MCV RBC AUTO: 102.4 FL (ref 80–94)
PLATELET # BLD: 253 THOU/MM3 (ref 130–400)
PLATELET # BLD: 262 THOU/MM3 (ref 130–400)
PMV BLD AUTO: 10.2 FL (ref 9.4–12.4)
PMV BLD AUTO: 10.4 FL (ref 9.4–12.4)
POTASSIUM SERPL-SCNC: 4.2 MEQ/L (ref 3.5–5.2)
RBC # BLD: 3.86 MILL/MM3 (ref 4.7–6.1)
RBC # BLD: 4.14 MILL/MM3 (ref 4.7–6.1)
SODIUM BLD-SCNC: 139 MEQ/L (ref 135–145)
TROPONIN T: 0.01 NG/ML
WBC # BLD: 17.8 THOU/MM3 (ref 4.8–10.8)
WBC # BLD: 17.8 THOU/MM3 (ref 4.8–10.8)

## 2020-04-30 PROCEDURE — 93010 ELECTROCARDIOGRAM REPORT: CPT | Performed by: INTERNAL MEDICINE

## 2020-04-30 PROCEDURE — 74018 RADEX ABDOMEN 1 VIEW: CPT

## 2020-04-30 PROCEDURE — 6360000002 HC RX W HCPCS: Performed by: INTERNAL MEDICINE

## 2020-04-30 PROCEDURE — 36415 COLL VENOUS BLD VENIPUNCTURE: CPT

## 2020-04-30 PROCEDURE — 85730 THROMBOPLASTIN TIME PARTIAL: CPT

## 2020-04-30 PROCEDURE — C9113 INJ PANTOPRAZOLE SODIUM, VIA: HCPCS | Performed by: INTERNAL MEDICINE

## 2020-04-30 PROCEDURE — 83605 ASSAY OF LACTIC ACID: CPT

## 2020-04-30 PROCEDURE — 93005 ELECTROCARDIOGRAM TRACING: CPT | Performed by: INTERNAL MEDICINE

## 2020-04-30 PROCEDURE — 2700000000 HC OXYGEN THERAPY PER DAY

## 2020-04-30 PROCEDURE — 6360000002 HC RX W HCPCS: Performed by: NURSE PRACTITIONER

## 2020-04-30 PROCEDURE — 94640 AIRWAY INHALATION TREATMENT: CPT

## 2020-04-30 PROCEDURE — 6370000000 HC RX 637 (ALT 250 FOR IP): Performed by: PHYSICIAN ASSISTANT

## 2020-04-30 PROCEDURE — 87040 BLOOD CULTURE FOR BACTERIA: CPT

## 2020-04-30 PROCEDURE — 2500000003 HC RX 250 WO HCPCS: Performed by: INTERNAL MEDICINE

## 2020-04-30 PROCEDURE — 2709999900 HC NON-CHARGEABLE SUPPLY

## 2020-04-30 PROCEDURE — 71045 X-RAY EXAM CHEST 1 VIEW: CPT

## 2020-04-30 PROCEDURE — 85027 COMPLETE CBC AUTOMATED: CPT

## 2020-04-30 PROCEDURE — 84484 ASSAY OF TROPONIN QUANT: CPT

## 2020-04-30 PROCEDURE — 94760 N-INVAS EAR/PLS OXIMETRY 1: CPT

## 2020-04-30 PROCEDURE — 99233 SBSQ HOSP IP/OBS HIGH 50: CPT | Performed by: INTERNAL MEDICINE

## 2020-04-30 PROCEDURE — 6370000000 HC RX 637 (ALT 250 FOR IP): Performed by: NURSE PRACTITIONER

## 2020-04-30 PROCEDURE — 2060000000 HC ICU INTERMEDIATE R&B

## 2020-04-30 PROCEDURE — 80048 BASIC METABOLIC PNL TOTAL CA: CPT

## 2020-04-30 RX ORDER — DEXTROSE, SODIUM CHLORIDE, AND POTASSIUM CHLORIDE 5; .45; .15 G/100ML; G/100ML; G/100ML
INJECTION INTRAVENOUS CONTINUOUS
Status: DISCONTINUED | OUTPATIENT
Start: 2020-04-30 | End: 2020-05-02

## 2020-04-30 RX ORDER — CALCIUM CARBONATE 200(500)MG
500 TABLET,CHEWABLE ORAL 3 TIMES DAILY PRN
Status: DISCONTINUED | OUTPATIENT
Start: 2020-04-30 | End: 2020-05-03 | Stop reason: HOSPADM

## 2020-04-30 RX ORDER — SODIUM CHLORIDE 0.9 % (FLUSH) 0.9 %
10 SYRINGE (ML) INJECTION EVERY 12 HOURS SCHEDULED
Status: DISCONTINUED | OUTPATIENT
Start: 2020-04-30 | End: 2020-05-03 | Stop reason: HOSPADM

## 2020-04-30 RX ORDER — PANTOPRAZOLE SODIUM 40 MG/10ML
40 INJECTION, POWDER, LYOPHILIZED, FOR SOLUTION INTRAVENOUS DAILY
Status: DISCONTINUED | OUTPATIENT
Start: 2020-04-30 | End: 2020-05-03

## 2020-04-30 RX ORDER — HEPARIN SODIUM 1000 [USP'U]/ML
10000 INJECTION, SOLUTION INTRAVENOUS; SUBCUTANEOUS PRN
Status: DISCONTINUED | OUTPATIENT
Start: 2020-04-30 | End: 2020-05-03

## 2020-04-30 RX ORDER — HEPARIN SODIUM 10000 [USP'U]/100ML
11 INJECTION, SOLUTION INTRAVENOUS CONTINUOUS
Status: DISCONTINUED | OUTPATIENT
Start: 2020-04-30 | End: 2020-05-03

## 2020-04-30 RX ORDER — SODIUM CHLORIDE 0.9 % (FLUSH) 0.9 %
10 SYRINGE (ML) INJECTION PRN
Status: DISCONTINUED | OUTPATIENT
Start: 2020-04-30 | End: 2020-05-03 | Stop reason: HOSPADM

## 2020-04-30 RX ORDER — BISACODYL 10 MG
10 SUPPOSITORY, RECTAL RECTAL DAILY
Status: DISCONTINUED | OUTPATIENT
Start: 2020-04-30 | End: 2020-05-03 | Stop reason: HOSPADM

## 2020-04-30 RX ORDER — HEPARIN SODIUM 1000 [USP'U]/ML
80 INJECTION, SOLUTION INTRAVENOUS; SUBCUTANEOUS ONCE
Status: DISCONTINUED | OUTPATIENT
Start: 2020-04-30 | End: 2020-04-30

## 2020-04-30 RX ORDER — GENTAMICIN SULFATE 80 MG/100ML
80 INJECTION, SOLUTION INTRAVENOUS ONCE
Status: DISCONTINUED | OUTPATIENT
Start: 2020-04-30 | End: 2020-04-30

## 2020-04-30 RX ORDER — HEPARIN SODIUM 1000 [USP'U]/ML
5000 INJECTION, SOLUTION INTRAVENOUS; SUBCUTANEOUS PRN
Status: DISCONTINUED | OUTPATIENT
Start: 2020-04-30 | End: 2020-05-03

## 2020-04-30 RX ORDER — ACETAMINOPHEN 325 MG/1
650 TABLET ORAL EVERY 4 HOURS PRN
Status: DISCONTINUED | OUTPATIENT
Start: 2020-04-30 | End: 2020-05-03 | Stop reason: HOSPADM

## 2020-04-30 RX ADMIN — POTASSIUM CHLORIDE, DEXTROSE MONOHYDRATE AND SODIUM CHLORIDE: 150; 5; 450 INJECTION, SOLUTION INTRAVENOUS at 11:28

## 2020-04-30 RX ADMIN — ONDANSETRON 4 MG: 2 INJECTION INTRAMUSCULAR; INTRAVENOUS at 05:33

## 2020-04-30 RX ADMIN — ANTACID TABLETS 500 MG: 500 TABLET, CHEWABLE ORAL at 03:28

## 2020-04-30 RX ADMIN — PANTOPRAZOLE SODIUM 40 MG: 40 INJECTION, POWDER, FOR SOLUTION INTRAVENOUS at 11:28

## 2020-04-30 RX ADMIN — POTASSIUM CHLORIDE, DEXTROSE MONOHYDRATE AND SODIUM CHLORIDE: 150; 5; 450 INJECTION, SOLUTION INTRAVENOUS at 02:00

## 2020-04-30 RX ADMIN — PANTOPRAZOLE SODIUM 40 MG: 40 TABLET, DELAYED RELEASE ORAL at 06:29

## 2020-04-30 RX ADMIN — BUDESONIDE AND FORMOTEROL FUMARATE DIHYDRATE 2 PUFF: 160; 4.5 AEROSOL RESPIRATORY (INHALATION) at 07:28

## 2020-04-30 RX ADMIN — ACETAMINOPHEN 650 MG: 325 TABLET ORAL at 15:18

## 2020-04-30 RX ADMIN — BUDESONIDE AND FORMOTEROL FUMARATE DIHYDRATE 2 PUFF: 160; 4.5 AEROSOL RESPIRATORY (INHALATION) at 17:36

## 2020-04-30 RX ADMIN — ALBUTEROL SULFATE 2.5 MG: 2.5 SOLUTION RESPIRATORY (INHALATION) at 07:28

## 2020-04-30 ASSESSMENT — PAIN SCALES - GENERAL: PAINLEVEL_OUTOF10: 0

## 2020-04-30 NOTE — PROGRESS NOTES
Hospitalist Progress Note    Patient:  Carin Rock      Unit/Bed:4K-14/014-A    YOB: 1945    MRN: [id number]       Acct: [de-identified]     PCP: YESY Zamora CNP    Date of Admission: 4/28/2020    Assessment/Plan:    Ileus, unclear etiology, NG tube placed, large volume emesis this morning, having biliary drainage on intermittent suction, GI consulted,    Acute respiratory failure, hypoxia secondary to PE on oxygen supplementation, will wean off and get home oxygen evaluation    Acute saddle pulmonary embolism without cor pulmonale-status post mechanical thrombo-aspiration currently on heparin drip planning to change to Eliquis/Xarelto on discharge    Acute DVT, extensive throughout the right lower extremity, superficial femoral, both peroneal, 1 posterior tibial and partial compressibility of the popliteal vein  -Continue long-term anticoagulation    Essential hypertension on lisinopril and verapamil    Nonocclusive coronary artery disease, medical management     GERD- on protonix     Hyperlipidemia on statin    Asthma without exacerbation continue inhalers  History of DVT and PE, 2018 As per patient, after driving to [place]  Obstructive sleep apnea on CPAP    Expected discharge date:   2-3 days    Disposition:    [x] Home       [] TCU       [] Rehab       [] Psych       [] SNF       [] [place]       [] Other-    Chief Complaint: Shortness of breath  Patient presented to emergency room with chief complaint of progressive worsening of shortness of breath over the last 24 hours. Patient drove from [place] to [place], taking intermittent breaks over 3 days and after waking up the next day he started having worsening of shortness of breath to the point where he could not catch his breath and the inhalers were not helping him and called EMS. While in [place] he did have 3 episodes of asthma exacerbations and got better with steroids and antibiotics.     Hospital Course: CTA of the chest showed saddle pulmonary embolus extending into the segmental branches of all lobes and possible right heart strain. Twelve-lead EKG did show right bundle branch block pattern. Patient was taken for emergent IR guided mechanical thrombo-aspiration on 4/28/2020. Patient did feel little better. He was continued on heparin drip and venous Doppler showed extensive DVT throughout the right lower extremity extending into the calf veins. Patient was transferred out of MICU on 4/29 and was doing good and started having mild discomfort like reflux on that night and on 4/30/2020 morning he started having nausea and vomiting, large-volume emesis. Denied any abdominal pain except some reflux-like discomfort in the chest.  Bowel sounds were sluggish and patient did have a bowel movement 2 days prior, KUB showed ileus. NG tube was placed and started on gentle hydration and continued on heparin drip and gastroenterologist was consulted. Twelve-lead EKG still showed right bundle branch block pattern and no significant ST-T changes, nonspecific T wave changes were noted. Did have a PVC. Changed Protonix to IV. Troponin was only mildly elevated at 0.013.      Subjective (past 24 hours): States his abdomen is always distended and is normal for him, but does have nausea and vomiting and also burning sensation in the chest like his reflux is acting up, had a bowel movement 2 days ago, shortness of breath is better      Medications:  Reviewed    Infusion Medications    heparin (porcine) 11 Units/kg/hr (04/30/20 1218)    dextrose 5% and 0.45% NaCl with KCl 20 mEq       Scheduled Medications    pantoprazole  40 mg Intravenous Daily    bacitracin zinc   Topical Once    sodium chloride flush  10 mL Intravenous 2 times per day    budesonide-formoterol  2 puff Inhalation BID    lisinopril  10 mg Oral Daily    therapeutic multivitamin-minerals  1 tablet Oral Daily    verapamil  240 mg Oral QAM    [Held by provider] pantoprazole  40 mg Oral QAM AC    pravastatin  20 mg Oral Nightly     PRN Meds: calcium carbonate, heparin (porcine), heparin (porcine), sodium chloride flush, acetaminophen **OR** acetaminophen, polyethylene glycol, promethazine **OR** ondansetron, albuterol      Intake/Output Summary (Last 24 hours) at 4/30/2020 1321  Last data filed at 4/30/2020 1000  Gross per 24 hour   Intake 2353.5 ml   Output 1850 ml   Net 503.5 ml       Diet:  Diet NPO Effective Now Exceptions are: Sips with Meds    Exam:  /68   Pulse 78   Temp 98.9 °F (37.2 °C) (Oral)   Resp 20   Ht 6' 2\" (1.88 m)   Wt 285 lb (129.3 kg)   SpO2 92%   BMI 36.59 kg/m²     Physical Examination:   General appearance - alert, awake  and in mild  Distress, NG tube, on oxygen  HEENT: Normocephalic, Atraumatic, pupils reactive, mucous membranes moist  Chest - moving equally to respiration,symmetric air entry, clear to auscultation  Heart - normal rate, regular rhythm, normal S1, S2, no murmurs,  Abdomen - soft, nontender,  verydistended, no masses or organomegaly, BS+ little sluggh  Neurological - alert, oriented, normal speech, sensations intact and able to move all extremities   Extremities - peripheral pulses normal, trace pedal edema,  Capillary refill less than 3 sec  Skin - normal coloration and turgor, no rashes      Labs:   Recent Labs     04/29/20  0322 04/30/20  0802 04/30/20  1039   WBC 13.3* 17.8* 17.8*   HGB 12.3* 12.8* 13.6*   HCT 38.3* 39.4* 42.4    253 262     Recent Labs     04/28/20  1001 04/29/20  0322 04/30/20  0802    131* 139   K 3.8 4.2 4.2    101 100   CO2 25 22* 28   BUN 17 26* 30*   CREATININE 0.8 1.0 1.0   CALCIUM 8.7 8.4* 9.1     Recent Labs     04/28/20  1001   AST 17   ALT 6*   BILITOT 0.9   ALKPHOS 76     No results for input(s): INR in the last 72 hours. No results for input(s): Divina Hutson in the last 72 hours.   Recent Labs     04/28/20  1001   PROCAL 0.11*       Microbiology: CONTRAST   Final Result       1. Saddle pulmonary embolism extending in segmental branches of all lobes of the lungs. The right to left ventricular ratio appears to be slightly greater than 1 which may be indicative of right heart strain. 2. Stable pulmonary nodule in the right lung. Critical findings discussed by Dr. Ana Lilia Washington with ER charge nurse Rosie Noriega at approximately 12:15 PM 4/28/2020 via telephone. **This report has been created using voice recognition software. It may contain minor errors which are inherent in voice recognition technology. **         Final report electronically signed by Dr Stefan Galeas on 4/28/2020 12:18 PM      XR CHEST PORTABLE   Final Result      Minimal bibasilar atelectasis/infiltrate. **This report has been created using voice recognition software. It may contain minor errors which are inherent in voice recognition technology. **      Final report electronically signed by Dr. Manuel Montenegro on 4/28/2020 10:10 AM          DVT prophylaxis: [] Lovenox                                 [] SCDs                                 [] SQ Heparin                                 [] Encourage ambulation           [x] Already on Anticoagulation     Code Status: Full Code    Tele:   [x] yes             [] no    Active Hospital Problems    Diagnosis Date Noted    Acute saddle pulmonary embolism ([facility] [address] [I26.92] 04/28/2020       Electronically signed by Jodi Bland MD on 4/30/2020 at 1:21 PM

## 2020-05-01 ENCOUNTER — APPOINTMENT (OUTPATIENT)
Dept: GENERAL RADIOLOGY | Age: 75
DRG: 163 | End: 2020-05-01
Payer: MEDICARE

## 2020-05-01 LAB
ANION GAP SERPL CALCULATED.3IONS-SCNC: 9 MEQ/L (ref 8–16)
APTT: 57.2 SECONDS (ref 22–38)
APTT: 66.4 SECONDS (ref 22–38)
APTT: 93.5 SECONDS (ref 22–38)
BUN BLDV-MCNC: 34 MG/DL (ref 7–22)
CALCIUM SERPL-MCNC: 8.5 MG/DL (ref 8.5–10.5)
CHLORIDE BLD-SCNC: 102 MEQ/L (ref 98–111)
CO2: 31 MEQ/L (ref 23–33)
CREAT SERPL-MCNC: 1.1 MG/DL (ref 0.4–1.2)
ERYTHROCYTE [DISTWIDTH] IN BLOOD BY AUTOMATED COUNT: 13.8 % (ref 11.5–14.5)
ERYTHROCYTE [DISTWIDTH] IN BLOOD BY AUTOMATED COUNT: 53.5 FL (ref 35–45)
GFR SERPL CREATININE-BSD FRML MDRD: 65 ML/MIN/1.73M2
GLUCOSE BLD-MCNC: 124 MG/DL (ref 70–108)
HCT VFR BLD CALC: 37.5 % (ref 42–52)
HEMOGLOBIN: 12 GM/DL (ref 14–18)
MCH RBC QN AUTO: 33.2 PG (ref 26–33)
MCHC RBC AUTO-ENTMCNC: 32 GM/DL (ref 32.2–35.5)
MCV RBC AUTO: 103.9 FL (ref 80–94)
PLATELET # BLD: 214 THOU/MM3 (ref 130–400)
PMV BLD AUTO: 10.7 FL (ref 9.4–12.4)
POTASSIUM SERPL-SCNC: 3.6 MEQ/L (ref 3.5–5.2)
RBC # BLD: 3.61 MILL/MM3 (ref 4.7–6.1)
SODIUM BLD-SCNC: 142 MEQ/L (ref 135–145)
WBC # BLD: 13.4 THOU/MM3 (ref 4.8–10.8)

## 2020-05-01 PROCEDURE — 6360000002 HC RX W HCPCS: Performed by: INTERNAL MEDICINE

## 2020-05-01 PROCEDURE — 94760 N-INVAS EAR/PLS OXIMETRY 1: CPT

## 2020-05-01 PROCEDURE — 2060000000 HC ICU INTERMEDIATE R&B

## 2020-05-01 PROCEDURE — 6370000000 HC RX 637 (ALT 250 FOR IP): Performed by: NURSE PRACTITIONER

## 2020-05-01 PROCEDURE — 36415 COLL VENOUS BLD VENIPUNCTURE: CPT

## 2020-05-01 PROCEDURE — 6370000000 HC RX 637 (ALT 250 FOR IP): Performed by: INTERNAL MEDICINE

## 2020-05-01 PROCEDURE — 80048 BASIC METABOLIC PNL TOTAL CA: CPT

## 2020-05-01 PROCEDURE — 2709999900 HC NON-CHARGEABLE SUPPLY

## 2020-05-01 PROCEDURE — 2500000003 HC RX 250 WO HCPCS: Performed by: INTERNAL MEDICINE

## 2020-05-01 PROCEDURE — 85027 COMPLETE CBC AUTOMATED: CPT

## 2020-05-01 PROCEDURE — 6360000002 HC RX W HCPCS: Performed by: NURSE PRACTITIONER

## 2020-05-01 PROCEDURE — 85730 THROMBOPLASTIN TIME PARTIAL: CPT

## 2020-05-01 PROCEDURE — 99232 SBSQ HOSP IP/OBS MODERATE 35: CPT | Performed by: INTERNAL MEDICINE

## 2020-05-01 PROCEDURE — 2580000003 HC RX 258: Performed by: INTERNAL MEDICINE

## 2020-05-01 PROCEDURE — 94640 AIRWAY INHALATION TREATMENT: CPT

## 2020-05-01 PROCEDURE — 74018 RADEX ABDOMEN 1 VIEW: CPT

## 2020-05-01 PROCEDURE — C9113 INJ PANTOPRAZOLE SODIUM, VIA: HCPCS | Performed by: INTERNAL MEDICINE

## 2020-05-01 RX ORDER — ALVIMOPAN 12 MG/1
12 CAPSULE ORAL 2 TIMES DAILY
Status: DISCONTINUED | OUTPATIENT
Start: 2020-05-01 | End: 2020-05-03 | Stop reason: HOSPADM

## 2020-05-01 RX ORDER — ALVIMOPAN 12 MG/1
12 CAPSULE ORAL 2 TIMES DAILY
Status: DISCONTINUED | OUTPATIENT
Start: 2020-05-01 | End: 2020-05-01

## 2020-05-01 RX ADMIN — POTASSIUM CHLORIDE, DEXTROSE MONOHYDRATE AND SODIUM CHLORIDE: 150; 5; 450 INJECTION, SOLUTION INTRAVENOUS at 16:31

## 2020-05-01 RX ADMIN — BISACODYL 10 MG: 10 SUPPOSITORY RECTAL at 09:00

## 2020-05-01 RX ADMIN — Medication 10 ML: at 21:00

## 2020-05-01 RX ADMIN — ALVIMOPAN 12 MG: 12 CAPSULE ORAL at 22:16

## 2020-05-01 RX ADMIN — BUDESONIDE AND FORMOTEROL FUMARATE DIHYDRATE 2 PUFF: 160; 4.5 AEROSOL RESPIRATORY (INHALATION) at 08:01

## 2020-05-01 RX ADMIN — ALBUTEROL SULFATE 2.5 MG: 2.5 SOLUTION RESPIRATORY (INHALATION) at 07:52

## 2020-05-01 RX ADMIN — PIPERACILLIN AND TAZOBACTAM 3.38 G: 3; .375 INJECTION, POWDER, LYOPHILIZED, FOR SOLUTION INTRAVENOUS at 18:39

## 2020-05-01 RX ADMIN — PANTOPRAZOLE SODIUM 40 MG: 40 INJECTION, POWDER, FOR SOLUTION INTRAVENOUS at 09:00

## 2020-05-01 RX ADMIN — PRAVASTATIN SODIUM 20 MG: 20 TABLET ORAL at 22:15

## 2020-05-01 RX ADMIN — ACETAMINOPHEN 650 MG: 325 TABLET ORAL at 16:33

## 2020-05-01 RX ADMIN — ALVIMOPAN 12 MG: 12 CAPSULE ORAL at 11:11

## 2020-05-01 RX ADMIN — PIPERACILLIN AND TAZOBACTAM 3.38 G: 3; .375 INJECTION, POWDER, LYOPHILIZED, FOR SOLUTION INTRAVENOUS at 10:09

## 2020-05-01 RX ADMIN — MULTIPLE VITAMINS W/ MINERALS TAB 1 TABLET: TAB at 09:00

## 2020-05-01 ASSESSMENT — PAIN SCALES - GENERAL
PAINLEVEL_OUTOF10: 0
PAINLEVEL_OUTOF10: 0
PAINLEVEL_OUTOF10: 3

## 2020-05-01 NOTE — PROGRESS NOTES
Physical Examination:   General appearance - alert, awake  and in no distress at rest, NG tube and oxygen present  HEENT: Normocephalic, Atraumatic, pupils reactive, mucous membranes moist  Chest - moving equally to respiration,symmetric air entry, little decreased at bases but fairly clear otherwise  Heart - normal rate, regular rhythm, normal S1, S2, no murmurs,  Abdomen - soft, nontender,  verydistended, no masses or organomegaly, BS could not appreciate much today  Neurological - alert, oriented, normal speech, sensations intact and able to move all extremities   Extremities - peripheral pulses normal, trace pedal edema,  Capillary refill less than 3 sec  Skin - normal coloration and turgor, no rashes      Labs:   Recent Labs     04/30/20  0802 04/30/20  1039 05/01/20  0435   WBC 17.8* 17.8* 13.4*   HGB 12.8* 13.6* 12.0*   HCT 39.4* 42.4 37.5*    262 214     Recent Labs     04/29/20  0322 04/30/20  0802 05/01/20  0435   * 139 142   K 4.2 4.2 3.6    100 102   CO2 22* 28 31   BUN 26* 30* 34*   CREATININE 1.0 1.0 1.1   CALCIUM 8.4* 9.1 8.5     No results for input(s): AST, ALT, BILIDIR, BILITOT, ALKPHOS in the last 72 hours. No results for input(s): INR in the last 72 hours. No results for input(s): Gareld Junk in the last 72 hours. No results for input(s): PROCAL in the last 72 hours. Microbiology:      Urinalysis:      Lab Results   Component Value Date    NITRU NEGATIVE 08/17/2018    WBCUA NONE SEEN 08/17/2018    BACTERIA NONE 08/17/2018    RBCUA 0-2 08/17/2018    BLOODU TRACE 08/17/2018    GLUCOSEU NEGATIVE 08/17/2018       Radiology:  XR ABDOMEN (KUB) (SINGLE AP VIEW)   Final Result   Minimal residual ileus. Marked improvement compared to prior study. **This report has been created using voice recognition software. It may contain minor errors which are inherent in voice recognition technology. **         Final report electronically signed by Dr. Jenny Hines on

## 2020-05-01 NOTE — PLAN OF CARE
Problem: Impaired respiratory status  Goal: Clear lung sounds  Outcome: Ongoing  Note: Pt has expiratory wheezes. Txs to help improve lung aeration.
light and patient voiced understanding. Call light and beside table within reach. Arm band and falling star in place. Hourly rounds completed. Will continue to monitor. Problem: Pain:  Goal: Control of acute pain  Description: Control of acute pain  Outcome: Ongoing  Note: Patient complained of no pain rating it a 0 on the 0-10 scale. Pain medication given as ordered and needed. Patient voiced satisfaction with this. Also instructed patient on distraction, repositioning, and ambulation as non-pharmacological methods of pain relief. Patient voiced understanding. Problem: Discharge Planning:  Goal: Discharged to appropriate level of care  Description: Discharged to appropriate level of care  Outcome: Ongoing  Note: Discharge planning in process and discussed with patient/family. Social work consulted for any additional needs. Care manager aware of discharge needs. Plan of care discussed with pt and family. Pt verbalizes understand.     Electronically signed by Odessa Connelly RN on 4/29/2020 at 12:26 AM

## 2020-05-02 ENCOUNTER — APPOINTMENT (OUTPATIENT)
Dept: GENERAL RADIOLOGY | Age: 75
DRG: 163 | End: 2020-05-02
Payer: MEDICARE

## 2020-05-02 LAB
ANION GAP SERPL CALCULATED.3IONS-SCNC: 7 MEQ/L (ref 8–16)
APTT: 61.9 SECONDS (ref 22–38)
APTT: 85.9 SECONDS (ref 22–38)
BUN BLDV-MCNC: 25 MG/DL (ref 7–22)
CALCIUM SERPL-MCNC: 8.1 MG/DL (ref 8.5–10.5)
CHLORIDE BLD-SCNC: 99 MEQ/L (ref 98–111)
CO2: 30 MEQ/L (ref 23–33)
CREAT SERPL-MCNC: 1 MG/DL (ref 0.4–1.2)
ERYTHROCYTE [DISTWIDTH] IN BLOOD BY AUTOMATED COUNT: 13.6 % (ref 11.5–14.5)
ERYTHROCYTE [DISTWIDTH] IN BLOOD BY AUTOMATED COUNT: 51.8 FL (ref 35–45)
GFR SERPL CREATININE-BSD FRML MDRD: 72 ML/MIN/1.73M2
GLUCOSE BLD-MCNC: 130 MG/DL (ref 70–108)
HCT VFR BLD CALC: 34.7 % (ref 42–52)
HEMOGLOBIN: 11 GM/DL (ref 14–18)
MCH RBC QN AUTO: 32.8 PG (ref 26–33)
MCHC RBC AUTO-ENTMCNC: 31.7 GM/DL (ref 32.2–35.5)
MCV RBC AUTO: 103.6 FL (ref 80–94)
PLATELET # BLD: 219 THOU/MM3 (ref 130–400)
PMV BLD AUTO: 10.4 FL (ref 9.4–12.4)
POTASSIUM SERPL-SCNC: 3.6 MEQ/L (ref 3.5–5.2)
RBC # BLD: 3.35 MILL/MM3 (ref 4.7–6.1)
SODIUM BLD-SCNC: 136 MEQ/L (ref 135–145)
WBC # BLD: 9.4 THOU/MM3 (ref 4.8–10.8)

## 2020-05-02 PROCEDURE — 6370000000 HC RX 637 (ALT 250 FOR IP): Performed by: NURSE PRACTITIONER

## 2020-05-02 PROCEDURE — 85730 THROMBOPLASTIN TIME PARTIAL: CPT

## 2020-05-02 PROCEDURE — 74018 RADEX ABDOMEN 1 VIEW: CPT

## 2020-05-02 PROCEDURE — 2580000003 HC RX 258: Performed by: INTERNAL MEDICINE

## 2020-05-02 PROCEDURE — 99232 SBSQ HOSP IP/OBS MODERATE 35: CPT | Performed by: INTERNAL MEDICINE

## 2020-05-02 PROCEDURE — 6360000002 HC RX W HCPCS: Performed by: INTERNAL MEDICINE

## 2020-05-02 PROCEDURE — 2500000003 HC RX 250 WO HCPCS: Performed by: INTERNAL MEDICINE

## 2020-05-02 PROCEDURE — C9113 INJ PANTOPRAZOLE SODIUM, VIA: HCPCS | Performed by: INTERNAL MEDICINE

## 2020-05-02 PROCEDURE — 6360000002 HC RX W HCPCS: Performed by: NURSE PRACTITIONER

## 2020-05-02 PROCEDURE — 94640 AIRWAY INHALATION TREATMENT: CPT

## 2020-05-02 PROCEDURE — 85027 COMPLETE CBC AUTOMATED: CPT

## 2020-05-02 PROCEDURE — 94760 N-INVAS EAR/PLS OXIMETRY 1: CPT

## 2020-05-02 PROCEDURE — 2060000000 HC ICU INTERMEDIATE R&B

## 2020-05-02 PROCEDURE — 36415 COLL VENOUS BLD VENIPUNCTURE: CPT

## 2020-05-02 PROCEDURE — 80048 BASIC METABOLIC PNL TOTAL CA: CPT

## 2020-05-02 RX ADMIN — BUDESONIDE AND FORMOTEROL FUMARATE DIHYDRATE 2 PUFF: 160; 4.5 AEROSOL RESPIRATORY (INHALATION) at 18:35

## 2020-05-02 RX ADMIN — PANTOPRAZOLE SODIUM 40 MG: 40 INJECTION, POWDER, FOR SOLUTION INTRAVENOUS at 08:27

## 2020-05-02 RX ADMIN — MULTIPLE VITAMINS W/ MINERALS TAB 1 TABLET: TAB at 08:26

## 2020-05-02 RX ADMIN — BUDESONIDE AND FORMOTEROL FUMARATE DIHYDRATE 2 PUFF: 160; 4.5 AEROSOL RESPIRATORY (INHALATION) at 08:04

## 2020-05-02 RX ADMIN — ALVIMOPAN 12 MG: 12 CAPSULE ORAL at 19:55

## 2020-05-02 RX ADMIN — PIPERACILLIN AND TAZOBACTAM 3.38 G: 3; .375 INJECTION, POWDER, LYOPHILIZED, FOR SOLUTION INTRAVENOUS at 10:51

## 2020-05-02 RX ADMIN — PIPERACILLIN AND TAZOBACTAM 3.38 G: 3; .375 INJECTION, POWDER, LYOPHILIZED, FOR SOLUTION INTRAVENOUS at 18:33

## 2020-05-02 RX ADMIN — PIPERACILLIN AND TAZOBACTAM 3.38 G: 3; .375 INJECTION, POWDER, LYOPHILIZED, FOR SOLUTION INTRAVENOUS at 02:30

## 2020-05-02 RX ADMIN — VERAPAMIL HYDROCHLORIDE 240 MG: 240 TABLET, FILM COATED, EXTENDED RELEASE ORAL at 08:27

## 2020-05-02 RX ADMIN — Medication 10 ML: at 08:27

## 2020-05-02 RX ADMIN — PRAVASTATIN SODIUM 20 MG: 20 TABLET ORAL at 19:55

## 2020-05-02 RX ADMIN — BISACODYL 10 MG: 10 SUPPOSITORY RECTAL at 08:27

## 2020-05-02 RX ADMIN — ALBUTEROL SULFATE 2.5 MG: 2.5 SOLUTION RESPIRATORY (INHALATION) at 16:24

## 2020-05-02 RX ADMIN — HEPARIN SODIUM 15 UNITS/KG/HR: 10000 INJECTION, SOLUTION INTRAVENOUS at 14:27

## 2020-05-02 RX ADMIN — HEPARIN SODIUM 15 UNITS/KG/HR: 10000 INJECTION, SOLUTION INTRAVENOUS at 01:08

## 2020-05-02 RX ADMIN — POTASSIUM CHLORIDE, DEXTROSE MONOHYDRATE AND SODIUM CHLORIDE: 150; 5; 450 INJECTION, SOLUTION INTRAVENOUS at 05:00

## 2020-05-02 RX ADMIN — ALVIMOPAN 12 MG: 12 CAPSULE ORAL at 08:27

## 2020-05-02 ASSESSMENT — PAIN SCALES - GENERAL
PAINLEVEL_OUTOF10: 0
PAINLEVEL_OUTOF10: 0

## 2020-05-02 NOTE — PROGRESS NOTES
0500-Pt stated, \"I haven't had a bath since I've been here, I think I stink\"  I then asked if he wanted me to bring bath items, he stated \"I want to wait until after breakfast.\"

## 2020-05-02 NOTE — PROGRESS NOTES
bowel movement 2 days prior, KUB showed ileus. NG tube was placed and started on gentle hydration and continued on heparin drip and gastroenterologist was consulted. Twelve-lead EKG still showed right bundle branch block pattern and no significant ST-T changes, nonspecific T wave changes were noted. Did have a PVC. Changed Protonix to IV. Troponin was only mildly elevated at 0.013.     5/1-patient feeling a lot better, had 2 rectal enemas yesterday, did not have bowel movement still, passing gas, no nausea or vomiting with the NG tube, had 2.8 L out, blood pressure little low, continue IV fluids, hold lisinopril, could not get verapamil as blood pressure is low, still on 2 L oxygen, GI started the patient on Entereg 12 mg twice daily for 3 days, yesterday started having fever of 101, cough with phlegm, chest x-ray suggest atelectasis versus developing infiltrates in the left base, treat like pneumonia especially with PE, Zosyn, GI started clear liquids, talked to his wife on Skype    5/2-had only 525 mL out from the NG tube in the last 24 hours, improving clinically had a small bowel movement, inadvertently NG tube was pulled back, while positioning at the x-ray and after coming to the floor was completely taken out. Stop fluids and encourage oral intake blood pressure improved, continue antibiotics, encourage activity, weaned off oxygen    Subjective (past 24 hours):  Tolerated liquids well without any nausea vomiting, had a small bowel movement soft, yesterday, still continuing to have some cough but not bringing up much phlegm, no shortness of breath at rest, no abdominal pain    Medications:  Reviewed    Infusion Medications    heparin (porcine) 15 Units/kg/hr (05/02/20 0108)     Scheduled Medications    piperacillin-tazobactam  3.375 g Intravenous Q8H    alvimopan  12 mg Oral BID    pantoprazole  40 mg Intravenous Daily    sodium chloride flush  10 mL Intravenous 2 times per day    bisacodyl  10 mg

## 2020-05-03 VITALS
DIASTOLIC BLOOD PRESSURE: 62 MMHG | TEMPERATURE: 97.5 F | HEART RATE: 64 BPM | BODY MASS INDEX: 35.84 KG/M2 | WEIGHT: 279.31 LBS | HEIGHT: 74 IN | SYSTOLIC BLOOD PRESSURE: 106 MMHG | OXYGEN SATURATION: 91 % | RESPIRATION RATE: 20 BRPM

## 2020-05-03 LAB
ANION GAP SERPL CALCULATED.3IONS-SCNC: 9 MEQ/L (ref 8–16)
APTT: 103.7 SECONDS (ref 22–38)
APTT: 78.6 SECONDS (ref 22–38)
BLOOD CULTURE, ROUTINE: NORMAL
BUN BLDV-MCNC: 18 MG/DL (ref 7–22)
CALCIUM SERPL-MCNC: 8.1 MG/DL (ref 8.5–10.5)
CHLORIDE BLD-SCNC: 100 MEQ/L (ref 98–111)
CO2: 28 MEQ/L (ref 23–33)
CREAT SERPL-MCNC: 1.2 MG/DL (ref 0.4–1.2)
GFR SERPL CREATININE-BSD FRML MDRD: 59 ML/MIN/1.73M2
GLUCOSE BLD-MCNC: 118 MG/DL (ref 70–108)
POTASSIUM SERPL-SCNC: 3.4 MEQ/L (ref 3.5–5.2)
SODIUM BLD-SCNC: 137 MEQ/L (ref 135–145)

## 2020-05-03 PROCEDURE — 6360000002 HC RX W HCPCS: Performed by: NURSE PRACTITIONER

## 2020-05-03 PROCEDURE — 6370000000 HC RX 637 (ALT 250 FOR IP): Performed by: NURSE PRACTITIONER

## 2020-05-03 PROCEDURE — 80048 BASIC METABOLIC PNL TOTAL CA: CPT

## 2020-05-03 PROCEDURE — 94761 N-INVAS EAR/PLS OXIMETRY MLT: CPT

## 2020-05-03 PROCEDURE — 85730 THROMBOPLASTIN TIME PARTIAL: CPT

## 2020-05-03 PROCEDURE — 2700000000 HC OXYGEN THERAPY PER DAY

## 2020-05-03 PROCEDURE — 2580000003 HC RX 258: Performed by: INTERNAL MEDICINE

## 2020-05-03 PROCEDURE — 99239 HOSP IP/OBS DSCHRG MGMT >30: CPT | Performed by: INTERNAL MEDICINE

## 2020-05-03 PROCEDURE — 94640 AIRWAY INHALATION TREATMENT: CPT

## 2020-05-03 PROCEDURE — 6360000002 HC RX W HCPCS: Performed by: INTERNAL MEDICINE

## 2020-05-03 PROCEDURE — 6370000000 HC RX 637 (ALT 250 FOR IP): Performed by: INTERNAL MEDICINE

## 2020-05-03 PROCEDURE — 36415 COLL VENOUS BLD VENIPUNCTURE: CPT

## 2020-05-03 RX ORDER — AMOXICILLIN AND CLAVULANATE POTASSIUM 875; 125 MG/1; MG/1
1 TABLET, FILM COATED ORAL EVERY 12 HOURS SCHEDULED
Qty: 6 TABLET | Refills: 0 | Status: SHIPPED | OUTPATIENT
Start: 2020-05-03 | End: 2020-05-06

## 2020-05-03 RX ORDER — POTASSIUM CHLORIDE 20 MEQ/1
40 TABLET, EXTENDED RELEASE ORAL ONCE
Status: COMPLETED | OUTPATIENT
Start: 2020-05-03 | End: 2020-05-03

## 2020-05-03 RX ORDER — POLYETHYLENE GLYCOL 3350 17 G/17G
17 POWDER, FOR SOLUTION ORAL DAILY PRN
Qty: 527 G | Refills: 1 | Status: SHIPPED | OUTPATIENT
Start: 2020-05-03 | End: 2020-06-02

## 2020-05-03 RX ORDER — AMOXICILLIN AND CLAVULANATE POTASSIUM 875; 125 MG/1; MG/1
1 TABLET, FILM COATED ORAL EVERY 12 HOURS SCHEDULED
Status: DISCONTINUED | OUTPATIENT
Start: 2020-05-03 | End: 2020-05-03 | Stop reason: HOSPADM

## 2020-05-03 RX ADMIN — POTASSIUM CHLORIDE 40 MEQ: 1500 TABLET, EXTENDED RELEASE ORAL at 08:09

## 2020-05-03 RX ADMIN — Medication 10 ML: at 08:10

## 2020-05-03 RX ADMIN — ALBUTEROL SULFATE 2.5 MG: 2.5 SOLUTION RESPIRATORY (INHALATION) at 06:59

## 2020-05-03 RX ADMIN — AMOXICILLIN AND CLAVULANATE POTASSIUM 1 TABLET: 875; 125 TABLET, FILM COATED ORAL at 11:49

## 2020-05-03 RX ADMIN — RIVAROXABAN 15 MG: 15 TABLET, FILM COATED ORAL at 11:50

## 2020-05-03 RX ADMIN — BUDESONIDE AND FORMOTEROL FUMARATE DIHYDRATE 2 PUFF: 160; 4.5 AEROSOL RESPIRATORY (INHALATION) at 07:00

## 2020-05-03 RX ADMIN — MULTIPLE VITAMINS W/ MINERALS TAB 1 TABLET: TAB at 08:09

## 2020-05-03 RX ADMIN — HEPARIN SODIUM 15 UNITS/KG/HR: 10000 INJECTION, SOLUTION INTRAVENOUS at 03:16

## 2020-05-03 RX ADMIN — VERAPAMIL HYDROCHLORIDE 240 MG: 240 TABLET, FILM COATED, EXTENDED RELEASE ORAL at 08:09

## 2020-05-03 RX ADMIN — PIPERACILLIN AND TAZOBACTAM 3.38 G: 3; .375 INJECTION, POWDER, LYOPHILIZED, FOR SOLUTION INTRAVENOUS at 02:01

## 2020-05-03 RX ADMIN — ALVIMOPAN 12 MG: 12 CAPSULE ORAL at 08:09

## 2020-05-03 ASSESSMENT — PAIN SCALES - GENERAL: PAINLEVEL_OUTOF10: 0

## 2020-05-03 NOTE — DISCHARGE INSTR - DIET
bread   1 teaspoon margarine or butter 1 teaspoon margarine or butter 1 teaspoon margarine or butter   poached egg or egg substitute banana 1/2 cup applesauce   Coffee or tea, with sugar and nondairy creamer 1/2 cup juice (apple, tomato) Coffee or tea, with sugar and nondairy creamer   Helpful tips   Use breads and grains made from refined flour, pasta, and white rice. Do not use whole grain products. Stay away from seeds, nuts, and raw or dried fruits. Also avoid raisins, berries, and foods that have these things in them. Stay away from fresh fruits and veggies. Some exceptions are lettuce, ripe bananas, cucumber, onions, zucchini, and melons. Stay away from juices with pulp. Remove skin from fruits and veggies before cooking. Stay away from prunes and prune juice. Stay away from dried beans and lentils. Limit milk and milk products to 2 cups daily if they make loose stools worse. Do not drink milk if you are lactose intolerant. If any food makes loose stools worse, stop eating that food and try it again later once you no longer have loose stools. Low Sodium Diet    Sodium is a mineral found in table salt and sea salt. It is found in some foods naturally, and in additives or preservatives. Too much sodium intake can cause fluid retention and high blood pressure. Sodium intake should not exceed 2300 mg per day. Keep in mind that a teaspoon of salt is equal to 2300 mg of sodium. Salt and Sodium Savers       Use less sodium in cooking. Try citrus fruits, vinegar, black pepper, herbs, or spices to add flavor to foods because they are very low in sodium. When choosing flavorings and seasonings, make sure that your choices do not contain the words salt or sodium. For example, choose onion powder instead of onion salt, and do not choose seasoning salt. Use sodium free herb blend seasonings instead of seasonings that contain salt or sodium.  Add less sodium at the table.

## 2020-05-03 NOTE — DISCHARGE SUMMARY
Hospital Medicine Discharge Summary      Patient Identification:   Pavan Matt   : 1945  MRN: [id number]   Account: [de-identified]      Patient's PCP: YESY Jane CNP    Admit Date: 2020     Discharge Date:   5/3/2020     Admitting Physician: Stef Britt MD     Discharge Physician: Blake Varela MD     Discharge Diagnoses:    Acute saddle pulmonary embolism without cor pulmonale status post mechanical thrombo-aspiration  Acute DVT extensive right lower-superficial femoral, both peroneal, 1 posterior tibial  Acute respiratory failure, hypoxia  Pneumonia of left lower lobe suspected to be secondary to PE  Ileus  Hypotension secondary to hypovolemia from nausea and vomiting  due to ileus  RBBB  Hypokalemia  Essential hypertension  Nonocclusive coronary artery disease  Gastroesophageal reflux disease  Hyperlipidemia  Asthma without exacerbation  History of DVT and PE in 2018 after a long drive  Obstructive sleep apnea on CPAP    The patient was seen and examined on day of discharge and this discharge summary is in conjunction with any daily progress note from day of discharge. Hospital Course: Pavan Matt is a 66 y.o. male admitted to [address] [place] [address] on 2020 for Shortness of breath  Patient presented to emergency room with chief complaint of progressive worsening of shortness of breath over the last 24 hours. Patient drove from [place] to [place], taking intermittent breaks over 3 days and after waking up the next day he started having worsening of shortness of breath to the point where he could not catch his breath and the inhalers were not helping him and called EMS. While in [place] he did have 3 episodes of asthma exacerbations and got better with steroids and antibiotics,   But is suspicious that if he actually had a PE when he was there also.     CTA of the chest showed saddle pulmonary embolus extending into the segmental branches of all lobes and possible right heart strain. Twelve-lead EKG did show right bundle branch block pattern. Patient was taken for emergent IR guided mechanical thrombo-aspiration on 4/28/2020. Patient symptoms improved clinically and was continued on heparin and subsequent venous Doppler did show extensive DVT throughout the right lower extremity extending into the calf veins. Patient was transferred out of MICU on 4/29 and was doing good and that night he did have mild discomfort in the chest like a reflux and took Tums but by next day morning he started having nausea and vomiting with a large volume emesis and KUB showed ileus and had an NG tube placed because of persistent nausea and vomiting. Did have significant output from the NG tube connected to intermittent suction and gastroenterologist was consulted. Etiology was not clear but was empirically put on bowel regimen with Entereg and rectal suppositories and over the next 2 days patient did improve. Did not have significant bowel movements and only had a small bowel movement. Patient was also starting to have a cough with phlegm and fever of 101 and chest x-ray suggested infiltrates versus atelectasis in the left lower lobe and was started on empiric antibiotic Zosyn and is being continued on Augmentin and treat like pneumonia. Patient's diet was advanced to clear liquids on 5/1 and was tolerating well and a follow-up x-ray on 5/2/2020 showed mild improvement but while at the x-ray the NG tube almost came out and subsequently was removed and diet was advanced gradually and tolerated well and is being discharged home. He did not qualify for oxygen. There was some concern about his weight being just  above 120 kg and was weighing around 126 kg. He preferred to be on Xarelto as he was already on it in the past when he had a clot. Recommended him to continue indefinitely especially he is planning to do long drives again.   Also recommended him to see a hematologist and he which are inherent in voice recognition technology. **      Final report electronically signed by Dr. Aga York MD on 4/30/2020 8:59 AM      VL DUP LOWER EXTREMITY VENOUS BILATERAL   Final Result   Extensive acute DVT throughout the right lower extremity extending into the calf veins. Findings discussed by Dr. Jodie Garner with ICU nurse Rogerio Spangler at approximately 7:30 PM 4/28/2020 via telephone. **This report has been created using voice recognition software. It may contain minor errors which are inherent in voice recognition technology. **      Final report electronically signed by Dr Lenin Montgomery on 4/28/2020 7:32 PM      IR GUIDED PERC TRANSLUM ART THROMBECTOMY INIT   Final Result   Status post successful mechanical formal aspiration procedure from both main pulmonary arteries. **This report has been created using voice recognition software. It may contain minor errors which are inherent in voice recognition technology. **      Final report electronically signed by Dr. Denise Suarez on 4/28/2020 5:01 PM      CTA CHEST [address]   Final Result       1. Saddle pulmonary embolism extending in segmental branches of all lobes of the lungs. The right to left ventricular ratio appears to be slightly greater than 1 which may be indicative of right heart strain. 2. Stable pulmonary nodule in the right lung. Critical findings discussed by Dr. Shawn Gaitan with ER charge nurse Franco at approximately 12:15 PM 4/28/2020 via telephone. **This report has been created using voice recognition software. It may contain minor errors which are inherent in voice recognition technology. **         Final report electronically signed by Dr Lenin Montgomery on 4/28/2020 12:18 PM      XR CHEST PORTABLE   Final Result      Minimal bibasilar atelectasis/infiltrate. **This report has been created using voice recognition software.  It may contain minor errors which are inherent in (THERAPEUTIC MULTIVITAMIN-MINERALS) tablet  Take 1 tablet by mouth daily             omeprazole (PRILOSEC) 20 MG delayed release capsule  Take 20 mg by mouth daily             polyethylene glycol (GLYCOLAX) 17 g packet  Take 17 g by mouth daily as needed for Constipation             pravastatin (PRAVACHOL) 20 MG tablet  Take 20 mg by mouth daily             rivaroxaban (XARELTO) 15 MG TABS tablet  Take 1 tablet by mouth 2 times daily (with meals)             rivaroxaban (XARELTO) 20 MG TABS tablet  Take 1 tablet by mouth daily (with breakfast) To start after taking 15 mg BID on 5/24/20             verapamil (VERELAN PM) 240 MG CR capsule  Take 240 mg by mouth every morning. Indications: Per patient taking medication for migraines prevention. Time Spent on discharge is more than 37 min in the examination, evaluation, counseling and review of medications and discharge plan. Signed: Thank you YESY Jane - [facility] for the opportunity to be involved in this patient's care.     Electronically signed by Blake Varela MD on 5/3/2020 at 9:41 AM

## 2020-05-03 NOTE — PROGRESS NOTES
Discharge teaching and instructions for diagnosis/procedure of saddle PE completed with patient using teachback method. AVS reviewed. Printed prescriptions given to patient. Patient voiced understanding regarding prescriptions, follow up appointments, and care of self at home.  Discharged in a wheelchair to  home with support per family

## 2020-05-04 ENCOUNTER — CARE COORDINATION (OUTPATIENT)
Dept: CASE MANAGEMENT | Age: 75
End: 2020-05-04

## 2020-05-04 PROCEDURE — 1111F DSCHRG MED/CURRENT MED MERGE: CPT | Performed by: NURSE PRACTITIONER

## 2020-05-04 RX ORDER — ALBUTEROL SULFATE 2.5 MG/3ML
SOLUTION RESPIRATORY (INHALATION)
Qty: 375 ML | Refills: 5 | Status: SHIPPED | OUTPATIENT
Start: 2020-05-04

## 2020-05-06 LAB — BLOOD CULTURE, ROUTINE: NORMAL

## 2020-05-11 ENCOUNTER — VIRTUAL VISIT (OUTPATIENT)
Dept: PULMONOLOGY | Age: 75
End: 2020-05-11
Payer: MEDICARE

## 2020-05-11 ENCOUNTER — CARE COORDINATION (OUTPATIENT)
Dept: CASE MANAGEMENT | Age: 75
End: 2020-05-11

## 2020-05-11 PROCEDURE — 99215 OFFICE O/P EST HI 40 MIN: CPT | Performed by: NURSE PRACTITIONER

## 2020-05-11 RX ORDER — PROMETHAZINE HYDROCHLORIDE AND CODEINE PHOSPHATE 6.25; 1 MG/5ML; MG/5ML
5 SYRUP ORAL 4 TIMES DAILY PRN
Qty: 140 ML | Refills: 0 | Status: SHIPPED | OUTPATIENT
Start: 2020-05-11 | End: 2020-05-18

## 2020-05-11 ASSESSMENT — ENCOUNTER SYMPTOMS
SHORTNESS OF BREATH: 1
VOMITING: 0
DIARRHEA: 0
VOICE CHANGE: 0
TROUBLE SWALLOWING: 0
EYES NEGATIVE: 1
NAUSEA: 0
WHEEZING: 0
ABDOMINAL PAIN: 0
CHEST TIGHTNESS: 1
COUGH: 1

## 2020-05-11 NOTE — PROGRESS NOTES
[facility]     Patient: Christiano Flanagan, 66 y.o.   : 1945    Pt of Dr. Schneider American   Patient presents with    Follow-Up from Hospital     saddle PE, s/p thrombectomy       TELEHEALTH EVALUATION -- Audio/Visual (During HSIRR-33 public health emergency)     TIFFANIE Dejesus is here for hospital follow up for saddle PE/ DVT right leg   Admitted 20-5/3/20  History of PE/DVT. PMH DVT and PE. Diagnosed 2018. Repeat CTA chest 18- no PE. repeat Duplex US leg , reflux noted in right posterior tibial and peroneal veins with no evidence of current DVT. Xarelto was stopped ( after taking for 1 year). Back on Xarelto after being hospitalized for respiratory failure 2/2 to large saddle PE without cor pulmonale. S/p thrombectomy done in IR. Access via right groin. Concern for pneumonia left lower lobe 2/2 PE ileus   Since discharge: C/o numbness right hand, intermittent. Morning cough with congestion in chest can not get the phlegm out. Uses nebulizer, makes cough worse with no production of mucous. Per wife coughing spells will last 10-15 min and gets dizziness from the cough. Cough has been present for years, significantly worse in past 1 week. Completed Augmentin that was prescribed at [facility]    Not on home O2, did home O2 eval before leaving hospital. Was on 2LPM in hospital   On CPAP at night. And using PRN during day . Using Symbicort 2 puffs BID,   Rinsing mouth. Has had allergy testing in past, follows with Dr Bill Queen on allergy shots every 2 weeks. Was on Silvestre Mattes, had total of 3-4 injections, did not see improvement in symptoms so was stopped. Tried to start [address] but could not afford. Trying to get Xolair approved.        Past Medical hx   PMH:  Past Medical History:   Diagnosis Date    Arthritis     Asthma     Cancer ([address]     skin    Chickenpox     Hyperlipidemia     Hypertension     Measles     Migraines     Mumps     Sleep apnea      SURGICAL HISTORY:  Past Surgical History:   Procedure Laterality Date    COLONOSCOPY      CORONARY ANGIOPLASTY N/A 2017/2018    Dr. Lonzie Eisenmenger Left 11/20/2019    PAROTIDECTOMY, LEFT performed by Arabella Soto MD at [address]  1990     SOCIAL HISTORY:  Social History     Tobacco Use    Smoking status: Never Smoker    Smokeless tobacco: Never Used   Substance Use Topics    Alcohol use: No     Alcohol/week: 0.0 standard drinks    Drug use: No     ALLERGIES:  Allergies   Allergen Reactions    Molds & Smuts Other (See Comments)     congestion    Sulfa Antibiotics Other (See Comments)     congestion     FAMILY HISTORY:  Family History   Problem Relation Age of Onset    Diabetes Mother     COPD Mother     Cancer Father      CURRENT MEDICATIONS:  Current Outpatient Medications   Medication Sig Dispense Refill    promethazine-codeine (PHENERGAN WITH CODEINE) 6.25-10 MG/5ML syrup Take 5 mLs by mouth 4 times daily as needed for Cough for up to 7 days.  140 mL 0    tiotropium (SPIRIVA RESPIMAT) 2.5 MCG/ACT AERS inhaler Inhale 2 puffs into the lungs daily 1 Inhaler 11    albuterol (PROVENTIL) (2.5 MG/3ML) 0.083% nebulizer solution inhale contents of 1 vial in nebulizer every 6 hours if needed for wheezing shortness of breath 375 mL 5    polyethylene glycol (GLYCOLAX) 17 g packet Take 17 g by mouth daily as needed for Constipation 527 g 1    rivaroxaban (XARELTO) 15 MG TABS tablet Take 1 tablet by mouth 2 times daily (with meals) 42 tablet 0    [START ON 5/24/2020] rivaroxaban (XARELTO) 20 MG TABS tablet Take 1 tablet by mouth daily (with breakfast) To start after taking 15 mg BID on 5/24/20 (Patient not taking: Reported on 5/4/2020) 90 tablet 1    docusate sodium (COLACE) 100 MG capsule Take 100 mg by mouth daily      lisinopril (PRINIVIL;ZESTRIL) 10 MG tablet Take 10 mg by mouth daily      Valve   The aortic valve leaflets were not well visualized. Aortic valve appears tricuspid. Aortic valve leaflets are somewhat thickened. Tricuspid Valve   Tricuspid valve was not well visualized. Mild tricuspid regurgitation. Pulmonic Valve   The pulmonic valve was not well visualized . Trivial pulmonic regurgitation visualized. Left Atrium   Left atrial size was normal.      Left Ventricle   Ejection fraction is visually estimated at 60%. Overall left ventricular function is normal.      Right Atrium   Right atrial size was normal.      Right Ventricle   The right ventricular size was normal with normal systolic function and   wall thickness. Pericardial Effusion   The pericardium was normal in appearance with no evidence of a pericardial   effusion. Pleural Effusion   No evidence of pleural effusion. Aorta / Great Vessels   -Aortic root dimension within normal limits.   -The Pulmonary artery is within normal limits. -IVC size is within normal limits with normal respiratory phasic changes. M-Mode/2D Measurements & Calculations  Assessment      Diagnosis Orders   1. Acute pulmonary embolism without acute cor pulmonale, unspecified pulmonary embolism type (HCC)  promethazine-codeine (PHENERGAN WITH CODEINE) 6.25-10 MG/5ML syrup   2. DVT, recurrent, lower extremity, acute, right ([address]     3. Chronic bronchitis, unspecified chronic bronchitis type ([address]  promethazine-codeine (PHENERGAN WITH CODEINE) 6.25-10 MG/5ML syrup    with worsening cough    4. Asthma-COPD overlap syndrome (HCC)  tiotropium (SPIRIVA RESPIMAT) 2.5 MCG/ACT AERS inhaler      Unclear if PE was provoked by recent travel/ surgery however has high risks for recurrence, will need life long anticoag and agree with hematology consult. Reflux of peroneal and tibial veins of right lower ext., Recent travel via motor home from [facility], recent surgery of parotid per Dr Maury Serrano MD, h/o DVT/PE, obesity.    Plan

## 2020-05-14 ENCOUNTER — APPOINTMENT (OUTPATIENT)
Dept: GENERAL RADIOLOGY | Age: 75
DRG: 202 | End: 2020-05-14
Payer: MEDICARE

## 2020-05-14 ENCOUNTER — HOSPITAL ENCOUNTER (INPATIENT)
Age: 75
LOS: 2 days | Discharge: HOME HEALTH | DRG: 202 | End: 2020-05-16
Attending: EMERGENCY MEDICINE | Admitting: INTERNAL MEDICINE
Payer: MEDICARE

## 2020-05-14 ENCOUNTER — APPOINTMENT (OUTPATIENT)
Dept: CT IMAGING | Age: 75
DRG: 202 | End: 2020-05-14
Payer: MEDICARE

## 2020-05-14 PROBLEM — J45.41 MODERATE PERSISTENT ASTHMA WITH EXACERBATION: Status: ACTIVE | Noted: 2020-05-14

## 2020-05-14 LAB
ALBUMIN SERPL-MCNC: 3.5 G/DL (ref 3.5–5.1)
ALP BLD-CCNC: 81 U/L (ref 38–126)
ALT SERPL-CCNC: 8 U/L (ref 11–66)
ANION GAP SERPL CALCULATED.3IONS-SCNC: 9 MEQ/L (ref 8–16)
AST SERPL-CCNC: 13 U/L (ref 5–40)
BASOPHILS # BLD: 1.1 %
BASOPHILS ABSOLUTE: 0.1 THOU/MM3 (ref 0–0.1)
BILIRUB SERPL-MCNC: 0.3 MG/DL (ref 0.3–1.2)
BUN BLDV-MCNC: 14 MG/DL (ref 7–22)
CALCIUM SERPL-MCNC: 9 MG/DL (ref 8.5–10.5)
CHLORIDE BLD-SCNC: 102 MEQ/L (ref 98–111)
CO2: 28 MEQ/L (ref 23–33)
CREAT SERPL-MCNC: 1 MG/DL (ref 0.4–1.2)
EKG ATRIAL RATE: 55 BPM
EKG P AXIS: 55 DEGREES
EKG P-R INTERVAL: 132 MS
EKG Q-T INTERVAL: 478 MS
EKG QRS DURATION: 116 MS
EKG QTC CALCULATION (BAZETT): 457 MS
EKG R AXIS: -20 DEGREES
EKG T AXIS: 55 DEGREES
EKG VENTRICULAR RATE: 55 BPM
EOSINOPHIL # BLD: 9.4 %
EOSINOPHILS ABSOLUTE: 0.9 THOU/MM3 (ref 0–0.4)
ERYTHROCYTE [DISTWIDTH] IN BLOOD BY AUTOMATED COUNT: 14.2 % (ref 11.5–14.5)
ERYTHROCYTE [DISTWIDTH] IN BLOOD BY AUTOMATED COUNT: 54.3 FL (ref 35–45)
GFR SERPL CREATININE-BSD FRML MDRD: 72 ML/MIN/1.73M2
GLUCOSE BLD-MCNC: 97 MG/DL (ref 70–108)
HCT VFR BLD CALC: 36.9 % (ref 42–52)
HEMOGLOBIN: 11.8 GM/DL (ref 14–18)
IMMATURE GRANS (ABS): 0.06 THOU/MM3 (ref 0–0.07)
IMMATURE GRANULOCYTES: 0.6 %
LYMPHOCYTES # BLD: 9.1 %
LYMPHOCYTES ABSOLUTE: 0.8 THOU/MM3 (ref 1–4.8)
MCH RBC QN AUTO: 33.2 PG (ref 26–33)
MCHC RBC AUTO-ENTMCNC: 32 GM/DL (ref 32.2–35.5)
MCV RBC AUTO: 103.9 FL (ref 80–94)
MONOCYTES # BLD: 7.5 %
MONOCYTES ABSOLUTE: 0.7 THOU/MM3 (ref 0.4–1.3)
NUCLEATED RED BLOOD CELLS: 0 /100 WBC
OSMOLALITY CALCULATION: 277.9 MOSMOL/KG (ref 275–300)
PLATELET # BLD: 393 THOU/MM3 (ref 130–400)
PMV BLD AUTO: 9.3 FL (ref 9.4–12.4)
POTASSIUM SERPL-SCNC: 4 MEQ/L (ref 3.5–5.2)
PRO-BNP: 113.4 PG/ML (ref 0–1800)
RBC # BLD: 3.55 MILL/MM3 (ref 4.7–6.1)
SEG NEUTROPHILS: 72.3 %
SEGMENTED NEUTROPHILS ABSOLUTE COUNT: 6.7 THOU/MM3 (ref 1.8–7.7)
SODIUM BLD-SCNC: 139 MEQ/L (ref 135–145)
TOTAL PROTEIN: 5.7 G/DL (ref 6.1–8)
TROPONIN T: < 0.01 NG/ML
WBC # BLD: 9.3 THOU/MM3 (ref 4.8–10.8)

## 2020-05-14 PROCEDURE — 6370000000 HC RX 637 (ALT 250 FOR IP): Performed by: INTERNAL MEDICINE

## 2020-05-14 PROCEDURE — 94669 MECHANICAL CHEST WALL OSCILL: CPT

## 2020-05-14 PROCEDURE — 99223 1ST HOSP IP/OBS HIGH 75: CPT | Performed by: INTERNAL MEDICINE

## 2020-05-14 PROCEDURE — 2580000003 HC RX 258: Performed by: INTERNAL MEDICINE

## 2020-05-14 PROCEDURE — 71045 X-RAY EXAM CHEST 1 VIEW: CPT

## 2020-05-14 PROCEDURE — G0378 HOSPITAL OBSERVATION PER HR: HCPCS

## 2020-05-14 PROCEDURE — 6360000004 HC RX CONTRAST MEDICATION: Performed by: EMERGENCY MEDICINE

## 2020-05-14 PROCEDURE — 93010 ELECTROCARDIOGRAM REPORT: CPT | Performed by: INTERNAL MEDICINE

## 2020-05-14 PROCEDURE — 84484 ASSAY OF TROPONIN QUANT: CPT

## 2020-05-14 PROCEDURE — 6360000002 HC RX W HCPCS: Performed by: INTERNAL MEDICINE

## 2020-05-14 PROCEDURE — 2709999900 HC NON-CHARGEABLE SUPPLY

## 2020-05-14 PROCEDURE — 96374 THER/PROPH/DIAG INJ IV PUSH: CPT

## 2020-05-14 PROCEDURE — 83880 ASSAY OF NATRIURETIC PEPTIDE: CPT

## 2020-05-14 PROCEDURE — 85025 COMPLETE CBC W/AUTO DIFF WBC: CPT

## 2020-05-14 PROCEDURE — 36415 COLL VENOUS BLD VENIPUNCTURE: CPT

## 2020-05-14 PROCEDURE — 1200000000 HC SEMI PRIVATE

## 2020-05-14 PROCEDURE — 80053 COMPREHEN METABOLIC PANEL: CPT

## 2020-05-14 PROCEDURE — 94640 AIRWAY INHALATION TREATMENT: CPT

## 2020-05-14 PROCEDURE — 93005 ELECTROCARDIOGRAM TRACING: CPT | Performed by: EMERGENCY MEDICINE

## 2020-05-14 PROCEDURE — 99283 EMERGENCY DEPT VISIT LOW MDM: CPT

## 2020-05-14 PROCEDURE — 71275 CT ANGIOGRAPHY CHEST: CPT

## 2020-05-14 RX ORDER — PRAVASTATIN SODIUM 20 MG
20 TABLET ORAL DAILY
Status: DISCONTINUED | OUTPATIENT
Start: 2020-05-14 | End: 2020-05-16 | Stop reason: HOSPADM

## 2020-05-14 RX ORDER — ONDANSETRON 2 MG/ML
4 INJECTION INTRAMUSCULAR; INTRAVENOUS EVERY 6 HOURS PRN
Status: DISCONTINUED | OUTPATIENT
Start: 2020-05-14 | End: 2020-05-16 | Stop reason: HOSPADM

## 2020-05-14 RX ORDER — LISINOPRIL 10 MG/1
10 TABLET ORAL DAILY
Status: DISCONTINUED | OUTPATIENT
Start: 2020-05-14 | End: 2020-05-16 | Stop reason: HOSPADM

## 2020-05-14 RX ORDER — PANTOPRAZOLE SODIUM 40 MG/1
40 TABLET, DELAYED RELEASE ORAL
Status: DISCONTINUED | OUTPATIENT
Start: 2020-05-15 | End: 2020-05-16 | Stop reason: HOSPADM

## 2020-05-14 RX ORDER — ACETAMINOPHEN 325 MG/1
650 TABLET ORAL EVERY 6 HOURS PRN
Status: DISCONTINUED | OUTPATIENT
Start: 2020-05-14 | End: 2020-05-16 | Stop reason: HOSPADM

## 2020-05-14 RX ORDER — METHYLPREDNISOLONE SODIUM SUCCINATE 40 MG/ML
40 INJECTION, POWDER, LYOPHILIZED, FOR SOLUTION INTRAMUSCULAR; INTRAVENOUS EVERY 12 HOURS
Status: COMPLETED | OUTPATIENT
Start: 2020-05-14 | End: 2020-05-16

## 2020-05-14 RX ORDER — IPRATROPIUM BROMIDE AND ALBUTEROL SULFATE 2.5; .5 MG/3ML; MG/3ML
1 SOLUTION RESPIRATORY (INHALATION) EVERY 6 HOURS
Status: DISCONTINUED | OUTPATIENT
Start: 2020-05-14 | End: 2020-05-14 | Stop reason: CLARIF

## 2020-05-14 RX ORDER — DOCUSATE SODIUM 100 MG/1
100 CAPSULE, LIQUID FILLED ORAL DAILY
Status: DISCONTINUED | OUTPATIENT
Start: 2020-05-14 | End: 2020-05-16 | Stop reason: HOSPADM

## 2020-05-14 RX ORDER — POLYETHYLENE GLYCOL 3350 17 G/17G
17 POWDER, FOR SOLUTION ORAL DAILY PRN
Status: DISCONTINUED | OUTPATIENT
Start: 2020-05-14 | End: 2020-05-16 | Stop reason: HOSPADM

## 2020-05-14 RX ORDER — ALBUTEROL SULFATE 2.5 MG/3ML
2.5 SOLUTION RESPIRATORY (INHALATION) EVERY 6 HOURS
Status: DISCONTINUED | OUTPATIENT
Start: 2020-05-14 | End: 2020-05-15

## 2020-05-14 RX ORDER — GUAIFENESIN 600 MG/1
600 TABLET, EXTENDED RELEASE ORAL 2 TIMES DAILY
Status: DISCONTINUED | OUTPATIENT
Start: 2020-05-14 | End: 2020-05-16 | Stop reason: HOSPADM

## 2020-05-14 RX ORDER — BUDESONIDE AND FORMOTEROL FUMARATE DIHYDRATE 160; 4.5 UG/1; UG/1
2 AEROSOL RESPIRATORY (INHALATION) 2 TIMES DAILY
Status: DISCONTINUED | OUTPATIENT
Start: 2020-05-14 | End: 2020-05-15

## 2020-05-14 RX ORDER — SODIUM CHLORIDE 0.9 % (FLUSH) 0.9 %
10 SYRINGE (ML) INJECTION PRN
Status: DISCONTINUED | OUTPATIENT
Start: 2020-05-14 | End: 2020-05-16 | Stop reason: HOSPADM

## 2020-05-14 RX ORDER — SODIUM CHLORIDE 0.9 % (FLUSH) 0.9 %
10 SYRINGE (ML) INJECTION EVERY 12 HOURS SCHEDULED
Status: DISCONTINUED | OUTPATIENT
Start: 2020-05-14 | End: 2020-05-16 | Stop reason: HOSPADM

## 2020-05-14 RX ORDER — FLUTICASONE PROPIONATE 50 MCG
1 SPRAY, SUSPENSION (ML) NASAL DAILY PRN
Status: DISCONTINUED | OUTPATIENT
Start: 2020-05-14 | End: 2020-05-16 | Stop reason: HOSPADM

## 2020-05-14 RX ORDER — AZITHROMYCIN 250 MG/1
500 TABLET, FILM COATED ORAL DAILY
Status: DISCONTINUED | OUTPATIENT
Start: 2020-05-14 | End: 2020-05-15

## 2020-05-14 RX ORDER — ACETAMINOPHEN 650 MG/1
650 SUPPOSITORY RECTAL EVERY 6 HOURS PRN
Status: DISCONTINUED | OUTPATIENT
Start: 2020-05-14 | End: 2020-05-16 | Stop reason: HOSPADM

## 2020-05-14 RX ORDER — PROMETHAZINE HYDROCHLORIDE 25 MG/1
12.5 TABLET ORAL EVERY 6 HOURS PRN
Status: DISCONTINUED | OUTPATIENT
Start: 2020-05-14 | End: 2020-05-16 | Stop reason: HOSPADM

## 2020-05-14 RX ORDER — VERAPAMIL HYDROCHLORIDE 240 MG/1
240 TABLET, FILM COATED, EXTENDED RELEASE ORAL EVERY MORNING
Status: DISCONTINUED | OUTPATIENT
Start: 2020-05-14 | End: 2020-05-16 | Stop reason: HOSPADM

## 2020-05-14 RX ADMIN — TIOTROPIUM BROMIDE INHALATION SPRAY 2 PUFF: 3.12 SPRAY, METERED RESPIRATORY (INHALATION) at 15:47

## 2020-05-14 RX ADMIN — AZITHROMYCIN 500 MG: 250 TABLET, FILM COATED ORAL at 16:40

## 2020-05-14 RX ADMIN — IOPAMIDOL 80 ML: 755 INJECTION, SOLUTION INTRAVENOUS at 08:07

## 2020-05-14 RX ADMIN — DOCUSATE SODIUM 100 MG: 100 CAPSULE, LIQUID FILLED ORAL at 16:43

## 2020-05-14 RX ADMIN — GUAIFENESIN 600 MG: 600 TABLET, EXTENDED RELEASE ORAL at 16:40

## 2020-05-14 RX ADMIN — PRAVASTATIN SODIUM 20 MG: 20 TABLET ORAL at 22:00

## 2020-05-14 RX ADMIN — GUAIFENESIN 600 MG: 600 TABLET, EXTENDED RELEASE ORAL at 22:00

## 2020-05-14 RX ADMIN — Medication 10 ML: at 22:00

## 2020-05-14 RX ADMIN — METHYLPREDNISOLONE SODIUM SUCCINATE 40 MG: 40 INJECTION, POWDER, FOR SOLUTION INTRAMUSCULAR; INTRAVENOUS at 16:44

## 2020-05-14 RX ADMIN — LISINOPRIL 10 MG: 10 TABLET ORAL at 16:40

## 2020-05-14 RX ADMIN — ALBUTEROL SULFATE 2.5 MG: 2.5 SOLUTION RESPIRATORY (INHALATION) at 15:47

## 2020-05-14 RX ADMIN — RIVAROXABAN 15 MG: 15 TABLET, FILM COATED ORAL at 16:40

## 2020-05-14 ASSESSMENT — PAIN SCALES - GENERAL: PAINLEVEL_OUTOF10: 0

## 2020-05-14 NOTE — ACP (ADVANCE CARE PLANNING)
Advance Care Planning   Advance Care Planning Clinical Specialist  Conversation Note      Date of ACP Conversation: 5/14/2020    Conversation Conducted with:   Patient with Decision Making Capacity   Healthcare Decision Maker: YES    ACP Clinical Specialist: Abhishek Wood RN      *When Decision Maker makes decisions on behalf of the incapacitated patient: Decision Maker is asked to consider and make decisions based on patient values, known preferences, or best interests. Current Designated Health Care Decision Maker:   (as entered in [address] field. Validate  this information as still accurate & up-to-date; edit [employer] [address] field as needed.)    If no Decision Maker listed above or available through scanned documents, then:    [address]   Who do you trust to make healthcare decisions for you? Name:  Angie Ramos  Phone  Number: [phone]    Can this person be reached and be able to respond quickly, such as within a few minutes or hours? YES  Who would be your back-up decision maker? Name   Phone Number:    For below questions, when conducting conversation with [email or web address], substitute \"he\" and \"his\" for \"you\" and \"your\". Hospitalization  If your health were to worsen and it became clear that your chance of recovery was unlikely, what would your preferences be regarding hospitalization?:    Choice:  [x]  The patient would want hospitalization  []  The patient would prefer comfort-focused treatment without hospitalization. Ventilation  If you were in your present state of health and suddenly became very ill and were unable to breathe on your own, what would your preference be about the use of a ventilator (breathing machine) if it were available to you?       If patient would desire the use of a ventilator (breathing machine), answer \"yes\", if not \"no\"YES    If your health were to worsen and it became clear that your

## 2020-05-14 NOTE — ED TRIAGE NOTES
Pt presents to the ED with c/o increased and worsening shortness of breath. Pt reports that he had a saddle PE approx two weeks ago. Pt states after DC he was unable to breathe well, but it has continued to get worse. Upon arrival pt is cool and breathing shallow. Pt VS obtained. EKG completed. IV inserted and labs drawn and sent.

## 2020-05-14 NOTE — PROGRESS NOTES
Pt admitted to  5K18 from ED  Complaints: Shortness of breath. Vital signs obtained. Assessment and data collection initiated. Two nurse skin assessment performed by myself, RN and Elpidio Post RN. Oriented to room. Policies and procedures for 5 explained. All questions answered with no further questions at this time. Fall prevention and safety brochure discussed with patient. Bed alarm on. Call light in reach.     Armando Henderson RN 5/14/2020 2:33 PM

## 2020-05-14 NOTE — ED NOTES
PT transported to [facility] in stable condition. Floor contacted before transport. Spoke with Drea Mcmillan.       Ej Haskins  05/14/20 1257

## 2020-05-14 NOTE — ED NOTES
ED nurse-to-nurse bedside report    Chief Complaint   Patient presents with    Shortness of Breath      LOC: alert and orientated to name, place, date  Vital signs   Vitals:    05/14/20 0652   BP: 109/67   Pulse: 58   Resp: 22   Temp: 99.8 °F (37.7 °C)   TempSrc: Oral   SpO2: 94%   Weight: 284 lb (128.8 kg)   Height: 6' 2\" (1.88 m)      Pain:    Pain Interventions: No pain  Pain Goal: N/A pt given warm blanket for comfort. Oxygen: No    Current needs required Pt to go to CT. Telemetry: Yes  LDAs:   Peripheral IV 05/14/20 Right Antecubital (Active)   Site Assessment Clean;Dry; Intact 5/14/2020  7:04 AM   Line Status Brisk blood return;Normal saline locked; Flushed;Specimen collected 5/14/2020  7:04 AM   Dressing Status Clean;Dry; Intact 5/14/2020  7:04 AM   Dressing Intervention New 5/14/2020  7:04 AM     Continuous Infusions:   Mobility: Requires assistance * 1  Mack Fall Risk Score: No flowsheet data found. Fall Interventions: Call light in reach, both side rails up. Pt aware of POC. Report given to: Anthony Botello RN.        Main Billings RN  05/14/20 2941

## 2020-05-14 NOTE — ED NOTES
Patient resting in bed, states he feels like he needs a breathing tx and cant breath. Patient currently sating at 98% RA with 23 respirations per min.  Dr. Velázquez Call notified      Joanne Patel RN  05/14/20 5250

## 2020-05-14 NOTE — ED PROVIDER NOTES
sister is alive. family history includes COPD in his mother; Cancer in his father; Diabetes in his mother. SOCIAL HISTORY      reports that he has never smoked. He has never used smokeless tobacco. He reports that he does not drink alcohol or use drugs. PHYSICAL EXAM     INITIAL VITALS:  height is 6' 2\" (1.88 m) and weight is 284 lb (128.8 kg). His oral temperature is 99.8 °F (37.7 °C). His blood pressure is 143/71 (abnormal) and his pulse is 67. His respiration is 25 and oxygen saturation is 98%. Constitutional: somewhat uncomfortable appearing  Eyes:  Pupils are equal and reactive, extraocular muscles intact   HENT:  Atraumatic appearing  oropharynx moist, no pharyngeal exudates. Neck- normal range of motion,  supple   Respiratory: Respiratory rate is increased. No retractions  Cardiovascular: regular  GI:  Non tender, no rigidity, rebound or guarding  Musculoskeletal:  2/4 distal pulses  Back- no tenderness  Neurologic:  Alert & oriented x 3      DIAGNOSTIC RESULTS     EKG: All EKG's are interpreted by the Emergency Department Physician who either signs or Co-signs this chart in the absence of a cardiologist.  EKG interpreted by me showing sinus rhythm at a rate of 55, QRS of 116, QTc of 457, axis of -20 incomplete right bundle pattern. RADIOLOGY: non-plain film images(s) such as CT, Ultrasound and MRI are read by the radiologist.  Chest x-ray was interpreted by the radiologist with some nonspecific findings. We proceeded with a CTA of the chest, there is still clot present however the appearance was felt to be better than previous.     LABS:   Labs Reviewed   CBC WITH AUTO DIFFERENTIAL - Abnormal; Notable for the following components:       Result Value    RBC 3.55 (*)     Hemoglobin 11.8 (*)     Hematocrit 36.9 (*)     .9 (*)     MCH 33.2 (*)     MCHC 32.0 (*)     RDW-SD 54.3 (*)     MPV 9.3 (*)     Lymphocytes Absolute 0.8 (*)     Eosinophils Absolute 0.9 (*)     All other components within normal limits   COMPREHENSIVE METABOLIC PANEL - Abnormal; Notable for the following components: Total Protein 5.7 (*)     ALT 8 (*)     All other components within normal limits   GLOMERULAR FILTRATION RATE, ESTIMATED - Abnormal; Notable for the following components:    Est, Glom Filt Rate 72 (*)     All other components within normal limits   ANION GAP   OSMOLALITY   TROPONIN   BRAIN NATRIURETIC PEPTIDE       EMERGENCY DEPARTMENT COURSE:   Vitals:    Vitals:    05/14/20 0754 05/14/20 0936 05/14/20 1050 05/14/20 1117   BP: 119/75 127/67 127/70 (!) 143/71   Pulse: 56 61 61 67   Resp: 20 23 22 25   Temp:       TempSrc:       SpO2: 95% 96% 98%    Weight:       Height: This patient presents with sudden worsening of his previous dyspnea that was previously felt to be due to a saddle embolus. He was taken on the previous admission for interventional procedure. Apparently it is debulk but he still has clot present for some reason he is worse today. His respiratory rate is actually up a little bit so this is a somewhat unclear situation. There is no an obvious evidence of pneumothorax or pneumonia or fever or no elevation of the troponin. We are bringing him into the hospital service for further evaluation. CRITICAL CARE:   none         FINAL IMPRESSION    Worsening dyspnea, subacute pulmonary embolism    DISPOSITION/PLAN   Admitted    DISCHARGE MEDICATIONS:  New Prescriptions    No medications on file       (Please note that portions of this note were completed with a voice recognition program.  Efforts were made to edit the dictations but occasionally words are mis-transcribed.)    This document serves as a record of the services and decisions personally performed and made by Kori Gutierrez DO. . It was created on their behalf by Kori Gutierrez, a trained medical scribe. The creation of this document is based the provider's statements to the medical scribe.     Signed by: Jacob Danielle

## 2020-05-15 LAB
ANION GAP SERPL CALCULATED.3IONS-SCNC: 12 MEQ/L (ref 8–16)
BASOPHILS # BLD: 0.3 %
BASOPHILS ABSOLUTE: 0 THOU/MM3 (ref 0–0.1)
BUN BLDV-MCNC: 14 MG/DL (ref 7–22)
CALCIUM SERPL-MCNC: 9.4 MG/DL (ref 8.5–10.5)
CHLORIDE BLD-SCNC: 103 MEQ/L (ref 98–111)
CO2: 23 MEQ/L (ref 23–33)
CREAT SERPL-MCNC: 0.9 MG/DL (ref 0.4–1.2)
EOSINOPHIL # BLD: 0 %
EOSINOPHILS ABSOLUTE: 0 THOU/MM3 (ref 0–0.4)
ERYTHROCYTE [DISTWIDTH] IN BLOOD BY AUTOMATED COUNT: 13.8 % (ref 11.5–14.5)
ERYTHROCYTE [DISTWIDTH] IN BLOOD BY AUTOMATED COUNT: 53 FL (ref 35–45)
FOLATE: 12.4 NG/ML (ref 4.8–24.2)
GFR SERPL CREATININE-BSD FRML MDRD: 82 ML/MIN/1.73M2
GLUCOSE BLD-MCNC: 140 MG/DL (ref 70–108)
HCT VFR BLD CALC: 41.4 % (ref 42–52)
HEMOGLOBIN: 13.1 GM/DL (ref 14–18)
IMMATURE GRANS (ABS): 0.03 THOU/MM3 (ref 0–0.07)
IMMATURE GRANULOCYTES: 0.4 %
LYMPHOCYTES # BLD: 7.4 %
LYMPHOCYTES ABSOLUTE: 0.6 THOU/MM3 (ref 1–4.8)
MAGNESIUM: 2.3 MG/DL (ref 1.6–2.4)
MCH RBC QN AUTO: 33.1 PG (ref 26–33)
MCHC RBC AUTO-ENTMCNC: 31.6 GM/DL (ref 32.2–35.5)
MCV RBC AUTO: 104.5 FL (ref 80–94)
MONOCYTES # BLD: 0.7 %
MONOCYTES ABSOLUTE: 0.1 THOU/MM3 (ref 0.4–1.3)
NUCLEATED RED BLOOD CELLS: 0 /100 WBC
PLATELET # BLD: 437 THOU/MM3 (ref 130–400)
PMV BLD AUTO: 9.6 FL (ref 9.4–12.4)
POTASSIUM SERPL-SCNC: 4.5 MEQ/L (ref 3.5–5.2)
RBC # BLD: 3.96 MILL/MM3 (ref 4.7–6.1)
SEG NEUTROPHILS: 91.2 %
SEGMENTED NEUTROPHILS ABSOLUTE COUNT: 6.9 THOU/MM3 (ref 1.8–7.7)
SODIUM BLD-SCNC: 138 MEQ/L (ref 135–145)
VITAMIN B-12: 277 PG/ML (ref 211–911)
WBC # BLD: 7.6 THOU/MM3 (ref 4.8–10.8)

## 2020-05-15 PROCEDURE — 6360000002 HC RX W HCPCS: Performed by: INTERNAL MEDICINE

## 2020-05-15 PROCEDURE — 82607 VITAMIN B-12: CPT

## 2020-05-15 PROCEDURE — 94760 N-INVAS EAR/PLS OXIMETRY 1: CPT

## 2020-05-15 PROCEDURE — 83735 ASSAY OF MAGNESIUM: CPT

## 2020-05-15 PROCEDURE — 80048 BASIC METABOLIC PNL TOTAL CA: CPT

## 2020-05-15 PROCEDURE — G0378 HOSPITAL OBSERVATION PER HR: HCPCS

## 2020-05-15 PROCEDURE — 1200000000 HC SEMI PRIVATE

## 2020-05-15 PROCEDURE — 6370000000 HC RX 637 (ALT 250 FOR IP): Performed by: INTERNAL MEDICINE

## 2020-05-15 PROCEDURE — 96376 TX/PRO/DX INJ SAME DRUG ADON: CPT

## 2020-05-15 PROCEDURE — 99232 SBSQ HOSP IP/OBS MODERATE 35: CPT | Performed by: INTERNAL MEDICINE

## 2020-05-15 PROCEDURE — 82746 ASSAY OF FOLIC ACID SERUM: CPT

## 2020-05-15 PROCEDURE — 2580000003 HC RX 258: Performed by: INTERNAL MEDICINE

## 2020-05-15 PROCEDURE — 94640 AIRWAY INHALATION TREATMENT: CPT

## 2020-05-15 PROCEDURE — 36415 COLL VENOUS BLD VENIPUNCTURE: CPT

## 2020-05-15 PROCEDURE — 85025 COMPLETE CBC W/AUTO DIFF WBC: CPT

## 2020-05-15 PROCEDURE — 96374 THER/PROPH/DIAG INJ IV PUSH: CPT

## 2020-05-15 RX ORDER — PREDNISONE 20 MG/1
40 TABLET ORAL DAILY
Status: DISCONTINUED | OUTPATIENT
Start: 2020-05-16 | End: 2020-05-16 | Stop reason: HOSPADM

## 2020-05-15 RX ORDER — IPRATROPIUM BROMIDE AND ALBUTEROL SULFATE 2.5; .5 MG/3ML; MG/3ML
1 SOLUTION RESPIRATORY (INHALATION)
Status: DISCONTINUED | OUTPATIENT
Start: 2020-05-15 | End: 2020-05-16 | Stop reason: HOSPADM

## 2020-05-15 RX ORDER — BUDESONIDE 0.5 MG/2ML
0.5 INHALANT ORAL 2 TIMES DAILY
Status: DISCONTINUED | OUTPATIENT
Start: 2020-05-15 | End: 2020-05-16 | Stop reason: HOSPADM

## 2020-05-15 RX ADMIN — Medication 10 ML: at 08:55

## 2020-05-15 RX ADMIN — BUDESONIDE 500 MCG: 0.5 INHALANT RESPIRATORY (INHALATION) at 20:48

## 2020-05-15 RX ADMIN — DOCUSATE SODIUM 100 MG: 100 CAPSULE, LIQUID FILLED ORAL at 08:54

## 2020-05-15 RX ADMIN — IPRATROPIUM BROMIDE AND ALBUTEROL SULFATE 1 AMPULE: .5; 3 SOLUTION RESPIRATORY (INHALATION) at 20:47

## 2020-05-15 RX ADMIN — METHYLPREDNISOLONE SODIUM SUCCINATE 40 MG: 40 INJECTION, POWDER, FOR SOLUTION INTRAMUSCULAR; INTRAVENOUS at 15:47

## 2020-05-15 RX ADMIN — IPRATROPIUM BROMIDE AND ALBUTEROL SULFATE 1 AMPULE: .5; 3 SOLUTION RESPIRATORY (INHALATION) at 12:21

## 2020-05-15 RX ADMIN — BUDESONIDE 500 MCG: 0.5 INHALANT RESPIRATORY (INHALATION) at 16:21

## 2020-05-15 RX ADMIN — METHYLPREDNISOLONE SODIUM SUCCINATE 40 MG: 40 INJECTION, POWDER, FOR SOLUTION INTRAMUSCULAR; INTRAVENOUS at 04:02

## 2020-05-15 RX ADMIN — GUAIFENESIN 600 MG: 600 TABLET, EXTENDED RELEASE ORAL at 20:07

## 2020-05-15 RX ADMIN — PRAVASTATIN SODIUM 20 MG: 20 TABLET ORAL at 20:07

## 2020-05-15 RX ADMIN — RIVAROXABAN 15 MG: 15 TABLET, FILM COATED ORAL at 17:24

## 2020-05-15 RX ADMIN — GUAIFENESIN 600 MG: 600 TABLET, EXTENDED RELEASE ORAL at 08:53

## 2020-05-15 RX ADMIN — LISINOPRIL 10 MG: 10 TABLET ORAL at 08:53

## 2020-05-15 RX ADMIN — IPRATROPIUM BROMIDE AND ALBUTEROL SULFATE 1 AMPULE: .5; 3 SOLUTION RESPIRATORY (INHALATION) at 16:20

## 2020-05-15 RX ADMIN — Medication 10 ML: at 20:08

## 2020-05-15 RX ADMIN — RIVAROXABAN 15 MG: 15 TABLET, FILM COATED ORAL at 08:53

## 2020-05-15 RX ADMIN — AZITHROMYCIN 500 MG: 250 TABLET, FILM COATED ORAL at 08:53

## 2020-05-15 RX ADMIN — VERAPAMIL HYDROCHLORIDE 240 MG: 240 TABLET, FILM COATED, EXTENDED RELEASE ORAL at 08:53

## 2020-05-15 RX ADMIN — PANTOPRAZOLE SODIUM 40 MG: 40 TABLET, DELAYED RELEASE ORAL at 07:35

## 2020-05-15 RX ADMIN — ALBUTEROL SULFATE 2.5 MG: 2.5 SOLUTION RESPIRATORY (INHALATION) at 01:17

## 2020-05-15 ASSESSMENT — PAIN SCALES - GENERAL
PAINLEVEL_OUTOF10: 0
PAINLEVEL_OUTOF10: 0

## 2020-05-15 NOTE — FLOWSHEET NOTE
05/15/20 4183   Provider Notification   Reason for Communication Evaluate   Provider Name Dr. Carlos Abraham   Provider Notification Physician   Method of Communication Secure Message   Response Waiting for response   Notification Time 5057     Patient having new numbness and tingling in fingers.

## 2020-05-15 NOTE — PROGRESS NOTES
non-tender, non-distended with normal bowel sounds. Protuberant  Musculoskeletal: Normal range of motion in extremities. There is no joint swelling or tenderness. Skin: Skin color, texture, turgor normal.  No rashes or lesions. Neurologic:  Neurovascularly intact without any focal sensory/motor deficits in the upper and lower extremities. Cranial nerves:  grossly non-focal.  There is decreased sensation to the ulnar aspect of the fourth digit and whole fifth digit bilaterally, right worse than left. No decreased  strength or weakness. No hyperthenar wasting noted. No numbness noted proximally to this location. Psychiatric: Alert and oriented, thought content appropriate, normal insight. Capillary Refill: Brisk,< 3 seconds. Peripheral Pulses: +2 palpable, equal bilaterally. Labs:   Recent Labs     05/14/20  0650   WBC 9.3   HGB 11.8*   HCT 36.9*        Recent Labs     05/14/20  0650      K 4.0      CO2 28   BUN 14   CREATININE 1.0   CALCIUM 9.0     Recent Labs     05/14/20  0650   AST 13   ALT 8*   BILITOT 0.3   ALKPHOS 81     No results for input(s): INR in the last 72 hours. No results for input(s): Joselito Purdue in the last 72 hours. Microbiology:      Urinalysis:      Lab Results   Component Value Date    NITRU NEGATIVE 08/17/2018    WBCUA NONE SEEN 08/17/2018    BACTERIA NONE 08/17/2018    RBCUA 0-2 08/17/2018    BLOODU TRACE 08/17/2018    GLUCOSEU NEGATIVE 08/17/2018       Radiology:  CTA Chest W WO Contrast   Final Result   1. There are several pulmonary nodules within the anteromedial right lung as described above. Several of these are stable from the prior examination dating back to 9/9/2018. However there is a new pulmonary nodule demonstrated at the anteromedial right    lung base on axial image 126 measuring 1.4 x 0.8 cm. Recommend 3 month follow-up CT imaging to document stability or resolution.       2. There are residual pulmonary emboli demonstrated at the distal right pulmonary artery extending into the right lower lobe lobar pulmonary artery and into the right posterior segmental pulmonary artery branch. However, overall, the degree of pulmonary    emboli appear significantly improved from the prior examination. In addition, the RV to LV ratio appears improved and within normal limits. The pulmonary emboli within the distal right pulmonary artery and right lower lobe lobar pulmonary artery appear    eccentric along the superior aspect of the vessel. This is appreciated on coronal series image 41. These are therefore favored represent residual resolving chronic thrombus. Consider follow-up CT imaging to document resolution. **This report has been created using voice recognition software. It may contain minor errors which are inherent in voice recognition technology. **          Final report electronically signed by Dr. Tez Smith on 5/14/2020 8:31 AM      XR CHEST PORTABLE   Final Result   1. Low lung volumes with linear bibasilar opacities. This can represent atelectasis versus infiltrates. **This report has been created using voice recognition software. It may contain minor errors which are inherent in voice recognition technology. **      Final report electronically signed by Dr. Sadie Barcenas on 5/14/2020 7:30 AM          DVT prophylaxis: [] Lovenox                                 [] SCDs                                 [] SQ Heparin                                 [] Encourage ambulation           [x] Already on Anticoagulation     Code Status: Full Code    PT/OT Eval Status: na    Diet:   DIET CARDIAC;     Fluids: ns    Tele:   [] yes             [] no      Electronically signed by Sheila Díaz DO on 5/15/2020 at 7:22 AM

## 2020-05-15 NOTE — CARE COORDINATION
5/15/20, 12:41 PM EDT    DISCHARGE PLANNING EVALUATION    Full assessment deferred. Patient stated that he resides at home with spouse and is independent in ADLs. He stated that he would like home health for Nursing to check his breathing. [place]. Sai Guard at made referral aware of weekend discharge. Patient stated that he can pay for medications but that they do add up. No further ends voiced. Sticky note placed on chart for order.

## 2020-05-15 NOTE — CARE COORDINATION
5/15/20, 10:08 AM EDT  DISCHARGE PLANNING EVALUATION:    [facility]       Admitted from: ER, patient presented with shortness of breath. 5/14/2020/ 200 Emilie Iyer day: 1   Location: -18/018-A Reason for admit: Moderate persistent asthma with exacerbation [J45.41] Status: Inpt. Admit order signed?: yes  PMH:  has a past medical history of Arthritis, Asthma, Cancer ([address] Chickenpox, Hyperlipidemia, Hypertension, Measles, Migraines, Mumps, and Sleep apnea. Procedure: N/A  Pertinent abnormal Imaging:Chest x-ray: 1. Low lung volumes with linear bibasilar opacities. This can represent atelectasis versus infiltrates. CTA of chest:   1. There are several pulmonary nodules within the anteromedial right lung as described above. Several of these are stable from the prior examination dating back to 9/9/2018. However there is a new pulmonary nodule demonstrated at the anteromedial right    lung base on axial image 126 measuring 1.4 x 0.8 cm. Recommend 3 month follow-up CT imaging to document stability or resolution.       2. There are residual pulmonary emboli demonstrated at the distal right pulmonary artery extending into the right lower lobe lobar pulmonary artery and into the right posterior segmental pulmonary artery branch. However, overall, the degree of pulmonary    emboli appear significantly improved from the prior examination. In addition, the RV to LV ratio appears improved and within normal limits. The pulmonary emboli within the distal right pulmonary artery and right lower lobe lobar pulmonary artery appear    eccentric along the superior aspect of the vessel. This is appreciated on coronal series image 41. These are therefore favored represent residual resolving chronic thrombus.  Consider follow-up CT imaging to document resolution.           Medications:  Scheduled Meds:   budesonide-formoterol  2 puff Inhalation BID    docusate sodium  100 mg Oral Daily    lisinopril  10 mg Oral Daily    pantoprazole  40 mg Oral QAM AC    pravastatin  20 mg Oral Daily    rivaroxaban  15 mg Oral BID WC    tiotropium  2 puff Inhalation Daily    verapamil  240 mg Oral QAM    methylPREDNISolone  40 mg Intravenous Q12H    azithromycin  500 mg Oral Daily    guaiFENesin  600 mg Oral BID    sodium chloride flush  10 mL Intravenous 2 times per day    albuterol  2.5 mg Nebulization Q6H     Continuous Infusions:   Pertinent Info/Orders/Treatment Plan:  Oral Zithromax, Symbicort inhaler, Proventil nebulizer, Solumedrol q 12 hr., Xarelto, prn Tylenol, Flonase, Phenergan and Zofran, acapella, home C-pap, up with assistance. Diet: DIET CARDIAC;   Smoking status:  reports that he has never smoked. He has never used smokeless tobacco.   PCP: YESY Schultz CNP  Readmission 30 days or less: Yes, patient was admitted on 4/28/2020 and discharged on 5/03/2020 with no services in place. Readmission Risk Score: 15%    Discharge Planning Evaluation  Current Residence:  Private Residence  Living Arrangements:  Spouse/Significant Other   Support Systems:  Children, Spouse/Significant Other, Family Members, Friends/Neighbors  Current Services PTA:     Potential Assistance Needed:  N/A  Potential Assistance Purchasing Medications:  No  Does patient want to participate in local refill/ meds to beds program?  Yes  Type of Home Care Services:  None  Patient expects to be discharged to:  Home with wife  Expected Discharge date:  05/17/20  Follow Up Appointment: Best Day/ Time:      Patient Goals/Plan/Treatment Preferences: Met with Alethea De La O. He is from home with his wife. Alethea De La O verifies he has insurance and a PCP. He states his medications are difficult to afford, especially medications for his Asthma. He has [facility]. Alethea De La O states a 90 day supply of Xarelto costs him $ 75.00. It all adds up for him. Alethea De La O denies a need for DME and agrees to [facility] at discharge.  He states his wife will transport him home at

## 2020-05-16 VITALS
BODY MASS INDEX: 34.67 KG/M2 | TEMPERATURE: 98.1 F | OXYGEN SATURATION: 93 % | SYSTOLIC BLOOD PRESSURE: 127 MMHG | WEIGHT: 270.1 LBS | HEART RATE: 72 BPM | RESPIRATION RATE: 16 BRPM | HEIGHT: 74 IN | DIASTOLIC BLOOD PRESSURE: 75 MMHG

## 2020-05-16 PROCEDURE — 96376 TX/PRO/DX INJ SAME DRUG ADON: CPT

## 2020-05-16 PROCEDURE — 6360000002 HC RX W HCPCS: Performed by: INTERNAL MEDICINE

## 2020-05-16 PROCEDURE — 6370000000 HC RX 637 (ALT 250 FOR IP): Performed by: INTERNAL MEDICINE

## 2020-05-16 PROCEDURE — 94760 N-INVAS EAR/PLS OXIMETRY 1: CPT

## 2020-05-16 PROCEDURE — G0378 HOSPITAL OBSERVATION PER HR: HCPCS

## 2020-05-16 PROCEDURE — 2580000003 HC RX 258: Performed by: INTERNAL MEDICINE

## 2020-05-16 PROCEDURE — 94640 AIRWAY INHALATION TREATMENT: CPT

## 2020-05-16 PROCEDURE — 99239 HOSP IP/OBS DSCHRG MGMT >30: CPT | Performed by: INTERNAL MEDICINE

## 2020-05-16 RX ORDER — GUAIFENESIN AND DEXTROMETHORPHAN HYDROBROMIDE 600; 30 MG/1; MG/1
1 TABLET, EXTENDED RELEASE ORAL 2 TIMES DAILY
Qty: 28 TABLET | Refills: 0 | Status: SHIPPED | OUTPATIENT
Start: 2020-05-16 | End: 2020-11-10 | Stop reason: SDUPTHER

## 2020-05-16 RX ORDER — PREDNISONE 20 MG/1
40 TABLET ORAL DAILY
Qty: 6 TABLET | Refills: 0 | Status: SHIPPED | OUTPATIENT
Start: 2020-05-17 | End: 2020-05-20

## 2020-05-16 RX ORDER — LANOLIN ALCOHOL/MO/W.PET/CERES
1000 CREAM (GRAM) TOPICAL DAILY
Status: DISCONTINUED | OUTPATIENT
Start: 2020-05-16 | End: 2020-05-16 | Stop reason: HOSPADM

## 2020-05-16 RX ADMIN — VERAPAMIL HYDROCHLORIDE 240 MG: 240 TABLET, FILM COATED, EXTENDED RELEASE ORAL at 09:11

## 2020-05-16 RX ADMIN — Medication 1000 MCG: at 10:14

## 2020-05-16 RX ADMIN — PREDNISONE 40 MG: 20 TABLET ORAL at 09:11

## 2020-05-16 RX ADMIN — Medication 10 ML: at 03:22

## 2020-05-16 RX ADMIN — METHYLPREDNISOLONE SODIUM SUCCINATE 40 MG: 40 INJECTION, POWDER, FOR SOLUTION INTRAMUSCULAR; INTRAVENOUS at 03:20

## 2020-05-16 RX ADMIN — BUDESONIDE 500 MCG: 0.5 INHALANT RESPIRATORY (INHALATION) at 08:35

## 2020-05-16 RX ADMIN — PANTOPRAZOLE SODIUM 40 MG: 40 TABLET, DELAYED RELEASE ORAL at 05:33

## 2020-05-16 RX ADMIN — GUAIFENESIN 600 MG: 600 TABLET, EXTENDED RELEASE ORAL at 09:12

## 2020-05-16 RX ADMIN — DOCUSATE SODIUM 100 MG: 100 CAPSULE, LIQUID FILLED ORAL at 09:12

## 2020-05-16 RX ADMIN — RIVAROXABAN 15 MG: 15 TABLET, FILM COATED ORAL at 09:12

## 2020-05-16 RX ADMIN — IPRATROPIUM BROMIDE AND ALBUTEROL SULFATE 1 AMPULE: .5; 3 SOLUTION RESPIRATORY (INHALATION) at 08:35

## 2020-05-16 RX ADMIN — LISINOPRIL 10 MG: 10 TABLET ORAL at 09:12

## 2020-05-16 ASSESSMENT — PAIN SCALES - GENERAL: PAINLEVEL_OUTOF10: 0

## 2020-05-16 NOTE — PLAN OF CARE
Problem: Falls - Risk of:  Goal: Will remain free from falls  Description: Will remain free from falls  5/15/2020 2324 by Damon Mason RN  Outcome: Ongoing  Note: No falls noted this shift. Patient up with assistance. Bed in low position with bed alarm on. 2/4 side rails up. Call light within reach. Non skid socks on when ambulating. Hourly rounding. Personal belongings within reach. Problem: Venous Thromboembolism:  Goal: Will show no signs or symptoms of venous thromboembolism  Description: Will show no signs or symptoms of venous thromboembolism  5/15/2020 2324 by Damon Mason RN  Outcome: Ongoing  Note: Pt on Xarelto. No s/s of DVT noted this shift. Problem: Activity Intolerance:  Goal: Ability to perform activities of daily living will improve  Description: Ability to perform activities of daily living will improve  5/15/2020 2324 by Damon Mason RN  Outcome: Ongoing  Note: Pt able to perform ADL's, SOB/some fatigue noted when up. Problem: Airway Clearance - Ineffective:  Goal: Ability to maintain a clear airway will improve  Description: Ability to maintain a clear airway will improve  5/15/2020 2324 by Damon Mason RN  Outcome: Ongoing  Note: Pt noted having productive cough per pt, yellow thick sputum, taking mucinex and encouraged water. Lung sounds expiratory wheezing with inspiratory wheezing noted to left side. Problem: Gas Exchange - Impaired:  Goal: Levels of oxygenation will improve  Description: Levels of oxygenation will improve  5/15/2020 2324 by Damon Mason RN  Outcome: Ongoing  Note: SpO2 93% on room air. Cough and deep breathing encouraged, acapella at bedside. Problem: Discharge Planning:  Goal: Discharged to appropriate level of care  Description: Discharged to appropriate level of care  5/15/2020 2324 by Damon Mason RN  Outcome: Ongoing  Note: Possible discharge 5/16 to home with wife. Will monitor for discharge needs.      Problem: Pain:  Goal: Control of

## 2020-05-17 NOTE — DISCHARGE SUMMARY
1704      General appearance: No apparent distress, well developed, appears stated age. Eyes:  Pupils equal, round, and reactive to light. Conjunctivae/corneas clear. HENT: Head normal in appearance. External nares normal.  Oral mucosa moist without lesions. Hearing grossly intact. Neck: Supple, with full range of motion. No jugular venous distention. Trachea midline. Respiratory:  Normal respiratory effort. Bilaterally decreased sounds with wheezing throughout. No rhonchi or rales  Cardiovascular: Normal rate, regular rhythm with normal S1/S2 without murmurs. No lower extremity edema. Abdomen: Soft, non-tender, non-distended with normal bowel sounds. Protuberant  Musculoskeletal: Normal range of motion in extremities. There is no joint swelling or tenderness. Skin: Skin color, texture, turgor normal.  No rashes or lesions. Neurologic:  Neurovascularly intact without any focal sensory/motor deficits in the upper and lower extremities. Cranial nerves:  grossly non-focal.  There is decreased sensation to the ulnar aspect of the fourth digit and whole fifth digit bilaterally, right worse than left. No decreased  strength or weakness. No hyperthenar wasting noted. No numbness noted proximally to this location. Psychiatric: Alert and oriented, thought content appropriate, normal insight. Capillary Refill: Brisk,< 3 seconds. Peripheral Pulses: +2 palpable, equal bilaterally. Labs:  For convenience and continuity at follow-up the following most recent labs are provided:      CBC:    Lab Results   Component Value Date    WBC 7.6 05/15/2020    HGB 13.1 05/15/2020    HCT 41.4 05/15/2020     05/15/2020       Renal:    Lab Results   Component Value Date     05/15/2020    K 4.5 05/15/2020    K 3.8 04/28/2020     05/15/2020    CO2 23 05/15/2020    BUN 14 05/15/2020    CREATININE 0.9 05/15/2020    CALCIUM 9.4 05/15/2020    PHOS 3.7 08/20/2018         Significant Diagnostic CODEINE  Take 5 mLs by mouth 4 times daily as needed for Cough for up to 7 days. * rivaroxaban 15 MG Tabs tablet  Commonly known as:  XARELTO  Take 1 tablet by mouth 2 times daily (with meals)     * rivaroxaban 20 MG Tabs tablet  Commonly known as:  Xarelto  Take 1 tablet by mouth daily (with breakfast) To start after taking 15 mg BID on 5/24/20  Start taking on:  May 24, 2020     therapeutic multivitamin-minerals tablet     tiotropium 2.5 MCG/ACT Aers inhaler  Commonly known as:  Spiriva Respimat  Inhale 2 puffs into the lungs daily     verapamil 240 MG extended release capsule  Commonly known as:  VERELAN         * This list has 4 medication(s) that are the same as other medications prescribed for you. Read the directions carefully, and ask your doctor or other care provider to review them with you. Where to Get Your Medications      These medications were sent to [facility] [address]    Phone:  [phone]   · cyanocobalamin 1000 MCG tablet  · Mucinex DM  MG Tb12  · predniSONE 20 MG tablet                 Time Spent on discharge is roughly 35 minutes in the examination, evaluation, counseling and review of medications and discharge plan. Thank you YESY Yeh CNP for the opportunity to be involved in this patient's care.     Signed:    Electronically signed by Lexx Jones DO on 5/17/2020 at 5:04 PM

## 2020-05-18 ENCOUNTER — CARE COORDINATION (OUTPATIENT)
Dept: CASE MANAGEMENT | Age: 75
End: 2020-05-18

## 2020-05-19 ENCOUNTER — OFFICE VISIT (OUTPATIENT)
Dept: ONCOLOGY | Age: 75
End: 2020-05-19
Payer: MEDICARE

## 2020-05-19 ENCOUNTER — HOSPITAL ENCOUNTER (OUTPATIENT)
Dept: INFUSION THERAPY | Age: 75
Discharge: HOME | End: 2020-05-19
Payer: MEDICARE

## 2020-05-19 VITALS
RESPIRATION RATE: 20 BRPM | TEMPERATURE: 98.8 F | HEIGHT: 74 IN | OXYGEN SATURATION: 94 % | HEART RATE: 54 BPM | SYSTOLIC BLOOD PRESSURE: 146 MMHG | DIASTOLIC BLOOD PRESSURE: 66 MMHG | WEIGHT: 293.4 LBS | BODY MASS INDEX: 37.65 KG/M2

## 2020-05-19 DIAGNOSIS — I26.92 ACUTE SADDLE PULMONARY EMBOLISM WITHOUT ACUTE COR PULMONALE (HCC): ICD-10-CM

## 2020-05-19 PROCEDURE — 85613 RUSSELL VIPER VENOM DILUTED: CPT

## 2020-05-19 PROCEDURE — 86147 CARDIOLIPIN ANTIBODY EA IG: CPT

## 2020-05-19 PROCEDURE — 99211 OFF/OP EST MAY X REQ PHY/QHP: CPT

## 2020-05-19 PROCEDURE — 36415 COLL VENOUS BLD VENIPUNCTURE: CPT

## 2020-05-19 PROCEDURE — 85300 ANTITHROMBIN III ACTIVITY: CPT

## 2020-05-19 PROCEDURE — 81240 F2 GENE: CPT

## 2020-05-19 PROCEDURE — 99204 OFFICE O/P NEW MOD 45 MIN: CPT | Performed by: PHYSICIAN ASSISTANT

## 2020-05-19 PROCEDURE — 85730 THROMBOPLASTIN TIME PARTIAL: CPT

## 2020-05-19 PROCEDURE — 85302 CLOT INHIBIT PROT C ANTIGEN: CPT

## 2020-05-19 PROCEDURE — 81241 F5 GENE: CPT

## 2020-05-19 PROCEDURE — 85305 CLOT INHIBIT PROT S TOTAL: CPT

## 2020-05-19 PROCEDURE — 85610 PROTHROMBIN TIME: CPT

## 2020-05-19 NOTE — PROGRESS NOTES
[facility] [address] [place] [address] [address]  [address] [address]  Dept: [phone]  Dept Fax: [phone]: [phone]      Visit Date:5/19/2020     Pavan Matt is a 66 y.o. male who presents today for:   Chief Complaint   Patient presents with    New Patient     DVT        HPI:   Pavan Matt is a 66 y.o. male referred to Hematology/Oncology clinic for evaluation of DVT per Hood Roman Daily, APRN-CNP. The patient has a history of multiple VTE and was recommended evaluation by Hematology. The patient was recently admitted to [facility] from 4/28/2020 to 5/3/2020 for acute saddle pulmonary embolism. He was also found to have an acute extensive DVT in the right lower extremity. The patient required mechanical thrombo-aspiration. The patient had drove from [place] to [place] prior to hospitalization and began having worsening shortness of breath. CTA on admission showed saddle pulmonary embolism extending into segmental branches of all lobes of the lungs. The right to left ventricular ratio appears to be slightly greater than 1 which may be indicative of right heart strain. He underwent emergent IR guided mechanical thromboaspiration on 4/28/20. He was initially treated wih heparin and transitioned to Xarelto at discharge. Since discharge from the hospital initially, the patient did have persistent shortness of breath and was admitted for asthma exacerbation. He had a repeat CTA of his chest on 5/1420 showing improvement in pulmonary emboli and improvement in the RV to LV ratio which appeared to be within normal limits. Prior to his initial hospitalization in April 2020, the patient had drove home from [place] over a 3-day course. He states he did stop every 3-4 hours during this time. He denies any recent trauma, immobilization, infection, or smoking. The patient has prior history of right lower extremity DVT and pulmonary embolism in 2018.   At that time

## 2020-05-22 LAB
ANTITHROMBIN ACTIVITY: 130 % (ref 76–128)
CARDIOLIPIN AB IGM: 5 MPL (ref 0–12)
CARDIOLIPIN ANTIBODY, IGG: 0 GPL (ref 0–14)
PROTEIN S ANTIGEN, TOTAL: 143 % (ref 84–134)

## 2020-05-23 LAB
DRVVT 1:1 MIX: 56 SEC (ref 33–44)
DRVVT CONFIRMATION TEST: POSITIVE RATIO
DRVVT SCREEN: 72 SEC (ref 33–44)
HEXAGONAL PHOSPHOLIPID NEUTRALIZAT TEST: ABNORMAL
LUPUS ANTICOAG INTERP: ABNORMAL
PLATELET NEUTRALIZATION: ABNORMAL
PROTEIN C ANTIGEN: > 95 % (ref 63–153)
PROTHROMBIN TIME: 18.2 SEC (ref 12–15.5)
PTT 1:1 MIX: ABNORMAL SEC (ref 32–48)
PTT LUPUS ANTICOAGULANT: 40 SEC (ref 32–48)
PTT-HEPARIN NEUTRALIZED: ABNORMAL SEC (ref 32–48)
REPTILASE TIME: ABNORMAL SEC
THROMBIN TIME: ABNORMAL SEC (ref 14.7–19.5)

## 2020-05-27 ENCOUNTER — CARE COORDINATION (OUTPATIENT)
Dept: CASE MANAGEMENT | Age: 75
End: 2020-05-27

## 2020-05-27 ENCOUNTER — HOSPITAL ENCOUNTER (OUTPATIENT)
Age: 75
Discharge: HOME | End: 2020-05-27
Payer: MEDICARE

## 2020-05-27 LAB
CHOLESTEROL, TOTAL: 200 MG/DL (ref 100–199)
HDLC SERPL-MCNC: 79 MG/DL
LDL CHOLESTEROL CALCULATED: 102 MG/DL
TRIGL SERPL-MCNC: 96 MG/DL (ref 0–199)

## 2020-05-27 PROCEDURE — 80061 LIPID PANEL: CPT

## 2020-05-27 PROCEDURE — 36415 COLL VENOUS BLD VENIPUNCTURE: CPT

## 2020-05-27 NOTE — CARE COORDINATION
Care Transition Nurse/ Ambulatory Care Manager contacted the patient by telephone to perform follow-up assessment. Verified name and  with patient as identifiers. Patient has following risk factors of: asthma. Symptoms reviewed with patient who verbalized the following symptoms: no worsening symptoms. Due to no new or worsening symptoms encounter was not routed to provider for escalation. Education provided regarding infection prevention, and signs and symptoms of COVID-19 and when to seek medical attention with patient who verbalized understanding. Discussed exposure protocols and quarantine from [address] you at higher risk for severe illness  and given an opportunity for questions and concerns. The patient agrees to contact the COVID-19 hotline [phone] or PCP office for questions related to their healthcare. CTN/ACM provided contact information for future reference. From CDC: Are you at higher risk for severe illness?  Wash your hands often.  Avoid close contact (6 feet, which is about two arm lengths) with people who are sick.  Put distance between yourself and other people if COVID-19 is spreading in your community.  Clean and disinfect frequently touched surfaces.  Avoid all cruise travel and non-essential air travel.  Call your healthcare professional if you have concerns about COVID-19 and your underlying condition or if you are sick. For more information on steps you can take to protect yourself, see CDC's How to Protect Yourself      Patient denies concerns or issues at this time.

## 2020-05-28 LAB
FACTOR V LEIDEN MUTATION: NORMAL
PROTHROMBIN G20210A MUTATION: NORMAL

## 2020-06-02 ENCOUNTER — OFFICE VISIT (OUTPATIENT)
Dept: ONCOLOGY | Age: 75
End: 2020-06-02
Payer: MEDICARE

## 2020-06-02 ENCOUNTER — HOSPITAL ENCOUNTER (OUTPATIENT)
Dept: INFUSION THERAPY | Age: 75
Discharge: HOME | End: 2020-06-02
Payer: MEDICARE

## 2020-06-02 VITALS
RESPIRATION RATE: 18 BRPM | WEIGHT: 284.2 LBS | BODY MASS INDEX: 36.47 KG/M2 | HEART RATE: 60 BPM | HEIGHT: 74 IN | TEMPERATURE: 99 F | DIASTOLIC BLOOD PRESSURE: 55 MMHG | OXYGEN SATURATION: 93 % | SYSTOLIC BLOOD PRESSURE: 123 MMHG

## 2020-06-02 PROCEDURE — 99213 OFFICE O/P EST LOW 20 MIN: CPT | Performed by: PHYSICIAN ASSISTANT

## 2020-06-02 PROCEDURE — 99211 OFF/OP EST MAY X REQ PHY/QHP: CPT

## 2020-06-02 NOTE — PATIENT INSTRUCTIONS
1. Return to clinic in 12 weeks  2. Repeat lupus anticoagulant testing one week prior to appointment. 3. Please call for questions or concerns.

## 2020-06-02 NOTE — PROGRESS NOTES
Antibiotics Other (See Comments)     congestion          Review of Systems:   Review of Systems   Pertinent review of systems noted in HPI, all other ROS negative. Objective:   Physical Exam   BP (!) 123/55 (Site: Left Upper Arm, Position: Sitting, Cuff Size: Large Adult)   Pulse 60   Temp 99 °F (37.2 °C) (Oral)   Resp 18   Ht 6' 2\" (1.88 m)   Wt 284 lb 3.2 oz (128.9 kg)   SpO2 93%   BMI 36.49 kg/m²    General appearance: No apparent distress, well developed, well nourished, obese and cooperative. HEENT: Pupils equal, round, and reactive to light. Conjunctivae/corneas clear. Oral mucosa moist.   Neck: Supple, with full range of motion. Trachea midline. Respiratory:  Normal respiratory effort. Clear to auscultation, bilaterally without Rales/Wheezes/Rhonchi. Cardiovascular: Regular rate and rhythm with normal S1/S2 without murmurs, rubs or gallops. Abdomen: Soft, non-tender, non-distended with active bowel sounds. Musculoskeletal: No clubbing, cyanosis or edema bilaterally. No calf pain with palpation  Skin: Skin color, texture, turgor normal.  No rashes or lesions. Neurologic:  Neurovascularly intact without any focal sensory/motor deficits.    Psychiatric: Alert and oriented      Imaging Studies and Labs:   CBC:   Lab Results   Component Value Date    WBC 7.6 05/15/2020    HGB 13.1 (L) 05/15/2020    HCT 41.4 (L) 05/15/2020    .5 (H) 05/15/2020     (H) 05/15/2020     BMP:   Lab Results   Component Value Date     05/15/2020    K 4.5 05/15/2020    K 3.8 04/28/2020     05/15/2020    CO2 23 05/15/2020    BUN 14 05/15/2020    CREATININE 0.9 05/15/2020    GLUCOSE 140 05/15/2020    CALCIUM 9.4 05/15/2020      LFT:   Lab Results   Component Value Date    ALT 8 (L) 05/14/2020    AST 13 05/14/2020    ALKPHOS 81 05/14/2020    BILITOT 0.3 05/14/2020      Antithrombin Activity 130High   76 - 128 % Final 05/19/2020  2:25 PM [facility]     Cardiolipin Antibody, IgG 0  0 - 14 GPL Final 05/19/2020  2:25 PM [facility]   INTERPRETIVE INFORMATION: Anti-Cardiolipin IgG Ab   0-14 GPL: Negative   15-19 GPL: Indeterminate   20-80 GPL: Low to Moderately Positive   81 GPL or above: High Positive      Cardiolipin Ab IgM 5  0 - 12 MPL Final 05/19/2020  2:25 PM [facility]   INTERPRETIVE INFORMATION: Anti-Cardiolipin IgM   0-12 MPL: Negative   13-19 MPL: Indeterminate   20-80 MPL: Low to Moderately Positive   81 MPL or above: High Positive     Factor V Leiden Mutation 05/19/2020  2:25  [address]   SEE BELOW    Comment:   Specimen(s) Received:   Peripheral blood, FVLI   Clinical Information:   Screening   RESULTS:   MOLECULAR GENETIC DIAGNOSIS:     Negative for Factor V Leiden Mutation   INTERPRETATION:    The Factor V Leiden mutation (1691GA)   [c.1601G>A(p.[id number])] was not detected in this study. Component Value Ref Range & Units Status Collected Lab   PTT Lupus Anticoagulant 40  32 - 48 sec Final 05/19/2020  2:25 PM [facility]   Protime 18. 2High   12.0 - 15.5 sec Final 05/19/2020  2:25 PM [facility]   dRVVT Screen 72High   33 - 44 sec Final 05/19/2020  2:25 PM [facility]   Hex Phosph Neut Test N/A  Negative Final 05/19/2020  2:25 PM [facility]   Ptt-Heparin Neutralized N/A  32 - 48 sec Final 05/19/2020  2:25 PM [facility]   Platelet Neutralization N/A  Negative Final 05/19/2020  2:25 PM [facility]   Thrombin Time N/A  14.7 - 19.5 sec Final 05/19/2020  2:25 PM [facility]   Reptilase Tm N/A  <=21.9 sec Final 05/19/2020  2:25 PM [facility]   PTT 1:1 Mix N/A  32 - 48 sec Final 05/19/2020  2:25 PM [facility]   DRVVT 1:1 Mix 56High   33 - 44 sec Final 05/19/2020  2:25 PM [facility]   DRVVT Confirmation Test PositiveAbnormal   Negative ratio Final 05/19/2020  2:25 PM [facility]   Lup Anticoag Interp See Note   Final 05/19/2020  2:25 PM [facility]   Lupus anticoagulant detected.       Protein C Antigen > 95  63 - 153 % Final 05/19/2020  2:25 PM [facility]       Protein S Ag, Total 143High   84 - 134 % Final 05/19/2020  2:25 PM [facility]     Prothrombin Mutation 05/19/2020  2:25 PM [facility]

## 2020-06-08 ENCOUNTER — HOSPITAL ENCOUNTER (OUTPATIENT)
Age: 75
Discharge: HOME | End: 2020-06-08
Payer: MEDICARE

## 2020-06-08 LAB
BASOPHILS # BLD: 1.2 %
BASOPHILS ABSOLUTE: 0.1 THOU/MM3 (ref 0–0.1)
EOSINOPHIL # BLD: 9.2 %
EOSINOPHILS ABSOLUTE: 0.7 THOU/MM3 (ref 0–0.4)
ERYTHROCYTE [DISTWIDTH] IN BLOOD BY AUTOMATED COUNT: 13.2 % (ref 11.5–14.5)
ERYTHROCYTE [DISTWIDTH] IN BLOOD BY AUTOMATED COUNT: 51.4 FL (ref 35–45)
HCT VFR BLD CALC: 43.6 % (ref 42–52)
HEMOGLOBIN: 13.8 GM/DL (ref 14–18)
IMMATURE GRANS (ABS): 0.04 THOU/MM3 (ref 0–0.07)
IMMATURE GRANULOCYTES: 0.5 %
LYMPHOCYTES # BLD: 10 %
LYMPHOCYTES ABSOLUTE: 0.7 THOU/MM3 (ref 1–4.8)
MCH RBC QN AUTO: 33 PG (ref 26–33)
MCHC RBC AUTO-ENTMCNC: 31.7 GM/DL (ref 32.2–35.5)
MCV RBC AUTO: 104.3 FL (ref 80–94)
MONOCYTES # BLD: 8.4 %
MONOCYTES ABSOLUTE: 0.6 THOU/MM3 (ref 0.4–1.3)
NUCLEATED RED BLOOD CELLS: 0 /100 WBC
PLATELET # BLD: 255 THOU/MM3 (ref 130–400)
PMV BLD AUTO: 10.5 FL (ref 9.4–12.4)
RBC # BLD: 4.18 MILL/MM3 (ref 4.7–6.1)
SEG NEUTROPHILS: 70.7 %
SEGMENTED NEUTROPHILS ABSOLUTE COUNT: 5.2 THOU/MM3 (ref 1.8–7.7)
WBC # BLD: 7.4 THOU/MM3 (ref 4.8–10.8)

## 2020-06-08 PROCEDURE — 82785 ASSAY OF IGE: CPT

## 2020-06-08 PROCEDURE — 36415 COLL VENOUS BLD VENIPUNCTURE: CPT

## 2020-06-08 PROCEDURE — 85025 COMPLETE CBC W/AUTO DIFF WBC: CPT

## 2020-06-10 LAB — IGE: 5 IU/ML

## 2020-07-01 NOTE — TELEPHONE ENCOUNTER
Joe Alcocer called requesting a refill on the following medications:  Requested Prescriptions     Pending Prescriptions Disp Refills    budesonide-formoterol (SYMBICORT) 160-4.5 MCG/ACT AERO 3 Inhaler 3     Sig: Inhale 2 puffs into the lungs 2 times daily     Pharmacy verified:  Yes         Date of last visit: 5-  Date of next visit (if applicable): Visit date not found

## 2020-07-13 RX ORDER — BUDESONIDE AND FORMOTEROL FUMARATE DIHYDRATE 160; 4.5 UG/1; UG/1
2 AEROSOL RESPIRATORY (INHALATION) 2 TIMES DAILY
Qty: 3 INHALER | Refills: 3 | Status: SHIPPED | OUTPATIENT
Start: 2020-07-13 | End: 2020-12-15

## 2020-08-06 ENCOUNTER — TELEPHONE (OUTPATIENT)
Dept: PULMONOLOGY | Age: 75
End: 2020-08-06

## 2020-08-06 ENCOUNTER — TELEPHONE (OUTPATIENT)
Dept: CARDIOLOGY CLINIC | Age: 75
End: 2020-08-06

## 2020-08-06 NOTE — TELEPHONE ENCOUNTER
Pre op clearance appointment requested.   Right cubital tunnel release  Dr Ocasio March 8/21/20 [facility]  PAT to be done prior at [facility]    Please contact [address] at [facility] at [address] [facility]

## 2020-08-10 ENCOUNTER — TELEPHONE (OUTPATIENT)
Dept: PULMONOLOGY | Age: 75
End: 2020-08-10

## 2020-08-14 ENCOUNTER — HOSPITAL ENCOUNTER (OUTPATIENT)
Age: 75
Discharge: HOME | End: 2020-08-14
Payer: MEDICARE

## 2020-08-14 PROCEDURE — U0002 COVID-19 LAB TEST NON-CDC: HCPCS

## 2020-08-14 PROCEDURE — 36415 COLL VENOUS BLD VENIPUNCTURE: CPT

## 2020-08-14 PROCEDURE — 85613 RUSSELL VIPER VENOM DILUTED: CPT

## 2020-08-14 PROCEDURE — 85610 PROTHROMBIN TIME: CPT

## 2020-08-14 PROCEDURE — 85385 FIBRINOGEN ANTIGEN: CPT

## 2020-08-14 PROCEDURE — 85730 THROMBOPLASTIN TIME PARTIAL: CPT

## 2020-08-17 ENCOUNTER — HOSPITAL ENCOUNTER (OUTPATIENT)
Dept: PULMONOLOGY | Age: 75
Discharge: HOME | End: 2020-08-17
Payer: MEDICARE

## 2020-08-17 LAB
PERFORMING LAB: NORMAL
REPORT: NORMAL
SARS-COV-2: NOT DETECTED

## 2020-08-17 PROCEDURE — 94726 PLETHYSMOGRAPHY LUNG VOLUMES: CPT

## 2020-08-17 PROCEDURE — 94729 DIFFUSING CAPACITY: CPT

## 2020-08-17 PROCEDURE — 94060 EVALUATION OF WHEEZING: CPT

## 2020-08-19 LAB
DRVVT 1:1 MIX: 59 SEC (ref 33–44)
DRVVT CONFIRMATION TEST: NEGATIVE RATIO
DRVVT SCREEN: 77 SEC (ref 33–44)
HEXAGONAL PHOSPHOLIPID NEUTRALIZAT TEST: NEGATIVE
LUPUS ANTICOAG INTERP: ABNORMAL
PLATELET NEUTRALIZATION: ABNORMAL
PROTHROMBIN TIME: 20 SEC (ref 12–15.5)
PTT 1:1 MIX: ABNORMAL SEC (ref 32–48)
PTT LUPUS ANTICOAGULANT: 47 SEC (ref 32–48)
PTT-HEPARIN NEUTRALIZED: ABNORMAL SEC (ref 32–48)
REPTILASE TIME: ABNORMAL SEC
THROMBIN TIME: ABNORMAL SEC (ref 14.7–19.5)

## 2020-08-24 ENCOUNTER — TELEPHONE (OUTPATIENT)
Dept: PULMONOLOGY | Age: 75
End: 2020-08-24

## 2020-08-24 ENCOUNTER — VIRTUAL VISIT (OUTPATIENT)
Dept: PULMONOLOGY | Age: 75
End: 2020-08-24
Payer: MEDICARE

## 2020-08-24 PROCEDURE — 99213 OFFICE O/P EST LOW 20 MIN: CPT | Performed by: NURSE PRACTITIONER

## 2020-08-24 ASSESSMENT — ENCOUNTER SYMPTOMS
SHORTNESS OF BREATH: 0
ABDOMINAL PAIN: 0
VOMITING: 0
WHEEZING: 0
DIARRHEA: 0
EYES NEGATIVE: 1
CHEST TIGHTNESS: 0
NAUSEA: 0
COUGH: 0

## 2020-08-24 NOTE — TELEPHONE ENCOUNTER
Patient forgot to mention in his video visit that he is going to have a nerve block with ulnar nerve surgery @ [facility] on 9-11-20. Patient wants to know if he should stop Xearlto or take something else in it's place. He would like to discuss this with you.

## 2020-08-24 NOTE — PATIENT INSTRUCTIONS
Continue current Inhalers    Recommend stopping Muccinex DM and try just plain Muccinex WITHOUT the decongestant to help with BP management     Keep appointments with hematology for blood thinner med management

## 2020-08-24 NOTE — PROGRESS NOTES
times daily 3 Inhaler 3    Dextromethorphan-guaiFENesin (MUCINEX DM)  MG TB12 Take 1 tablet by mouth 2 times daily 28 tablet 0    vitamin B-12 1000 MCG tablet Take 1 tablet by mouth daily 30 tablet 3    tiotropium (SPIRIVA RESPIMAT) 2.5 MCG/ACT AERS inhaler Inhale 2 puffs into the lungs daily 1 Inhaler 11    albuterol (PROVENTIL) (2.5 MG/3ML) 0.083% nebulizer solution inhale contents of 1 vial in nebulizer every 6 hours if needed for wheezing shortness of breath 375 mL 5    rivaroxaban (XARELTO) 15 MG TABS tablet Take 1 tablet by mouth 2 times daily (with meals) (Patient not taking: Reported on 6/2/2020) 42 tablet 0    rivaroxaban (XARELTO) 20 MG TABS tablet Take 1 tablet by mouth daily (with breakfast) To start after taking 15 mg BID on 5/24/20 90 tablet 1    docusate sodium (COLACE) 100 MG capsule Take 100 mg by mouth daily      lisinopril (PRINIVIL;ZESTRIL) 10 MG tablet Take 10 mg by mouth daily      fluticasone (FLONASE) 50 MCG/ACT nasal spray 1 spray by Nasal route daily as needed for Rhinitis 3 Bottle 3    CPAP Machine MISC by Does not apply route Please change CPAP pressure to 13 cm H20. Download 2 weeks 1 each 0    albuterol sulfate  (90 Base) MCG/ACT inhaler Inhale 2 puffs into the lungs every 6 hours as needed for Wheezing      omeprazole (PRILOSEC) 20 MG delayed release capsule Take 20 mg by mouth daily      pravastatin (PRAVACHOL) 20 MG tablet Take 20 mg by mouth daily      verapamil (VERELAN PM) 240 MG CR capsule Take 240 mg by mouth every morning. Indications: Per patient taking medication for migraines prevention. No current facility-administered medications for this visit. Clemencia RIDLEY   Review of Systems   Constitutional: Negative for chills and fever. HENT: Negative. Eyes: Negative. Respiratory: Negative for cough, chest tightness, shortness of breath and wheezing. Cardiovascular: Negative for chest pain, palpitations and leg swelling. Gastrointestinal: Negative for abdominal pain, diarrhea, nausea and vomiting. Genitourinary: Negative. Musculoskeletal: Negative. Skin: Negative. Neurological: Negative. Hematological: Does not bruise/bleed easily. Psychiatric/Behavioral: Negative for suicidal ideas. Physical exam   There were no vitals taken for this visit. Physical Exam  Constitutional:       Appearance: Normal appearance. HENT:      Head: Normocephalic and atraumatic. Eyes:      Conjunctiva/sclera: Conjunctivae normal.   Pulmonary:      Effort: Pulmonary effort is normal. No tachypnea, bradypnea or respiratory distress. Neurological:      Mental Status: He is alert and oriented to person, place, and time. Psychiatric:         Attention and Perception: Attention normal.         Mood and Affect: Mood normal.         Speech: Speech normal.         Behavior: Behavior normal.         Thought Content: Thought content normal.         Cognition and Memory: Cognition normal.         Judgment: Judgment normal.          Test results   Lung Nodule Screening     [] Qualifies    [x]Does not qualify   [] Declined    [] Completed             Assessment      Diagnosis Orders   1. Asthma-COPD overlap syndrome ([facility] [address]     2. History of pulmonary embolus (PE)     3. DVT, recurrent, lower extremity, acute, right ([address]         asthma/ COPD under good control     Plan   -recommend switching Muccinex DM to just Muccinex ( guaifenesin) to help with BP   -continue symbicort and spiriva   -PRN albuterol  -keep f/u with hematology for anticoag management , per notes will be on anticoag life long as no other contraindications    Will see Gurmeet Gonzalez in: 1 year, pt request doxy. me for f/u     Maribel Medina is being evaluated by a Virtual Visit (video visit) encounter to address concerns as mentioned above. A caregiver was present when appropriate.  Due to this being a TeleHealth encounter (During [facility] public health emergency), evaluation of the following organ systems was limited: Vitals/Constitutional/EENT/Resp/CV/GI//MS/Neuro/Skin/Heme-Lymph-Imm. Pursuant to the emergency declaration under the [address] [place] [address], [address] [place] [address] and the [employer] Act, this Virtual Visit was conducted with patient's (and/or legal guardian's) consent, to reduce the patient's risk of exposure to COVID-19 and provide necessary medical care. The patient (and/or legal guardian) has also been advised to contact this office for worsening conditions or problems, and seek emergency medical treatment and/or call 911 if deemed necessary. Services were provided through a video synchronous discussion virtually to substitute for in-person clinic visit. Patient and provider were located at their individual homes. Patient identification was verified at the start of the visit.          Electronically signed by YESY Tay CNP on 8/24/2020 at 11:41 AM

## 2020-08-31 ENCOUNTER — HOSPITAL ENCOUNTER (OUTPATIENT)
Dept: INFUSION THERAPY | Age: 75
Discharge: HOME | End: 2020-08-31
Payer: MEDICARE

## 2020-08-31 ENCOUNTER — OFFICE VISIT (OUTPATIENT)
Dept: ONCOLOGY | Age: 75
End: 2020-08-31
Payer: MEDICARE

## 2020-08-31 VITALS
TEMPERATURE: 97.6 F | HEIGHT: 74 IN | HEART RATE: 57 BPM | WEIGHT: 287.8 LBS | SYSTOLIC BLOOD PRESSURE: 121 MMHG | RESPIRATION RATE: 18 BRPM | OXYGEN SATURATION: 94 % | DIASTOLIC BLOOD PRESSURE: 72 MMHG | BODY MASS INDEX: 36.93 KG/M2

## 2020-08-31 PROCEDURE — 99214 OFFICE O/P EST MOD 30 MIN: CPT | Performed by: PHYSICIAN ASSISTANT

## 2020-08-31 PROCEDURE — 99211 OFF/OP EST MAY X REQ PHY/QHP: CPT

## 2020-08-31 NOTE — PROGRESS NOTES
had [place] from [place] to [place] to go on a cruise. He drove 14 hours straight and developed shortness of breath and sought evaluation while in [place]. He was diagnosed with DVT and PE and was treated with Xarelto for 18 months. He had no other provoking factors at that time. His past medical history includes hypertension, asthma, CHARIS, and history of skin cancers. He is a nonsmoker and does not drink alcohol. He is up-to-date on colonoscopy and has PSA monitored annually. Pertinent family history includes his sister had a saddle pulmonary embolism. Interval History 8/31/2020: The patient is here for follow-up evaluation of hypercoagulable work-up. Since his last visit in June 2020 he states he has been feeling well. He denies any change his overall health. He reports improvement in shortness of breath and dyspnea on exertion and states his symptoms have resolved. He denies any chest pain, palpitations, lightheadedness, dizziness, leg pain or cramping. He continues to tolerate Xarelto well without any abnormal bleeding. He denies epistaxis, hemoptysis, hematemesis, melena, hematochezia, hematuria.       Past Medical History:   Diagnosis Date    Arthritis     Asthma     Cancer ([address]     skin    Chickenpox     Hyperlipidemia     Hypertension     Measles     Migraines     Mumps     Sleep apnea       Past Surgical History:   Procedure Laterality Date    COLONOSCOPY      CORONARY ANGIOPLASTY N/A 2017/2018    Dr. Qasim Green Left 11/20/2019    PAROTIDECTOMY, LEFT performed by Toshia Cervantes MD at [address]  1990      Family History   Problem Relation Age of Onset    Diabetes Mother     COPD Mother     Cancer Father     Deep Vein Thrombosis Sister     Crohn's Disease Sister       Social History     Tobacco Use    Smoking status: Never Smoker    Smokeless tobacco: Never Used   Substance Use Topics    Alcohol use: No     Alcohol/week: 0.0 standard drinks      Current Outpatient Medications   Medication Sig Dispense Refill    budesonide-formoterol (SYMBICORT) 160-4.5 MCG/ACT AERO Inhale 2 puffs into the lungs 2 times daily 3 Inhaler 3    vitamin B-12 1000 MCG tablet Take 1 tablet by mouth daily 30 tablet 3    albuterol (PROVENTIL) (2.5 MG/3ML) 0.083% nebulizer solution inhale contents of 1 vial in nebulizer every 6 hours if needed for wheezing shortness of breath 375 mL 5    rivaroxaban (XARELTO) 20 MG TABS tablet Take 1 tablet by mouth daily (with breakfast) To start after taking 15 mg BID on 5/24/20 90 tablet 1    docusate sodium (COLACE) 100 MG capsule Take 100 mg by mouth daily      lisinopril (PRINIVIL;ZESTRIL) 10 MG tablet Take 10 mg by mouth daily      fluticasone (FLONASE) 50 MCG/ACT nasal spray 1 spray by Nasal route daily as needed for Rhinitis 3 Bottle 3    CPAP Machine MISC by Does not apply route Please change CPAP pressure to 13 cm H20. Download 2 weeks 1 each 0    albuterol sulfate  (90 Base) MCG/ACT inhaler Inhale 2 puffs into the lungs every 6 hours as needed for Wheezing      omeprazole (PRILOSEC) 20 MG delayed release capsule Take 20 mg by mouth daily      pravastatin (PRAVACHOL) 20 MG tablet Take 20 mg by mouth daily      verapamil (VERELAN PM) 240 MG CR capsule Take 240 mg by mouth every morning. Indications: Per patient taking medication for migraines prevention.  Dextromethorphan-guaiFENesin (MUCINEX DM)  MG TB12 Take 1 tablet by mouth 2 times daily (Patient not taking: Reported on 8/31/2020) 28 tablet 0     No current facility-administered medications for this visit. Allergies   Allergen Reactions    Molds & Smuts Other (See Comments)     congestion    Sulfa Antibiotics Other (See Comments)     congestion          Review of Systems:   Review of Systems   Pertinent review of systems noted in HPI, all other ROS negative.    Objective:   Physical Exam   /72 (Site: Left Upper Arm, Position: Sitting, Cuff Size: Medium Adult)   Pulse 57   Temp 97.6 °F (36.4 °C) (Oral)   Resp 18   Ht 6' 2\" (1.88 m)   Wt 287 lb 12.8 oz (130.5 kg)   SpO2 94%   BMI 36.95 kg/m²    General appearance: No apparent distress, well developed, well nourished, obese and cooperative. HEENT: Pupils equal, round, and reactive to light. Conjunctivae/corneas clear. Oral mucosa moist.   Neck: Supple, with full range of motion. Trachea midline. Respiratory:  Normal respiratory effort. Clear to auscultation, bilaterally without Rales/Wheezes/Rhonchi. Cardiovascular: Regular rate and rhythm with normal S1/S2 without murmurs, rubs or gallops. Abdomen: Soft, non-tender, non-distended with active bowel sounds. Musculoskeletal: No clubbing, cyanosis or edema bilaterally. No calf pain with palpation  Skin: Skin color, texture, turgor normal.  No rashes or lesions. Neurologic:  Neurovascularly intact without any focal sensory/motor deficits.    Psychiatric: Alert and oriented      Imaging Studies and Labs:   CBC:   Lab Results   Component Value Date    WBC 7.4 06/08/2020    HGB 13.8 (L) 06/08/2020    HCT 43.6 06/08/2020    .3 (H) 06/08/2020     06/08/2020     BMP:   Lab Results   Component Value Date     05/15/2020    K 4.5 05/15/2020    K 3.8 04/28/2020     05/15/2020    CO2 23 05/15/2020    BUN 14 05/15/2020    CREATININE 0.9 05/15/2020    GLUCOSE 140 05/15/2020    CALCIUM 9.4 05/15/2020      LFT:   Lab Results   Component Value Date    ALT 8 (L) 05/14/2020    AST 13 05/14/2020    ALKPHOS 81 05/14/2020    BILITOT 0.3 05/14/2020      Antithrombin Activity 130High   76 - 128 % Final 05/19/2020  2:25 PM [facility]     Cardiolipin Antibody, IgG 0  0 - 14 GPL Final 05/19/2020  2:25 PM [facility]   INTERPRETIVE INFORMATION: Anti-Cardiolipin IgG Ab   0-14 GPL: Negative   15-19 GPL: Indeterminate   20-80 GPL: Low to Moderately Positive   81 GPL or above: High Positive      Cardiolipin Ab IgM 5 0 - 12 MPL Final 05/19/2020  2:25 PM [facility]   INTERPRETIVE INFORMATION: Anti-Cardiolipin IgM   0-12 MPL: Negative   13-19 MPL: Indeterminate   20-80 MPL: Low to Moderately Positive   81 MPL or above: High Positive     Factor V Leiden Mutation 05/19/2020  2:25  [address]   SEE BELOW    Comment:   Specimen(s) Received:   Peripheral blood, FVLI   Clinical Information:   Screening   RESULTS:   MOLECULAR GENETIC DIAGNOSIS:     Negative for Factor V Leiden Mutation   INTERPRETATION:    The Factor V Leiden mutation (1691GA)   [c.1601G>A(p.[id number])] was not detected in this study. Component Value Ref Range & Units Status Collected Lab   PTT Lupus Anticoagulant 40  32 - 48 sec Final 05/19/2020  2:25 PM [facility]   Protime 18. 2High   12.0 - 15.5 sec Final 05/19/2020  2:25 PM [facility]   dRVVT Screen 72High   33 - 44 sec Final 05/19/2020  2:25 PM [facility]   Hex Phosph Neut Test N/A  Negative Final 05/19/2020  2:25 PM [facility]   Ptt-Heparin Neutralized N/A  32 - 48 sec Final 05/19/2020  2:25 PM [facility]   Platelet Neutralization N/A  Negative Final 05/19/2020  2:25 PM [facility]   Thrombin Time N/A  14.7 - 19.5 sec Final 05/19/2020  2:25 PM [facility]   Reptilase Tm N/A  <=21.9 sec Final 05/19/2020  2:25 PM [facility]   PTT 1:1 Mix N/A  32 - 48 sec Final 05/19/2020  2:25 PM [facility]   DRVVT 1:1 Mix 56High   33 - 44 sec Final 05/19/2020  2:25 PM [facility]   DRVVT Confirmation Test PositiveAbnormal   Negative ratio Final 05/19/2020  2:25 PM [facility]   Lup Anticoag Interp See Note   Final 05/19/2020  2:25 PM [facility]   Lupus anticoagulant detected.       Protein C Antigen > 95  63 - 153 % Final 05/19/2020  2:25 PM [facility]       Protein S Ag, Total 143High   84 - 134 % Final 05/19/2020  2:25 PM [facility]     Prothrombin Mutation 05/19/2020  2:25  [address]   SEE BELOW    Comment:   Specimen(s) Received:   Peripheral blood, PTI   Clinical Information:   Screening   RESULTS:   MOLECULAR GENETIC DIAGNOSIS:          Negative for Prothrombin 09243I Mutation   INTERPRETATION:  The Factor II (Prothrombin) mutation (43841MM)   [c.*97G>A] was not detected in this study. 8/19/2020 11:06 PM - [facility] Incoming Lab Results From [facility]     Component  Value  Ref Range & Units  Status  Collected  Lab    PTT Lupus Anticoagulant  47  32 - 48 sec  Final  08/14/2020 12:43 PM  [facility]    Protime  20. 0High    12.0 - 15.5 sec  Final  08/14/2020 12:43 PM  [facility]    dRVVT Screen  77High    33 - 44 sec  Final  08/14/2020 12:43 PM  [facility]    Hex Phosph Neut Test  Negative  Negative  Final  08/14/2020 12:43 PM  [facility]    Ptt-Heparin Neutralized  N/A  32 - 48 sec  Final  08/14/2020 12:43 PM  [facility]    Platelet Neutralization  N/A  Negative  Final  08/14/2020 12:43 PM  [facility]    Thrombin Time  N/A  14.7 - 19.5 sec  Final  08/14/2020 12:43 PM  [facility]    Reptilase Tm  N/A  <=21.9 sec  Final  08/14/2020 12:43 PM  [facility]    PTT 1:1 Mix  N/A  32 - 48 sec  Final  08/14/2020 12:43 PM  [facility]    DRVVT 1:1 Mix  59High    33 - 44 sec  Final  08/14/2020 12:43 PM  [facility]    DRVVT Confirmation Test  Negative  Negative ratio  Final  08/14/2020 12:43 PM  [facility]    Lup Anticoag Interp  See Note   Final  08/14/2020 12:43 PM  [facility]    Lupus anticoagulant not detected. Assessment and Plan:   1. History of Saddle Pulmonary Embolism   Patient with recent extensive PE, RLE DVT and history of RLE DVT, PE in 2018. He had provoking factors of long travel; however, due to extent of clotting and positive family history hypercoagulable work-up obtained:                - Antithrombin III - 130%, very mild elevated, non specific. No deficiency noted. - Cardiolipin Antibodies IgG & IgM - negative              - Factor 5 Leiden - negative              - Lupus (LE) panel w/ reflex - detected. - Protein C Antigen, Total - within normal limits              - Protein S Antigen, Total - 143%, very mildly elevated. Non specific. No deficiency noted.                - Prothrombin Gene Mutation - negative  -Repeat lupus anticoagulant was obtained on 20 and negative. Lupus anticoagulant not detected. Discussed initial test was performed while on anticoagulation which can cause false positive testing. testing on 2 more occasions at least 12 weeks apart is recommended to confirm persistently positive results of lupus anticoagulant. Reviewed this finding with the patient today. Discussed that he does not have antiphospholipid syndrome. Discussed with the patient lifelong anticoagulation is recommended. He is currently on Xarelto which he tolerates well. Recommend to continue Xarelto. Discussed with the patient to monitor for any abnormal bleeding. 2. Preoperative Evaluation   The patient is to have right cubital tunnel release per Dr. Danna Lucio at [facility] on 20. Called and discussed with Dr. Ivette Ordonez assistant. The patient is expected to have low bleeding risk with EBL 5 mL. Reviewed with Dr. Sarah Mcclain and recommend the followin. Discontinue Xarelto three days prior to surgery   2. Begin Lovenox 2 days prior to surgery at 1 mg/kg twice daily dosing   3. Hold Lovenox 24 hours prior to surgery   4. Resume Lovenox 24 hours after surgery at 1 mg/kg twice daily dosing. Continue Lovenox for 5 days after surgery   5. Discontinue Lovenox 6 days after surgery, then resume Xarelto  Called and discussed these recommendations with Dr. Ivette Ordonez assistant. Prescription for Lovenox sent to patient's pharmacy. 3. Pulmonary Nodules  Noted on CTA of the chest on 20 as several pulmonary nodules in the anterior medial right lung. Several of these are stable from the prior examination dating back to 2018. However there is a new pulmonary nodule demonstrated of the anterior medial right lung base on axial image 126 measuring 1.4 x 0.8 cm. Recommend 3-month follow-up CT imaging to document stability or resolution.   The patient follows with pulmonology and is recommended to have repeat CT of the chest in 3 months per his pulmonology clinic. All patient questions answered. Pt voiced understanding. Patient agreed with treatment plan. Reviewed assessment and plan with Dr. Sarah Mcclain. Follow up as directed. Patient instructed to call for questions or concerns.       Electronically signed by   Thiago Hernández PA-C

## 2020-09-08 ENCOUNTER — TELEPHONE (OUTPATIENT)
Dept: ONCOLOGY | Age: 75
End: 2020-09-08

## 2020-09-08 NOTE — TELEPHONE ENCOUNTER
Valerie Going from [facility] called with questions regarding the dosage on Joe's Lovenox.

## 2020-11-06 ENCOUNTER — OFFICE VISIT (OUTPATIENT)
Dept: CARDIOLOGY CLINIC | Age: 75
End: 2020-11-06
Payer: MEDICARE

## 2020-11-06 VITALS
SYSTOLIC BLOOD PRESSURE: 111 MMHG | HEIGHT: 74 IN | HEART RATE: 66 BPM | BODY MASS INDEX: 37.12 KG/M2 | WEIGHT: 289.2 LBS | DIASTOLIC BLOOD PRESSURE: 67 MMHG

## 2020-11-06 PROBLEM — E66.01 CLASS 2 SEVERE OBESITY DUE TO EXCESS CALORIES WITH SERIOUS COMORBIDITY AND BODY MASS INDEX (BMI) OF 37.0 TO 37.9 IN ADULT (HCC): Status: ACTIVE | Noted: 2020-11-06

## 2020-11-06 PROBLEM — E66.812 CLASS 2 SEVERE OBESITY DUE TO EXCESS CALORIES WITH SERIOUS COMORBIDITY AND BODY MASS INDEX (BMI) OF 37.0 TO 37.9 IN ADULT: Status: ACTIVE | Noted: 2020-11-06

## 2020-11-06 PROCEDURE — 99213 OFFICE O/P EST LOW 20 MIN: CPT | Performed by: NUCLEAR MEDICINE

## 2020-11-06 NOTE — PROGRESS NOTES
[facility],[address]  [address] [place], [place] [address]  Dept: [phone]  Dept Fax: [phone]  Loc: [phone]    Visit Date: 11/6/2020    Catina Foster is a 66 y.o. male who presents todayfor:  Chief Complaint   Patient presents with    1 Year Follow Up    Hypertension    Coronary Artery Disease    Hyperlipidemia     No chest pain   No changes in breathing  Cath 2016   Mild CAd  Bp is stable   No dizziness  No syncope  On statins for hyperlipidemia  On xeralto for PE     HPI:  HPI  Past Medical History:   Diagnosis Date    Arthritis     Asthma     Cancer ([address]     skin    Chickenpox     Hyperlipidemia     Hypertension     Measles     Migraines     Mumps     Sleep apnea       Past Surgical History:   Procedure Laterality Date    COLONOSCOPY      CORONARY ANGIOPLASTY N/A 2017/2018    Dr. Juan Diego Melgoza Left 09/2020    EYE SURGERY      PAROTIDECTOMY Left 11/20/2019    PAROTIDECTOMY, LEFT performed by Alejandra Mac MD at [place] [id number]     Family History   Problem Relation Age of Onset    Diabetes Mother     COPD Mother     Cancer Father     Deep Vein Thrombosis Sister     Crohn's Disease Sister      Social History     Tobacco Use    Smoking status: Never Smoker    Smokeless tobacco: Never Used   Substance Use Topics    Alcohol use: No     Alcohol/week: 0.0 standard drinks      Current Outpatient Medications   Medication Sig Dispense Refill    enoxaparin (LOVENOX) 150 MG/ML injection Inject 0.87 mLs into the skin every 12 hours Follow instructions provided by Hematology office. Begin Lovenox 2 days prior to surgery. Hold Lovenox 24 hours prior to surgery. Resume Lovenox 24 hours after surgery for 5 days. Discontinue Lovenox 6 days after surgery and resume Xarelto.  12 Syringe 0    budesonide-formoterol (SYMBICORT) 160-4.5 MCG/ACT AERO Inhale 2 puffs into the lungs 2 times daily 3 Inhaler 3    Dextromethorphan-guaiFENesin (MUCINEX DM)  MG TB12 Take 1 tablet by mouth 2 times daily 28 tablet 0    vitamin B-12 1000 MCG tablet Take 1 tablet by mouth daily 30 tablet 3    albuterol (PROVENTIL) (2.5 MG/3ML) 0.083% nebulizer solution inhale contents of 1 vial in nebulizer every 6 hours if needed for wheezing shortness of breath 375 mL 5    rivaroxaban (XARELTO) 20 MG TABS tablet Take 1 tablet by mouth daily (with breakfast) To start after taking 15 mg BID on 5/24/20 90 tablet 1    docusate sodium (COLACE) 100 MG capsule Take 100 mg by mouth daily      lisinopril (PRINIVIL;ZESTRIL) 10 MG tablet Take 10 mg by mouth daily      fluticasone (FLONASE) 50 MCG/ACT nasal spray 1 spray by Nasal route daily as needed for Rhinitis 3 Bottle 3    CPAP Machine MISC by Does not apply route Please change CPAP pressure to 13 cm H20. Download 2 weeks 1 each 0    albuterol sulfate  (90 Base) MCG/ACT inhaler Inhale 2 puffs into the lungs every 6 hours as needed for Wheezing      omeprazole (PRILOSEC) 20 MG delayed release capsule Take 20 mg by mouth daily      pravastatin (PRAVACHOL) 20 MG tablet Take 20 mg by mouth daily      verapamil (VERELAN PM) 240 MG CR capsule Take 240 mg by mouth every morning. Indications: Per patient taking medication for migraines prevention. No current facility-administered medications for this visit.       Allergies   Allergen Reactions    Molds & Smuts Other (See Comments)     congestion    Sulfa Antibiotics Other (See Comments)     congestion     Health Maintenance   Topic Date Due    DTaP/Tdap/Td vaccine (1 - Tdap) [birth year]/1962    Shingles Vaccine (2 of 3) 11/05/2014    Pneumococcal 65+ years Vaccine (2 of 2 - PPSV23) 07/25/2018    Annual Wellness Visit (AWV)  06/20/2019    Lipid screen  05/27/2021    Potassium monitoring  09/18/2021    Creatinine monitoring  09/18/2021    Flu vaccine  Completed    Hepatitis A vaccine  Aged Anatoliy Youssef Hepatitis B vaccine  Aged Out    Hib vaccine  Aged Out    Meningococcal (ACWY) vaccine  Aged Out       Subjective:  Review of Systems  General:   No fever, no chills, No fatigue or weight loss  Pulmonary:    some dyspnea, no wheezing  Cardiac:    Denies recent chest pain,   GI:     No nausea or vomiting, no abdominal pain  Neuro:    No dizziness or light headedness,   Musculoskeletal:  No recent active issues  Extremities:   No edema, no obvious claudication       Objective:  Physical Exam  /67   Pulse 66   Ht 6' 2\" (1.88 m)   Wt 289 lb 3.2 oz (131.2 kg)   BMI 37.13 kg/m²   General:   Well developed, well nourished  Lungs:   Clear to auscultation  Heart:    Normal S1 S2, Slight murmur. no rubs, no gallops  Abdomen:   Soft, non tender, no organomegalies, positive bowel sounds  Extremities:   No edema, no cyanosis, good peripheral pulses  Neurological:   Awake, alert, oriented. No obvious focal deficits  Musculoskelatal:  No obvious deformities    Assessment:      Diagnosis Orders   1. Essential hypertension     2. Class 2 severe obesity due to excess calories with serious comorbidity and body mass index (BMI) of 37.0 to 37.9 in adult ([facility]     3. Coronary artery disease involving native coronary artery of native heart without angina pectoris     as above  Cardiac fair for now         Plan:  No follow-ups on file. As above  Continue risk factor modification and medical management  Thank you for allowing me to participate in the care of your patient. Please don't hesitate to contact me regarding any further issues related to the patient care    Orders Placed:  No orders of the defined types were placed in this encounter. Medications Prescribed:  No orders of the defined types were placed in this encounter. Discussed use, benefit, and side effects of prescribed medications. All patient questions answered. Pt voicedunderstanding. Instructed to continue current medications, diet and exercise. Continue risk factor modification and medical management. Patient agreed with treatment plan. Follow up as directed.     Electronically signedby Khalida Rosenthal MD on 11/6/2020 at 9:38 AM

## 2020-11-10 ENCOUNTER — TELEPHONE (OUTPATIENT)
Dept: PULMONOLOGY | Age: 75
End: 2020-11-10

## 2020-11-10 RX ORDER — PREDNISONE 10 MG/1
10 TABLET ORAL DAILY
Qty: 30 TABLET | Refills: 0 | Status: SHIPPED | OUTPATIENT
Start: 2020-11-10 | End: 2020-11-28

## 2020-11-10 RX ORDER — IPRATROPIUM BROMIDE AND ALBUTEROL SULFATE 2.5; .5 MG/3ML; MG/3ML
1 SOLUTION RESPIRATORY (INHALATION) 4 TIMES DAILY
Qty: 360 ML | Refills: 3 | Status: ON HOLD | OUTPATIENT
Start: 2020-11-10 | End: 2020-12-17 | Stop reason: HOSPADM

## 2020-11-10 RX ORDER — GUAIFENESIN AND DEXTROMETHORPHAN HYDROBROMIDE 600; 30 MG/1; MG/1
1 TABLET, EXTENDED RELEASE ORAL 2 TIMES DAILY
Qty: 28 TABLET | Refills: 0 | Status: SHIPPED | OUTPATIENT
Start: 2020-11-10

## 2020-11-10 NOTE — TELEPHONE ENCOUNTER
I called and notified Barbara Hernandez, he verbalized understanding and will be getting tested tomorrow 11/11/20 and is to follow up on 11/17/20. Thank you.

## 2020-11-10 NOTE — TELEPHONE ENCOUNTER
Patient needs CXR and COVID testing if those have not been done.   I will go ahead and send in new script to go back on prednisone and script for Muccinex

## 2020-11-10 NOTE — TELEPHONE ENCOUNTER
I have also sent in script for Duoneb for his nebulizer, he should start this and use 4x daily in addition to his Symbicort.    Make appt to be seen in office in 1-2 weeks ( pending COVID is negative)

## 2020-11-11 ENCOUNTER — HOSPITAL ENCOUNTER (OUTPATIENT)
Dept: GENERAL RADIOLOGY | Age: 75
Discharge: HOME | End: 2020-11-11
Payer: MEDICARE

## 2020-11-11 ENCOUNTER — HOSPITAL ENCOUNTER (OUTPATIENT)
Age: 75
Discharge: HOME | End: 2020-11-11
Payer: MEDICARE

## 2020-11-11 PROCEDURE — U0003 INFECTIOUS AGENT DETECTION BY NUCLEIC ACID (DNA OR RNA); SEVERE ACUTE RESPIRATORY SYNDROME CORONAVIRUS 2 (SARS-COV-2) (CORONAVIRUS DISEASE [COVID-19]), AMPLIFIED PROBE TECHNIQUE, MAKING USE OF HIGH THROUGHPUT TECHNOLOGIES AS DESCRIBED BY CMS-2020-01-R: HCPCS

## 2020-11-11 PROCEDURE — 71045 X-RAY EXAM CHEST 1 VIEW: CPT

## 2020-11-12 LAB — SARS-COV-2: NOT DETECTED

## 2020-11-17 ENCOUNTER — OFFICE VISIT (OUTPATIENT)
Dept: PULMONOLOGY | Age: 75
End: 2020-11-17
Payer: MEDICARE

## 2020-11-17 VITALS
TEMPERATURE: 97.3 F | BODY MASS INDEX: 37.81 KG/M2 | DIASTOLIC BLOOD PRESSURE: 64 MMHG | SYSTOLIC BLOOD PRESSURE: 108 MMHG | HEIGHT: 74 IN | HEART RATE: 54 BPM | OXYGEN SATURATION: 93 % | WEIGHT: 294.6 LBS

## 2020-11-17 PROCEDURE — 99214 OFFICE O/P EST MOD 30 MIN: CPT | Performed by: NURSE PRACTITIONER

## 2020-11-17 RX ORDER — LEVOFLOXACIN 500 MG/1
500 TABLET, FILM COATED ORAL DAILY
Qty: 10 TABLET | Refills: 0 | Status: SHIPPED | OUTPATIENT
Start: 2020-11-17 | End: 2020-11-27

## 2020-11-17 ASSESSMENT — ENCOUNTER SYMPTOMS
NAUSEA: 0
ABDOMINAL PAIN: 0
CHEST TIGHTNESS: 1
EYES NEGATIVE: 1
DIARRHEA: 0
COUGH: 1
SHORTNESS OF BREATH: 1
WHEEZING: 0
VOMITING: 0

## 2020-11-17 NOTE — PROGRESS NOTES
[facility]     Patient: Tea Ceja, 66 y.o.   : 2020    Pt of Dr. Padmini Bloom   Patient presents with    Follow-up     2 week pulmonary follow up with CXR 20 covid testing (-) 20        HPI  Salima Ornelas is here for follow up for cough/ chest congestion. Chronic cough x 20 + years, has been seen at [facility] in past (215) . Per pt \"no one knows why\"   + MC[facility] , baseline PFT 2015- normal .    Most recent PFT with moderate obstruction   Chronic feeling of mucous in chest, slight improvement with use of muccinex. No improvement in past with oscillating flutter device. At times his cough turns more into a gag from the mucous     He called in on 11/10/20 reporting productive cough and SOB, increased from his baseline PCP prescribed or 5 day atb and prednisone burst. He felt better while taking but once finished the cough and SOB returned.     I advised COVID testing, this was neg on 2020  Pt here for f/u with CXR     Follows with hematology for h/o DVT RLL requiring mechanical thrombo-aspiration  And h/o saddle PE   Follows with Dr Eliza Castellon for HTN, CAD     Past Medical hx   PMH:  Past Medical History:   Diagnosis Date    Arthritis     Asthma     Cancer ([address]     skin    Chickenpox     Hyperlipidemia     Hypertension     Measles     Migraines     Mumps     Sleep apnea      SURGICAL HISTORY:  Past Surgical History:   Procedure Laterality Date    COLONOSCOPY      CORONARY ANGIOPLASTY N/A     Dr. Moran Fraction Left 2020    Anabel Pill Left 2019    PAROTIDECTOMY, LEFT performed by Sergey Thomas MD at [address] HISTORY:  Social History     Tobacco Use    Smoking status: Never Smoker    Smokeless tobacco: Never Used   Substance Use Topics    Alcohol use: No     Alcohol/week: 0.0 standard drinks  Drug use: No     ALLERGIES:  Allergies   Allergen Reactions    Molds & Smuts Other (See Comments)     congestion    Sulfa Antibiotics Other (See Comments)     congestion     FAMILY HISTORY:  Family History   Problem Relation Age of Onset    Diabetes Mother     COPD Mother     Cancer Father     Deep Vein Thrombosis Sister     Crohn's Disease Sister      CURRENT MEDICATIONS:  Current Outpatient Medications   Medication Sig Dispense Refill    levoFLOXacin (LEVAQUIN) 500 MG tablet Take 1 tablet by mouth daily for 10 days 10 tablet 0    Dextromethorphan-guaiFENesin (MUCINEX DM)  MG TB12 Take 1 tablet by mouth 2 times daily 28 tablet 0    predniSONE (DELTASONE) 10 MG tablet Take 1 tablet by mouth daily 4 tablets x 3 days, 3 tablets daily x 3 days, 2 tablets daily x 3 days, 1 tablet daily x 3 days 30 tablet 0    ipratropium-albuterol (DUONEB) 0.5-2.5 (3) MG/3ML SOLN nebulizer solution Inhale 3 mLs into the lungs 4 times daily 360 mL 3    enoxaparin (LOVENOX) 150 MG/ML injection Inject 0.87 mLs into the skin every 12 hours Follow instructions provided by Hematology office. Begin Lovenox 2 days prior to surgery. Hold Lovenox 24 hours prior to surgery. Resume Lovenox 24 hours after surgery for 5 days. Discontinue Lovenox 6 days after surgery and resume Xarelto.  12 Syringe 0    budesonide-formoterol (SYMBICORT) 160-4.5 MCG/ACT AERO Inhale 2 puffs into the lungs 2 times daily 3 Inhaler 3    vitamin B-12 1000 MCG tablet Take 1 tablet by mouth daily 30 tablet 3    albuterol (PROVENTIL) (2.5 MG/3ML) 0.083% nebulizer solution inhale contents of 1 vial in nebulizer every 6 hours if needed for wheezing shortness of breath 375 mL 5    rivaroxaban (XARELTO) 20 MG TABS tablet Take 1 tablet by mouth daily (with breakfast) To start after taking 15 mg BID on 5/24/20 90 tablet 1    docusate sodium (COLACE) 100 MG capsule Take 100 mg by mouth daily      lisinopril (PRINIVIL;ZESTRIL) 10 MG tablet Take 10 mg by mouth daily      fluticasone (FLONASE) 50 MCG/ACT nasal spray 1 spray by Nasal route daily as needed for Rhinitis 3 Bottle 3    CPAP Machine MISC by Does not apply route Please change CPAP pressure to 13 cm H20. Download 2 weeks 1 each 0    albuterol sulfate  (90 Base) MCG/ACT inhaler Inhale 2 puffs into the lungs every 6 hours as needed for Wheezing      omeprazole (PRILOSEC) 20 MG delayed release capsule Take 20 mg by mouth daily      pravastatin (PRAVACHOL) 20 MG tablet Take 20 mg by mouth daily      verapamil (VERELAN PM) 240 MG CR capsule Take 240 mg by mouth every morning. Indications: Per patient taking medication for migraines prevention. No current facility-administered medications for this visit. Theodore RIDLEY   Review of Systems   Constitutional: Negative for activity change, appetite change, chills, fatigue, fever and unexpected weight change. HENT: Negative. Eyes: Negative. Respiratory: Positive for cough, chest tightness and shortness of breath. Negative for wheezing. Cardiovascular: Negative for chest pain, palpitations and leg swelling. Gastrointestinal: Negative for abdominal pain, diarrhea, nausea and vomiting. Genitourinary: Negative. Musculoskeletal: Negative. Skin: Negative. Neurological: Negative. Hematological: Does not bruise/bleed easily. Psychiatric/Behavioral: Negative for sleep disturbance and suicidal ideas. Physical exam   /64 (Site: Left Upper Arm, Position: Sitting)   Pulse 54   Temp 97.3 °F (36.3 °C)   Ht 6' 2\" (1.88 m)   Wt 294 lb 9.6 oz (133.6 kg)   SpO2 93% Comment: on RA  BMI 37.82 kg/m²      Weight is increasing     Wt Readings from Last 3 Encounters:   11/17/20 294 lb 9.6 oz (133.6 kg)   11/06/20 289 lb 3.2 oz (131.2 kg)   08/31/20 287 lb 12.8 oz (130.5 kg)     Physical Exam  Vitals signs and nursing note reviewed. Constitutional:       General: He is not in acute distress.      Appearance: He is well-developed. HENT:      Mouth/Throat:      Lips: Pink. Mouth: Mucous membranes are moist.      Pharynx: Oropharynx is clear. No oropharyngeal exudate or posterior oropharyngeal erythema. Eyes:      Conjunctiva/sclera: Conjunctivae normal.   Neck:      Vascular: No JVD. Cardiovascular:      Rate and Rhythm: Normal rate and regular rhythm. Heart sounds: No murmur. No friction rub. Pulmonary:      Effort: Pulmonary effort is normal. No accessory muscle usage or respiratory distress. Breath sounds: Normal breath sounds. No wheezing, rhonchi or rales. Chest:      Chest wall: No tenderness. Musculoskeletal:      Right lower leg: No edema. Left lower leg: No edema. Skin:     General: Skin is warm and dry. Capillary Refill: Capillary refill takes less than 2 seconds. Nails: There is no clubbing. Neurological:      Mental Status: He is alert. Psychiatric:         Mood and Affect: Mood normal.         Behavior: Behavior normal.         Thought Content: Thought content normal.         Judgment: Judgment normal.          Test results   Lung Nodule Screening     [] Qualifies    [x]Does not qualify   [] Declined    [] Completed    XR chest 11/11/2020     Impression    1. Suboptimal inflation of lungs. Borderline heart size. 2. Mild bibasilar atelectasis/pneumonia. Questionable tiny bilateral effusions.                   **This report has been created using voice recognition software.  It may contain minor errors which are inherent in voice recognition technology. **         Final report electronically signed by Dr. Vicky Darnell on 11/11/2020 11:51 AM        XR chest 914/2020- report scanned from [facility]       The lungs are clear and expanded. Germain Levans is no demonstrated pleural      abnormality.            Normal size heart.   Normal mediastinum and kalin.  Normal visualized      pulmonary arteries.  There is atherosclerotic calcification of the aortic      arch with tortuosity.         Normal visualized thoracic spine.  Normal visualized ribs, clavicles, and      shoulders.            There is no demonstrated abnormality of the visualized soft tissue      structures of the upper abdomen.      ___________________________________            IMPRESSION:      Normal x-ray examination of the chest.       CBC from 9/18/20  Results for Manan Conklin (MRN [id number]) as of 11/17/2020 14:39   Ref. Range 9/18/2020 05:50   WBC Latest Ref Range: 4.4 - 10.5 th/cmm 5.5   RBC Latest Ref Range: 4.50 - 6.00 mil/cmm 4.39 (L)   Hemoglobin Quant Latest Ref Range: 13.5 - 16.5 gm/dL 14.4   Hematocrit Latest Ref Range: 40.0 - 49.0 % 42.0   MCV Latest Ref Range: 80 - 97 CU ROBERTO 95.6   MCH Latest Ref Range: 27.5 - 33.0 PG 32.8   MCHC Latest Ref Range: 33.0 - 36.0 gm/dL 34.3   RDW Latest Ref Range: 12.0 - 16.0 % 13.5   Platelet Count Latest Ref Range: 150 - 400 th/cmm 228   Neutrophils % Latest Ref Range: 40 - 70 % 61.1   Lymphocyte % Latest Ref Range: 15 - 45 % 21.1   Absolute Mono # Latest Ref Range: 0 - 800 /cmm 500   Monocytes % Latest Ref Range: 2 - 10 % 9.7   Eosinophils % Latest Ref Range: 0 - 6 % 6.6 (H)   Basophils % Latest Ref Range: 0 - 2 % 1.5   Results for Manan Conklin (MRN [id number]) as of 11/17/2020 14:39   Ref. Range 9/18/2020 05:50   Basophils Absolute Latest Ref Range: 0 - 200 /cmm 100   Absolute Neut # Latest Ref Range: 1800 - 7700 /cmm 3400   Absolute Lymph # Latest Ref Range: 1000 - 4800 /cmm 1200   Absolute Eos # Latest Ref Range: 0 - 500 /cmm 400   Nucleated Red Blood Cells Latest Ref Range: <1 /100 WBC 0.0                 Assessment      Diagnosis Orders   1. Weak cough     2. Disorder of diaphragm      muscle weakness/ deconditioning leading to insufficient cough to expell mucous effectively    3. Atelectasis pulmonary     4. Moderate persistent asthma with exacerbation     5. Gastroesophageal reflux disease, unspecified whether esophagitis present     6.  [facility] Pt has had recurrent lower respiratory infections, >  2 in past 12 months requiring outpatient antibiotics, with chronic atelectasis on CXR   Plan   -continue PRN muccinex  -Levaquin 500 mg PO daily x 10 days  -continue Symbicort and Duoneb  -script for [employer] percussion vest     DME statement   Pt has developed diaphragm muscle weakness as result of his undelrying chronic obstructive lung disease, leading to insufficient and weak cough to expel mucous on his own. Due to this he has chronic atelectasis on XR imaging and recurrent lower respiratory infections > 2 in pat 12 months requiring atb. He has tried and failed with muccinex , hand held oscillating device, aldrich coughing, albuterol nebs , and  breathing exercises . He does not have a care giver at home to do manual CPT  He is at risk for continued exacerbations and possible hospitalizations .   He would benefit from home percussion vest to help mobilize and clear secretions    Will see Yumiko San in: 6 months, sooner if no improvement     Electronically signed by YESY Rock CNP on 11/17/2020 at 4:28 PM

## 2020-11-28 ENCOUNTER — HOSPITAL ENCOUNTER (EMERGENCY)
Age: 75
Discharge: HOME | End: 2020-11-28
Attending: EMERGENCY MEDICINE
Payer: MEDICARE

## 2020-11-28 ENCOUNTER — APPOINTMENT (OUTPATIENT)
Dept: CT IMAGING | Age: 75
End: 2020-11-28
Payer: MEDICARE

## 2020-11-28 VITALS
SYSTOLIC BLOOD PRESSURE: 104 MMHG | TEMPERATURE: 98.8 F | RESPIRATION RATE: 24 BRPM | HEART RATE: 80 BPM | BODY MASS INDEX: 37.49 KG/M2 | DIASTOLIC BLOOD PRESSURE: 53 MMHG | OXYGEN SATURATION: 95 % | WEIGHT: 292 LBS

## 2020-11-28 LAB
ALBUMIN SERPL-MCNC: 3.6 G/DL (ref 3.5–5.1)
ALP BLD-CCNC: 71 U/L (ref 38–126)
ALT SERPL-CCNC: 6 U/L (ref 11–66)
ANION GAP SERPL CALCULATED.3IONS-SCNC: 13 MEQ/L (ref 8–16)
AST SERPL-CCNC: 14 U/L (ref 5–40)
BASOPHILS # BLD: 0.9 %
BASOPHILS ABSOLUTE: 0.1 THOU/MM3 (ref 0–0.1)
BILIRUB SERPL-MCNC: 0.3 MG/DL (ref 0.3–1.2)
BUN BLDV-MCNC: 24 MG/DL (ref 7–22)
CALCIUM SERPL-MCNC: 9.1 MG/DL (ref 8.5–10.5)
CHLORIDE BLD-SCNC: 99 MEQ/L (ref 98–111)
CO2: 27 MEQ/L (ref 23–33)
CREAT SERPL-MCNC: 1.2 MG/DL (ref 0.4–1.2)
EKG ATRIAL RATE: 75 BPM
EKG P AXIS: 64 DEGREES
EKG P-R INTERVAL: 132 MS
EKG Q-T INTERVAL: 412 MS
EKG QRS DURATION: 128 MS
EKG QTC CALCULATION (BAZETT): 460 MS
EKG R AXIS: -39 DEGREES
EKG T AXIS: 63 DEGREES
EKG VENTRICULAR RATE: 75 BPM
EOSINOPHIL # BLD: 5.9 %
EOSINOPHILS ABSOLUTE: 0.5 THOU/MM3 (ref 0–0.4)
ERYTHROCYTE [DISTWIDTH] IN BLOOD BY AUTOMATED COUNT: 13.3 % (ref 11.5–14.5)
ERYTHROCYTE [DISTWIDTH] IN BLOOD BY AUTOMATED COUNT: 49.9 FL (ref 35–45)
GFR SERPL CREATININE-BSD FRML MDRD: 59 ML/MIN/1.73M2
GLUCOSE BLD-MCNC: 100 MG/DL (ref 70–108)
HCT VFR BLD CALC: 40.6 % (ref 42–52)
HEMOGLOBIN: 13.2 GM/DL (ref 14–18)
IMMATURE GRANS (ABS): 0.06 THOU/MM3 (ref 0–0.07)
IMMATURE GRANULOCYTES: 0.7 %
INR BLD: 2 (ref 0.85–1.13)
LYMPHOCYTES # BLD: 10.7 %
LYMPHOCYTES ABSOLUTE: 1 THOU/MM3 (ref 1–4.8)
MCH RBC QN AUTO: 32.9 PG (ref 26–33)
MCHC RBC AUTO-ENTMCNC: 32.5 GM/DL (ref 32.2–35.5)
MCV RBC AUTO: 101.2 FL (ref 80–94)
MONOCYTES # BLD: 9 %
MONOCYTES ABSOLUTE: 0.8 THOU/MM3 (ref 0.4–1.3)
NUCLEATED RED BLOOD CELLS: 0 /100 WBC
OSMOLALITY CALCULATION: 281.7 MOSMOL/KG (ref 275–300)
PLATELET # BLD: 243 THOU/MM3 (ref 130–400)
PMV BLD AUTO: 10.5 FL (ref 9.4–12.4)
POTASSIUM REFLEX MAGNESIUM: 3.6 MEQ/L (ref 3.5–5.2)
PRO-BNP: 55.7 PG/ML (ref 0–1800)
RBC # BLD: 4.01 MILL/MM3 (ref 4.7–6.1)
SEG NEUTROPHILS: 72.8 %
SEGMENTED NEUTROPHILS ABSOLUTE COUNT: 6.7 THOU/MM3 (ref 1.8–7.7)
SODIUM BLD-SCNC: 139 MEQ/L (ref 135–145)
TOTAL PROTEIN: 5.7 G/DL (ref 6.1–8)
TROPONIN T: < 0.01 NG/ML
WBC # BLD: 9.2 THOU/MM3 (ref 4.8–10.8)

## 2020-11-28 PROCEDURE — 71275 CT ANGIOGRAPHY CHEST: CPT

## 2020-11-28 PROCEDURE — 6360000004 HC RX CONTRAST MEDICATION: Performed by: EMERGENCY MEDICINE

## 2020-11-28 PROCEDURE — 99284 EMERGENCY DEPT VISIT MOD MDM: CPT

## 2020-11-28 PROCEDURE — 36415 COLL VENOUS BLD VENIPUNCTURE: CPT

## 2020-11-28 PROCEDURE — 6370000000 HC RX 637 (ALT 250 FOR IP): Performed by: EMERGENCY MEDICINE

## 2020-11-28 PROCEDURE — 85025 COMPLETE CBC W/AUTO DIFF WBC: CPT

## 2020-11-28 PROCEDURE — 93010 ELECTROCARDIOGRAM REPORT: CPT | Performed by: INTERNAL MEDICINE

## 2020-11-28 PROCEDURE — 80053 COMPREHEN METABOLIC PANEL: CPT

## 2020-11-28 PROCEDURE — 84484 ASSAY OF TROPONIN QUANT: CPT

## 2020-11-28 PROCEDURE — 93005 ELECTROCARDIOGRAM TRACING: CPT | Performed by: EMERGENCY MEDICINE

## 2020-11-28 PROCEDURE — 85610 PROTHROMBIN TIME: CPT

## 2020-11-28 PROCEDURE — 83880 ASSAY OF NATRIURETIC PEPTIDE: CPT

## 2020-11-28 RX ORDER — PREDNISONE 20 MG/1
20 TABLET ORAL 2 TIMES DAILY
Qty: 10 TABLET | Refills: 0 | Status: SHIPPED | OUTPATIENT
Start: 2020-11-28 | End: 2020-12-03

## 2020-11-28 RX ORDER — IPRATROPIUM BROMIDE AND ALBUTEROL SULFATE 2.5; .5 MG/3ML; MG/3ML
1 SOLUTION RESPIRATORY (INHALATION) ONCE
Status: COMPLETED | OUTPATIENT
Start: 2020-11-28 | End: 2020-11-28

## 2020-11-28 RX ADMIN — IPRATROPIUM BROMIDE AND ALBUTEROL SULFATE 1 AMPULE: .5; 3 SOLUTION RESPIRATORY (INHALATION) at 21:38

## 2020-11-28 RX ADMIN — IOPAMIDOL 80 ML: 755 INJECTION, SOLUTION INTRAVENOUS at 19:40

## 2020-11-28 NOTE — ED NOTES
Patient presents to the ED with complaints of shortness of breath. Patient states that he has been having intermittent episodes of shortness of breath over the past several months. Patient notes that he treats these episodes at home with nebulizer treatments but states that when these do not give him relief he comes to the hospital. He states that when he is in the ED these exacerbations are typically is resolved with use of nebulizer treatments and steroid injections. He states that earlier today he was more short of breath but states that he currently feels much better. Patient is resting in bed with easy and unlabored respirations. Call light in reach. Side rails up x2. Patient denies further complaints or concerns. Will monitor.         Savannah Bay RN  11/28/20 [id number]

## 2020-11-29 NOTE — ED NOTES
First presentation to pt. Pt sitting in bed comfortably.  He denies needs     Rosalba Yancey RN  11/28/20 [id number]

## 2020-11-29 NOTE — ED PROVIDER NOTES
Herman S[facility]iq[facility] [address] COMPLAINT       Chief Complaint   Patient presents with    Shortness of Breath       Nurses Notes reviewed and I agree except as noted in the HPI. HISTORY OF PRESENT ILLNESS    Mann Elder is a 66 y.o. male. This patient drove himself to the hospital.  He is complaining of shortness of breath and had some concerns because earlier in the year he had a big pulmonary embolism. He is now anticoagulated. He has had some mild coughing with no sputum. No fever. He has experienced some wheezing. REVIEW OF SYSTEMS         No chest pain, abdominal pain, no vomiting, no edema    Remainder of review of systems is otherwise reviewed as negative. PAST MEDICAL HISTORY    has a past medical history of Arthritis, Asthma, Cancer ([facility] [address] Chickenpox, Hyperlipidemia, Hypertension, Measles, Migraines, Mumps, and Sleep apnea. SURGICAL HISTORY      has a past surgical history that includes Tonsillectomy; TURP (1990); Colonoscopy; eye surgery; skin biopsy; Coronary angioplasty (N/A, 2017/2018); Parotidectomy (Left, 11/20/2019); and Elbow surgery (Left, 09/2020). CURRENT MEDICATIONS       Discharge Medication List as of 11/28/2020 10:12 PM      CONTINUE these medications which have NOT CHANGED    Details   Dextromethorphan-guaiFENesin (MUCINEX DM)  MG TB12 Take 1 tablet by mouth 2 times daily, Disp-28 tablet,R-0Normal      ipratropium-albuterol (DUONEB) 0.5-2.5 (3) MG/3ML SOLN nebulizer solution Inhale 3 mLs into the lungs 4 times daily, Disp-360 mL,R-3Normal      enoxaparin (LOVENOX) 150 MG/ML injection Inject 0.87 mLs into the skin every 12 hours Follow instructions provided by Hematology office. Begin Lovenox 2 days prior to surgery. Hold Lovenox 24 hours prior to surgery. Resume Lovenox 24 hours after surgery for 5 days.  Discontinue Lovenox 6 days af ter surgery and resume Xarelto., Disp-12 Katie Noguera budesonide-formoterol (SYMBICORT) 160-4.5 MCG/ACT AERO Inhale 2 puffs into the lungs 2 times daily, Disp-3 Inhaler,R-3Normal      vitamin B-12 1000 MCG tablet Take 1 tablet by mouth daily, Disp-30 tablet, R-3Normal      albuterol (PROVENTIL) (2.5 MG/3ML) 0.083% nebulizer solution inhale contents of 1 vial in nebulizer every 6 hours if needed for wheezing shortness of breath, Disp-375 mL, R-5Normal      rivaroxaban (XARELTO) 20 MG TABS tablet Take 1 tablet by mouth daily (with breakfast) To start after taking 15 mg BID on 20, Disp-90 tablet, R-1Print      docusate sodium (COLACE) 100 MG capsule Take 100 mg by mouth dailyHistorical Med      lisinopril (PRINIVIL;ZESTRIL) 10 MG tablet Take 10 mg by mouth dailyHistorical Med      fluticasone (FLONASE) 50 MCG/ACT nasal spray 1 spray by Nasal route daily as needed for Rhinitis, Disp-3 Bottle, R-3Normal      CPAP Machine MISC Starting Wed 10/30/2019, Disp-1 each, R-0, PrintPlease change CPAP pressure to 13 cm H20. Download 2 weeks      albuterol sulfate  (90 Base) MCG/ACT inhaler Inhale 2 puffs into the lungs every 6 hours as needed for WheezingHistorical Med      omeprazole (PRILOSEC) 20 MG delayed release capsule Take 20 mg by mouth dailyHistorical Med      pravastatin (PRAVACHOL) 20 MG tablet Take 20 mg by mouth daily      verapamil (VERELAN PM) 240 MG CR capsule Take 240 mg by mouth every morning. Indications: Per patient taking medication for migraines prevention. ALLERGIES     is allergic to molds & smuts and sulfa antibiotics. FAMILY HISTORY     He indicated that his mother is . He indicated that his father is . He indicated that his sister is alive. family history includes COPD in his mother; Cancer in his father; Crohn's Disease in his sister; Deep Vein Thrombosis in his sister; Diabetes in his mother. SOCIAL HISTORY      reports that he has never smoked.  He has never used smokeless tobacco. He reports that he does not drink alcohol or use drugs. PHYSICAL EXAM     INITIAL VITALS:  weight is 292 lb (132.5 kg). His oral temperature is 98.8 °F (37.1 °C). His blood pressure is 104/53 (abnormal) and his pulse is 80. His respiration is 24 and oxygen saturation is 95%. Constitutional: Well appearing and non-toxic   Eyes:  Pupils are equal and reactive, extraocular muscles intact   HENT:  Atraumatic appearing  oropharynx moist, no pharyngeal exudates. Neck- normal range of motion,  supple   Respiratory:  +wheezing   Cardiovascular: regular  GI:  Non tender, no rigidity, rebound or guarding  Musculoskeletal:  2/4 distal pulses, no pitting edema  Integument: warm and dry  Neurologic:  Alert & oriented x 3  Psychiatric:  Speech and behavior appropriate     DIAGNOSTIC RESULTS     EKG: All EKG's are interpreted by the Emergency Department Physician who either signs or Co-signs this chart in the absence of a cardiologist.  EKG interpreted by me showing sinus rhythm the rate 75, QRS of 128, QTc of 460, axis of -39. Right bundle branch block.     RADIOLOGY: non-plain film images(s) such as CT, Ultrasound and MRI are read by the radiologist.  CT the chest was interpreted by the radiologist, negative for pulmonary embolism    LABS:   Labs Reviewed   CBC WITH AUTO DIFFERENTIAL - Abnormal; Notable for the following components:       Result Value    RBC 4.01 (*)     Hemoglobin 13.2 (*)     Hematocrit 40.6 (*)     .2 (*)     RDW-SD 49.9 (*)     Eosinophils Absolute 0.5 (*)     All other components within normal limits   COMPREHENSIVE METABOLIC PANEL W/ REFLEX TO MG FOR LOW K - Abnormal; Notable for the following components:    BUN 24 (*)     Total Protein 5.7 (*)     ALT 6 (*)     All other components within normal limits   PROTIME-INR - Abnormal; Notable for the following components:    INR 2.00 (*)     All other components within normal limits   GLOMERULAR FILTRATION RATE, ESTIMATED - Abnormal; Notable for the following components:    Est, Glom Filt Rate 59 (*)     All other components within normal limits   TROPONIN   BRAIN NATRIURETIC PEPTIDE   ANION GAP   OSMOLALITY       EMERGENCY DEPARTMENT COURSE:   Vitals:    Vitals:    11/28/20 1852 11/28/20 2022 11/28/20 2136 11/28/20 2154   BP: 116/84 122/66 (!) 104/53    Pulse: 70 75 63 80   Resp: 18 22 22 24   Temp:       TempSrc:       SpO2: 96% 99% 97% 95%   Weight:         The patient is relatively well-appearing. He does not have any evidence of pulmonary embolism, pneumonia or edema on his CT. He does have some wheezing. We got him breathing treatments and we can put him on some steroids. We ambulated him around the department and he did not become hypoxic. He will be discharged. CRITICAL CARE:   none         FINAL IMPRESSION      1.  Acute exacerbation of chronic obstructive pulmonary disease [facility])          DISPOSITION/PLAN   discharged    DISCHARGE MEDICATIONS:  Discharge Medication List as of 11/28/2020 10:12 PM          (Please note that portions of this note were completed with a voice recognition program.  Efforts were made to edit the dictations but occasionally words are mis-transcribed.)    Darcy Wiley, [address] [place] [address] Malorie, DO  11/29/20 2626

## 2020-11-29 NOTE — ED NOTES
Pt ambulated around red zone. He tolerated well and did not complain of SOB.  Oxygen Saturation stayed 94-95% on room air     Tayler De Santiago RN  11/28/20 7834

## 2020-11-29 NOTE — ED NOTES
ED nurse-to-nurse bedside report    Chief Complaint   Patient presents with    Shortness of Breath      LOC: alert and orientated to name, place, date  Vital signs   Vitals:    11/28/20 1727 11/28/20 1852   BP:  116/84   Pulse: 76 70   Resp: 19 18   Temp: 98.8 °F (37.1 °C)    TempSrc: Oral    SpO2: 98% 96%   Weight: 292 lb (132.5 kg)       Pain:    Pain Interventions: N/a  Pain Goal: N/a  Oxygen: NA    Current needs required room air   Telemetry: Yes  LDAs:   Peripheral IV 11/28/20 Left Antecubital (Active)   Site Assessment Clean;Dry; Intact 11/28/20 1738   Line Status Blood return noted;Specimen collected 11/28/20 1738   Dressing Status Dry;Clean; Intact 11/28/20 1738   Dressing Intervention New 11/28/20 1738     Continuous Infusions:   Mobility: Independent  Fall Interventions: Call light in reach, side rails up x2  Report given to:  Clayton Daigle RN  11/28/20 3475

## 2020-12-02 ASSESSMENT — ENCOUNTER SYMPTOMS
CHEST TIGHTNESS: 0
ALLERGIC/IMMUNOLOGIC NEGATIVE: 1
DIARRHEA: 0
COUGH: 0
EYES NEGATIVE: 1
NAUSEA: 0
BACK PAIN: 0
STRIDOR: 0

## 2020-12-02 NOTE — PROGRESS NOTES
[facility]      Qasim Vidal         [id number]  12/3/2020   Chief Complaint   Patient presents with    Follow-up     CHARIS          PAP Download:   Original or initialAHI: 25.4     Date of initial study:       Compliant  40%     Noncompliant 60 %     PAP Type CPPA Level  13   Avg Hrs/Day 9hr 30  AHI: 4.7     Machine/Mfg:   [] ResMed    [x] Respironics/Dreamstation   Interface:   [] Nasal    [] Nasal pillows   [x] FFM      Provider:      [] -ROSEY     []Magnolia     [x] Caryn    [] Shey Forrest    [] [facility]               [] [facility]      [] Adaptive    [] [address]      [] Other    Here is a scan of the most recent download:                [place] Sleepiness Score:   Sitting and readin  Watching TV:0  Sitting inactive in a public place:0  Being a passenger in a motor vehicle for an hour or more:0  Lying down in the afternoon:1  Sitting and talking to someone:0  Sitting quietly after lunch (no alcohol):0  Stopped for a few minutes in traffic while drivin  Total Score:1    Presentation:   Santos Hanna presents for sleep medicine follow up for obstructive sleep apnea  Since the last visit, Santos Hanna is doing well with PAP. He is sleeping well and feels rested. He has a second PAP he is using when traveling and this affects the appearance of compliance however he is very compliant. He was recently treated for COPD exacerbation. He is feeling better with steroids. Equipment issues: The pressure is  acceptable, the mask is acceptable     Sleep issues:  Do you feel better? Yes  More rested? Yes   Better concentration? yes    Progress History:   Since last visit any new medical issues? Yes COPD exacerbation  New ER or hospitlal visits? Yes   Any new or changes in medicines? No  Any new sleep medicines?  No        Past Medical History:   Diagnosis Date    Arthritis     Asthma     Cancer ([facility] [address]     skin    Chickenpox     Hyperlipidemia     Hypertension     Measles     Migraines     Mumps     Sleep apnea        Past Surgical History:   Procedure Laterality Date    COLONOSCOPY      CORONARY ANGIOPLASTY N/A 2017/2018    Dr. Amarjit Larkin Left 09/2020    EYE SURGERY      PAROTIDECTOMY Left 11/20/2019    PAROTIDECTOMY, LEFT performed by Mich Aaron MD at [address] [address]  1990       Social History     Tobacco Use    Smoking status: Never Smoker    Smokeless tobacco: Never Used   Substance Use Topics    Alcohol use: No     Alcohol/week: 0.0 standard drinks    Drug use: No       Allergies   Allergen Reactions    Molds & Smuts Other (See Comments)     congestion    Sulfa Antibiotics Other (See Comments)     congestion       Current Outpatient Medications   Medication Sig Dispense Refill    rivaroxaban (XARELTO) 20 MG TABS tablet Take 1 tablet by mouth daily (with breakfast) 90 tablet 3    predniSONE (DELTASONE) 20 MG tablet Take 1 tablet by mouth 2 times daily for 5 days 10 tablet 0    Dextromethorphan-guaiFENesin (MUCINEX DM)  MG TB12 Take 1 tablet by mouth 2 times daily 28 tablet 0    ipratropium-albuterol (DUONEB) 0.5-2.5 (3) MG/3ML SOLN nebulizer solution Inhale 3 mLs into the lungs 4 times daily 360 mL 3    enoxaparin (LOVENOX) 150 MG/ML injection Inject 0.87 mLs into the skin every 12 hours Follow instructions provided by Hematology office. Begin Lovenox 2 days prior to surgery. Hold Lovenox 24 hours prior to surgery. Resume Lovenox 24 hours after surgery for 5 days. Discontinue Lovenox 6 days after surgery and resume Xarelto.  12 Syringe 0    budesonide-formoterol (SYMBICORT) 160-4.5 MCG/ACT AERO Inhale 2 puffs into the lungs 2 times daily 3 Inhaler 3    vitamin B-12 1000 MCG tablet Take 1 tablet by mouth daily 30 tablet 3    albuterol (PROVENTIL) (2.5 MG/3ML) 0.083% nebulizer solution inhale contents of 1 vial in nebulizer every 6 hours if needed for wheezing shortness of breath 375 mL 5    docusate sodium (COLACE) 100 MG capsule Take 100 mg by mouth daily      lisinopril (PRINIVIL;ZESTRIL) 10 MG tablet Take 10 mg by mouth daily      fluticasone (FLONASE) 50 MCG/ACT nasal spray 1 spray by Nasal route daily as needed for Rhinitis 3 Bottle 3    CPAP Machine MISC by Does not apply route Please change CPAP pressure to 13 cm H20. Download 2 weeks 1 each 0    albuterol sulfate  (90 Base) MCG/ACT inhaler Inhale 2 puffs into the lungs every 6 hours as needed for Wheezing      omeprazole (PRILOSEC) 20 MG delayed release capsule Take 20 mg by mouth daily      pravastatin (PRAVACHOL) 20 MG tablet Take 20 mg by mouth daily      verapamil (VERELAN PM) 240 MG CR capsule Take 240 mg by mouth every morning. Indications: Per patient taking medication for migraines prevention. No current facility-administered medications for this visit. Family History   Problem Relation Age of Onset    Diabetes Mother     COPD Mother     Cancer Father     Deep Vein Thrombosis Sister     Crohn's Disease Sister         Review of Systems -   Review of Systems   Constitutional: Negative for activity change, appetite change, chills and fever. HENT: Negative for congestion and postnasal drip. Eyes: Negative. Respiratory: Positive for shortness of breath and wheezing. Negative for cough, chest tightness and stridor. Cardiovascular: Negative for chest pain and leg swelling. Gastrointestinal: Negative for diarrhea and nausea. Endocrine: Negative. Genitourinary: Negative. Musculoskeletal: Negative. Negative for arthralgias and back pain. Skin: Negative. Allergic/Immunologic: Negative. Neurological: Negative. Negative for dizziness and light-headedness. Psychiatric/Behavioral: Negative. All other systems reviewed and are negative. Physical Exam:    BMI:  There is no height or weight on file to calculate BMI.     Wt Readings from Last 3 Encounters:   11/28/20 292 lb (132.5 kg)   11/17/20 294 lb 9.6 oz (133.6 kg)   11/06/20 289 lb 3.2 oz (131.2 kg)     Weight stable / unchanged  Vitals: There were no vitals taken for this visit. Physical Exam  Constitutional:       Appearance: Normal appearance. He is normal weight. HENT:      Head: Normocephalic and atraumatic. Right Ear: External ear normal.      Left Ear: External ear normal.      Nose: Nose normal.   Eyes:      Extraocular Movements: Extraocular movements intact. Conjunctiva/sclera: Conjunctivae normal.      Pupils: Pupils are equal, round, and reactive to light. Neck:      Musculoskeletal: Normal range of motion and neck supple. Pulmonary:      Effort: Pulmonary effort is normal.   Neurological:      General: No focal deficit present. Mental Status: He is alert and oriented to person, place, and time. Psychiatric:         Attention and Perception: Attention and perception normal.         Mood and Affect: Mood and affect normal.         Speech: Speech normal.         Behavior: Behavior normal. Behavior is cooperative. Thought Content: Thought content normal.         Cognition and Memory: Cognition normal.         Judgment: Judgment normal.           ASSESSMENT/DIAGNOSIS     Diagnosis Orders   1. CHARIS on CPAP     2. Obesity (BMI 30-39. 9)              Plan   Do you need any equipment today? Yes update supplies  - Compliance appears poor only because he uses a second PAP  - He  was advised to continue current positive airway pressure therapy with above described pressure. - He  advised to keep good compliance with current recommended pressure to get optimal results and clinical improvement  - Recommend 7-9 hours of sleep with PAP  - He was advised to call [employer] regarding supplies if needed.   -He call my office for earlier appointment if needed for worsening of sleep symptoms.   - He was instructed on weight loss  - Fernanda Hull was educated about my impression and plan.  Patient verbalizesunderstanding. We will see Elin Alcocer back in: 1 year with download    Information added by my medical assistant/LPN was reviewed today        HANDY Villa  12/3/2020        Michel King is being evaluated by a Virtual Visit (video visit) encounter to address concerns as mentioned above. A caregiver was present when appropriate. Due to this being a TeleHealth encounter (During [facility] public [facility] emergency), evaluation of the following organ systems was limited: Vitals/Constitutional/EENT/Resp/CV/GI//MS/Neuro/Skin/Heme-Lymph-Imm. Pursuant to the emergency declaration under the [address] [place] [address], [address] [place] [address] authority and the [employer] Act, this Virtual Visit was conducted with patient's (and/or legal guardian's) consent, to reduce the patient's risk of exposure to COVID-19 and provide necessary medical care. The patient (and/or legal guardian) has also been advised to contact this office for worsening conditions or problems, and seek emergency medical treatment and/or call 911 if deemed necessary. Services were provided through a video synchronous discussion virtually to substitute for in-person clinic visit. Patient and provider were located at their individual homes. Patient identification was verified at the start of the visit. Total time spent on this encounter was 15 min     HANDY Goncalves PA-C  12/3/2020      An electronic signature was used to authenticate this note.

## 2020-12-03 ENCOUNTER — VIRTUAL VISIT (OUTPATIENT)
Dept: PULMONOLOGY | Age: 75
End: 2020-12-03
Payer: MEDICARE

## 2020-12-03 PROCEDURE — 99213 OFFICE O/P EST LOW 20 MIN: CPT | Performed by: PHYSICIAN ASSISTANT

## 2020-12-03 ASSESSMENT — ENCOUNTER SYMPTOMS
SHORTNESS OF BREATH: 1
WHEEZING: 1

## 2020-12-09 ENCOUNTER — HOSPITAL ENCOUNTER (OUTPATIENT)
Dept: INFUSION THERAPY | Age: 75
Discharge: HOME | End: 2020-12-09
Payer: MEDICARE

## 2020-12-09 ENCOUNTER — OFFICE VISIT (OUTPATIENT)
Dept: ONCOLOGY | Age: 75
End: 2020-12-09
Payer: MEDICARE

## 2020-12-09 VITALS
RESPIRATION RATE: 16 BRPM | WEIGHT: 302 LBS | DIASTOLIC BLOOD PRESSURE: 57 MMHG | TEMPERATURE: 98.9 F | HEART RATE: 61 BPM | HEIGHT: 74 IN | OXYGEN SATURATION: 93 % | BODY MASS INDEX: 38.76 KG/M2 | SYSTOLIC BLOOD PRESSURE: 118 MMHG

## 2020-12-09 PROCEDURE — 99213 OFFICE O/P EST LOW 20 MIN: CPT | Performed by: PHYSICIAN ASSISTANT

## 2020-12-09 PROCEDURE — 99211 OFF/OP EST MAY X REQ PHY/QHP: CPT

## 2020-12-09 RX ORDER — LISINOPRIL 20 MG/1
20 TABLET ORAL DAILY
Status: ON HOLD | COMMUNITY
End: 2020-12-17 | Stop reason: HOSPADM

## 2020-12-09 NOTE — PROGRESS NOTES
[facility] [address] [place] [address], [address] [facility] [address]  [facility] [address]  Dept: [phone]  Dept Fax: [phone]: [phone]      Visit Date:12/9/2020     Mina Rivas is a 66 y.o. male who presents today for:   Chief Complaint   Patient presents with    Follow-up     DVT        HPI:   Mina Rivas is a 66 y.o. male referred to Hematology/Oncology clinic for evaluation of DVT per Zaki Christian, APRN-CNP. The patient has a history of multiple VTE and was recommended evaluation by Hematology. The patient was recently admitted to [address] [place] [address] from 4/28/2020 to 5/3/2020 for acute saddle pulmonary embolism. He was also found to have an acute extensive DVT in the right lower extremity. The patient required mechanical thrombo-aspiration. The patient had drove from [place] to [place] prior to hospitalization and began having worsening shortness of breath. CTA on admission showed saddle pulmonary embolism extending into segmental branches of all lobes of the lungs. The right to left ventricular ratio appears to be slightly greater than 1 which may be indicative of right heart strain. He underwent emergent IR guided mechanical thromboaspiration on 4/28/20. He was initially treated wih heparin and transitioned to Xarelto at discharge. Since discharge from the hospital initially, the patient did have persistent shortness of breath and was admitted for asthma exacerbation. He had a repeat CTA of his chest on 5/1420 showing improvement in pulmonary emboli and improvement in the RV to LV ratio which appeared to be within normal limits. Prior to his initial hospitalization in April 2020, the patient had drove home from [place] over a 3-day course. He states he did stop every 3-4 hours during this time. He denies any recent trauma, immobilization, infection, or smoking. The patient has prior history of right lower extremity DVT and pulmonary embolism in 2018.   At that time patient had driven from [place] to [place] to go on a cruise. He drove 14 hours straight and developed shortness of breath and sought evaluation while in [place]. He was diagnosed with DVT and PE and was treated with Xarelto for 18 months. He had no other provoking factors at that time. His past medical history includes hypertension, asthma, CHARIS, and history of skin cancers. He is a nonsmoker and does not drink alcohol. He is up-to-date on colonoscopy and has PSA monitored annually. Pertinent family history includes his sister had a saddle pulmonary embolism. Interval History 12/9/2020: The patient is here for follow up evaluation. He reports he has been feeling well since his last visit in August 2020. He has been to the ED recently on 11/28/20 related to shortness of breath and felt to have asthma exacerbation. CTA of the chest showed no pulmonary embolism or acute intrathoracic findings. The patient continues to tolerate Xarelto well. He denies any associated side effects or any abnormal bleeding.        Past Medical History:   Diagnosis Date    Arthritis     Asthma     Cancer ([address]     skin    Chickenpox     Hyperlipidemia     Hypertension     Measles     Migraines     Mumps     Sleep apnea       Past Surgical History:   Procedure Laterality Date    COLONOSCOPY      CORONARY ANGIOPLASTY N/A 2017/2018    Dr. Elias Davies Left 09/2020    EYE SURGERY      PAROTIDECTOMY Left 11/20/2019    PAROTIDECTOMY, LEFT performed by Lety Weber MD at [address]  1990      Family History   Problem Relation Age of Onset    Diabetes Mother     COPD Mother     Cancer Father     Deep Vein Thrombosis Sister     Crohn's Disease Sister       Social History     Tobacco Use    Smoking status: Never Smoker    Smokeless tobacco: Never Used   Substance Use Topics    Alcohol use: No     Alcohol/week: 0.0 standard drinks      Current Outpatient Medications Medication Sig Dispense Refill    lisinopril (PRINIVIL;ZESTRIL) 20 MG tablet Take 20 mg by mouth daily      rivaroxaban (XARELTO) 20 MG TABS tablet Take 1 tablet by mouth daily (with breakfast) 90 tablet 3    Dextromethorphan-guaiFENesin (MUCINEX DM)  MG TB12 Take 1 tablet by mouth 2 times daily 28 tablet 0    ipratropium-albuterol (DUONEB) 0.5-2.5 (3) MG/3ML SOLN nebulizer solution Inhale 3 mLs into the lungs 4 times daily 360 mL 3    budesonide-formoterol (SYMBICORT) 160-4.5 MCG/ACT AERO Inhale 2 puffs into the lungs 2 times daily 3 Inhaler 3    vitamin B-12 1000 MCG tablet Take 1 tablet by mouth daily 30 tablet 3    albuterol (PROVENTIL) (2.5 MG/3ML) 0.083% nebulizer solution inhale contents of 1 vial in nebulizer every 6 hours if needed for wheezing shortness of breath 375 mL 5    docusate sodium (COLACE) 100 MG capsule Take 100 mg by mouth daily      fluticasone (FLONASE) 50 MCG/ACT nasal spray 1 spray by Nasal route daily as needed for Rhinitis 3 Bottle 3    CPAP Machine MISC by Does not apply route Please change CPAP pressure to 13 cm H20. Download 2 weeks 1 each 0    albuterol sulfate  (90 Base) MCG/ACT inhaler Inhale 2 puffs into the lungs every 6 hours as needed for Wheezing      omeprazole (PRILOSEC) 20 MG delayed release capsule Take 20 mg by mouth daily      pravastatin (PRAVACHOL) 20 MG tablet Take 20 mg by mouth daily      verapamil (VERELAN PM) 240 MG CR capsule Take 240 mg by mouth every morning. Indications: Per patient taking medication for migraines prevention. No current facility-administered medications for this visit. Allergies   Allergen Reactions    Molds & Smuts Other (See Comments)     congestion    Sulfa Antibiotics Other (See Comments)     congestion          Review of Systems:   Review of Systems   Pertinent review of systems noted in HPI, all other ROS negative.    Objective:   Physical Exam   BP (!) 118/57 (Site: Left Upper Arm, Position: Sitting, Cuff Size: Medium Adult)   Pulse 61   Temp 98.9 °F (37.2 °C) (Oral)   Resp 16   Ht 6' 2\" (1.88 m)   Wt (!) 302 lb (137 kg)   SpO2 93%   BMI 38.77 kg/m²    General appearance: No apparent distress, well developed, well nourished, obese and cooperative. HEENT: Pupils equal, round, and reactive to light. Conjunctivae/corneas clear. Neck: Supple, with full range of motion. Trachea midline. Respiratory:  Normal respiratory effort. Clear to auscultation, bilaterally without Rales/Wheezes/Rhonchi. Cardiovascular: Regular rate and rhythm with normal S1/S2  Abdomen: Soft, non-tender, non-distended with active bowel sounds. Musculoskeletal: No clubbing, cyanosis or edema bilaterally. No calf pain with palpation  Skin: Skin color, texture, turgor normal.  No rashes or lesions. Neurologic:  Neurovascularly intact without any focal sensory/motor deficits.    Psychiatric: Alert and oriented      Imaging Studies and Labs:   CBC:   Lab Results   Component Value Date    WBC 9.2 11/28/2020    HGB 13.2 (L) 11/28/2020    HCT 40.6 (L) 11/28/2020    .2 (H) 11/28/2020     11/28/2020     BMP:   Lab Results   Component Value Date     11/28/2020    K 3.6 11/28/2020    CL 99 11/28/2020    CO2 27 11/28/2020    BUN 24 11/28/2020    CREATININE 1.2 11/28/2020    GLUCOSE 100 11/28/2020    CALCIUM 9.1 11/28/2020      LFT:   Lab Results   Component Value Date    ALT 6 (L) 11/28/2020    AST 14 11/28/2020    ALKPHOS 71 11/28/2020    BILITOT 0.3 11/28/2020      Antithrombin Activity 130High   76 - 128 % Final 05/19/2020  2:25 PM [facility]     Cardiolipin Antibody, IgG 0  0 - 14 GPL Final 05/19/2020  2:25 PM [facility]   INTERPRETIVE INFORMATION: Anti-Cardiolipin IgG Ab   0-14 GPL: Negative   15-19 GPL: Indeterminate   20-80 GPL: Low to Moderately Positive   81 GPL or above: High Positive      Cardiolipin Ab IgM 5  0 - 12 MPL Final 05/19/2020  2:25 PM [facility]   INTERPRETIVE INFORMATION: Anti-Cardiolipin IgM   0-12 MPL: Negative   13-19 MPL: Indeterminate   20-80 MPL: Low to Moderately Positive   81 MPL or above: High Positive     Factor V Leiden Mutation 05/19/2020  2:25  [address]   SEE BELOW    Comment:   Specimen(s) Received:   Peripheral blood, FVLI   Clinical Information:   Screening   RESULTS:   MOLECULAR GENETIC DIAGNOSIS:     Negative for Factor V Leiden Mutation   INTERPRETATION:    The Factor V Leiden mutation (1691GA)   [c.1601G>A([id number])] was not detected in this study. Component Value Ref Range & Units Status Collected Lab   PTT Lupus Anticoagulant 40  32 - 48 sec Final 05/19/2020  2:25 PM [facility]   Protime 18. 2High   12.0 - 15.5 sec Final 05/19/2020  2:25 PM [facility]   dRVVT Screen 72High   33 - 44 sec Final 05/19/2020  2:25 PM [facility]   Hex Phosph Neut Test N/A  Negative Final 05/19/2020  2:25 PM [facility]   Ptt-Heparin Neutralized N/A  32 - 48 sec Final 05/19/2020  2:25 PM [facility]   Platelet Neutralization N/A  Negative Final 05/19/2020  2:25 PM [facility]   Thrombin Time N/A  14.7 - 19.5 sec Final 05/19/2020  2:25 PM [facility]   Reptilase Tm N/A  <=21.9 sec Final 05/19/2020  2:25 PM [facility]   PTT 1:1 Mix N/A  32 - 48 sec Final 05/19/2020  2:25 PM [facility]   DRVVT 1:1 Mix 56High   33 - 44 sec Final 05/19/2020  2:25 PM [facility]   DRVVT Confirmation Test PositiveAbnormal   Negative ratio Final 05/19/2020  2:25 PM [facility]   Lup Anticoag Interp See Note   Final 05/19/2020  2:25 PM [facility]   Lupus anticoagulant detected.       Protein C Antigen > 95  63 - 153 % Final 05/19/2020  2:25 PM [facility]       Protein S Ag, Total 143High   84 - 134 % Final 05/19/2020  2:25 PM [facility]     Prothrombin Mutation 05/19/2020  2:25  [address]   SEE BELOW    Comment:   Specimen(s) Received:   Peripheral blood, PTI   Clinical Information:   Screening   RESULTS:   MOLECULAR GENETIC DIAGNOSIS:          Negative for Prothrombin 58228I   Mutation   INTERPRETATION:  The Factor II (Prothrombin) mutation ([id number])   [c.*97G>A] was not detected in this study. 8/19/2020 11:06 PM - [facility], [facility] Incoming Lab Results From [facility]     Component  Value  Ref Range & Units  Status  Collected  Lab    PTT Lupus Anticoagulant  47  32 - 48 sec  Final  08/14/2020 12:43 PM  [facility]    Protime  20. 0High    12.0 - 15.5 sec  Final  08/14/2020 12:43 PM  [facility]    dRVVT Screen  77High    33 - 44 sec  Final  08/14/2020 12:43 PM  [facility]    Hex Phosph Neut Test  Negative  Negative  Final  08/14/2020 12:43 PM  [facility]    Ptt-Heparin Neutralized  N/A  32 - 48 sec  Final  08/14/2020 12:43 PM  [facility]    Platelet Neutralization  N/A  Negative  Final  08/14/2020 12:43 PM  [facility]    Thrombin Time  N/A  14.7 - 19.5 sec  Final  08/14/2020 12:43 PM  [facility]    Reptilase Tm  N/A  <=21.9 sec  Final  08/14/2020 12:43 PM  [facility]    PTT 1:1 Mix  N/A  32 - 48 sec  Final  08/14/2020 12:43 PM  [facility]    DRVVT 1:1 Mix  59High    33 - 44 sec  Final  08/14/2020 12:43 PM  [facility]    DRVVT Confirmation Test  Negative  Negative ratio  Final  08/14/2020 12:43 PM  [facility]    Lup Anticoag Interp  See Note   Final  08/14/2020 12:43 PM  [facility]    Lupus anticoagulant not detected. Assessment and Plan:   1. History of Saddle Pulmonary Embolism   Patient with extensive PE, RLE DVT and history of RLE DVT, PE in 2018. He had provoking factors of long travel; however, due to extent of clotting and positive family history hypercoagulable work-up obtained:                - Antithrombin III - 130%, very mild elevated, non specific. No deficiency noted. - Cardiolipin Antibodies IgG & IgM - negative              - Factor 5 Leiden - negative              - Lupus (LE) panel w/ reflex - detected. - Protein C Antigen, Total - within normal limits              - Protein S Antigen, Total - 143%, very mildly elevated. Non specific. No deficiency noted. - Prothrombin Gene Mutation - negative  -Repeat lupus anticoagulant was obtained on 8/14/20 and negative. Lupus anticoagulant not detected.  Discussed initial test was performed while on anticoagulation which can cause false positive testing. testing on 2 more occasions at least 12 weeks apart is recommended to confirm persistently positive results of lupus anticoagulant. Discussed that he does not have antiphospholipid syndrome. Discussed with the patient lifelong anticoagulation is recommended due to two episodes of extensive PE. He will continue Xarelto. He will continue to follow up with his PCP. Return to Hematology clinic as needed. All patient questions answered. Pt voiced understanding. Patient agreed with treatment plan. Follow up as directed. Patient instructed to call for questions or concerns.       Electronically signed by   Noreen Davis PA-C

## 2020-12-14 ENCOUNTER — HOSPITAL ENCOUNTER (INPATIENT)
Age: 75
LOS: 2 days | Discharge: HOME | DRG: 191 | End: 2020-12-17
Attending: EMERGENCY MEDICINE | Admitting: INTERNAL MEDICINE
Payer: MEDICARE

## 2020-12-14 ENCOUNTER — APPOINTMENT (OUTPATIENT)
Dept: GENERAL RADIOLOGY | Age: 75
DRG: 191 | End: 2020-12-14
Payer: MEDICARE

## 2020-12-14 DIAGNOSIS — R79.89 ELEVATED SERUM CREATININE: ICD-10-CM

## 2020-12-14 DIAGNOSIS — D72.829 LEUKOCYTOSIS, UNSPECIFIED TYPE: ICD-10-CM

## 2020-12-14 DIAGNOSIS — J45.21 MILD INTERMITTENT ASTHMA WITH EXACERBATION: Primary | ICD-10-CM

## 2020-12-14 LAB
ANION GAP SERPL CALCULATED.3IONS-SCNC: 17 MEQ/L (ref 8–16)
BASOPHILS # BLD: 0.9 %
BASOPHILS ABSOLUTE: 0.1 THOU/MM3 (ref 0–0.1)
BUN BLDV-MCNC: 21 MG/DL (ref 7–22)
CALCIUM SERPL-MCNC: 8.6 MG/DL (ref 8.5–10.5)
CHLORIDE BLD-SCNC: 104 MEQ/L (ref 98–111)
CO2: 23 MEQ/L (ref 23–33)
CREAT SERPL-MCNC: 1.3 MG/DL (ref 0.4–1.2)
EKG ATRIAL RATE: 55 BPM
EKG P AXIS: 47 DEGREES
EKG P-R INTERVAL: 134 MS
EKG Q-T INTERVAL: 538 MS
EKG QRS DURATION: 120 MS
EKG QTC CALCULATION (BAZETT): 514 MS
EKG R AXIS: -22 DEGREES
EKG T AXIS: 49 DEGREES
EKG VENTRICULAR RATE: 55 BPM
EOSINOPHIL # BLD: 7.8 %
EOSINOPHILS ABSOLUTE: 0.7 THOU/MM3 (ref 0–0.4)
ERYTHROCYTE [DISTWIDTH] IN BLOOD BY AUTOMATED COUNT: 13.6 % (ref 11.5–14.5)
ERYTHROCYTE [DISTWIDTH] IN BLOOD BY AUTOMATED COUNT: 52.2 FL (ref 35–45)
FLU A ANTIGEN: NEGATIVE
FLU B ANTIGEN: NEGATIVE
GFR SERPL CREATININE-BSD FRML MDRD: 53 ML/MIN/1.73M2
GLUCOSE BLD-MCNC: 106 MG/DL (ref 70–108)
HCT VFR BLD CALC: 40.2 % (ref 42–52)
HEMOGLOBIN: 12.8 GM/DL (ref 14–18)
IMMATURE GRANS (ABS): 0.04 THOU/MM3 (ref 0–0.07)
IMMATURE GRANULOCYTES: 0.4 %
LYMPHOCYTES # BLD: 11.3 %
LYMPHOCYTES ABSOLUTE: 1 THOU/MM3 (ref 1–4.8)
MCH RBC QN AUTO: 33.2 PG (ref 26–33)
MCHC RBC AUTO-ENTMCNC: 31.8 GM/DL (ref 32.2–35.5)
MCV RBC AUTO: 104.4 FL (ref 80–94)
MONOCYTES # BLD: 9.2 %
MONOCYTES ABSOLUTE: 0.8 THOU/MM3 (ref 0.4–1.3)
NUCLEATED RED BLOOD CELLS: 0 /100 WBC
OSMOLALITY CALCULATION: 290.2 MOSMOL/KG (ref 275–300)
PLATELET # BLD: 259 THOU/MM3 (ref 130–400)
PMV BLD AUTO: 10.1 FL (ref 9.4–12.4)
POTASSIUM REFLEX MAGNESIUM: 4 MEQ/L (ref 3.5–5.2)
PRO-BNP: 66.6 PG/ML (ref 0–1800)
PROCALCITONIN: 0.09 NG/ML (ref 0.01–0.09)
RBC # BLD: 3.85 MILL/MM3 (ref 4.7–6.1)
SEG NEUTROPHILS: 70.4 %
SEGMENTED NEUTROPHILS ABSOLUTE COUNT: 6.4 THOU/MM3 (ref 1.8–7.7)
SODIUM BLD-SCNC: 144 MEQ/L (ref 135–145)
TROPONIN T: < 0.01 NG/ML
WBC # BLD: 9.1 THOU/MM3 (ref 4.8–10.8)

## 2020-12-14 PROCEDURE — 6360000002 HC RX W HCPCS: Performed by: EMERGENCY MEDICINE

## 2020-12-14 PROCEDURE — 85025 COMPLETE CBC W/AUTO DIFF WBC: CPT

## 2020-12-14 PROCEDURE — 93010 ELECTROCARDIOGRAM REPORT: CPT | Performed by: INTERNAL MEDICINE

## 2020-12-14 PROCEDURE — 36415 COLL VENOUS BLD VENIPUNCTURE: CPT

## 2020-12-14 PROCEDURE — 84484 ASSAY OF TROPONIN QUANT: CPT

## 2020-12-14 PROCEDURE — U0002 COVID-19 LAB TEST NON-CDC: HCPCS

## 2020-12-14 PROCEDURE — U0003 INFECTIOUS AGENT DETECTION BY NUCLEIC ACID (DNA OR RNA); SEVERE ACUTE RESPIRATORY SYNDROME CORONAVIRUS 2 (SARS-COV-2) (CORONAVIRUS DISEASE [COVID-19]), AMPLIFIED PROBE TECHNIQUE, MAKING USE OF HIGH THROUGHPUT TECHNOLOGIES AS DESCRIBED BY CMS-2020-01-R: HCPCS

## 2020-12-14 PROCEDURE — 87804 INFLUENZA ASSAY W/OPTIC: CPT

## 2020-12-14 PROCEDURE — 94761 N-INVAS EAR/PLS OXIMETRY MLT: CPT

## 2020-12-14 PROCEDURE — 6370000000 HC RX 637 (ALT 250 FOR IP): Performed by: EMERGENCY MEDICINE

## 2020-12-14 PROCEDURE — 96374 THER/PROPH/DIAG INJ IV PUSH: CPT

## 2020-12-14 PROCEDURE — 94640 AIRWAY INHALATION TREATMENT: CPT

## 2020-12-14 PROCEDURE — 99284 EMERGENCY DEPT VISIT MOD MDM: CPT

## 2020-12-14 PROCEDURE — 93005 ELECTROCARDIOGRAM TRACING: CPT | Performed by: FAMILY MEDICINE

## 2020-12-14 PROCEDURE — 80048 BASIC METABOLIC PNL TOTAL CA: CPT

## 2020-12-14 PROCEDURE — 71045 X-RAY EXAM CHEST 1 VIEW: CPT

## 2020-12-14 PROCEDURE — 84145 PROCALCITONIN (PCT): CPT

## 2020-12-14 PROCEDURE — 2700000000 HC OXYGEN THERAPY PER DAY

## 2020-12-14 PROCEDURE — 83880 ASSAY OF NATRIURETIC PEPTIDE: CPT

## 2020-12-14 RX ORDER — LEVOFLOXACIN 500 MG/1
500 TABLET, FILM COATED ORAL ONCE
Status: COMPLETED | OUTPATIENT
Start: 2020-12-14 | End: 2020-12-14

## 2020-12-14 RX ORDER — METHYLPREDNISOLONE SODIUM SUCCINATE 125 MG/2ML
125 INJECTION, POWDER, LYOPHILIZED, FOR SOLUTION INTRAMUSCULAR; INTRAVENOUS ONCE
Status: COMPLETED | OUTPATIENT
Start: 2020-12-14 | End: 2020-12-14

## 2020-12-14 RX ORDER — ALBUTEROL SULFATE 2.5 MG/3ML
15 SOLUTION RESPIRATORY (INHALATION) ONCE
Status: COMPLETED | OUTPATIENT
Start: 2020-12-14 | End: 2020-12-14

## 2020-12-14 RX ADMIN — LEVOFLOXACIN 500 MG: 500 TABLET, FILM COATED ORAL at 23:02

## 2020-12-14 RX ADMIN — METHYLPREDNISOLONE SODIUM SUCCINATE 125 MG: 125 INJECTION, POWDER, FOR SOLUTION INTRAMUSCULAR; INTRAVENOUS at 21:16

## 2020-12-14 RX ADMIN — ALBUTEROL SULFATE 15 MG/HR: 2.5 SOLUTION RESPIRATORY (INHALATION) at 19:33

## 2020-12-14 NOTE — ED PROVIDER NOTES
[facility]  EMERGENCY DEPARTMENT ENCOUNTER      PATIENT NAME: Osa Castleman  MRN: [id number]  : 1945  CONTRERAS: 2020  PROVIDER: Teo Miranda MD      CHIEF COMPLAINT       Chief Complaint   Patient presents with    Shortness of Breath       Nurses Notes reviewed and I agreeexcept as noted in the HPI. HISTORY OF PRESENT ILLNESS    Osa Castleman is a 66 y.o. male who presents to Emergency Department with SOB since last night. Past medical history remarkable for asthma and second hand smoker as a child and JOSE on CPAP. Patient states shortness of breath became worse in the last 24 hours. He has been using home albuterol nebulizer treatment every 2 hours with little improvement. He has no fever, no chills. No chest pain. No nausea, no vomiting. No leg swelling. This HPI was provided by the patient. REVIEW OF SYSTEMS     Review of Systems   Constitutional: Negative for activity change, appetite change, chills, fatigue, fever and unexpected weight change. HENT: Negative for congestion, ear discharge, ear pain, hearing loss, nosebleeds, rhinorrhea and sore throat. Eyes: Negative for photophobia, pain, discharge, redness and itching. Respiratory: Positive for cough, shortness of breath and wheezing. Negative for chest tightness and stridor. Cardiovascular: Negative for chest pain, palpitations and leg swelling. Gastrointestinal: Negative for abdominal distention, abdominal pain, constipation, diarrhea, nausea and vomiting. Endocrine: Negative for cold intolerance, heat intolerance, polydipsia and polyphagia. Genitourinary: Negative for dysuria, flank pain, frequency and hematuria. Musculoskeletal: Negative for arthralgias, back pain, gait problem, myalgias, neck pain and neck stiffness. Skin: Negative for pallor, rash and wound. Allergic/Immunologic: Negative for environmental allergies and food allergies.    Neurological: Negative for dizziness, tremors, syncope, weakness and headaches. Psychiatric/Behavioral: Negative for agitation, behavioral problems, confusion, self-injury, sleep disturbance and suicidal ideas. PAST MEDICAL HISTORY     Past Medical History:   Diagnosis Date    Arthritis     Asthma     Cancer ([address]     skin    Chickenpox     Hyperlipidemia     Hypertension     Measles     Migraines     Mumps     Sleep apnea        SURGICAL HISTORY       Past Surgical History:   Procedure Laterality Date    COLONOSCOPY      CORONARY ANGIOPLASTY N/A 2017/2018    Dr. Yvrose Mcadams Left 09/2020    EYE SURGERY      PAROTIDECTOMY Left 11/20/2019    PAROTIDECTOMY, LEFT performed by Allen Rios MD at [facility]       Previous Medications    ALBUTEROL (PROVENTIL) (2.5 MG/3ML) 0.083% NEBULIZER SOLUTION    inhale contents of 1 vial in nebulizer every 6 hours if needed for wheezing shortness of breath    ALBUTEROL SULFATE  (90 BASE) MCG/ACT INHALER    Inhale 2 puffs into the lungs every 6 hours as needed for Wheezing    BUDESONIDE-FORMOTEROL (SYMBICORT) 160-4.5 MCG/ACT AERO    Inhale 2 puffs into the lungs 2 times daily    CPAP MACHINE MISC    by Does not apply route Please change CPAP pressure to 13 cm H20.   Download 2 weeks    DEXTROMETHORPHAN-GUAIFENESIN (MUCINEX DM)  MG TB12    Take 1 tablet by mouth 2 times daily    DOCUSATE SODIUM (COLACE) 100 MG CAPSULE    Take 100 mg by mouth daily    FLUTICASONE (FLONASE) 50 MCG/ACT NASAL SPRAY    1 spray by Nasal route daily as needed for Rhinitis    IPRATROPIUM-ALBUTEROL (DUONEB) 0.5-2.5 (3) MG/3ML SOLN NEBULIZER SOLUTION    Inhale 3 mLs into the lungs 4 times daily    LISINOPRIL (PRINIVIL;ZESTRIL) 20 MG TABLET    Take 20 mg by mouth daily    OMEPRAZOLE (PRILOSEC) 20 MG DELAYED RELEASE CAPSULE    Take 20 mg by mouth daily    PRAVASTATIN (PRAVACHOL) 20 MG TABLET    Take 20 mg by mouth daily RIVAROXABAN (XARELTO) 20 MG TABS TABLET    Take 1 tablet by mouth daily (with breakfast)    VERAPAMIL (VERELAN PM) 240 MG CR CAPSULE    Take 240 mg by mouth every morning. Indications: Per patient taking medication for migraines prevention. VITAMIN B-12 1000 MCG TABLET    Take 1 tablet by mouth daily       ALLERGIES     Molds & smuts and Sulfa antibiotics    FAMILY HISTORY     He indicated that his mother is . He indicated that his father is . He indicated that his sister is alive. family history includes COPD in his mother; Cancer in his father; Crohn's Disease in his sister; Deep Vein Thrombosis in his sister; Diabetes in his mother. SOCIAL HISTORY      reports that he has never smoked. He has never used smokeless tobacco. He reports that he does not drink alcohol or use drugs. PHYSICAL EXAM     INITIAL VITALS:  weight is 289 lb (131.1 kg). His oral temperature is 98.2 °F (36.8 °C). His blood pressure is 122/64 and his pulse is 61. His respiration is 24 and oxygen saturation is 91%. Physical Exam  Vitals signs and nursing note reviewed. Constitutional:       Appearance: He is well-developed. He is not diaphoretic. HENT:      Head: Normocephalic and atraumatic. Nose: Nose normal.   Eyes:      General: No scleral icterus. Right eye: No discharge. Left eye: No discharge. Conjunctiva/sclera: Conjunctivae normal.      Pupils: Pupils are equal, round, and reactive to light. Neck:      Musculoskeletal: Normal range of motion and neck supple. Vascular: No JVD. Trachea: No tracheal deviation. Cardiovascular:      Rate and Rhythm: Normal rate and regular rhythm. Heart sounds: Normal heart sounds. No murmur. No friction rub. No gallop. Pulmonary:      Effort: Pulmonary effort is normal. No respiratory distress. Breath sounds: No stridor. Examination of the right-lower field reveals wheezing and rhonchi.  Examination of the left-lower field reveals wheezing and rhonchi. Wheezing, rhonchi and rales present. Chest:      Chest wall: No tenderness. Abdominal:      General: Bowel sounds are normal. There is no distension. Palpations: Abdomen is soft. There is no mass. Tenderness: There is no abdominal tenderness. There is no guarding or rebound. Hernia: No hernia is present. Musculoskeletal:         General: No tenderness or deformity. Lymphadenopathy:      Cervical: No cervical adenopathy. Skin:     General: Skin is warm and dry. Capillary Refill: Capillary refill takes less than 2 seconds. Coloration: Skin is not pale. Findings: No erythema or rash. Neurological:      Mental Status: He is alert and oriented to person, place, and time. Cranial Nerves: No cranial nerve deficit. Sensory: No sensory deficit. Motor: No abnormal muscle tone. Coordination: Coordination normal.      Deep Tendon Reflexes: Reflexes normal.   Psychiatric:         Behavior: Behavior normal.         Thought Content: Thought content normal.         Judgment: Judgment normal.         DIFFERENTIAL DIAGNOSIS:   Includes but not limited to: Pneumonia, bronchitis, CHF, PE, COPD, asthma, pulmonary edema, pleural effusion, AMI, URI, influenza, sinusitis, allergies, anxiety    DIAGNOSTIC RESULTS   EKG: All EKG's are interpreted by the Emergency Department Physician who either signs or Co-signsthis chart in the absence of a cardiologist.  Interpreted by me  No acute changes. Result Value Ref Range    Ventricular Rate 55 BPM    Atrial Rate 55 BPM    P-R Interval 134 ms    QRS Duration 120 ms    Q-T Interval 538 ms    QTc Calculation (Bazett) 514 ms    P Axis 47 degrees    R Axis -22 degrees    T Axis 49 degrees       RADIOLOGY: non-plain film images(s) such as CT, Ultrasound and MRI are read by the radiologist.  XR CHEST PORTABLE   Final Result   Few reticular markings in the lung bases.  Correlate for atelectasis or possible developing infiltrate. **This report has been created using voice recognition software. It may contain minor errors which are inherent in voice recognition technology. **      Final report electronically signed by Dr. Caitlyn Ramírez on 12/14/2020 6:00 PM          LABS:   Results for orders placed or performed during the hospital encounter of 12/14/20   Rapid influenza A/B antigens    Specimen: Nasopharyngeal   Result Value Ref Range    Flu A Antigen Negative NEGATIVE    Flu B Antigen Negative NEGATIVE   Basic Metabolic Panel w/ Reflex to MG   Result Value Ref Range    Sodium 144 135 - 145 meq/L    Potassium reflex Magnesium 4.0 3.5 - 5.2 meq/L    Chloride 104 98 - 111 meq/L    CO2 23 23 - 33 meq/L    Glucose 106 70 - 108 mg/dL    BUN 21 7 - 22 mg/dL    CREATININE 1.3 (H) 0.4 - 1.2 mg/dL    Calcium 8.6 8.5 - 10.5 mg/dL   Brain Natriuretic Peptide   Result Value Ref Range    Pro-BNP 66.6 0.0 - 1800.0 pg/mL   CBC Auto Differential   Result Value Ref Range    WBC 9.1 4.8 - 10.8 thou/mm3    RBC 3.85 (L) 4.70 - 6.10 mill/mm3    Hemoglobin 12.8 (L) 14.0 - 18.0 gm/dl    Hematocrit 40.2 (L) 42.0 - 52.0 %    .4 (H) 80.0 - 94.0 fL    MCH 33.2 (H) 26.0 - 33.0 pg    MCHC 31.8 (L) 32.2 - 35.5 gm/dl    RDW-CV 13.6 11.5 - 14.5 %    RDW-SD 52.2 (H) 35.0 - 45.0 fL    Platelets 279 327 - 893 thou/mm3    MPV 10.1 9.4 - 12.4 fL    Seg Neutrophils 70.4 %    Lymphocytes 11.3 %    Monocytes 9.2 %    Eosinophils 7.8 %    Basophils 0.9 %    Immature Granulocytes 0.4 %    Segs Absolute 6.4 1.8 - 7.7 thou/mm3    Lymphocytes Absolute 1.0 1.0 - 4.8 thou/mm3    Monocytes Absolute 0.8 0.4 - 1.3 thou/mm3    Eosinophils Absolute 0.7 (H) 0.0 - 0.4 thou/mm3    Basophils Absolute 0.1 0.0 - 0.1 thou/mm3    Immature Grans (Abs) 0.04 0.00 - 0.07 thou/mm3    nRBC 0 /100 wbc   Troponin   Result Value Ref Range    Troponin T < 0.010 ng/ml   Procalcitonin   Result Value Ref Range    Procalcitonin 0.09 0.01 - 0.09 ng/mL   Anion Gap   Result Value Ref Range    Anion Gap 17.0 (H) 8.0 - 16.0 meq/L   Osmolality   Result Value Ref Range    Osmolality Calc 290.2 275.0 - 300.0 mOsmol/kg   Glomerular Filtration Rate, Estimated   Result Value Ref Range    Est Glovin Filt Rate 53 (A) ml/min/1.73m2   COVID-19   Result Value Ref Range    SARS-CoV-2, NAAT NOT DETECTED NOT DETECTED   EKG 12 Lead   Result Value Ref Range    Ventricular Rate 55 BPM    Atrial Rate 55 BPM    P-R Interval 134 ms    QRS Duration 120 ms    Q-T Interval 538 ms    QTc Calculation (Bazett) 514 ms    P Axis 47 degrees    R Axis -22 degrees    T Axis 49 degrees       EMERGENCY DEPARTMENT COURSE:   Vitals:    Vitals:    12/14/20 1933 12/14/20 2125 12/14/20 2301 12/14/20 2306   BP:  129/61 122/64    Pulse:  55 61    Resp: 18 20 24    Temp:       TempSrc:       SpO2: 94% 96% (!) 89% 91%   Weight:           MEDICAL DECISION MAKINGS:    Patient was seen and evaluated in a timely fashion. Patient was medicated following ED medications. His ED work-ups suggest patient has asthma exacerbation, no evidence patient has severe bacterial infection. No evidence patient has pneumonia. Covid 19 negative. Medications   ipratropium-albuterol (DUONEB) nebulizer solution 1 ampule (has no administration in time range)   ipratropium-albuterol (DUONEB) nebulizer solution 1 ampule (has no administration in time range)   magnesium sulfate 2 g in 50 mL IVPB premix (has no administration in time range)   albuterol (PROVENTIL) nebulizer solution (15 mg/hr Nebulization Given 12/14/20 1933)   methylPREDNISolone sodium (SOLU-MEDROL) injection 125 mg (125 mg Intravenous Given 12/14/20 2116)   levoFLOXacin (LEVAQUIN) tablet 500 mg (500 mg Oral Given 12/14/20 2302)     On reassessment, patient could not tolerate ambulatory test, he was grunting for air per nurse, he is not on home oxygen, his SaO2 dropped to 89% on room air, admission felt to be required.   Case was discussed with patient was admitted by hospitalist service. CRITICAL CARE:   None    CONSULTS:  Hospitalist    PROCEDURES:  None    FINAL IMPRESSION      1. Mild intermittent asthma with exacerbation          DISPOSITION/PLAN   Admit    PATIENT REFERRED TO:  No follow-up provider specified.     DISCHARGE MEDICATIONS:  New Prescriptions    No medications on file       (Please note that portions of this note were completed with a voice recognition program.  Efforts were made to edit the dictations but occasionally words aremis-transcribed.)    MD Jesse Ragsdale MD  12/15/20 0691

## 2020-12-15 PROBLEM — J44.1 COPD WITH ACUTE EXACERBATION (HCC): Status: ACTIVE | Noted: 2020-12-15

## 2020-12-15 LAB
BASE EXCESS MIXED: 0.8 MMOL/L (ref -2–3)
COLLECTED BY:: ABNORMAL
HCO3, MIXED: 24 MMOL/L (ref 23–28)
O2 SAT, MIXED: 97 %
PCO2, MIXED VENOUS: 33 MMHG (ref 41–51)
PH, MIXED: 7.46 (ref 7.31–7.41)
PO2 MIXED: 85 MMHG (ref 25–40)
SARS-COV-2, NAAT: NOT DETECTED

## 2020-12-15 PROCEDURE — 96375 TX/PRO/DX INJ NEW DRUG ADDON: CPT

## 2020-12-15 PROCEDURE — 6370000000 HC RX 637 (ALT 250 FOR IP): Performed by: NURSE PRACTITIONER

## 2020-12-15 PROCEDURE — 6360000002 HC RX W HCPCS: Performed by: EMERGENCY MEDICINE

## 2020-12-15 PROCEDURE — 6370000000 HC RX 637 (ALT 250 FOR IP): Performed by: EMERGENCY MEDICINE

## 2020-12-15 PROCEDURE — 96366 THER/PROPH/DIAG IV INF ADDON: CPT

## 2020-12-15 PROCEDURE — 6370000000 HC RX 637 (ALT 250 FOR IP): Performed by: FAMILY MEDICINE

## 2020-12-15 PROCEDURE — 99223 1ST HOSP IP/OBS HIGH 75: CPT | Performed by: NURSE PRACTITIONER

## 2020-12-15 PROCEDURE — 2580000003 HC RX 258: Performed by: NURSE PRACTITIONER

## 2020-12-15 PROCEDURE — G0378 HOSPITAL OBSERVATION PER HR: HCPCS

## 2020-12-15 PROCEDURE — 82803 BLOOD GASES ANY COMBINATION: CPT

## 2020-12-15 PROCEDURE — 94640 AIRWAY INHALATION TREATMENT: CPT

## 2020-12-15 PROCEDURE — 96365 THER/PROPH/DIAG IV INF INIT: CPT

## 2020-12-15 PROCEDURE — 94761 N-INVAS EAR/PLS OXIMETRY MLT: CPT

## 2020-12-15 PROCEDURE — 1200000003 HC TELEMETRY R&B

## 2020-12-15 PROCEDURE — 94760 N-INVAS EAR/PLS OXIMETRY 1: CPT

## 2020-12-15 RX ORDER — PROMETHAZINE HYDROCHLORIDE 25 MG/1
12.5 TABLET ORAL EVERY 6 HOURS PRN
Status: DISCONTINUED | OUTPATIENT
Start: 2020-12-15 | End: 2020-12-17 | Stop reason: HOSPADM

## 2020-12-15 RX ORDER — POLYETHYLENE GLYCOL 3350 17 G/17G
17 POWDER, FOR SOLUTION ORAL DAILY PRN
Status: DISCONTINUED | OUTPATIENT
Start: 2020-12-15 | End: 2020-12-17 | Stop reason: HOSPADM

## 2020-12-15 RX ORDER — CETIRIZINE HYDROCHLORIDE 10 MG/1
10 TABLET ORAL DAILY
Status: DISCONTINUED | OUTPATIENT
Start: 2020-12-15 | End: 2020-12-17 | Stop reason: HOSPADM

## 2020-12-15 RX ORDER — PREDNISONE 20 MG/1
40 TABLET ORAL DAILY
Status: DISCONTINUED | OUTPATIENT
Start: 2020-12-15 | End: 2020-12-17 | Stop reason: HOSPADM

## 2020-12-15 RX ORDER — IPRATROPIUM BROMIDE AND ALBUTEROL SULFATE 2.5; .5 MG/3ML; MG/3ML
1 SOLUTION RESPIRATORY (INHALATION) ONCE
Status: COMPLETED | OUTPATIENT
Start: 2020-12-15 | End: 2020-12-15

## 2020-12-15 RX ORDER — SODIUM CHLORIDE 0.9 % (FLUSH) 0.9 %
10 SYRINGE (ML) INJECTION PRN
Status: DISCONTINUED | OUTPATIENT
Start: 2020-12-15 | End: 2020-12-17 | Stop reason: HOSPADM

## 2020-12-15 RX ORDER — PANTOPRAZOLE SODIUM 40 MG/1
40 TABLET, DELAYED RELEASE ORAL
Status: DISCONTINUED | OUTPATIENT
Start: 2020-12-15 | End: 2020-12-17 | Stop reason: HOSPADM

## 2020-12-15 RX ORDER — IPRATROPIUM BROMIDE AND ALBUTEROL SULFATE 2.5; .5 MG/3ML; MG/3ML
1 SOLUTION RESPIRATORY (INHALATION)
Status: DISCONTINUED | OUTPATIENT
Start: 2020-12-15 | End: 2020-12-16

## 2020-12-15 RX ORDER — ALBUTEROL SULFATE 2.5 MG/3ML
2.5 SOLUTION RESPIRATORY (INHALATION)
Status: DISCONTINUED | OUTPATIENT
Start: 2020-12-15 | End: 2020-12-17 | Stop reason: HOSPADM

## 2020-12-15 RX ORDER — FLUTICASONE PROPIONATE 50 MCG
1 SPRAY, SUSPENSION (ML) NASAL DAILY PRN
Status: DISCONTINUED | OUTPATIENT
Start: 2020-12-15 | End: 2020-12-17 | Stop reason: HOSPADM

## 2020-12-15 RX ORDER — VERAPAMIL HYDROCHLORIDE 240 MG/1
240 TABLET, FILM COATED, EXTENDED RELEASE ORAL EVERY MORNING
Status: DISCONTINUED | OUTPATIENT
Start: 2020-12-15 | End: 2020-12-17 | Stop reason: HOSPADM

## 2020-12-15 RX ORDER — LISINOPRIL 20 MG/1
20 TABLET ORAL DAILY
Status: DISCONTINUED | OUTPATIENT
Start: 2020-12-15 | End: 2020-12-16

## 2020-12-15 RX ORDER — ACETAMINOPHEN 325 MG/1
650 TABLET ORAL EVERY 6 HOURS PRN
Status: DISCONTINUED | OUTPATIENT
Start: 2020-12-15 | End: 2020-12-17 | Stop reason: HOSPADM

## 2020-12-15 RX ORDER — MAGNESIUM SULFATE IN WATER 40 MG/ML
2 INJECTION, SOLUTION INTRAVENOUS ONCE
Status: COMPLETED | OUTPATIENT
Start: 2020-12-15 | End: 2020-12-15

## 2020-12-15 RX ORDER — PRAVASTATIN SODIUM 20 MG
20 TABLET ORAL DAILY
Status: DISCONTINUED | OUTPATIENT
Start: 2020-12-15 | End: 2020-12-17 | Stop reason: HOSPADM

## 2020-12-15 RX ORDER — SODIUM CHLORIDE 0.9 % (FLUSH) 0.9 %
10 SYRINGE (ML) INJECTION EVERY 12 HOURS SCHEDULED
Status: DISCONTINUED | OUTPATIENT
Start: 2020-12-15 | End: 2020-12-17 | Stop reason: HOSPADM

## 2020-12-15 RX ORDER — ACETAMINOPHEN 650 MG/1
650 SUPPOSITORY RECTAL EVERY 6 HOURS PRN
Status: DISCONTINUED | OUTPATIENT
Start: 2020-12-15 | End: 2020-12-17 | Stop reason: HOSPADM

## 2020-12-15 RX ORDER — BUDESONIDE AND FORMOTEROL FUMARATE DIHYDRATE 160; 4.5 UG/1; UG/1
2 AEROSOL RESPIRATORY (INHALATION) 2 TIMES DAILY
Status: DISCONTINUED | OUTPATIENT
Start: 2020-12-15 | End: 2020-12-17 | Stop reason: HOSPADM

## 2020-12-15 RX ORDER — LANOLIN ALCOHOL/MO/W.PET/CERES
1000 CREAM (GRAM) TOPICAL DAILY
Status: DISCONTINUED | OUTPATIENT
Start: 2020-12-15 | End: 2020-12-17 | Stop reason: HOSPADM

## 2020-12-15 RX ORDER — ONDANSETRON 2 MG/ML
4 INJECTION INTRAMUSCULAR; INTRAVENOUS EVERY 6 HOURS PRN
Status: DISCONTINUED | OUTPATIENT
Start: 2020-12-15 | End: 2020-12-17 | Stop reason: HOSPADM

## 2020-12-15 RX ORDER — DOCUSATE SODIUM 100 MG/1
100 CAPSULE, LIQUID FILLED ORAL DAILY
Status: DISCONTINUED | OUTPATIENT
Start: 2020-12-15 | End: 2020-12-17 | Stop reason: HOSPADM

## 2020-12-15 RX ADMIN — IPRATROPIUM BROMIDE AND ALBUTEROL SULFATE 1 AMPULE: .5; 3 SOLUTION RESPIRATORY (INHALATION) at 21:38

## 2020-12-15 RX ADMIN — CETIRIZINE HYDROCHLORIDE 10 MG: 10 TABLET, FILM COATED ORAL at 13:09

## 2020-12-15 RX ADMIN — SODIUM CHLORIDE, PRESERVATIVE FREE 10 ML: 5 INJECTION INTRAVENOUS at 09:00

## 2020-12-15 RX ADMIN — Medication 1000 MCG: at 08:52

## 2020-12-15 RX ADMIN — SODIUM CHLORIDE, PRESERVATIVE FREE 10 ML: 5 INJECTION INTRAVENOUS at 22:28

## 2020-12-15 RX ADMIN — BUDESONIDE AND FORMOTEROL FUMARATE DIHYDRATE 2 PUFF: 160; 4.5 AEROSOL RESPIRATORY (INHALATION) at 21:38

## 2020-12-15 RX ADMIN — IPRATROPIUM BROMIDE AND ALBUTEROL SULFATE 1 AMPULE: .5; 3 SOLUTION RESPIRATORY (INHALATION) at 01:05

## 2020-12-15 RX ADMIN — MAGNESIUM SULFATE HEPTAHYDRATE 2 G: 40 INJECTION, SOLUTION INTRAVENOUS at 01:39

## 2020-12-15 RX ADMIN — FLUTICASONE PROPIONATE 1 SPRAY: 50 SPRAY, METERED NASAL at 08:52

## 2020-12-15 RX ADMIN — PREDNISONE 40 MG: 20 TABLET ORAL at 13:09

## 2020-12-15 RX ADMIN — GUAIFENESIN AND DEXTROMETHORPHAN HYDROBROMIDE 1 TABLET: 600; 30 TABLET, EXTENDED RELEASE ORAL at 22:28

## 2020-12-15 RX ADMIN — IPRATROPIUM BROMIDE AND ALBUTEROL SULFATE 1 AMPULE: .5; 3 SOLUTION RESPIRATORY (INHALATION) at 07:28

## 2020-12-15 RX ADMIN — LISINOPRIL 20 MG: 20 TABLET ORAL at 08:52

## 2020-12-15 RX ADMIN — IPRATROPIUM BROMIDE AND ALBUTEROL SULFATE 1 AMPULE: .5; 3 SOLUTION RESPIRATORY (INHALATION) at 16:44

## 2020-12-15 RX ADMIN — VERAPAMIL HYDROCHLORIDE 240 MG: 240 TABLET, FILM COATED, EXTENDED RELEASE ORAL at 08:52

## 2020-12-15 RX ADMIN — IPRATROPIUM BROMIDE AND ALBUTEROL SULFATE 1 AMPULE: .5; 3 SOLUTION RESPIRATORY (INHALATION) at 13:14

## 2020-12-15 RX ADMIN — PANTOPRAZOLE SODIUM 40 MG: 40 TABLET, DELAYED RELEASE ORAL at 08:56

## 2020-12-15 RX ADMIN — PRAVASTATIN SODIUM 20 MG: 20 TABLET ORAL at 08:52

## 2020-12-15 RX ADMIN — RIVAROXABAN 20 MG: 20 TABLET, FILM COATED ORAL at 08:52

## 2020-12-15 RX ADMIN — DOCUSATE SODIUM 100 MG: 100 CAPSULE, LIQUID FILLED ORAL at 08:51

## 2020-12-15 RX ADMIN — GUAIFENESIN AND DEXTROMETHORPHAN HYDROBROMIDE 1 TABLET: 600; 30 TABLET, EXTENDED RELEASE ORAL at 08:52

## 2020-12-15 ASSESSMENT — ENCOUNTER SYMPTOMS
COUGH: 1
EYE PAIN: 0
PHOTOPHOBIA: 0
SORE THROAT: 0
EYE DISCHARGE: 0
WHEEZING: 1
SHORTNESS OF BREATH: 1
BACK PAIN: 0
VOMITING: 0
EYE ITCHING: 0
ABDOMINAL PAIN: 0
DIARRHEA: 0
CONSTIPATION: 0
STRIDOR: 0
NAUSEA: 0
EYE REDNESS: 0
CHEST TIGHTNESS: 0
ABDOMINAL DISTENTION: 0
RHINORRHEA: 0

## 2020-12-15 NOTE — H&P
History & Physical        Patient:  Shalini Choudhary  YOB: 1945    MRN: [id number]     Acct: [de-identified]    PCP: YESY Buenrostro CNP    Date of Admission: 12/14/2020    Date of Service: Pt seen/examined on 12/15/20  and Admitted to Inpatient with expected LOS greater than two midnights due to medical therapy. ASSESSMENT/PLAN:    1. Acute COPD/Asthma exacerbation--follows with [facility]; most recent PFTs showed moderate obstruction; was given Solu-Medrol 125 mg in the emergency department so will change to prednisone 40 mg daily; COVID-19 negative, influenza screen negative; on Symbicort and DuoNeb  2. Chronic cough--feels better after receiving 5 days of antibiotics and prednisone burst however when he finished taking it the cough and shortness of breath returns; chest x-ray shows few reticular markings in the lung bases and to correlate for atelectasis or possible developing infiltrate; procalcitonin is normal as well as BNP; he is afebrile; patient is on lisinopril  3. Macrocytic, hyperchromic anemia  4. Essential hypertension, uncontrolled--on lisinopril, verapamil  5. Hyperlipidemia--treated with Pravachol  6. 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  Obesity with BMI 37.11      Chief Complaint: Cough and shortness of breath      History Of Present Illness:    66 y.o. male who presented to [facility] with cough and shortness of breath; this patient has a past medical history of asthma, sleep apnea, hypertension and hyperlipidemia; he has a history of COPD/asthma and [facility]; he states he has chronic shortness of breath and cough and states \"it comes and cycles\"; he states every couple months he will \"get this\" and states around antibiotics and steroids helps him momentarily however he then has another episode; he denies any lightheadedness or dizziness or headache, states he has a cough bringing up some yellow phlegm, states he is never smoked however he was exposed to secondhand smoke; he states for the last 24 hours he has been using his nebulizer every 2 hours to assist him in breathing; the lowest O2 sat documented was 89% on room air; when he presented to the emergency department he was not hypoxic and he was hemodynamically stable; he was given nebulizer treatment along with 2 duo nebs, Levaquin 500 mg daily and Solu-Medrol 125 mg daily; he is being admitted to the hospital service for further care and evaluation. Past Medical History:          Diagnosis Date    Arthritis     Asthma     Cancer ([address]     skin    Chickenpox     Hyperlipidemia     Hypertension     Measles     Migraines     Mumps     Sleep apnea        Past Surgical History:          Procedure Laterality Date    COLONOSCOPY      CORONARY ANGIOPLASTY N/A 2017/2018    Dr. Reginaldo Stack Left 09/2020    Jenny Mayberry Left 11/20/2019    PAROTIDECTOMY, LEFT performed by Timi Barboza MD at [address] [place] [address]       Medications Prior to Admission:      Prior to Admission medications    Medication Sig Start Date End Date Taking?  Authorizing Provider   lisinopril (PRINIVIL;ZESTRIL) 20 MG tablet Take 20 mg by mouth daily    Historical Provider, MD   rivaroxaban (XARELTO) 20 MG TABS tablet Take 1 tablet by mouth daily (with breakfast) 12/2/20   YESY Aparicio CNP   Dextromethorphan-guaiFENesin (MUCINEX DM)  MG TB12 Take 1 tablet by mouth 2 times daily 11/10/20   YESY Dockery CNP   ipratropium-albuterol (DUONEB) 0.5-2.5 (3) MG/3ML SOLN nebulizer solution Inhale 3 mLs into the lungs 4 times daily 11/10/20   YESY Aparicio CNP   budesonide-formoterol (SYMBICORT) 160-4.5 MCG/ACT AERO Inhale 2 puffs into the lungs 2 times daily 7/13/20   YESY Aparicio CNP   vitamin B-12 1000 MCG tablet Take 1 tablet by mouth daily 5/16/20   Daniel More DO albuterol (PROVENTIL) (2.5 MG/3ML) 0.083% nebulizer solution inhale contents of 1 vial in nebulizer every 6 hours if needed for wheezing shortness of breath 5/4/20   YESY Fritz CNP   docusate sodium (COLACE) 100 MG capsule Take 100 mg by mouth daily    Historical Provider, MD   fluticasone (FLONASE) 50 MCG/ACT nasal spray 1 spray by Nasal route daily as needed for Rhinitis 11/18/19   YESY Fritz CNP   CPAP Machine MISC by Does not apply route Please change CPAP pressure to 13 cm H20. Download 2 weeks 10/30/19   Charles Romero PA-C   albuterol sulfate  (90 Base) MCG/ACT inhaler Inhale 2 puffs into the lungs every 6 hours as needed for Wheezing    Historical Provider, MD   omeprazole (PRILOSEC) 20 MG delayed release capsule Take 20 mg by mouth daily    Historical Provider, MD   pravastatin (PRAVACHOL) 20 MG tablet Take 20 mg by mouth daily    Historical Provider, MD   verapamil (VERELAN PM) 240 MG CR capsule Take 240 mg by mouth every morning. Indications: Per patient taking medication for migraines prevention. Historical Provider, MD       Allergies:  Molds & smuts and Sulfa antibiotics    Social History:   reports that he has never smoked. He has never used smokeless tobacco. He reports that he does not drink alcohol or use drugs.     Family History:      Positive as follows:        Problem Relation Age of Onset    Diabetes Mother     COPD Mother     Cancer Father     Deep Vein Thrombosis Sister     Crohn's Disease Sister        REVIEW OF SYSTEMS:     Constitutional: ROS: negative for - chills or fever  Head: no headache, no head injury, no migraine   Eyes ROS: denies blurred/double vision  Ears ROS: no hearing difficulty, no tinnitus  Mouth and Throat ROS: no ulceration, dysphagia, dental caries  Psychological ROS: no depression, no anxiety, no panic attacks, denies suicide/homicide ideation  Endocrine ROS: denies polyuria, polydypsia, no heat or cold intolerance  Respiratory ROS: positive for - cough, shortness of breath and sputum changes  Cardiovascular ROS: no chest pain or dyspnea on exertion  Gastrointestinal ROS: no abdominal pain, change in bowel habits, or black or bloody stools  Genito-Urinary ROS: denies dysuria, frequency, urgency; denies hematuria  Musculoskeletal ROS: negative  Neurological ROS: no syncope, no seizures, no numbness or tingling of hands, no numbness or tingling of feet, no paresis  Dermatology: no skin rash, no eczema  Endocrine: no polyuria, polydypsia, no heat/cold intolerance  Hematology: denies bruising easily, denies bleeding problems, denies clotting disorders    PHYSICAL EXAM:    /64   Pulse 61   Temp 98.2 °F (36.8 °C) (Oral)   Resp 24   Wt 289 lb (131.1 kg)   SpO2 91%   BMI 37.11 kg/m²     General appearance:  No apparent distress, appears stated age and cooperative. HEENT:  Normal cephalic, atraumatic without obvious deformity. Pupils equal, round, and reactive to light. Conjunctivae/corneas clear. Neck: Supple, with full range of motion. No jugular venous distention. Trachea midline. Respiratory: Respiratory rate of 24 however he is able to speak in complete sentences. Expiratory and wheezing noted Cardiovascular:  Regular rate and rhythm with normal S1/S2 without murmurs, rubs or gallops. Abdomen: Soft, non-tender, non-distended with normal bowel sounds. Musculoskeletal:  No clubbing, cyanosis or edema bilaterally. Full range of motion without deformity. Skin: Skin color, texture, turgor normal.    Neurologic:  Neurovascularly intact without any focal sensory/motor deficits.  Cranial nerves: II-XII intact, grossly non-focal.  Psychiatric:  Alert and oriented, thought content appropriate  Capillary Refill: Brisk,< 3 seconds   Peripheral Pulses: +2 palpable, equal bilaterally       Labs:     Recent Labs     12/14/20  1840   WBC 9.1   HGB 12.8*   HCT 40.2*        Recent Labs     12/14/20  1840   NA 144   K 4.0      CO2 23   BUN 21   CREATININE 1.3*   CALCIUM 8.6     No results for input(s): AST, ALT, BILIDIR, BILITOT, ALKPHOS in the last 72 hours. No results for input(s): INR in the last 72 hours. Recent Labs     12/14/20  1840   TROPONINT < 0.010     Procalcitonin:  Recent Labs     12/14/20  1840   PROCAL 0.09     Radiology:     Xr Chest Portable    Result Date: 12/14/2020  PROCEDURE: XR CHEST PORTABLE CLINICAL INFORMATION: sob. COMPARISON: November 11, 2020 TECHNIQUE: Portable. FINDINGS: Few reticular markings in the lung bases. Correlate for atelectasis or possible developing infiltrate. Costophrenic angles are preserved. Cardiomediastinal silhouette is within normal limits. No acute osseous findings. Few reticular markings in the lung bases. Correlate for atelectasis or possible developing infiltrate. **This report has been created using voice recognition software. It may contain minor errors which are inherent in voice recognition technology. ** Final report electronically signed by Dr. Bill Love on 12/14/2020 6:00 PM      EKG:  I have reviewed the EKG with the following interpretation: Sinus bradycardia heart rate 55 with inverted T waves in aVL and V2      Thank you YESY Willoughby CNP for the opportunity to be involved in this patient's care.     Electronically signed by YESY Tyler CNP on 12/15/2020 at 1:06 AM

## 2020-12-15 NOTE — CARE COORDINATION
DISASTER CHARTING    12/15/20, 12:22 PM EST    DISCHARGE ONGOING EVALUATION:     Jayjay Ybarra day: 0  Location: -28/028-A Reason for admit: [facility]ation [facility] [J44.1]   Barriers to Discharge: Patient presents with shortness of breath. Symbicort, Zyrtec, Mucinex, Duoneb nebulizer, Levaquin x 1 dose, Solumedrol x 1 dose Xarelto, prn pain medications and Proventil nebulizer, a.m. labs, general diet, telemetry, up with assistance. PCP: YESY Thorpe - CNP  Patient Goals/Plan/Treatment Preferences: Met with Nish Strong. He is from home with his wife. He verifies his insurance and PCP. He can afford his medications and denies a need for DME. He drove himself to the hospital and will drive himself home. Nish Strong declines [facility].

## 2020-12-15 NOTE — ED NOTES
Called [facility] and informed them that the patient is on their way to the unit. Patient transported via wheelchair in a stable condition.      Kandi Roberson  12/15/20 4093
Dr. Kristen Villasenor at bedside at this time.      Mandy Chapman, RN  12/14/20 1920
ED nurse-to-nurse bedside report    Chief Complaint   Patient presents with    Shortness of Breath      LOC: alert and orientated to name, place, date  Vital signs   Vitals:    12/14/20 1724 12/14/20 1726 12/14/20 1912   BP:  (!) 101/58 (!) 109/55   Pulse: 58  70   Resp: 20  22   Temp: 98.2 °F (36.8 °C)     TempSrc: Oral     SpO2: 94%  92%   Weight: 289 lb (131.1 kg)        Pain:    Pain Interventions: no pain  Pain Goal: no pain  Oxygen: Yes    Current needs required 2L O2 via NC   Telemetry: No  LDAs:    Continuous Infusions:   Mobility: Requires assistance * 1  Mack Fall Risk Score: No flowsheet data found.   Fall Interventions: bed in lowest position, call light in reach  Report given to: [id number] Corby Roman RN  12/14/20 2497
ED to inpatient nurses report    Chief Complaint   Patient presents with    Shortness of Breath      Present to ED from home  LOC: alert and orientated to name, place, date  Vital signs   Vitals:    12/15/20 0141 12/15/20 0329 12/15/20 0543 12/15/20 0548   BP: (!) 118/58 (!) 110/58 123/66    Pulse:  90 84    Resp: 16 20 18    Temp:       TempSrc:       SpO2: 93% 91% 93% 92%   Weight:          Oxygen Baseline 90-92%    Current needs required none Bipap/Cpap Yes  LDAs:   Peripheral IV 12/15/20 Left Antecubital (Active)   Site Assessment Intact;Dry;Clean 12/15/20 0336   Line Status Normal saline locked 12/15/20 0336   Dressing Status Clean;Dry; Intact 12/15/20 0336     Mobility: Independent  Pending ED orders: none  Present condition: stable     Electronically signed by Mikaela Kwan RN on 12/15/2020 at 7:02 AM     Mikaela Kwan RN  12/15/20 7501
Patient presents to the ED with complaints of SOB and a cough. Patient states he has been using his inhaler about every 2 hours and still can't seem to catch his breath. Patient has a history of asthma.      Eunice Rodriguez RN  12/14/20 7477
Pt ambulated halls. Pt very labored. Oxygen between 90-92%. Will notify provider.       Beckie Garland RN  12/14/20 5948
Pt assisted to side of bed to urinate. Pt given cup of ice water. Pt denies any further needs at this time. Call light within reach. Side rails up x2.       Shabnam Cruz RN  12/15/20 6396
[facility] [address] [place] [address]  12/14/20 7655
Pt resting in bed with eyes closed. Offered pt to put on bi-pap- pt reports he wants to wait. Pt offered urinal. Denies any further needs will monitor.       Fidencio Nyhan, RN  12/15/20 9066
Resting in bed. Denies any needs. Made aware of admission to floor. Denies any needs.       Rafaela Keller RN  12/15/20 5882
Upon first contact pt oxygen 92%. Placed on 2L Nc at this this time. oxygen up to 95% Will monitor.       Abhishek Pendleton RN  12/14/20 1913
Patient/Caregiver provided printed discharge information.

## 2020-12-15 NOTE — PROGRESS NOTES
Patient states that he feels better and wants to know what the plan is for his admission here. Nurse informed patient of plans for lab work in the morning and the importance to stay in hospital and get a thorough work up complete before discharge.

## 2020-12-15 NOTE — PLAN OF CARE
Patient admitted after midnight. See H/P for details    COPD exac; continue current regimen. Added home cetrizine. May consider Singulair as pt had no exacerbations while he was on it. Now multiple exacs. Insurance not covering singulair.  Will need to speak to SW/CM for discounts etc.

## 2020-12-16 LAB
ALBUMIN SERPL-MCNC: 3.7 G/DL (ref 3.5–5.1)
ALP BLD-CCNC: 68 U/L (ref 38–126)
ALT SERPL-CCNC: 8 U/L (ref 11–66)
ANION GAP SERPL CALCULATED.3IONS-SCNC: 14 MEQ/L (ref 8–16)
AST SERPL-CCNC: 18 U/L (ref 5–40)
BASOPHILS # BLD: 0.1 %
BASOPHILS ABSOLUTE: 0 THOU/MM3 (ref 0–0.1)
BILIRUB SERPL-MCNC: 0.4 MG/DL (ref 0.3–1.2)
BUN BLDV-MCNC: 31 MG/DL (ref 7–22)
CALCIUM SERPL-MCNC: 9.2 MG/DL (ref 8.5–10.5)
CHLORIDE BLD-SCNC: 99 MEQ/L (ref 98–111)
CO2: 25 MEQ/L (ref 23–33)
CREAT SERPL-MCNC: 1.5 MG/DL (ref 0.4–1.2)
EOSINOPHIL # BLD: 0 %
EOSINOPHILS ABSOLUTE: 0 THOU/MM3 (ref 0–0.4)
ERYTHROCYTE [DISTWIDTH] IN BLOOD BY AUTOMATED COUNT: 13.8 % (ref 11.5–14.5)
ERYTHROCYTE [DISTWIDTH] IN BLOOD BY AUTOMATED COUNT: 52.5 FL (ref 35–45)
FOLATE: 8.8 NG/ML (ref 4.8–24.2)
GFR SERPL CREATININE-BSD FRML MDRD: 45 ML/MIN/1.73M2
GLUCOSE BLD-MCNC: 132 MG/DL (ref 70–108)
HCT VFR BLD CALC: 44.3 % (ref 42–52)
HEMOGLOBIN: 14.3 GM/DL (ref 14–18)
IMMATURE GRANS (ABS): 0.09 THOU/MM3 (ref 0–0.07)
IMMATURE GRANULOCYTES: 0.5 %
LV EF: 55 %
LVEF MODALITY: NORMAL
LYMPHOCYTES # BLD: 4.2 %
LYMPHOCYTES ABSOLUTE: 0.8 THOU/MM3 (ref 1–4.8)
MAGNESIUM: 2.7 MG/DL (ref 1.6–2.4)
MCH RBC QN AUTO: 33.4 PG (ref 26–33)
MCHC RBC AUTO-ENTMCNC: 32.3 GM/DL (ref 32.2–35.5)
MCV RBC AUTO: 103.5 FL (ref 80–94)
MONOCYTES # BLD: 5.9 %
MONOCYTES ABSOLUTE: 1.1 THOU/MM3 (ref 0.4–1.3)
NUCLEATED RED BLOOD CELLS: 0 /100 WBC
PLATELET # BLD: 309 THOU/MM3 (ref 130–400)
PMV BLD AUTO: 10.1 FL (ref 9.4–12.4)
POTASSIUM SERPL-SCNC: 4.1 MEQ/L (ref 3.5–5.2)
PRO-BNP: 249.9 PG/ML (ref 0–1800)
PROCALCITONIN: 0.1 NG/ML (ref 0.01–0.09)
RBC # BLD: 4.28 MILL/MM3 (ref 4.7–6.1)
SARS-COV-2: NOT DETECTED
SEG NEUTROPHILS: 89.3 %
SEGMENTED NEUTROPHILS ABSOLUTE COUNT: 17 THOU/MM3 (ref 1.8–7.7)
SODIUM BLD-SCNC: 138 MEQ/L (ref 135–145)
TOTAL PROTEIN: 5.9 G/DL (ref 6.1–8)
VITAMIN B-12: 792 PG/ML (ref 211–911)
WBC # BLD: 19 THOU/MM3 (ref 4.8–10.8)

## 2020-12-16 PROCEDURE — 36415 COLL VENOUS BLD VENIPUNCTURE: CPT

## 2020-12-16 PROCEDURE — 94760 N-INVAS EAR/PLS OXIMETRY 1: CPT

## 2020-12-16 PROCEDURE — 6370000000 HC RX 637 (ALT 250 FOR IP): Performed by: NURSE PRACTITIONER

## 2020-12-16 PROCEDURE — 83735 ASSAY OF MAGNESIUM: CPT

## 2020-12-16 PROCEDURE — 6370000000 HC RX 637 (ALT 250 FOR IP): Performed by: FAMILY MEDICINE

## 2020-12-16 PROCEDURE — 2580000003 HC RX 258: Performed by: NURSE PRACTITIONER

## 2020-12-16 PROCEDURE — 84145 PROCALCITONIN (PCT): CPT

## 2020-12-16 PROCEDURE — 6360000002 HC RX W HCPCS: Performed by: NURSE PRACTITIONER

## 2020-12-16 PROCEDURE — 82746 ASSAY OF FOLIC ACID SERUM: CPT

## 2020-12-16 PROCEDURE — 83880 ASSAY OF NATRIURETIC PEPTIDE: CPT

## 2020-12-16 PROCEDURE — 85025 COMPLETE CBC W/AUTO DIFF WBC: CPT

## 2020-12-16 PROCEDURE — G0378 HOSPITAL OBSERVATION PER HR: HCPCS

## 2020-12-16 PROCEDURE — 99233 SBSQ HOSP IP/OBS HIGH 50: CPT | Performed by: HOSPITALIST

## 2020-12-16 PROCEDURE — 94640 AIRWAY INHALATION TREATMENT: CPT

## 2020-12-16 PROCEDURE — 82607 VITAMIN B-12: CPT

## 2020-12-16 PROCEDURE — 96375 TX/PRO/DX INJ NEW DRUG ADDON: CPT

## 2020-12-16 PROCEDURE — 80053 COMPREHEN METABOLIC PANEL: CPT

## 2020-12-16 PROCEDURE — 1200000003 HC TELEMETRY R&B

## 2020-12-16 PROCEDURE — 93306 TTE W/DOPPLER COMPLETE: CPT

## 2020-12-16 RX ORDER — HYDROCHLOROTHIAZIDE 25 MG/1
25 TABLET ORAL DAILY
Status: DISCONTINUED | OUTPATIENT
Start: 2020-12-17 | End: 2020-12-17 | Stop reason: HOSPADM

## 2020-12-16 RX ADMIN — BUDESONIDE AND FORMOTEROL FUMARATE DIHYDRATE 2 PUFF: 160; 4.5 AEROSOL RESPIRATORY (INHALATION) at 22:26

## 2020-12-16 RX ADMIN — VERAPAMIL HYDROCHLORIDE 240 MG: 240 TABLET, FILM COATED, EXTENDED RELEASE ORAL at 10:54

## 2020-12-16 RX ADMIN — Medication 1000 MCG: at 10:54

## 2020-12-16 RX ADMIN — ALBUTEROL SULFATE 2.5 MG: 2.5 SOLUTION RESPIRATORY (INHALATION) at 05:29

## 2020-12-16 RX ADMIN — ONDANSETRON 4 MG: 2 INJECTION INTRAMUSCULAR; INTRAVENOUS at 10:48

## 2020-12-16 RX ADMIN — PRAVASTATIN SODIUM 20 MG: 20 TABLET ORAL at 10:54

## 2020-12-16 RX ADMIN — GUAIFENESIN AND DEXTROMETHORPHAN HYDROBROMIDE 1 TABLET: 600; 30 TABLET, EXTENDED RELEASE ORAL at 10:54

## 2020-12-16 RX ADMIN — IPRATROPIUM BROMIDE AND ALBUTEROL SULFATE 1 AMPULE: .5; 3 SOLUTION RESPIRATORY (INHALATION) at 10:36

## 2020-12-16 RX ADMIN — PREDNISONE 40 MG: 20 TABLET ORAL at 10:54

## 2020-12-16 RX ADMIN — GUAIFENESIN AND DEXTROMETHORPHAN HYDROBROMIDE 1 TABLET: 600; 30 TABLET, EXTENDED RELEASE ORAL at 21:50

## 2020-12-16 RX ADMIN — SODIUM CHLORIDE, PRESERVATIVE FREE 10 ML: 5 INJECTION INTRAVENOUS at 21:50

## 2020-12-16 RX ADMIN — PANTOPRAZOLE SODIUM 40 MG: 40 TABLET, DELAYED RELEASE ORAL at 05:09

## 2020-12-16 RX ADMIN — DOCUSATE SODIUM 100 MG: 100 CAPSULE, LIQUID FILLED ORAL at 10:54

## 2020-12-16 RX ADMIN — CETIRIZINE HYDROCHLORIDE 10 MG: 10 TABLET, FILM COATED ORAL at 10:54

## 2020-12-16 RX ADMIN — RIVAROXABAN 20 MG: 20 TABLET, FILM COATED ORAL at 10:54

## 2020-12-16 RX ADMIN — LISINOPRIL 20 MG: 20 TABLET ORAL at 10:54

## 2020-12-16 RX ADMIN — BUDESONIDE AND FORMOTEROL FUMARATE DIHYDRATE 2 PUFF: 160; 4.5 AEROSOL RESPIRATORY (INHALATION) at 10:40

## 2020-12-16 RX ADMIN — SODIUM CHLORIDE, PRESERVATIVE FREE 10 ML: 5 INJECTION INTRAVENOUS at 10:54

## 2020-12-16 NOTE — PROGRESS NOTES
Hospitalist Progress Note      Patient:  Radha Díaz    Unit/Bed:5K-28/028-A  YOB: 1945  MRN: [id number]   Acct: [de-identified]   PCP: YESY Quintanilla CNP  Date of Admission: 12/14/2020    Assessment/Plan:    1. CONTRERAS: query acute COPD/Asthma exacerbation--most recent PFTs showed moderate obstruction; was given Solu-Medrol 125 mg in ED, currently on prednisone 40 mg daily; COVID-19 negative, optimized inhaler regimen; LAMA, LABA/ICS standing and MAO prn. Also sent for Pro-BNP and TTE to assess for possible CHF  2. Chronic cough:  Productive vs dry? Chronic vs acute on chronic? on lisinopril which will d/c and reassess response  3. 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 Essential hypertension, fairly controlled on home lisinopril and verapamil; switched to HCTZ from ACEi for chronic cough. Will monitor  5. Hyperlipidemia--on statin  6. 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 Obesity with BMI 37.11  8. PT/OT to see    Chief Complaint: SOB    Initial H and P:-    Admitted for management of SOB and query acute COPD/Asthma exacerbation    Subjective (past 24 hours):   Feeling improved. Denies SOB/CP/fever/chills. Describes chronic cough productive of clear sputum      Past medical history, family history, social history and allergies reviewed again and is unchanged since admission. ROS (12 point review of systems completed. Pertinent positives noted.  Otherwise ROS is negative)     Medications:  Reviewed    Infusion Medications   Scheduled Medications    budesonide-formoterol  2 puff Inhalation BID    dextromethorphan-guaiFENesin  1 tablet Oral BID    docusate sodium  100 mg Oral Daily    lisinopril  20 mg Oral Daily    pantoprazole  40 mg Oral QAM AC    pravastatin  20 mg Oral Daily    rivaroxaban  20 mg Oral Daily with breakfast    verapamil  240 mg Oral QAM    cyanocobalamin  1,000 mcg Oral Daily    sodium chloride flush  10 mL Intravenous 2 times per day    ipratropium-albuterol  1 ampule Inhalation Q4H WA    predniSONE  40 mg Oral Daily    cetirizine  10 mg Oral Daily     PRN Meds: fluticasone, sodium chloride flush, promethazine **OR** ondansetron, polyethylene glycol, acetaminophen **OR** acetaminophen, albuterol      Intake/Output Summary (Last 24 hours) at 12/16/2020 0715  Last data filed at 12/16/2020 1405  Gross per 24 hour   Intake 980 ml   Output 0 ml   Net 980 ml       Diet:  DIET GENERAL;    Exam:  /68   Pulse 72   Temp 98.1 °F (36.7 °C) (Oral)   Resp 16   Ht 6' 2\" (1.88 m)   Wt 294 lb 2 oz (133.4 kg)   SpO2 93%   BMI 37.76 kg/m²   General appearance: Obese. No apparent distress, appears stated age and cooperative. HEENT: Pupils equal, round, and reactive to light. Conjunctivae/corneas clear. Neck: Supple, with full range of motion. No jugular venous distention. Trachea midline. Respiratory:  Normal respiratory effort. Clear to auscultation, bilaterally without Rales/Wheezes/Rhonchi. Cardiovascular: Regular rate and rhythm with normal S1/S2 without murmurs, rubs or gallops. Abdomen: Soft, non-tender, non-distended with normal bowel sounds. Musculoskeletal: passive and active ROM x 4 extremities. Skin: trace bilat pitting edema. Neurologic:  Neurovascularly intact without any focal sensory/motor deficits.  Cranial nerves: II-XII intact, grossly non-focal.  Psychiatric: Alert and oriented, thought content appropriate, normal insight  Capillary Refill: Brisk,< 3 seconds   Peripheral Pulses: +2 palpable, equal bilaterally     Labs:   Recent Labs     12/14/20 1840 12/16/20  0538   WBC 9.1 19.0*   HGB 12.8* 14.3   HCT 40.2* 44.3    309     Recent Labs     12/14/20  1840 12/16/20  0538    138   K 4.0 4.1    99   CO2 23 25   BUN 21 31*   CREATININE 1.3* 1.5*   CALCIUM 8.6 9.2     Recent Labs     12/16/20  0538   AST 18   ALT 8*   BILITOT 0.4   ALKPHOS 68     No results for input(s): INR in the last 72 hours. No results for input(s): Catracho Weinstein in the last 72 hours. Microbiology:    Blood culture #1:   Lab Results   Component Value Date    BC No growth-preliminary No growth  04/30/2020       Blood culture #2:No results found for: Paolo Geller    Organism:No results found for: [facility]      Lab Results   Component Value Date    LABGRAM  08/17/2018     Rare segmented neutrophils observed. Few epithelial cells observed. Few gram positive cocci in pairs. Rare gram negative bacilli. Rare gram positive bacilli. MRSA culture only:No results found for: [address]    Urine culture: No results found for: LABURIN    Respiratory culture: No results found for: CULTRESP    Aerobic and Anaerobic :  No results found for: LABAERO  No results found for: LABANAE    Urinalysis:      Lab Results   Component Value Date    NITRU NEGATIVE 08/17/2018    WBCUA NONE SEEN 08/17/2018    BACTERIA NONE 08/17/2018    RBCUA 0-2 08/17/2018    BLOODU TRACE 08/17/2018    GLUCOSEU NEGATIVE 08/17/2018       Radiology:  XR CHEST PORTABLE   Final Result   Few reticular markings in the lung bases. Correlate for atelectasis or possible developing infiltrate. **This report has been created using voice recognition software. It may contain minor errors which are inherent in voice recognition technology. **      Final report electronically signed by Dr. Jimmy Whittaker on 12/14/2020 6:00 PM        Xr Chest Portable    Result Date: 12/14/2020  PROCEDURE: XR CHEST PORTABLE CLINICAL INFORMATION: sob. COMPARISON: November 11, 2020 TECHNIQUE: Portable. FINDINGS: Few reticular markings in the lung bases. Correlate for atelectasis or possible developing infiltrate. Costophrenic angles are preserved. Cardiomediastinal silhouette is within normal limits. No acute osseous findings. Few reticular markings in the lung bases. Correlate for atelectasis or possible developing infiltrate.  **This report has been created using voice recognition software. It may contain minor errors which are inherent in voice recognition technology. ** Final report electronically signed by Dr. Jose Bray on 12/14/2020 6:00 PM    With RN in room, patient was updated about and agreed upon the treatment plan, all the questions and concerns were addressed.       Electronically signed by Kera Porras MD on 12/16/2020 at 7:15 AM

## 2020-12-17 VITALS
SYSTOLIC BLOOD PRESSURE: 129 MMHG | TEMPERATURE: 97.5 F | OXYGEN SATURATION: 94 % | WEIGHT: 294.13 LBS | RESPIRATION RATE: 17 BRPM | BODY MASS INDEX: 37.75 KG/M2 | DIASTOLIC BLOOD PRESSURE: 69 MMHG | HEIGHT: 74 IN | HEART RATE: 54 BPM

## 2020-12-17 PROBLEM — J44.1 COPD WITH ACUTE EXACERBATION (HCC): Status: RESOLVED | Noted: 2020-12-15 | Resolved: 2020-12-17

## 2020-12-17 PROCEDURE — 6370000000 HC RX 637 (ALT 250 FOR IP): Performed by: NURSE PRACTITIONER

## 2020-12-17 PROCEDURE — 94640 AIRWAY INHALATION TREATMENT: CPT

## 2020-12-17 PROCEDURE — G0378 HOSPITAL OBSERVATION PER HR: HCPCS

## 2020-12-17 PROCEDURE — 6360000002 HC RX W HCPCS: Performed by: STUDENT IN AN ORGANIZED HEALTH CARE EDUCATION/TRAINING PROGRAM

## 2020-12-17 PROCEDURE — 2580000003 HC RX 258: Performed by: NURSE PRACTITIONER

## 2020-12-17 PROCEDURE — 99239 HOSP IP/OBS DSCHRG MGMT >30: CPT | Performed by: HOSPITALIST

## 2020-12-17 PROCEDURE — 6370000000 HC RX 637 (ALT 250 FOR IP): Performed by: HOSPITALIST

## 2020-12-17 PROCEDURE — 6370000000 HC RX 637 (ALT 250 FOR IP): Performed by: FAMILY MEDICINE

## 2020-12-17 PROCEDURE — 94760 N-INVAS EAR/PLS OXIMETRY 1: CPT

## 2020-12-17 PROCEDURE — 90732 PPSV23 VACC 2 YRS+ SUBQ/IM: CPT | Performed by: STUDENT IN AN ORGANIZED HEALTH CARE EDUCATION/TRAINING PROGRAM

## 2020-12-17 PROCEDURE — G0009 ADMIN PNEUMOCOCCAL VACCINE: HCPCS | Performed by: STUDENT IN AN ORGANIZED HEALTH CARE EDUCATION/TRAINING PROGRAM

## 2020-12-17 RX ORDER — BUDESONIDE AND FORMOTEROL FUMARATE DIHYDRATE 160; 4.5 UG/1; UG/1
2 AEROSOL RESPIRATORY (INHALATION) 2 TIMES DAILY
Qty: 1 INHALER | Refills: 0 | Status: SHIPPED | OUTPATIENT
Start: 2020-12-17 | End: 2022-05-09

## 2020-12-17 RX ORDER — HYDROCHLOROTHIAZIDE 25 MG/1
25 TABLET ORAL DAILY
Qty: 30 TABLET | Refills: 0 | Status: SHIPPED | OUTPATIENT
Start: 2020-12-18

## 2020-12-17 RX ORDER — PREDNISONE 20 MG/1
40 TABLET ORAL DAILY
Qty: 4 TABLET | Refills: 0 | Status: SHIPPED | OUTPATIENT
Start: 2020-12-18 | End: 2020-12-20

## 2020-12-17 RX ORDER — CETIRIZINE HYDROCHLORIDE 10 MG/1
10 TABLET ORAL DAILY
Qty: 30 TABLET | Refills: 0 | Status: SHIPPED | OUTPATIENT
Start: 2020-12-18 | End: 2021-11-08

## 2020-12-17 RX ADMIN — BUDESONIDE AND FORMOTEROL FUMARATE DIHYDRATE 2 PUFF: 160; 4.5 AEROSOL RESPIRATORY (INHALATION) at 08:03

## 2020-12-17 RX ADMIN — Medication 1000 MCG: at 08:26

## 2020-12-17 RX ADMIN — VERAPAMIL HYDROCHLORIDE 240 MG: 240 TABLET, FILM COATED, EXTENDED RELEASE ORAL at 08:26

## 2020-12-17 RX ADMIN — PREDNISONE 40 MG: 20 TABLET ORAL at 08:26

## 2020-12-17 RX ADMIN — PRAVASTATIN SODIUM 20 MG: 20 TABLET ORAL at 08:26

## 2020-12-17 RX ADMIN — GUAIFENESIN AND DEXTROMETHORPHAN HYDROBROMIDE 1 TABLET: 600; 30 TABLET, EXTENDED RELEASE ORAL at 08:26

## 2020-12-17 RX ADMIN — HYDROCHLOROTHIAZIDE 25 MG: 25 TABLET ORAL at 08:26

## 2020-12-17 RX ADMIN — DOCUSATE SODIUM 100 MG: 100 CAPSULE, LIQUID FILLED ORAL at 08:26

## 2020-12-17 RX ADMIN — PANTOPRAZOLE SODIUM 40 MG: 40 TABLET, DELAYED RELEASE ORAL at 08:26

## 2020-12-17 RX ADMIN — RIVAROXABAN 20 MG: 20 TABLET, FILM COATED ORAL at 08:26

## 2020-12-17 RX ADMIN — SODIUM CHLORIDE, PRESERVATIVE FREE 10 ML: 5 INJECTION INTRAVENOUS at 08:26

## 2020-12-17 RX ADMIN — TIOTROPIUM BROMIDE INHALATION SPRAY 2 PUFF: 3.12 SPRAY, METERED RESPIRATORY (INHALATION) at 08:03

## 2020-12-17 RX ADMIN — PNEUMOCOCCAL VACCINE POLYVALENT 0.5 ML
25; 25; 25; 25; 25; 25; 25; 25; 25; 25; 25; 25; 25; 25; 25; 25; 25; 25; 25; 25; 25; 25; 25 INJECTION, SOLUTION INTRAMUSCULAR; SUBCUTANEOUS at 14:53

## 2020-12-17 RX ADMIN — CETIRIZINE HYDROCHLORIDE 10 MG: 10 TABLET, FILM COATED ORAL at 08:26

## 2020-12-17 NOTE — PROGRESS NOTES
CLINICAL PHARMACY NOTE: MEDS TO [address] Select Patient?: Yes  Total # of Prescriptions Filled: 4   The following medications were delivered to the patient:  Cetirizine 10mg  Prednisone 20mg  Hydrochlorothiazide 25mg  Spiriva Respimat 2.5mcg  Total # of Interventions Completed: 2  Time Spent (min): 30    Additional Documentation:

## 2020-12-17 NOTE — CARE COORDINATION
Yasmine Zelaya is discharged to home today with no services added/requested. Hospital follow-up appointment made. 12/17/20, 12:40 PM EST    Patient goals/plan/ treatment preferences discussed by  and . Patient goals/plan/ treatment preferences reviewed with patient/ family. Patient/ family verbalize understanding of discharge plan and are in agreement with goal/plan/treatment preferences. Understanding was demonstrated using the teach back method. AVS provided by RN at time of discharge, which includes all necessary medical information pertaining to the patients current course of illness, treatment, post-discharge goals of care, and treatment preferences.         IMM Letter  IMM Letter given to Patient/Family/Significant other/Guardian/POA/by[de-identified] staff  IMM Letter date given[de-identified] 12/15/20  IMM Letter time given[de-identified] 8090

## 2020-12-17 NOTE — PROGRESS NOTES
Patient discharged from [facility] at this time via wheelchair with personal belongings and AVS in possession upon departure. Patient left in personal vehicle with keys in hand and denied any questions/concerns following discharge. Chart broken down and files placed in yellow bin.

## 2020-12-17 NOTE — DISCHARGE SUMMARY
DISCHARGE SUMMARY      Patient Identification:   Enrique Cutler   : 1945  MRN: [id number]   Account: [de-identified]      Patient's PCP: YESY Longoria CNP    Admit Date: 2020     Discharge Date: 20    Admitting Physician: Catherin Lefort, MD     Discharge Physician: Vinay Powell DO     Discharge Diagnoses: Active Hospital Problems    Diagnosis Date Noted    COPD with acute exacerbation ([facility] [address] [J44.1] 12/15/2020   CONTRERAS d/t acute COPD/Asthma Exacerbation  Chronic cough  Megaloblastosis without anemia  Essential HTN  HLD  History of VTEs  Obesity with BMI 37.11    The patient was seen and examined on day of discharge and this discharge summary is in conjunction with any daily progress note from day of discharge. Patient stable at time of discharge. Hospital Course:    Enrique Cutler is a 66 y.o. male admitted to [address] [place] [address] on 2020 for   Chief Complaint   Patient presents with    Shortness of Breath     66 y.o. male who presented to [address] [place] [address] with cough and shortness of breath; this patient has a past medical history of asthma, sleep apnea, hypertension and hyperlipidemia; he has a history of COPD/asthma and [facility]; he states he has chronic shortness of breath and cough and states \"it comes and cycles\"; he states every couple months he will \"get this\" and states around antibiotics and steroids helps him momentarily however he then has another episode; he denies any lightheadedness or dizziness or headache, states he has a cough bringing up some yellow phlegm, states he is never smoked however he was exposed to secondhand smoke; he states for the last 24 hours he has been using his nebulizer every 2 hours to assist him in breathing; the lowest O2 sat documented was 89% on room air; when he presented to the emergency department he was not hypoxic and he was hemodynamically stable; he was given nebulizer treatment along with 2 duo nebs, Levaquin 500 mg daily and Solu-Medrol 125 mg daily; he is being admitted to the hospital service for further care and evaluation. Patient was treated for copd exacerbation with solumedrol transitioned to prednisone. His inhlaer regimen was otpimized to LAMA (spiriva), LABA/ICS (symbicort) and MAO prn (albuterol). Also added flonase for patient and zyrtec for seasonal allergy control. Patient will be treated with 5 days total of steroids. Antibiotics were not indicated. He was COVID negative. Patient's initial BNP was 66 and follow up was 249. Patient was also evaluated for TTE for possible CHF. Echo showed EF of 55%, not suggestive of CHF and no signs of pulmonary hypertension. Patient's lisinopril was discontinued due to chronic cough and switched to HCTZ. Recommend to follow this progress as outpatient. Folate and Vitamin B12 were checked due to megaloblastosis and were WNL. Patient due for Pneumo 23 vaccine d/t increased risk of pneumonia with COPD/asthma. Lab work outpatient before follow up with PCP. BMP to follow up on creatinine and CBC to follow up on WBC. Patient afebrile with no source of infection, suspect secondary to steroid use. Exam:     Vitals:  Vitals:    12/16/20 1045 12/16/20 1616 12/16/20 2148 12/17/20 0351   BP:  (!) 128/54 111/63 131/64   Pulse:  71 57 68   Resp:  16 16 16   Temp: 97.9 °F (36.6 °C) 98.4 °F (36.9 °C) 97.4 °F (36.3 °C) 97.5 °F (36.4 °C)   TempSrc: Oral Oral Axillary Oral   SpO2:  93% 91% 91%   Weight:       Height:         Weight: Weight: 294 lb 2 oz (133.4 kg)     24 hour intake/output:    Intake/Output Summary (Last 24 hours) at 12/17/2020 0754  Last data filed at 12/17/2020 0352  Gross per 24 hour   Intake 410 ml   Output --   Net 410 ml         Physical Exam  Constitutional:       General: He is not in acute distress. Appearance: Normal appearance.  He is not ill-appearing, toxic-appearing or diaphoretic. HENT:      Head: Normocephalic and atraumatic. Right Ear: External ear normal.      Left Ear: External ear normal.      Nose: Nose normal.   Eyes:      Conjunctiva/sclera: Conjunctivae normal.   Neck:      Musculoskeletal: Normal range of motion. Cardiovascular:      Rate and Rhythm: Normal rate and regular rhythm. Pulses: Normal pulses. Heart sounds: Normal heart sounds. Pulmonary:      Effort: Pulmonary effort is normal.      Breath sounds: No wheezing, rhonchi or rales. Abdominal:      General: Abdomen is flat. Palpations: Abdomen is soft. Tenderness: There is no abdominal tenderness. Musculoskeletal: Normal range of motion. Skin:     General: Skin is warm and dry. Neurological:      General: No focal deficit present. Mental Status: He is alert. Mental status is at baseline. Psychiatric:         Mood and Affect: Mood normal.         Behavior: Behavior normal.       Labs: For convenience and continuity at follow-up the following most recent labs are provided:      CBC:    Lab Results   Component Value Date    WBC 19.0 12/16/2020    HGB 14.3 12/16/2020    HCT 44.3 12/16/2020     12/16/2020       Renal:    Lab Results   Component Value Date     12/16/2020    K 4.1 12/16/2020    K 4.0 12/14/2020    CL 99 12/16/2020    CO2 25 12/16/2020    BUN 31 12/16/2020    CREATININE 1.5 12/16/2020    CALCIUM 9.2 12/16/2020    PHOS 3.7 08/20/2018         Significant Diagnostic Studies    Radiology:   XR CHEST PORTABLE   Final Result   Few reticular markings in the lung bases. Correlate for atelectasis or possible developing infiltrate. **This report has been created using voice recognition software. It may contain minor errors which are inherent in voice recognition technology. **      Final report electronically signed by Dr. Raina Vincent on 12/14/2020 6:00 PM             Consults:   None    Disposition:    [x] Home       [] TCU       [] Rehab [] Psych       [] SNF       [] [place]       [] Other-    Condition at Discharge: Stable    Code Status:  Full Code     Patient Instructions:    Discharge lab work: BMP to follow up on Creatinine, CBC to follow up on WBC. Activity: activity as tolerated  Diet: DIET GENERAL;      Follow-up visits:   No follow-up provider specified. Discharge Medications:      [facility] Medication Instructions [id number]    Printed on:12/17/20 8441   Medication Information                      albuterol (PROVENTIL) (2.5 MG/3ML) 0.083% nebulizer solution  inhale contents of 1 vial in nebulizer every 6 hours if needed for wheezing shortness of breath             albuterol sulfate  (90 Base) MCG/ACT inhaler  Inhale 2 puffs into the lungs every 6 hours as needed for Wheezing             CPAP Machine MISC  by Does not apply route Please change CPAP pressure to 13 cm H20. Download 2 weeks             Dextromethorphan-guaiFENesin (MUCINEX DM)  MG TB12  Take 1 tablet by mouth 2 times daily             docusate sodium (COLACE) 100 MG capsule  Take 100 mg by mouth daily             fluticasone (FLONASE) 50 MCG/ACT nasal spray  1 spray by Nasal route daily as needed for Rhinitis             ipratropium-albuterol (DUONEB) 0.5-2.5 (3) MG/3ML SOLN nebulizer solution  Inhale 3 mLs into the lungs 4 times daily             lisinopril (PRINIVIL;ZESTRIL) 20 MG tablet  Take 20 mg by mouth daily             mometasone-formoterol (DULERA) 200-5 MCG/ACT inhaler  Inhale 2 puffs into the lungs every 12 hours             omeprazole (PRILOSEC) 20 MG delayed release capsule  Take 20 mg by mouth daily             pravastatin (PRAVACHOL) 20 MG tablet  Take 20 mg by mouth daily             rivaroxaban (XARELTO) 20 MG TABS tablet  Take 1 tablet by mouth daily (with breakfast)             verapamil (VERELAN PM) 240 MG CR capsule  Take 240 mg by mouth every morning.  Indications: Per patient taking medication for migraines prevention. vitamin B-12 1000 MCG tablet  Take 1 tablet by mouth daily                 Time Spent on discharge is more than 45 minutes in the examination, evaluation, counseling and review of medications and discharge plan. Signed: Thank you YESY Quintanilla CNP for the opportunity to be involved in this patient's care.     Electronically signed by Dominik Howell DO on 12/17/2020 at 7:39 AM

## 2020-12-22 ENCOUNTER — NURSE ONLY (OUTPATIENT)
Dept: LAB | Age: 75
End: 2020-12-22

## 2020-12-22 LAB
ANION GAP SERPL CALCULATED.3IONS-SCNC: 9 MEQ/L (ref 8–16)
BASOPHILS # BLD: 0.8 %
BASOPHILS ABSOLUTE: 0.1 THOU/MM3 (ref 0–0.1)
BUN BLDV-MCNC: 23 MG/DL (ref 7–22)
CALCIUM SERPL-MCNC: 9.1 MG/DL (ref 8.5–10.5)
CHLORIDE BLD-SCNC: 102 MEQ/L (ref 98–111)
CO2: 31 MEQ/L (ref 23–33)
CREAT SERPL-MCNC: 1.1 MG/DL (ref 0.4–1.2)
EOSINOPHIL # BLD: 6.1 %
EOSINOPHILS ABSOLUTE: 0.6 THOU/MM3 (ref 0–0.4)
ERYTHROCYTE [DISTWIDTH] IN BLOOD BY AUTOMATED COUNT: 13.2 % (ref 11.5–14.5)
ERYTHROCYTE [DISTWIDTH] IN BLOOD BY AUTOMATED COUNT: 50.3 FL (ref 35–45)
GFR SERPL CREATININE-BSD FRML MDRD: 65 ML/MIN/1.73M2
GLUCOSE BLD-MCNC: 97 MG/DL (ref 70–108)
HCT VFR BLD CALC: 42.4 % (ref 42–52)
HEMOGLOBIN: 13.9 GM/DL (ref 14–18)
IMMATURE GRANS (ABS): 0.12 THOU/MM3 (ref 0–0.07)
IMMATURE GRANULOCYTES: 1.3 %
LYMPHOCYTES # BLD: 12.4 %
LYMPHOCYTES ABSOLUTE: 1.1 THOU/MM3 (ref 1–4.8)
MCH RBC QN AUTO: 33.3 PG (ref 26–33)
MCHC RBC AUTO-ENTMCNC: 32.8 GM/DL (ref 32.2–35.5)
MCV RBC AUTO: 101.4 FL (ref 80–94)
MONOCYTES # BLD: 8.9 %
MONOCYTES ABSOLUTE: 0.8 THOU/MM3 (ref 0.4–1.3)
NUCLEATED RED BLOOD CELLS: 0 /100 WBC
PLATELET # BLD: 258 THOU/MM3 (ref 130–400)
PMV BLD AUTO: 11.2 FL (ref 9.4–12.4)
POTASSIUM SERPL-SCNC: 4 MEQ/L (ref 3.5–5.2)
RBC # BLD: 4.18 MILL/MM3 (ref 4.7–6.1)
SEG NEUTROPHILS: 70.5 %
SEGMENTED NEUTROPHILS ABSOLUTE COUNT: 6.4 THOU/MM3 (ref 1.8–7.7)
SODIUM BLD-SCNC: 142 MEQ/L (ref 135–145)
WBC # BLD: 9.1 THOU/MM3 (ref 4.8–10.8)

## 2020-12-24 ENCOUNTER — PATIENT MESSAGE (OUTPATIENT)
Dept: PULMONOLOGY | Age: 75
End: 2020-12-24

## 2020-12-28 ENCOUNTER — APPOINTMENT (OUTPATIENT)
Dept: CT IMAGING | Age: 75
End: 2020-12-28
Payer: MEDICARE

## 2020-12-28 ENCOUNTER — HOSPITAL ENCOUNTER (EMERGENCY)
Age: 75
Discharge: HOME | End: 2020-12-28
Attending: FAMILY MEDICINE
Payer: MEDICARE

## 2020-12-28 ENCOUNTER — APPOINTMENT (OUTPATIENT)
Dept: GENERAL RADIOLOGY | Age: 75
End: 2020-12-28
Payer: MEDICARE

## 2020-12-28 VITALS
WEIGHT: 294 LBS | HEART RATE: 66 BPM | HEIGHT: 74 IN | BODY MASS INDEX: 37.73 KG/M2 | RESPIRATION RATE: 22 BRPM | TEMPERATURE: 98.4 F | SYSTOLIC BLOOD PRESSURE: 128 MMHG | OXYGEN SATURATION: 93 % | DIASTOLIC BLOOD PRESSURE: 72 MMHG

## 2020-12-28 LAB
ALBUMIN SERPL-MCNC: 3.7 G/DL (ref 3.5–5.1)
ALP BLD-CCNC: 84 U/L (ref 38–126)
ALT SERPL-CCNC: 8 U/L (ref 11–66)
ANION GAP SERPL CALCULATED.3IONS-SCNC: 8 MEQ/L (ref 8–16)
APTT: 39.3 SECONDS (ref 22–38)
AST SERPL-CCNC: 15 U/L (ref 5–40)
BASOPHILS # BLD: 0.7 %
BASOPHILS ABSOLUTE: 0.1 THOU/MM3 (ref 0–0.1)
BILIRUB SERPL-MCNC: 0.4 MG/DL (ref 0.3–1.2)
BUN BLDV-MCNC: 16 MG/DL (ref 7–22)
CALCIUM SERPL-MCNC: 8.9 MG/DL (ref 8.5–10.5)
CHLORIDE BLD-SCNC: 100 MEQ/L (ref 98–111)
CO2: 29 MEQ/L (ref 23–33)
CREAT SERPL-MCNC: 1.1 MG/DL (ref 0.4–1.2)
EKG ATRIAL RATE: 74 BPM
EKG P AXIS: 72 DEGREES
EKG P-R INTERVAL: 132 MS
EKG Q-T INTERVAL: 458 MS
EKG QRS DURATION: 126 MS
EKG QTC CALCULATION (BAZETT): 508 MS
EKG R AXIS: -26 DEGREES
EKG T AXIS: 76 DEGREES
EKG VENTRICULAR RATE: 74 BPM
EOSINOPHIL # BLD: 8.4 %
EOSINOPHILS ABSOLUTE: 0.9 THOU/MM3 (ref 0–0.4)
ERYTHROCYTE [DISTWIDTH] IN BLOOD BY AUTOMATED COUNT: 13.3 % (ref 11.5–14.5)
ERYTHROCYTE [DISTWIDTH] IN BLOOD BY AUTOMATED COUNT: 50.2 FL (ref 35–45)
GFR SERPL CREATININE-BSD FRML MDRD: 65 ML/MIN/1.73M2
GLUCOSE BLD-MCNC: 147 MG/DL (ref 70–108)
HCT VFR BLD CALC: 41 % (ref 42–52)
HEMOGLOBIN: 13.5 GM/DL (ref 14–18)
IMMATURE GRANS (ABS): 0.06 THOU/MM3 (ref 0–0.07)
IMMATURE GRANULOCYTES: 0.6 %
INR BLD: 1.94 (ref 0.85–1.13)
LYMPHOCYTES # BLD: 8.7 %
LYMPHOCYTES ABSOLUTE: 0.9 THOU/MM3 (ref 1–4.8)
MAGNESIUM: 2 MG/DL (ref 1.6–2.4)
MCH RBC QN AUTO: 33.6 PG (ref 26–33)
MCHC RBC AUTO-ENTMCNC: 32.9 GM/DL (ref 32.2–35.5)
MCV RBC AUTO: 102 FL (ref 80–94)
MONOCYTES # BLD: 7 %
MONOCYTES ABSOLUTE: 0.7 THOU/MM3 (ref 0.4–1.3)
NUCLEATED RED BLOOD CELLS: 0 /100 WBC
OSMOLALITY CALCULATION: 277.7 MOSMOL/KG (ref 275–300)
PLATELET # BLD: 266 THOU/MM3 (ref 130–400)
PMV BLD AUTO: 9.8 FL (ref 9.4–12.4)
POTASSIUM REFLEX MAGNESIUM: 3.4 MEQ/L (ref 3.5–5.2)
PRO-BNP: 105.3 PG/ML (ref 0–1800)
RBC # BLD: 4.02 MILL/MM3 (ref 4.7–6.1)
SEG NEUTROPHILS: 74.6 %
SEGMENTED NEUTROPHILS ABSOLUTE COUNT: 7.6 THOU/MM3 (ref 1.8–7.7)
SODIUM BLD-SCNC: 137 MEQ/L (ref 135–145)
TOTAL PROTEIN: 6.1 G/DL (ref 6.1–8)
TROPONIN T: < 0.01 NG/ML
WBC # BLD: 10.2 THOU/MM3 (ref 4.8–10.8)

## 2020-12-28 PROCEDURE — 84484 ASSAY OF TROPONIN QUANT: CPT

## 2020-12-28 PROCEDURE — 71045 X-RAY EXAM CHEST 1 VIEW: CPT

## 2020-12-28 PROCEDURE — 94640 AIRWAY INHALATION TREATMENT: CPT

## 2020-12-28 PROCEDURE — 83880 ASSAY OF NATRIURETIC PEPTIDE: CPT

## 2020-12-28 PROCEDURE — 36415 COLL VENOUS BLD VENIPUNCTURE: CPT

## 2020-12-28 PROCEDURE — 93010 ELECTROCARDIOGRAM REPORT: CPT | Performed by: NUCLEAR MEDICINE

## 2020-12-28 PROCEDURE — 71275 CT ANGIOGRAPHY CHEST: CPT

## 2020-12-28 PROCEDURE — 6360000004 HC RX CONTRAST MEDICATION: Performed by: FAMILY MEDICINE

## 2020-12-28 PROCEDURE — 83735 ASSAY OF MAGNESIUM: CPT

## 2020-12-28 PROCEDURE — 85025 COMPLETE CBC W/AUTO DIFF WBC: CPT

## 2020-12-28 PROCEDURE — 93005 ELECTROCARDIOGRAM TRACING: CPT | Performed by: FAMILY MEDICINE

## 2020-12-28 PROCEDURE — 99285 EMERGENCY DEPT VISIT HI MDM: CPT

## 2020-12-28 PROCEDURE — 6360000002 HC RX W HCPCS: Performed by: STUDENT IN AN ORGANIZED HEALTH CARE EDUCATION/TRAINING PROGRAM

## 2020-12-28 PROCEDURE — 85610 PROTHROMBIN TIME: CPT

## 2020-12-28 PROCEDURE — 80053 COMPREHEN METABOLIC PANEL: CPT

## 2020-12-28 PROCEDURE — 96374 THER/PROPH/DIAG INJ IV PUSH: CPT

## 2020-12-28 PROCEDURE — 85730 THROMBOPLASTIN TIME PARTIAL: CPT

## 2020-12-28 PROCEDURE — 6370000000 HC RX 637 (ALT 250 FOR IP): Performed by: STUDENT IN AN ORGANIZED HEALTH CARE EDUCATION/TRAINING PROGRAM

## 2020-12-28 RX ORDER — METHYLPREDNISOLONE SODIUM SUCCINATE 125 MG/2ML
40 INJECTION, POWDER, LYOPHILIZED, FOR SOLUTION INTRAMUSCULAR; INTRAVENOUS ONCE
Status: COMPLETED | OUTPATIENT
Start: 2020-12-28 | End: 2020-12-28

## 2020-12-28 RX ORDER — PREDNISONE 20 MG/1
40 TABLET ORAL DAILY
Qty: 10 TABLET | Refills: 0 | Status: SHIPPED | OUTPATIENT
Start: 2020-12-28 | End: 2020-12-28 | Stop reason: SDUPTHER

## 2020-12-28 RX ORDER — METHYLPREDNISOLONE SODIUM SUCCINATE 125 MG/2ML
40 INJECTION, POWDER, LYOPHILIZED, FOR SOLUTION INTRAMUSCULAR; INTRAVENOUS ONCE
Status: CANCELLED | OUTPATIENT
Start: 2020-12-28

## 2020-12-28 RX ORDER — IPRATROPIUM BROMIDE AND ALBUTEROL SULFATE 2.5; .5 MG/3ML; MG/3ML
1 SOLUTION RESPIRATORY (INHALATION) ONCE
Status: COMPLETED | OUTPATIENT
Start: 2020-12-28 | End: 2020-12-28

## 2020-12-28 RX ORDER — PREDNISONE 20 MG/1
40 TABLET ORAL DAILY
Qty: 10 TABLET | Refills: 0 | Status: CANCELLED | OUTPATIENT
Start: 2020-12-28 | End: 2021-01-02

## 2020-12-28 RX ORDER — GUAIFENESIN 600 MG/1
600 TABLET, EXTENDED RELEASE ORAL 2 TIMES DAILY
Qty: 30 TABLET | Refills: 0 | Status: CANCELLED | OUTPATIENT
Start: 2020-12-28 | End: 2021-01-12

## 2020-12-28 RX ORDER — PREDNISONE 20 MG/1
40 TABLET ORAL DAILY
Qty: 10 TABLET | Refills: 0 | Status: SHIPPED | OUTPATIENT
Start: 2020-12-28 | End: 2021-01-02

## 2020-12-28 RX ADMIN — IOPAMIDOL 80 ML: 755 INJECTION, SOLUTION INTRAVENOUS at 21:09

## 2020-12-28 RX ADMIN — IPRATROPIUM BROMIDE AND ALBUTEROL SULFATE 1 AMPULE: .5; 3 SOLUTION RESPIRATORY (INHALATION) at 19:11

## 2020-12-28 RX ADMIN — METHYLPREDNISOLONE SODIUM SUCCINATE 40 MG: 125 INJECTION, POWDER, FOR SOLUTION INTRAMUSCULAR; INTRAVENOUS at 19:36

## 2020-12-28 ASSESSMENT — ENCOUNTER SYMPTOMS
SHORTNESS OF BREATH: 1
WHEEZING: 1
COUGH: 0
SORE THROAT: 0
RHINORRHEA: 0
ABDOMINAL PAIN: 0
BACK PAIN: 0
DIARRHEA: 0
CONSTIPATION: 0
NAUSEA: 0
BLOOD IN STOOL: 0
CHEST TIGHTNESS: 0
VOMITING: 0

## 2020-12-28 NOTE — ED NOTES
Patient resting in bed. Respirations easy and unlabored. No distress noted. Call light within reach.      Roseanne Farias RN  12/28/20 8037

## 2020-12-28 NOTE — ED TRIAGE NOTES
Pt presents to the ED from home c/o SOB. Pt states he has had SOB for several years now. Pt reports he was recently admitted in December for SOB. Pt states the SOB has gotten gradually worse over the past few days. Hx COPD. EKG complete. IV access established. Labs collected.

## 2020-12-29 NOTE — ED PROVIDER NOTES
[facility] [address] [address] EMERGENCY DEPT  EMERGENCY DEPARTMENT     Pt Name: Mecca Mckee  MRN: [id number]  [place] 1945  Date of evaluation: 12/28/2020  Provider: Damon Post MD    CHIEF COMPLAINT       Chief Complaint   Patient presents with    Shortness of Breath       HISTORY OF PRESENT ILLNESS    Mecca Mckee is a 66 y.o. male with COPD who presents to the emergency department from home due to acutely worsening dyspnea especially on exertion with associated wheezing. Patient was recently admitted for similar presentation thought to be 2/2 COPD/Asthma exacerbation and completed 5-day course of prednisone outpatient. Patient states every time he is admitted and treated with steroids, he starts to feel better but after sometime, he starts to have worsening of symptoms including wheezing. Reports no associated dizziness/lightheadedness, chest pain, subjective fevers, chills, nausea/vomiting, abdominal pain, dysuria, diarrhea/constipation, or known sick contact since discharge. 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 Triage notes and Nursing notes were reviewed by myself. Any discrepancies are addressed above.     PAST MEDICAL HISTORY     Past Medical History:   Diagnosis Date    Arthritis     Asthma     Cancer ([address]     skin    Chickenpox     Hyperlipidemia     Hypertension     Measles     Migraines     Mumps     Sleep apnea        SURGICAL HISTORY       Past Surgical History:   Procedure Laterality Date    COLONOSCOPY      CORONARY ANGIOPLASTY N/A 2017/2018    Dr. Darlene Liang Left 09/2020    Olga Banuelos Left 11/20/2019    PAROTIDECTOMY, LEFT performed by Damaris Fitzpatrick MD at [address] [place] [address]       Current Discharge Medication List      CONTINUE these medications which have NOT CHANGED    Details   budesonide-formoterol (SYMBICORT) 160-4.5 MCG/ACT AERO Inhale 2 puffs into the lungs 2 times daily  Qty: 1 Inhaler, Refills: 0      tiotropium (SPIRIVA RESPIMAT) 2.5 MCG/ACT AERS inhaler Inhale 2 puffs into the lungs daily  Qty: 1 Inhaler, Refills: 1      cetirizine (ZYRTEC) 10 MG tablet Take 1 tablet by mouth daily  Qty: 30 tablet, Refills: 0      hydroCHLOROthiazide (HYDRODIURIL) 25 MG tablet Take 1 tablet by mouth daily  Qty: 30 tablet, Refills: 0      rivaroxaban (XARELTO) 20 MG TABS tablet Take 1 tablet by mouth daily (with breakfast)  Qty: 90 tablet, Refills: 3      Dextromethorphan-guaiFENesin (MUCINEX DM)  MG TB12 Take 1 tablet by mouth 2 times daily  Qty: 28 tablet, Refills: 0    Associated Diagnoses: Productive cough; SOB (shortness of breath)      vitamin B-12 1000 MCG tablet Take 1 tablet by mouth daily  Qty: 30 tablet, Refills: 3      albuterol (PROVENTIL) (2.5 MG/3ML) 0.083% nebulizer solution inhale contents of 1 vial in nebulizer every 6 hours if needed for wheezing shortness of breath  Qty: 375 mL, Refills: 5    Associated Diagnoses: Severe persistent asthma without complication; DNS (deviated nasal septum); Acute maxillary sinusitis, recurrence not specified      docusate sodium (COLACE) 100 MG capsule Take 100 mg by mouth daily      fluticasone (FLONASE) 50 MCG/ACT nasal spray 1 spray by Nasal route daily as needed for Rhinitis  Qty: 3 Bottle, Refills: 3      CPAP Machine MISC by Does not apply route Please change CPAP pressure to 13 cm H20. Download 2 weeks  Qty: 1 each, Refills: 0      albuterol sulfate  (90 Base) MCG/ACT inhaler Inhale 2 puffs into the lungs every 6 hours as needed for Wheezing      omeprazole (PRILOSEC) 20 MG delayed release capsule Take 20 mg by mouth daily      pravastatin (PRAVACHOL) 20 MG tablet Take 20 mg by mouth daily      verapamil (VERELAN PM) 240 MG CR capsule Take 240 mg by mouth every morning. Indications: Per patient taking medication for migraines prevention.              ALLERGIES     Molds & smuts and Sulfa antibiotics    FAMILY HISTORY       Family History   Problem Relation Age of Onset    Diabetes Mother     COPD Mother     Cancer Father     Deep Vein Thrombosis Sister     Crohn's Disease Sister         SOCIAL HISTORY       Social History     Socioeconomic History    Marital status:      Spouse name: Carlota Can Number of children: 2    Years of education: 25    Highest education level: None   Occupational History    Occupation: retired   Social Needs    Financial resource strain: None    Food insecurity     Worry: None     Inability: None    Transportation needs     Medical: None     Non-medical: None   Tobacco Use    Smoking status: Never Smoker    Smokeless tobacco: Never Used   Substance and Sexual Activity    Alcohol use: No     Alcohol/week: 0.0 standard drinks    Drug use: No    Sexual activity: None   Lifestyle    Physical activity     Days per week: None     Minutes per session: None    Stress: None   Relationships    Social connections     Talks on phone: None     Gets together: None     Attends Zoroastrianism service: None     Active member of club or organization: None     Attends meetings of clubs or organizations: None     Relationship status: None    Intimate partner violence     Fear of current or ex partner: None     Emotionally abused: None     Physically abused: None     Forced sexual activity: None   Other Topics Concern    None   Social History Narrative    None       REVIEW OF SYSTEMS     Review of Systems   Constitutional: Positive for fatigue. Negative for activity change, chills and diaphoresis. HENT: Negative for rhinorrhea and sore throat. Eyes: Negative for visual disturbance. Respiratory: Positive for shortness of breath and wheezing. Negative for cough and chest tightness. Cardiovascular: Negative for chest pain, palpitations and leg swelling.    Gastrointestinal: Negative for abdominal pain, blood in stool, constipation, diarrhea, nausea and vomiting. Endocrine: Negative for polyuria. Genitourinary: Negative for dysuria and flank pain. Musculoskeletal: Negative for back pain and myalgias. Skin: Negative for rash. Neurological: Negative for dizziness, syncope, weakness, light-headedness, numbness and headaches. Hematological: Does not bruise/bleed easily. Psychiatric/Behavioral: Negative for confusion. The patient is nervous/anxious. All other systems reviewed and are negative. Except as noted above the remainder of the review of systems was reviewed and is. PHYSICAL EXAM       ED Triage Vitals [12/28/20 1659]   BP Temp Temp Source Pulse Resp SpO2 Height Weight   130/78 98.4 °F (36.9 °C) Oral 70 18 94 % 6' 2\" (1.88 m) 294 lb (133.4 kg)       Physical Exam  Vitals signs reviewed. Constitutional:       General: He is not in acute distress. Appearance: He is obese. He is not ill-appearing, toxic-appearing or diaphoretic. HENT:      Head: Normocephalic and atraumatic. Mouth/Throat:      Mouth: Mucous membranes are moist.      Pharynx: Oropharynx is clear. Eyes:      Extraocular Movements: Extraocular movements intact. Pupils: Pupils are equal, round, and reactive to light. Neck:      Musculoskeletal: Normal range of motion and neck supple. Vascular: No JVD. Cardiovascular:      Rate and Rhythm: Normal rate and regular rhythm. Heart sounds: No murmur. No gallop. Pulmonary:      Effort: Pulmonary effort is normal.      Breath sounds: Normal breath sounds. Comments: Diminished lung sounds with diffuse wheezing, saturating 94% on room air  Abdominal:      Palpations: Abdomen is soft. Tenderness: There is no abdominal tenderness. There is no guarding or rebound. Comments: Obese   Musculoskeletal: Normal range of motion. Right lower leg: He exhibits no tenderness. No edema. Left lower leg: He exhibits no tenderness. No edema. Skin:     General: Skin is warm and dry. Capillary Refill: Capillary refill takes less than 2 seconds. Coloration: Skin is not cyanotic. Findings: No erythema or rash. Neurological:      General: No focal deficit present. Mental Status: He is alert and oriented to person, place, and time. Cranial Nerves: No cranial nerve deficit. Motor: No weakness. Psychiatric:         Mood and Affect: Mood normal.         Behavior: Behavior normal.         DIAGNOSTIC RESULTS     EKG: NSR with RBBB, no acute changes from prior  All E[facility]'s are [facility] who either signs or Co-signs this chart in the absence of a cardiologist.      RADIOLOGY: (none if blank)   Interpretation per the Radiologistbelow, if available at the time of this note:    CTA Chest W WO Contrast   Final Result   Impression:   1. Bronchial wall thickening with mucous plugging. 2.  Cholelithiasis      This document has been electronically signed by: Gilford Harrow, MD on    12/28/2020 09:50 PM      All CT scans at this facility use dose modulation, iterative    reconstruction, and/or weight-based   dosing when appropriate to reduce radiation dose to as low as reasonably    achievable. XR CHEST PORTABLE   Final Result   1. Normal heart size. No effusion. 2. Minimal subsegmental atelectasis lung left hemidiaphragm. **This report has been created using voice recognition software. It may contain minor errors which are inherent in voice recognition technology. **      Final report electronically signed by Dr. Collazo Both on 12/28/2020 5:47 PM          LABS:  Labs Reviewed   APTT - Abnormal; Notable for the following components:       Result Value    aPTT 39.3 (*)     All other components within normal limits   PROTIME-INR - Abnormal; Notable for the following components:    INR 1.94 (*)     All other components within normal limits   CBC WITH AUTO DIFFERENTIAL - Abnormal; Notable for the following components: course for likely COPD exacerbation with unknown etiology at this time, with plan of close follow up with Pulmonology outpatient for further management. Plan discussed with patient and wife at bedside who voiced understanding. Instructed to return if symptoms change or worsen. ED Medications administered this visit:    Medications   ipratropium-albuterol (DUONEB) nebulizer solution 1 ampule (1 ampule Inhalation Given 12/28/20 1911)   methylPREDNISolone sodium (SOLU-MEDROL) injection 40 mg (40 mg Intravenous Given 12/28/20 1936)   iopamidol (ISOVUE-370) 76 % injection 80 mL (80 mLs Intravenous Given 12/28/20 2109)         Procedures: (None if blank)       CLINICAL       1.  COPD exacerbation [facility])          DISPOSITION/PLAN   DISPOSITION  Discharge to home          PATIENT REFERRED TO:  YESY Garcia - CNP  [address] [place] [address] 71 [address] [facility] [address]  [phone]    In 1 week  As needed, If symptoms worsen    Ghazal Escobar MD  [facility]  [address] [facility] [facility]  [address] [place] [address] [phone]    In 2 days  Call for follow up    [facility] [address] [address] EMERGENCY DEPT  [address]  [address] [facility]  [phone]    If symptoms worsen      DISCHARGE MEDICATIONS:  Current Discharge Medication List      START taking these medications    Details   predniSONE (DELTASONE) 20 MG tablet Take 2 tablets by mouth daily for 5 days  Qty: 10 tablet, Refills: 0                    (Please note that portions of this note were completed with a voice recognition program.  Efforts were made to edit the dictations but occasionallywords are mis-transcribed.)      Fermin Hale MD,  PGY1, Internal Medicine     Fermin Hale MD  Resident  12/28/20 5842

## 2020-12-29 NOTE — ED NOTES
Patient resting in bed. Respirations easy and unlabored. No distress noted. Call light within reach.      Izzy Bang RN  12/28/20 5216

## 2020-12-29 NOTE — ED NOTES
Patient resting in bed. Patient has labored breathing and dyspnea at rest. Wife at bedside. Patient expresses no needs at this time. Call light in reach. Will continue to monitor.      Annie Hubbard RN  12/28/20 0273

## 2021-01-05 ENCOUNTER — FOLLOWUP TELEPHONE ENCOUNTER (OUTPATIENT)
Dept: CARDIAC REHAB | Age: 76
End: 2021-01-05

## 2021-01-05 NOTE — TELEPHONE ENCOUNTER
PULMONARY REHABILITATION REFERRAL  COPD Exacerbation    Pulmonary Rehab Evaluation order received from FANNY Woody CNP on 12/30/2020. Pt qualifies for RTR (Respiratory Therapy Rehab) program. Spoke with patient about the benefits of supervised comfortable, progressive exercise with oxygen saturation monitoring and pulmonary education. Also explained to pt that if he does the whole program it is 18 weeks long. Pt states he is interested but pt stated he is planning to go to [place] for the winter and hoping to leave end of January/beginning of February so not sure if wants to start rehab now or not. Pt stated he wanted to check on a few things and said he would call us back. I gave him our number, [phone], and told pt to call anytime. Pt had no other questions at this time.

## 2021-01-08 LAB — SARS-COV-2: DETECTED

## 2021-01-17 ENCOUNTER — HOSPITAL ENCOUNTER (INPATIENT)
Age: 76
LOS: 2 days | Discharge: HOME | DRG: 177 | End: 2021-01-19
Attending: EMERGENCY MEDICINE | Admitting: OPHTHALMOLOGY
Payer: MEDICARE

## 2021-01-17 ENCOUNTER — APPOINTMENT (OUTPATIENT)
Dept: GENERAL RADIOLOGY | Age: 76
DRG: 177 | End: 2021-01-17
Payer: MEDICARE

## 2021-01-17 DIAGNOSIS — J96.00 ACUTE RESPIRATORY FAILURE DUE TO COVID-19 (HCC): ICD-10-CM

## 2021-01-17 DIAGNOSIS — J44.1 COPD EXACERBATION (HCC): Primary | ICD-10-CM

## 2021-01-17 DIAGNOSIS — R09.02 HYPOXIA: ICD-10-CM

## 2021-01-17 DIAGNOSIS — U07.1 ACUTE RESPIRATORY FAILURE DUE TO COVID-19 (HCC): ICD-10-CM

## 2021-01-17 LAB
ANION GAP SERPL CALCULATED.3IONS-SCNC: 14 MEQ/L (ref 8–16)
APTT: 34 SECONDS (ref 22–38)
BASOPHILS # BLD: 1 %
BASOPHILS ABSOLUTE: 0.1 THOU/MM3 (ref 0–0.1)
BUN BLDV-MCNC: 18 MG/DL (ref 7–22)
C-REACTIVE PROTEIN: 0.76 MG/DL (ref 0–1)
CALCIUM SERPL-MCNC: 9.1 MG/DL (ref 8.5–10.5)
CHLORIDE BLD-SCNC: 102 MEQ/L (ref 98–111)
CO2: 26 MEQ/L (ref 23–33)
CREAT SERPL-MCNC: 1.1 MG/DL (ref 0.4–1.2)
EKG ATRIAL RATE: 89 BPM
EKG P AXIS: 64 DEGREES
EKG P-R INTERVAL: 134 MS
EKG Q-T INTERVAL: 386 MS
EKG QRS DURATION: 120 MS
EKG QTC CALCULATION (BAZETT): 469 MS
EKG R AXIS: -40 DEGREES
EKG T AXIS: 65 DEGREES
EKG VENTRICULAR RATE: 89 BPM
EOSINOPHIL # BLD: 13.5 %
EOSINOPHILS ABSOLUTE: 1 THOU/MM3 (ref 0–0.4)
ERYTHROCYTE [DISTWIDTH] IN BLOOD BY AUTOMATED COUNT: 13.4 % (ref 11.5–14.5)
ERYTHROCYTE [DISTWIDTH] IN BLOOD BY AUTOMATED COUNT: 49.6 FL (ref 35–45)
FERRITIN: 82 NG/ML (ref 22–322)
FIBRINOGEN: 517 MG/100ML (ref 155–475)
GFR SERPL CREATININE-BSD FRML MDRD: 65 ML/MIN/1.73M2
GLUCOSE BLD-MCNC: 108 MG/DL (ref 70–108)
HCT VFR BLD CALC: 43.2 % (ref 42–52)
HEMOGLOBIN: 14.4 GM/DL (ref 14–18)
IMMATURE GRANS (ABS): 0.03 THOU/MM3 (ref 0–0.07)
IMMATURE GRANULOCYTES: 0.4 %
INR BLD: 1.31 (ref 0.85–1.13)
LD: 238 U/L (ref 100–190)
LYMPHOCYTES # BLD: 12.1 %
LYMPHOCYTES ABSOLUTE: 0.9 THOU/MM3 (ref 1–4.8)
MCH RBC QN AUTO: 33.3 PG (ref 26–33)
MCHC RBC AUTO-ENTMCNC: 33.3 GM/DL (ref 32.2–35.5)
MCV RBC AUTO: 99.8 FL (ref 80–94)
MONOCYTES # BLD: 7.6 %
MONOCYTES ABSOLUTE: 0.6 THOU/MM3 (ref 0.4–1.3)
NUCLEATED RED BLOOD CELLS: 0 /100 WBC
OSMOLALITY CALCULATION: 285.5 MOSMOL/KG (ref 275–300)
PLATELET # BLD: 231 THOU/MM3 (ref 130–400)
PMV BLD AUTO: 9.9 FL (ref 9.4–12.4)
POTASSIUM SERPL-SCNC: 3.9 MEQ/L (ref 3.5–5.2)
PRO-BNP: 70.7 PG/ML (ref 0–1800)
PROCALCITONIN: 0.11 NG/ML (ref 0.01–0.09)
RBC # BLD: 4.33 MILL/MM3 (ref 4.7–6.1)
SEG NEUTROPHILS: 65.4 %
SEGMENTED NEUTROPHILS ABSOLUTE COUNT: 4.8 THOU/MM3 (ref 1.8–7.7)
SODIUM BLD-SCNC: 142 MEQ/L (ref 135–145)
TROPONIN T: < 0.01 NG/ML
WBC # BLD: 7.3 THOU/MM3 (ref 4.8–10.8)

## 2021-01-17 PROCEDURE — 94761 N-INVAS EAR/PLS OXIMETRY MLT: CPT

## 2021-01-17 PROCEDURE — 93005 ELECTROCARDIOGRAM TRACING: CPT | Performed by: EMERGENCY MEDICINE

## 2021-01-17 PROCEDURE — 99284 EMERGENCY DEPT VISIT MOD MDM: CPT

## 2021-01-17 PROCEDURE — 36430 TRANSFUSION BLD/BLD COMPNT: CPT

## 2021-01-17 PROCEDURE — 99223 1ST HOSP IP/OBS HIGH 75: CPT | Performed by: NURSE PRACTITIONER

## 2021-01-17 PROCEDURE — XW13325 TRANSFUSION OF CONVALESCENT PLASMA (NONAUTOLOGOUS) INTO PERIPHERAL VEIN, PERCUTANEOUS APPROACH, NEW TECHNOLOGY GROUP 5: ICD-10-PCS | Performed by: INTERNAL MEDICINE

## 2021-01-17 PROCEDURE — 2580000003 HC RX 258: Performed by: NURSE PRACTITIONER

## 2021-01-17 PROCEDURE — 36415 COLL VENOUS BLD VENIPUNCTURE: CPT

## 2021-01-17 PROCEDURE — 83880 ASSAY OF NATRIURETIC PEPTIDE: CPT

## 2021-01-17 PROCEDURE — 85025 COMPLETE CBC W/AUTO DIFF WBC: CPT

## 2021-01-17 PROCEDURE — 84484 ASSAY OF TROPONIN QUANT: CPT

## 2021-01-17 PROCEDURE — 2500000003 HC RX 250 WO HCPCS: Performed by: EMERGENCY MEDICINE

## 2021-01-17 PROCEDURE — 6370000000 HC RX 637 (ALT 250 FOR IP): Performed by: NURSE PRACTITIONER

## 2021-01-17 PROCEDURE — 71045 X-RAY EXAM CHEST 1 VIEW: CPT

## 2021-01-17 PROCEDURE — XW033E5 INTRODUCTION OF REMDESIVIR ANTI-INFECTIVE INTO PERIPHERAL VEIN, PERCUTANEOUS APPROACH, NEW TECHNOLOGY GROUP 5: ICD-10-PCS | Performed by: INTERNAL MEDICINE

## 2021-01-17 PROCEDURE — 94640 AIRWAY INHALATION TREATMENT: CPT

## 2021-01-17 PROCEDURE — 6360000002 HC RX W HCPCS: Performed by: NURSE PRACTITIONER

## 2021-01-17 PROCEDURE — 82728 ASSAY OF FERRITIN: CPT

## 2021-01-17 PROCEDURE — 94669 MECHANICAL CHEST WALL OSCILL: CPT

## 2021-01-17 PROCEDURE — 6370000000 HC RX 637 (ALT 250 FOR IP): Performed by: EMERGENCY MEDICINE

## 2021-01-17 PROCEDURE — 85385 FIBRINOGEN ANTIGEN: CPT

## 2021-01-17 PROCEDURE — 84145 PROCALCITONIN (PCT): CPT

## 2021-01-17 PROCEDURE — 80048 BASIC METABOLIC PNL TOTAL CA: CPT

## 2021-01-17 PROCEDURE — 1200000000 HC SEMI PRIVATE

## 2021-01-17 PROCEDURE — 85610 PROTHROMBIN TIME: CPT

## 2021-01-17 PROCEDURE — P9059 PLASMA, FRZ BETWEEN 8-24HOUR: HCPCS

## 2021-01-17 PROCEDURE — 2580000003 HC RX 258: Performed by: EMERGENCY MEDICINE

## 2021-01-17 PROCEDURE — 2500000003 HC RX 250 WO HCPCS: Performed by: NURSE PRACTITIONER

## 2021-01-17 PROCEDURE — 85730 THROMBOPLASTIN TIME PARTIAL: CPT

## 2021-01-17 PROCEDURE — 86140 C-REACTIVE PROTEIN: CPT

## 2021-01-17 PROCEDURE — 83615 LACTATE (LD) (LDH) ENZYME: CPT

## 2021-01-17 RX ORDER — VERAPAMIL HYDROCHLORIDE 240 MG/1
240 TABLET, FILM COATED, EXTENDED RELEASE ORAL EVERY MORNING
Status: DISCONTINUED | OUTPATIENT
Start: 2021-01-18 | End: 2021-01-19 | Stop reason: HOSPADM

## 2021-01-17 RX ORDER — POLYETHYLENE GLYCOL 3350 17 G/17G
17 POWDER, FOR SOLUTION ORAL DAILY PRN
Status: DISCONTINUED | OUTPATIENT
Start: 2021-01-17 | End: 2021-01-19 | Stop reason: HOSPADM

## 2021-01-17 RX ORDER — 0.9 % SODIUM CHLORIDE 0.9 %
30 INTRAVENOUS SOLUTION INTRAVENOUS PRN
Status: DISCONTINUED | OUTPATIENT
Start: 2021-01-17 | End: 2021-01-19 | Stop reason: HOSPADM

## 2021-01-17 RX ORDER — ACETAMINOPHEN 325 MG/1
650 TABLET ORAL EVERY 6 HOURS PRN
Status: DISCONTINUED | OUTPATIENT
Start: 2021-01-17 | End: 2021-01-19 | Stop reason: HOSPADM

## 2021-01-17 RX ORDER — PROMETHAZINE HYDROCHLORIDE 25 MG/1
12.5 TABLET ORAL EVERY 6 HOURS PRN
Status: DISCONTINUED | OUTPATIENT
Start: 2021-01-17 | End: 2021-01-19 | Stop reason: HOSPADM

## 2021-01-17 RX ORDER — SODIUM CHLORIDE 0.9 % (FLUSH) 0.9 %
10 SYRINGE (ML) INJECTION PRN
Status: DISCONTINUED | OUTPATIENT
Start: 2021-01-17 | End: 2021-01-19 | Stop reason: HOSPADM

## 2021-01-17 RX ORDER — DOXYCYCLINE HYCLATE 100 MG
100 TABLET ORAL EVERY 12 HOURS SCHEDULED
Status: DISCONTINUED | OUTPATIENT
Start: 2021-01-17 | End: 2021-01-19 | Stop reason: HOSPADM

## 2021-01-17 RX ORDER — ACETAMINOPHEN 650 MG/1
650 SUPPOSITORY RECTAL EVERY 6 HOURS PRN
Status: DISCONTINUED | OUTPATIENT
Start: 2021-01-17 | End: 2021-01-19 | Stop reason: HOSPADM

## 2021-01-17 RX ORDER — DEXAMETHASONE SODIUM PHOSPHATE 4 MG/ML
10 INJECTION, SOLUTION INTRA-ARTICULAR; INTRALESIONAL; INTRAMUSCULAR; INTRAVENOUS; SOFT TISSUE ONCE
Status: DISCONTINUED | OUTPATIENT
Start: 2021-01-17 | End: 2021-01-17

## 2021-01-17 RX ORDER — VITAMIN B COMPLEX
2000 TABLET ORAL DAILY
Status: DISCONTINUED | OUTPATIENT
Start: 2021-01-17 | End: 2021-01-19 | Stop reason: HOSPADM

## 2021-01-17 RX ORDER — FLUTICASONE PROPIONATE 50 MCG
1 SPRAY, SUSPENSION (ML) NASAL DAILY PRN
Status: DISCONTINUED | OUTPATIENT
Start: 2021-01-17 | End: 2021-01-19 | Stop reason: HOSPADM

## 2021-01-17 RX ORDER — HYDROCHLOROTHIAZIDE 25 MG/1
25 TABLET ORAL DAILY
Status: DISCONTINUED | OUTPATIENT
Start: 2021-01-18 | End: 2021-01-19 | Stop reason: HOSPADM

## 2021-01-17 RX ORDER — PANTOPRAZOLE SODIUM 40 MG/1
40 TABLET, DELAYED RELEASE ORAL
Status: DISCONTINUED | OUTPATIENT
Start: 2021-01-18 | End: 2021-01-19 | Stop reason: HOSPADM

## 2021-01-17 RX ORDER — IPRATROPIUM BROMIDE AND ALBUTEROL SULFATE 2.5; .5 MG/3ML; MG/3ML
3 SOLUTION RESPIRATORY (INHALATION) ONCE
Status: COMPLETED | OUTPATIENT
Start: 2021-01-17 | End: 2021-01-17

## 2021-01-17 RX ORDER — METHYLPREDNISOLONE SODIUM SUCCINATE 40 MG/ML
40 INJECTION, POWDER, LYOPHILIZED, FOR SOLUTION INTRAMUSCULAR; INTRAVENOUS EVERY 12 HOURS
Status: DISCONTINUED | OUTPATIENT
Start: 2021-01-17 | End: 2021-01-18

## 2021-01-17 RX ORDER — SODIUM CHLORIDE 0.9 % (FLUSH) 0.9 %
10 SYRINGE (ML) INJECTION EVERY 12 HOURS SCHEDULED
Status: DISCONTINUED | OUTPATIENT
Start: 2021-01-17 | End: 2021-01-19 | Stop reason: HOSPADM

## 2021-01-17 RX ORDER — ALBUTEROL SULFATE 90 UG/1
2 AEROSOL, METERED RESPIRATORY (INHALATION) EVERY 6 HOURS PRN
Status: DISCONTINUED | OUTPATIENT
Start: 2021-01-17 | End: 2021-01-18

## 2021-01-17 RX ORDER — CETIRIZINE HYDROCHLORIDE 10 MG/1
10 TABLET ORAL DAILY
Status: DISCONTINUED | OUTPATIENT
Start: 2021-01-18 | End: 2021-01-19 | Stop reason: HOSPADM

## 2021-01-17 RX ORDER — SODIUM CHLORIDE 9 MG/ML
INJECTION, SOLUTION INTRAVENOUS PRN
Status: DISCONTINUED | OUTPATIENT
Start: 2021-01-17 | End: 2021-01-19 | Stop reason: HOSPADM

## 2021-01-17 RX ORDER — ONDANSETRON 2 MG/ML
4 INJECTION INTRAMUSCULAR; INTRAVENOUS EVERY 6 HOURS PRN
Status: DISCONTINUED | OUTPATIENT
Start: 2021-01-17 | End: 2021-01-19 | Stop reason: HOSPADM

## 2021-01-17 RX ORDER — ASCORBIC ACID 500 MG
500 TABLET ORAL DAILY
Status: DISCONTINUED | OUTPATIENT
Start: 2021-01-17 | End: 2021-01-18

## 2021-01-17 RX ORDER — BUDESONIDE AND FORMOTEROL FUMARATE DIHYDRATE 160; 4.5 UG/1; UG/1
2 AEROSOL RESPIRATORY (INHALATION) 2 TIMES DAILY
Status: DISCONTINUED | OUTPATIENT
Start: 2021-01-17 | End: 2021-01-19 | Stop reason: HOSPADM

## 2021-01-17 RX ORDER — DEXAMETHASONE SODIUM PHOSPHATE 4 MG/ML
10 INJECTION, SOLUTION INTRA-ARTICULAR; INTRALESIONAL; INTRAMUSCULAR; INTRAVENOUS; SOFT TISSUE ONCE
Status: DISCONTINUED | OUTPATIENT
Start: 2021-01-17 | End: 2021-01-17 | Stop reason: SDUPTHER

## 2021-01-17 RX ORDER — LANOLIN ALCOHOL/MO/W.PET/CERES
1000 CREAM (GRAM) TOPICAL DAILY
Status: DISCONTINUED | OUTPATIENT
Start: 2021-01-18 | End: 2021-01-19 | Stop reason: HOSPADM

## 2021-01-17 RX ORDER — ZINC SULFATE 50(220)MG
50 CAPSULE ORAL DAILY
Status: DISCONTINUED | OUTPATIENT
Start: 2021-01-17 | End: 2021-01-19 | Stop reason: HOSPADM

## 2021-01-17 RX ORDER — DOCUSATE SODIUM 100 MG/1
100 CAPSULE, LIQUID FILLED ORAL DAILY
Status: DISCONTINUED | OUTPATIENT
Start: 2021-01-18 | End: 2021-01-19 | Stop reason: HOSPADM

## 2021-01-17 RX ORDER — PRAVASTATIN SODIUM 20 MG
20 TABLET ORAL NIGHTLY
Status: DISCONTINUED | OUTPATIENT
Start: 2021-01-17 | End: 2021-01-19 | Stop reason: HOSPADM

## 2021-01-17 RX ADMIN — ALBUTEROL SULFATE 2 PUFF: 90 AEROSOL, METERED RESPIRATORY (INHALATION) at 17:02

## 2021-01-17 RX ADMIN — Medication 2000 UNITS: at 17:07

## 2021-01-17 RX ADMIN — PRAVASTATIN SODIUM 20 MG: 20 TABLET ORAL at 19:58

## 2021-01-17 RX ADMIN — GUAIFENESIN AND DEXTROMETHORPHAN HYDROBROMIDE 1 TABLET: 600; 30 TABLET, EXTENDED RELEASE ORAL at 19:58

## 2021-01-17 RX ADMIN — IPRATROPIUM BROMIDE AND ALBUTEROL SULFATE 3 AMPULE: .5; 3 SOLUTION RESPIRATORY (INHALATION) at 13:43

## 2021-01-17 RX ADMIN — DOXYCYCLINE 100 MG: 100 INJECTION, POWDER, LYOPHILIZED, FOR SOLUTION INTRAVENOUS at 15:59

## 2021-01-17 RX ADMIN — REMDESIVIR 200 MG: 100 INJECTION, POWDER, LYOPHILIZED, FOR SOLUTION INTRAVENOUS at 22:05

## 2021-01-17 RX ADMIN — OXYCODONE HYDROCHLORIDE AND ACETAMINOPHEN 500 MG: 500 TABLET ORAL at 17:07

## 2021-01-17 RX ADMIN — ZINC SULFATE 220 MG (50 MG) CAPSULE 50 MG: CAPSULE at 17:07

## 2021-01-17 RX ADMIN — METHYLPREDNISOLONE SODIUM SUCCINATE 40 MG: 40 INJECTION, POWDER, FOR SOLUTION INTRAMUSCULAR; INTRAVENOUS at 17:07

## 2021-01-17 RX ADMIN — Medication 10 ML: at 19:59

## 2021-01-17 RX ADMIN — DOXYCYCLINE HYCLATE 100 MG: 100 TABLET, COATED ORAL at 19:58

## 2021-01-17 RX ADMIN — BUDESONIDE AND FORMOTEROL FUMARATE DIHYDRATE 2 PUFF: 160; 4.5 AEROSOL RESPIRATORY (INHALATION) at 16:54

## 2021-01-17 ASSESSMENT — PAIN SCALES - GENERAL
PAINLEVEL_OUTOF10: 0
PAINLEVEL_OUTOF10: 0

## 2021-01-17 NOTE — ED PROVIDER NOTES
[place] ENCOUNTER          Pt Name: Jacobo Kellogg  MRN: [id number]  [place] 1945  Date of evaluation: 1/17/2021  Emergency Physician: Jared Chinchilla, [facility]       Chief Complaint   Patient presents with    Positive For Covid-19    Shortness of Breath     History obtained from the patient. HISTORY OF PRESENT ILLNESS    HPI  Jacobo Kellogg is a 66 y.o. male who presents to the emergency department for evaluation of shortness of breath. Patient with a past medical history of COPD. He has been progressively worsening over the last several months. He states over the last 10 days and he had a sharp decline. In which she has had worsening wheezing, shortness of breath, dyspnea near with exertion. He tested positive for COVID-19 on 1 8. He recently was placed on a short course of Zithromax and prednisone. He states he finished the prednisone approximately 4 days ago. He states his cough is productive of yellow sputum. Denies chest pain denies lower extremity edema. The patient has no other acute complaints at this time. REVIEW OF SYSTEMS   Review of Systems   Constitutional: Negative for activity change, chills and fever. HENT: Negative for congestion, sinus pressure and sore throat. Eyes: Negative for photophobia, redness and visual disturbance. Respiratory: Positive for cough, shortness of breath and wheezing. Negative for chest tightness. Cardiovascular: Negative for chest pain, palpitations and leg swelling. Gastrointestinal: Negative for abdominal pain, nausea and vomiting. Endocrine: Negative for polydipsia and polyuria. Genitourinary: Negative for decreased urine volume, difficulty urinating, dysuria, flank pain, frequency and urgency. Musculoskeletal: Negative for back pain, myalgias and neck pain. Skin: Negative for color change and rash.    Neurological: Negative for weakness and headaches. Hematological: Negative for adenopathy. Does not bruise/bleed easily. Psychiatric/Behavioral: Negative for confusion and self-injury.          PAST MEDICAL AND SURGICAL HISTORY     Past Medical History:   Diagnosis Date    Arthritis     Asthma     Cancer ([address]     skin    Chickenpox     Hyperlipidemia     Hypertension     Measles     Migraines     Mumps     Sleep apnea      Past Surgical History:   Procedure Laterality Date    COLONOSCOPY      CORONARY ANGIOPLASTY N/A 2017/2018    Dr. Nevin Yee Left 09/2020    EYE SURGERY      PAROTIDECTOMY Left 11/20/2019    PAROTIDECTOMY, LEFT performed by Judi Phoenix MD at [address] [place] [address]      Current Facility-Administered Medications:     dexamethasone (DECADRON) injection 10 mg, 10 mg, Oral, Once, Dago Adrien, DO    doxycycline (VIBRAMYCIN) 100 mg in dextrose 5 % 100 mL IVPB, 100 mg, Intravenous, Once, Dago Adrien, DO    Current Outpatient Medications:     fluticasone (FLONASE) 50 MCG/ACT nasal spray, USE 1 SPRAY NASALLY DAILY AS NEEDED FOR RHINITIS, Disp: 48 g, Rfl: 5    budesonide-formoterol (SYMBICORT) 160-4.5 MCG/ACT AERO, Inhale 2 puffs into the lungs 2 times daily, Disp: 1 Inhaler, Rfl: 0    tiotropium (SPIRIVA RESPIMAT) 2.5 MCG/ACT AERS inhaler, Inhale 2 puffs into the lungs daily, Disp: 1 Inhaler, Rfl: 1    cetirizine (ZYRTEC) 10 MG tablet, Take 1 tablet by mouth daily, Disp: 30 tablet, Rfl: 0    hydroCHLOROthiazide (HYDRODIURIL) 25 MG tablet, Take 1 tablet by mouth daily, Disp: 30 tablet, Rfl: 0    rivaroxaban (XARELTO) 20 MG TABS tablet, Take 1 tablet by mouth daily (with breakfast), Disp: 90 tablet, Rfl: 3    Dextromethorphan-guaiFENesin (MUCINEX DM)  MG TB12, Take 1 tablet by mouth 2 times daily, Disp: 28 tablet, Rfl: 0    vitamin B-12 1000 MCG tablet, Take 1 tablet by mouth daily, Disp: 30 tablet, Rfl: 3    albuterol (PROVENTIL) (2.5 MG/3ML) 0.083% nebulizer solution, inhale contents of 1 vial in nebulizer every 6 hours if needed for wheezing shortness of breath, Disp: 375 mL, Rfl: 5    docusate sodium (COLACE) 100 MG capsule, Take 100 mg by mouth daily, Disp: , Rfl:     CPAP Machine MISC, by Does not apply route Please change CPAP pressure to 13 cm H20. Download 2 weeks, Disp: 1 each, Rfl: 0    albuterol sulfate  (90 Base) MCG/ACT inhaler, Inhale 2 puffs into the lungs every 6 hours as needed for Wheezing, Disp: , Rfl:     omeprazole (PRILOSEC) 20 MG delayed release capsule, Take 20 mg by mouth daily, Disp: , Rfl:     pravastatin (PRAVACHOL) 20 MG tablet, Take 20 mg by mouth daily, Disp: , Rfl:     verapamil (VERELAN PM) 240 MG CR capsule, Take 240 mg by mouth every morning. Indications: Per patient taking medication for migraines prevention. , Disp: , Rfl:       SOCIAL HISTORY     Social History     Social History Narrative    Not on file     Social History     Tobacco Use    Smoking status: Never Smoker    Smokeless tobacco: Never Used   Substance Use Topics    Alcohol use: No     Alcohol/week: 0.0 standard drinks    Drug use: No         ALLERGIES     Allergies   Allergen Reactions    Molds & Smuts Other (See Comments)     congestion    Sulfa Antibiotics Other (See Comments)     congestion         FAMILY HISTORY     Family History   Problem Relation Age of Onset    Diabetes Mother     COPD Mother     Cancer Father     Deep Vein Thrombosis Sister     Crohn's Disease Sister          PHYSICAL EXAM     ED Triage Vitals [01/17/21 1258]   BP Temp Temp Source Pulse Resp SpO2 Height Weight   136/85 99 °F (37.2 °C) Oral 89 29 91 % 6' 2\" (1.88 m) 294 lb (133.4 kg)         Additional Vital Signs:  Vitals:    01/17/21 1420   BP: 106/75   Pulse: 93   Resp: 24   Temp:    SpO2: 94%       Physical Exam  Vitals signs and nursing note reviewed. Constitutional:       General: He is not in acute distress. Appearance: He is well-developed. He is not diaphoretic. HENT:      Head: Normocephalic. Eyes:      Pupils: Pupils are equal, round, and reactive to light. Neck:      Musculoskeletal: Normal range of motion and neck supple. Vascular: No JVD. Cardiovascular:      Rate and Rhythm: Normal rate and regular rhythm. Heart sounds: Normal heart sounds. Pulmonary:      Effort: Pulmonary effort is normal. Tachypnea (conversational dyspnea) present. Breath sounds: Examination of the right-upper field reveals wheezing. Examination of the left-upper field reveals wheezing. Examination of the right-middle field reveals wheezing. Examination of the left-middle field reveals wheezing. Examination of the right-lower field reveals decreased breath sounds. Examination of the left-lower field reveals decreased breath sounds. Decreased breath sounds and wheezing present. No rhonchi or rales. Abdominal:      General: Bowel sounds are normal. There is no distension. Palpations: Abdomen is soft. Tenderness: There is no abdominal tenderness. Musculoskeletal: Normal range of motion. General: No tenderness or deformity. Right lower leg: He exhibits no tenderness. Left lower leg: He exhibits no tenderness. Skin:     General: Skin is warm and dry. Capillary Refill: Capillary refill takes less than 2 seconds. Neurological:      Mental Status: He is alert and oriented to person, place, and time. Comments: Non-focal. Moves all extremities. Initial vital signs and nursing assessment reviewed and normal.   Pulsoximetry is abnormal per my interpretation. MEDICAL DECISION MAKING   Initial Assessment: Given the patient's above chief complaint and findings on history and physical examination, I thought it was appropriate to consider the following emergency medical conditions: COPD left, acute hypoxic respiratory failure, pulmonary embolus, ACS, sepsis.  Although some of these diagnoses are unlikely, they were considered in my medical decision making. Plan: CBC, BMP, ECG, troponin, procalcitonin, Covid severity labs ferritin, CRP, fibrinogen, LDH symptomatic treatment with duoneb and reassess         ED RESULTS   Laboratory results:  Labs Reviewed   CBC WITH AUTO DIFFERENTIAL - Abnormal; Notable for the following components:       Result Value    RBC 4.33 (*)     MCV 99.8 (*)     MCH 33.3 (*)     RDW-SD 49.6 (*)     Lymphocytes Absolute 0.9 (*)     Eosinophils Absolute 1.0 (*)     All other components within normal limits   PROCALCITONIN - Abnormal; Notable for the following components:    Procalcitonin 0.11 (*)     All other components within normal limits   PROTIME-INR - Abnormal; Notable for the following components:    INR 1.31 (*)     All other components within normal limits   FIBRINOGEN - Abnormal; Notable for the following components:    Fibrinogen 517 (*)     All other components within normal limits   GLOMERULAR FILTRATION RATE, ESTIMATED - Abnormal; Notable for the following components:    Est, Glom Filt Rate 65 (*)     All other components within normal limits   BASIC METABOLIC PANEL   BRAIN NATRIURETIC PEPTIDE   TROPONIN   APTT   C-REACTIVE PROTEIN   ANION GAP   OSMOLALITY   LACTATE DEHYDROGENASE   FERRITIN       Radiologic studies results:  XR CHEST PORTABLE   Final Result   1. Mild airspace opacities are present at the medial right lung base which represent mild atelectasis or pneumonia. **This report has been created using voice recognition software. It may contain minor errors which are inherent in voice recognition technology. **      Final report electronically signed by Dr. Joe Nur on 1/17/2021 1:34 PM          ED Medications administered this visit:   Medications   dexamethasone (DECADRON) injection 10 mg (has no administration in time range)   doxycycline (VIBRAMYCIN) 100 mg in dextrose 5 % 100 mL IVPB (has no administration in time range)   ipratropium-albuterol (DUONEB)

## 2021-01-17 NOTE — ED NOTES
Pt updated on POC. No other significant changes at this time. Will continue to monitor.       Monse Donis RN  01/17/21 1162

## 2021-01-17 NOTE — H&P
History & Physical        Patient:  Aleyda Hernandez  YOB: 1945    MRN: [id number]     Acct: [de-identified]    PCP: YESY Cr CNP    Date of Admission: 1/17/2021    Date of Service: Pt seen/examined on 01/17/21  and Admitted to Inpatient with expected LOS greater than two midnights due to medical therapy. ASSESSMENT/PLAN:    1. COVID-19 positive--he tested positive for COVID-19 on January 8 after he had known exposure; agrees to convalescent plasma x 1 dose on 1/17, Solu-Medrol 40 mg twice a day 1/17, remdesivir 1/17, add vitamin C, vitamin D and zinc, add incentive spirometry and Acapella  2. Acute hypoxic respiratory failure--had an O2 sat of 88%, was placed on 2 L of oxygen and currently satting 94% and not in any distress; wean as able, add incentive spirometry and Acapella  3. Probable acute COPD exacerbation--doxycycline 1/17, continue home inhalers, add albuterol inhaler  4. Possible pneumonia (POA) right base, possibly secondary to COVID-19--procalcitonin was normal  5. Macrocytosis without anemia, chronic  6. Essential hypertension, uncontrolled--on hydrochlorothiazide,  7. Chronic diastolic heart failure--EF was 55% on December 16, 2020  8. Nonobstructive CAD noted per cardiac catheterization on July 11, 2016  9. 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 CHARIS--on home CPAP at 13 cm H2O  11.  Obesity with BMI 37.75    Chief Complaint: Shortness of breath that has worsened    History Of Present Illness:    66 y.o. male who presented to [address] [place] [address] with shortness of breath; patient has a past medical history of COPD, asthma, hypertension, hyperlipidemia, VTE's; he was Covid positive approximately 10 days ago after an exposure and his wife is currently at home ill with Covid; he states he has had a long history of COPD/asthma and was actually discharged from the hospital on December 17 after being admitted on December 14 for COPD/asthma exacerbation; he states he was doing okay however the past week he has noticed increased shortness of breath especially with exertion, he has a chronic cough bringing up yellowish-greenish phlegm; he denies ever smoking however he was exposed to secondhand smoke in his father; he denies any lightheadedness or dizziness, chest pain, headache, nausea, vomiting, diarrhea. In the emergency department he was noted to have an O2 sat on room air of 88%, with ambulation he was audibly short of breath; he was given doxycycline 100 mg IV x1 dose in the emergency department along with Decadron 6 mg IV; on exam he is quite wheezy; he is being admitted to the hospital service for further care and evaluation. Past Medical History:          Diagnosis Date    Arthritis     Asthma     Cancer ([address]     skin    Chickenpox     Hyperlipidemia     Hypertension     Measles     Migraines     Mumps     Sleep apnea        Past Surgical History:          Procedure Laterality Date    COLONOSCOPY      CORONARY ANGIOPLASTY N/A 2017/2018    Dr. Denise Pizarro Left 09/2020    Robel Veenanelli Left 11/20/2019    PAROTIDECTOMY, LEFT performed by Oscar Barnett MD at [address] [place] [address]       Medications Prior to Admission:      Prior to Admission medications    Medication Sig Start Date End Date Taking?  Authorizing Provider   fluticasone (FLONASE) 50 MCG/ACT nasal spray USE 1 SPRAY NASALLY DAILY AS NEEDED FOR RHINITIS 12/31/20   YESY Brito - [facility]   budesonide-formoterol (SYMBICORT) 160-4.5 MCG/ACT AERO Inhale 2 puffs into the lungs 2 times daily 12/17/20   Joen Gums, DO   tiotropium (SPIRIVA RESPIMAT) 2.5 MCG/ACT AERS inhaler Inhale 2 puffs into the lungs daily 12/18/20   Jokali Gums, DO   cetirizine (ZYRTEC) 10 MG tablet Take 1 tablet by mouth daily 12/18/20   Berta Moseley, DO   hydroCHLOROthiazide (HYDRODIURIL) 25 MG tablet Take 1 tablet by mouth daily 12/18/20   Lindsey Jeter DO   rivaroxaban (XARELTO) 20 MG TABS tablet Take 1 tablet by mouth daily (with breakfast) 12/2/20   YESY Kothari CNP   Dextromethorphan-guaiFENesin (MUCINEX DM)  MG TB12 Take 1 tablet by mouth 2 times daily 11/10/20   YESY Kothari CNP   vitamin B-12 1000 MCG tablet Take 1 tablet by mouth daily 5/16/20   Rema Farrell DO   albuterol (PROVENTIL) (2.5 MG/3ML) 0.083% nebulizer solution inhale contents of 1 vial in nebulizer every 6 hours if needed for wheezing shortness of breath 5/4/20   YESY Kothari CNP   docusate sodium (COLACE) 100 MG capsule Take 100 mg by mouth daily    Historical Provider, MD   CPAP Machine MISC by Does not apply route Please change CPAP pressure to 13 cm H20. Download 2 weeks 10/30/19   Lavella Gilford, PA-C   albuterol sulfate  (90 Base) MCG/ACT inhaler Inhale 2 puffs into the lungs every 6 hours as needed for Wheezing    Historical Provider, MD   omeprazole (PRILOSEC) 20 MG delayed release capsule Take 20 mg by mouth daily    Historical Provider, MD   pravastatin (PRAVACHOL) 20 MG tablet Take 20 mg by mouth daily    Historical Provider, MD   verapamil (VERELAN PM) 240 MG CR capsule Take 240 mg by mouth every morning. Indications: Per patient taking medication for migraines prevention. Historical Provider, MD       Allergies:  Molds & smuts and Sulfa antibiotics    Social History:   reports that he has never smoked. He has never used smokeless tobacco. He reports that he does not drink alcohol or use drugs.     Family History:      Positive as follows:        Problem Relation Age of Onset    Diabetes Mother     COPD Mother     Cancer Father     Deep Vein Thrombosis Sister     Crohn's Disease Sister        REVIEW OF SYSTEMS:     Constitutional: ROS: positive for  - malaise  Head: no headache, no head injury, no migraine   Eyes ROS: denies blurred/double vision  Ears ROS: no hearing difficulty, no tinnitus  Mouth and Throat ROS: no ulceration, dysphagia, dental caries  Psychological ROS: no depression, no anxiety, no panic attacks, denies suicide/homicide ideation  Endocrine ROS: denies polyuria, polydypsia, no heat or cold intolerance  Respiratory ROS: positive for - cough and shortness of breath  Cardiovascular ROS: positive for - dyspnea on exertion and shortness of breath  Gastrointestinal ROS: no abdominal pain, change in bowel habits, or black or bloody stools  Genito-Urinary ROS: denies dysuria, frequency, urgency; denies hematuria  Musculoskeletal ROS: negative  Neurological ROS: no syncope, no seizures, no numbness or tingling of hands, no numbness or tingling of feet, no paresis  Dermatology: no skin rash, no eczema  Endocrine: no polyuria, polydypsia, no heat/cold intolerance  Hematology: denies bruising easily, denies bleeding problems, denies clotting disorders    PHYSICAL EXAM:    /75   Pulse 93   Temp 99 °F (37.2 °C) (Oral)   Resp 24   Ht 6' 2\" (1.88 m)   Wt 294 lb (133.4 kg)   SpO2 94%   BMI 37.75 kg/m²     General appearance:  No apparent distress, appears stated age and cooperative. HEENT:  Normal cephalic, atraumatic without obvious deformity. Pupils equal, round, and reactive to light. Conjunctivae/corneas clear. Neck: Supple, with full range of motion. No jugular venous distention. Trachea midline. Respiratory:  Normal respiratory effort. Clear to auscultation, bilaterally without Rales/Wheezes/Rhonchi. Cardiovascular:  Regular rate and rhythm with normal S1/S2 without murmurs, rubs or gallops. Abdomen: Soft, non-tender, non-distended with normal bowel sounds. Musculoskeletal:  No clubbing, cyanosis or edema bilaterally. Full range of motion without deformity. Skin: Skin color, texture, turgor normal.    Neurologic:  Neurovascularly intact without any focal sensory/motor deficits.  Cranial nerves: II-XII intact, grossly non-focal.  Psychiatric:  Alert and oriented, thought content appropriate  Capillary Refill: Brisk,< 3 seconds   Peripheral Pulses: +2 palpable, equal bilaterally       Labs:     Recent Labs     01/17/21  1300   WBC 7.3   HGB 14.4   HCT 43.2        Recent Labs     01/17/21  1300      K 3.9      CO2 26   BUN 18   CREATININE 1.1   CALCIUM 9.1     No results for input(s): AST, ALT, BILIDIR, BILITOT, ALKPHOS in the last 72 hours. Recent Labs     01/17/21  1300   INR 1.31*     Recent Labs     01/17/21  1300   TROPONINT < 0.010     Procalcitonin:  Recent Labs     01/17/21  1300   PROCAL 0.11*     Radiology:     Xr Chest Portable    Result Date: 1/17/2021  PROCEDURE: XR CHEST PORTABLE CLINICAL INFORMATION: cough COMPARISON: 12/28/2020 TECHNIQUE:  AP mobile chest single view  FINDINGS: Mild airspace disease is noted at the medial right lung base which represent mild atelectasis or pneumonia. No pneumothorax or pleural effusion is seen. No acute osseous findings are demonstrated. 1. Mild airspace opacities are present at the medial right lung base which represent mild atelectasis or pneumonia. **This report has been created using voice recognition software. It may contain minor errors which are inherent in voice recognition technology. ** Final report electronically signed by Dr. Khalida Zamora on 1/17/2021 1:34 PM    EKG:  I have reviewed the EKG with the following interpretation: Sinus rhythm heart rate 89    Thank you YESY De Jesus CNP for the opportunity to be involved in this patient's care.     Electronically signed by YESY Barajas CNP on 1/17/2021 at 2:21 PM

## 2021-01-17 NOTE — ED TRIAGE NOTES
Pt presents to the ED through triage from home with c/o SOB. Pt was covid positive 10 days ago. Pt is 90-91% on room air. Pt is visibly SOB. Pt reports his SOB worsened overnight and his inhaler has not been giving him enough relief. EKG complete.  IV access maintained

## 2021-01-18 LAB
ALBUMIN SERPL-MCNC: 3.6 G/DL (ref 3.5–5.1)
ALP BLD-CCNC: 77 U/L (ref 38–126)
ALT SERPL-CCNC: 7 U/L (ref 11–66)
AST SERPL-CCNC: 16 U/L (ref 5–40)
BILIRUB SERPL-MCNC: 0.5 MG/DL (ref 0.3–1.2)
BILIRUBIN DIRECT: < 0.2 MG/DL (ref 0–0.3)
FIBRINOGEN: 468 MG/100ML (ref 155–475)
TOTAL PROTEIN: 6.3 G/DL (ref 6.1–8)

## 2021-01-18 PROCEDURE — 6370000000 HC RX 637 (ALT 250 FOR IP): Performed by: NURSE PRACTITIONER

## 2021-01-18 PROCEDURE — 6360000002 HC RX W HCPCS: Performed by: NURSE PRACTITIONER

## 2021-01-18 PROCEDURE — 6370000000 HC RX 637 (ALT 250 FOR IP): Performed by: PHYSICIAN ASSISTANT

## 2021-01-18 PROCEDURE — 36430 TRANSFUSION BLD/BLD COMPNT: CPT

## 2021-01-18 PROCEDURE — 99232 SBSQ HOSP IP/OBS MODERATE 35: CPT | Performed by: NURSE PRACTITIONER

## 2021-01-18 PROCEDURE — P9059 PLASMA, FRZ BETWEEN 8-24HOUR: HCPCS

## 2021-01-18 PROCEDURE — 80076 HEPATIC FUNCTION PANEL: CPT

## 2021-01-18 PROCEDURE — 2500000003 HC RX 250 WO HCPCS: Performed by: NURSE PRACTITIONER

## 2021-01-18 PROCEDURE — 1200000000 HC SEMI PRIVATE

## 2021-01-18 PROCEDURE — 94640 AIRWAY INHALATION TREATMENT: CPT

## 2021-01-18 PROCEDURE — 85385 FIBRINOGEN ANTIGEN: CPT

## 2021-01-18 PROCEDURE — 2580000003 HC RX 258: Performed by: NURSE PRACTITIONER

## 2021-01-18 PROCEDURE — 36415 COLL VENOUS BLD VENIPUNCTURE: CPT

## 2021-01-18 RX ORDER — ALBUTEROL SULFATE 90 UG/1
2 AEROSOL, METERED RESPIRATORY (INHALATION)
Status: DISCONTINUED | OUTPATIENT
Start: 2021-01-18 | End: 2021-01-19 | Stop reason: HOSPADM

## 2021-01-18 RX ORDER — DEXAMETHASONE 4 MG/1
6 TABLET ORAL DAILY
Status: DISCONTINUED | OUTPATIENT
Start: 2021-01-19 | End: 2021-01-19 | Stop reason: HOSPADM

## 2021-01-18 RX ORDER — POLYVINYL ALCOHOL 14 MG/ML
1 SOLUTION/ DROPS OPHTHALMIC 3 TIMES DAILY
Status: DISCONTINUED | OUTPATIENT
Start: 2021-01-18 | End: 2021-01-19 | Stop reason: HOSPADM

## 2021-01-18 RX ORDER — ASCORBIC ACID 500 MG
1000 TABLET ORAL DAILY
Status: DISCONTINUED | OUTPATIENT
Start: 2021-01-19 | End: 2021-01-19 | Stop reason: HOSPADM

## 2021-01-18 RX ORDER — GUAIFENESIN 100 MG/5ML
200 SOLUTION ORAL EVERY 4 HOURS PRN
Status: DISCONTINUED | OUTPATIENT
Start: 2021-01-18 | End: 2021-01-19 | Stop reason: HOSPADM

## 2021-01-18 RX ORDER — SODIUM CHLORIDE 9 MG/ML
INJECTION, SOLUTION INTRAVENOUS PRN
Status: DISCONTINUED | OUTPATIENT
Start: 2021-01-18 | End: 2021-01-19 | Stop reason: HOSPADM

## 2021-01-18 RX ORDER — FAMOTIDINE 20 MG/1
20 TABLET, FILM COATED ORAL 2 TIMES DAILY
Status: DISCONTINUED | OUTPATIENT
Start: 2021-01-18 | End: 2021-01-19 | Stop reason: HOSPADM

## 2021-01-18 RX ADMIN — GUAIFENESIN AND DEXTROMETHORPHAN HYDROBROMIDE 1 TABLET: 600; 30 TABLET, EXTENDED RELEASE ORAL at 21:06

## 2021-01-18 RX ADMIN — ZINC SULFATE 220 MG (50 MG) CAPSULE 50 MG: CAPSULE at 09:15

## 2021-01-18 RX ADMIN — OXYCODONE HYDROCHLORIDE AND ACETAMINOPHEN 500 MG: 500 TABLET ORAL at 09:15

## 2021-01-18 RX ADMIN — FLUTICASONE PROPIONATE 1 SPRAY: 50 SPRAY, METERED NASAL at 03:12

## 2021-01-18 RX ADMIN — PANTOPRAZOLE SODIUM 40 MG: 40 TABLET, DELAYED RELEASE ORAL at 05:40

## 2021-01-18 RX ADMIN — ALBUTEROL SULFATE 2 PUFF: 90 AEROSOL, METERED RESPIRATORY (INHALATION) at 02:58

## 2021-01-18 RX ADMIN — BUDESONIDE AND FORMOTEROL FUMARATE DIHYDRATE 2 PUFF: 160; 4.5 AEROSOL RESPIRATORY (INHALATION) at 09:09

## 2021-01-18 RX ADMIN — DOCUSATE SODIUM 100 MG: 100 CAPSULE, LIQUID FILLED ORAL at 09:15

## 2021-01-18 RX ADMIN — ALBUTEROL SULFATE 2 PUFF: 90 AEROSOL, METERED RESPIRATORY (INHALATION) at 14:08

## 2021-01-18 RX ADMIN — ALBUTEROL SULFATE 2 PUFF: 90 AEROSOL, METERED RESPIRATORY (INHALATION) at 20:17

## 2021-01-18 RX ADMIN — Medication 10 ML: at 09:27

## 2021-01-18 RX ADMIN — BUDESONIDE AND FORMOTEROL FUMARATE DIHYDRATE 2 PUFF: 160; 4.5 AEROSOL RESPIRATORY (INHALATION) at 20:17

## 2021-01-18 RX ADMIN — FAMOTIDINE 20 MG: 20 TABLET, FILM COATED ORAL at 21:06

## 2021-01-18 RX ADMIN — VERAPAMIL HYDROCHLORIDE 240 MG: 240 TABLET, FILM COATED, EXTENDED RELEASE ORAL at 09:15

## 2021-01-18 RX ADMIN — Medication 1000 MCG: at 09:15

## 2021-01-18 RX ADMIN — FAMOTIDINE 20 MG: 20 TABLET, FILM COATED ORAL at 13:05

## 2021-01-18 RX ADMIN — DOXYCYCLINE HYCLATE 100 MG: 100 TABLET, COATED ORAL at 21:06

## 2021-01-18 RX ADMIN — GUAIFENESIN AND DEXTROMETHORPHAN HYDROBROMIDE 1 TABLET: 600; 30 TABLET, EXTENDED RELEASE ORAL at 09:15

## 2021-01-18 RX ADMIN — RIVAROXABAN 20 MG: 20 TABLET, FILM COATED ORAL at 09:15

## 2021-01-18 RX ADMIN — TIOTROPIUM BROMIDE INHALATION SPRAY 2 PUFF: 3.12 SPRAY, METERED RESPIRATORY (INHALATION) at 09:09

## 2021-01-18 RX ADMIN — METHYLPREDNISOLONE SODIUM SUCCINATE 40 MG: 40 INJECTION, POWDER, FOR SOLUTION INTRAMUSCULAR; INTRAVENOUS at 05:40

## 2021-01-18 RX ADMIN — Medication 10 ML: at 21:06

## 2021-01-18 RX ADMIN — Medication 2000 UNITS: at 09:15

## 2021-01-18 RX ADMIN — PRAVASTATIN SODIUM 20 MG: 20 TABLET ORAL at 21:06

## 2021-01-18 RX ADMIN — POLYVINYL ALCOHOL 1 DROP: 14 SOLUTION/ DROPS OPHTHALMIC at 09:15

## 2021-01-18 RX ADMIN — REMDESIVIR 100 MG: 100 INJECTION, POWDER, LYOPHILIZED, FOR SOLUTION INTRAVENOUS at 21:05

## 2021-01-18 RX ADMIN — CETIRIZINE HYDROCHLORIDE 10 MG: 10 TABLET, FILM COATED ORAL at 09:15

## 2021-01-18 RX ADMIN — DOXYCYCLINE HYCLATE 100 MG: 100 TABLET, COATED ORAL at 09:15

## 2021-01-18 ASSESSMENT — ENCOUNTER SYMPTOMS
COLOR CHANGE: 0
COUGH: 1
VOMITING: 0
SHORTNESS OF BREATH: 1
NAUSEA: 0
CHEST TIGHTNESS: 0
EYE REDNESS: 0
WHEEZING: 1
ABDOMINAL PAIN: 0
PHOTOPHOBIA: 0
BACK PAIN: 0
SINUS PRESSURE: 0
SORE THROAT: 0

## 2021-01-18 ASSESSMENT — PAIN SCALES - GENERAL
PAINLEVEL_OUTOF10: 0
PAINLEVEL_OUTOF10: 0

## 2021-01-18 NOTE — CARE COORDINATION
DISASTER CHARTING    1/18/21, 1:27 PM EST    DISCHARGE ONGOING EVALUATION:     [facility] day: 1  Location: 8A-15/015-A Reason for admit: COVID-19 virus detected [U07.1]   Barriers to Discharge: + covid. Using 1L NC. Uses cpap at home through [facility]. Convalescent plasma. Doxycycline. Remdesivir. Decadron. PCP: YESY Guajardo CNP  Readmission Risk Score: 23%  Patient Goals/Plan/Treatment Preferences: Spoke with patient, he plans to return home with wife. Cpap through [employer]. Request nebulizer machine as his is broken and he requires treatments frequently. Does not anticipate need of  HH. Nebulizer order placed, sent to [facility] so patient will have at time of discharge. Explained that patient will need at time of discharge and asked [facility] to notify  if this will be sent to home or if family should pick-up. Monitor for O2 needs.

## 2021-01-18 NOTE — PROGRESS NOTES
Suni Mancini was evaluated today and a DME order was entered for a nebulizer compressor in order to administer Albuterol due to the diagnosis of copd. The need for this equipment and treatment was discussed with the patient and he understands and is in agreement.

## 2021-01-19 VITALS
HEART RATE: 77 BPM | DIASTOLIC BLOOD PRESSURE: 60 MMHG | TEMPERATURE: 98.2 F | WEIGHT: 283.7 LBS | BODY MASS INDEX: 36.41 KG/M2 | HEIGHT: 74 IN | OXYGEN SATURATION: 94 % | RESPIRATION RATE: 18 BRPM | SYSTOLIC BLOOD PRESSURE: 115 MMHG

## 2021-01-19 LAB
ANION GAP SERPL CALCULATED.3IONS-SCNC: 9 MEQ/L (ref 8–16)
BUN BLDV-MCNC: 29 MG/DL (ref 7–22)
CALCIUM SERPL-MCNC: 9 MG/DL (ref 8.5–10.5)
CHLORIDE BLD-SCNC: 102 MEQ/L (ref 98–111)
CO2: 29 MEQ/L (ref 23–33)
CREAT SERPL-MCNC: 0.8 MG/DL (ref 0.4–1.2)
ERYTHROCYTE [DISTWIDTH] IN BLOOD BY AUTOMATED COUNT: 13.2 % (ref 11.5–14.5)
ERYTHROCYTE [DISTWIDTH] IN BLOOD BY AUTOMATED COUNT: 49.4 FL (ref 35–45)
GFR SERPL CREATININE-BSD FRML MDRD: > 90 ML/MIN/1.73M2
GLUCOSE BLD-MCNC: 101 MG/DL (ref 70–108)
GLUCOSE BLD-MCNC: 95 MG/DL (ref 70–108)
HCT VFR BLD CALC: 40 % (ref 42–52)
HEMOGLOBIN: 13.1 GM/DL (ref 14–18)
MCH RBC QN AUTO: 33.2 PG (ref 26–33)
MCHC RBC AUTO-ENTMCNC: 32.8 GM/DL (ref 32.2–35.5)
MCV RBC AUTO: 101.3 FL (ref 80–94)
PLATELET # BLD: 214 THOU/MM3 (ref 130–400)
PMV BLD AUTO: 10.3 FL (ref 9.4–12.4)
POTASSIUM REFLEX MAGNESIUM: 4 MEQ/L (ref 3.5–5.2)
RBC # BLD: 3.95 MILL/MM3 (ref 4.7–6.1)
SODIUM BLD-SCNC: 140 MEQ/L (ref 135–145)
WBC # BLD: 11.3 THOU/MM3 (ref 4.8–10.8)

## 2021-01-19 PROCEDURE — 85027 COMPLETE CBC AUTOMATED: CPT

## 2021-01-19 PROCEDURE — 94640 AIRWAY INHALATION TREATMENT: CPT

## 2021-01-19 PROCEDURE — 80048 BASIC METABOLIC PNL TOTAL CA: CPT

## 2021-01-19 PROCEDURE — 6370000000 HC RX 637 (ALT 250 FOR IP): Performed by: NURSE PRACTITIONER

## 2021-01-19 PROCEDURE — 99238 HOSP IP/OBS DSCHRG MGMT 30/<: CPT | Performed by: NURSE PRACTITIONER

## 2021-01-19 PROCEDURE — 94760 N-INVAS EAR/PLS OXIMETRY 1: CPT

## 2021-01-19 PROCEDURE — 6360000002 HC RX W HCPCS: Performed by: NURSE PRACTITIONER

## 2021-01-19 PROCEDURE — 36415 COLL VENOUS BLD VENIPUNCTURE: CPT

## 2021-01-19 PROCEDURE — 82948 REAGENT STRIP/BLOOD GLUCOSE: CPT

## 2021-01-19 PROCEDURE — 2580000003 HC RX 258: Performed by: NURSE PRACTITIONER

## 2021-01-19 RX ORDER — GUAIFENESIN 100 MG/5ML
200 SOLUTION ORAL EVERY 4 HOURS PRN
Qty: 1200 ML | Refills: 0 | Status: SHIPPED | OUTPATIENT
Start: 2021-01-19 | End: 2021-11-08

## 2021-01-19 RX ORDER — CHOLECALCIFEROL (VITAMIN D3) 50 MCG
2000 TABLET ORAL DAILY
Qty: 30 TABLET | Refills: 0 | Status: SHIPPED | OUTPATIENT
Start: 2021-01-20 | End: 2021-11-08

## 2021-01-19 RX ORDER — DOXYCYCLINE HYCLATE 100 MG
100 TABLET ORAL EVERY 12 HOURS SCHEDULED
Qty: 6 TABLET | Refills: 0 | Status: SHIPPED | OUTPATIENT
Start: 2021-01-20 | End: 2021-01-23

## 2021-01-19 RX ORDER — DEXAMETHASONE 6 MG/1
6 TABLET ORAL DAILY
Qty: 4 TABLET | Refills: 0 | Status: SHIPPED | OUTPATIENT
Start: 2021-01-20 | End: 2021-01-24

## 2021-01-19 RX ORDER — ZINC SULFATE 50(220)MG
50 CAPSULE ORAL DAILY
Qty: 30 CAPSULE | Refills: 0 | COMMUNITY
Start: 2021-01-20 | End: 2021-11-08

## 2021-01-19 RX ADMIN — TIOTROPIUM BROMIDE INHALATION SPRAY 2 PUFF: 3.12 SPRAY, METERED RESPIRATORY (INHALATION) at 07:32

## 2021-01-19 RX ADMIN — DEXAMETHASONE 6 MG: 4 TABLET ORAL at 09:17

## 2021-01-19 RX ADMIN — FAMOTIDINE 20 MG: 20 TABLET, FILM COATED ORAL at 08:50

## 2021-01-19 RX ADMIN — Medication 10 ML: at 08:54

## 2021-01-19 RX ADMIN — ZINC SULFATE 220 MG (50 MG) CAPSULE 50 MG: CAPSULE at 09:16

## 2021-01-19 RX ADMIN — HYDROCHLOROTHIAZIDE 25 MG: 25 TABLET ORAL at 10:15

## 2021-01-19 RX ADMIN — BUDESONIDE AND FORMOTEROL FUMARATE DIHYDRATE 2 PUFF: 160; 4.5 AEROSOL RESPIRATORY (INHALATION) at 07:34

## 2021-01-19 RX ADMIN — CETIRIZINE HYDROCHLORIDE 10 MG: 10 TABLET, FILM COATED ORAL at 09:17

## 2021-01-19 RX ADMIN — Medication 1000 MCG: at 09:17

## 2021-01-19 RX ADMIN — OXYCODONE HYDROCHLORIDE AND ACETAMINOPHEN 1000 MG: 500 TABLET ORAL at 08:51

## 2021-01-19 RX ADMIN — VERAPAMIL HYDROCHLORIDE 240 MG: 240 TABLET, FILM COATED, EXTENDED RELEASE ORAL at 09:17

## 2021-01-19 RX ADMIN — ALBUTEROL SULFATE 2 PUFF: 90 AEROSOL, METERED RESPIRATORY (INHALATION) at 07:33

## 2021-01-19 RX ADMIN — RIVAROXABAN 20 MG: 20 TABLET, FILM COATED ORAL at 09:16

## 2021-01-19 RX ADMIN — Medication 2000 UNITS: at 09:17

## 2021-01-19 RX ADMIN — PANTOPRAZOLE SODIUM 40 MG: 40 TABLET, DELAYED RELEASE ORAL at 05:56

## 2021-01-19 RX ADMIN — GUAIFENESIN AND DEXTROMETHORPHAN HYDROBROMIDE 1 TABLET: 600; 30 TABLET, EXTENDED RELEASE ORAL at 09:17

## 2021-01-19 RX ADMIN — ALBUTEROL SULFATE 2 PUFF: 90 AEROSOL, METERED RESPIRATORY (INHALATION) at 13:16

## 2021-01-19 RX ADMIN — DOXYCYCLINE HYCLATE 100 MG: 100 TABLET, COATED ORAL at 08:51

## 2021-01-19 ASSESSMENT — PAIN SCALES - GENERAL
PAINLEVEL_OUTOF10: 0
PAINLEVEL_OUTOF10: 0

## 2021-01-19 NOTE — CARE COORDINATION
1/19/21, 2:08 PM EST    Patient goals/plan/ treatment preferences discussed by  and . Patient goals/plan/ treatment preferences reviewed with patient/ family. Patient/ family verbalize understanding of discharge plan and are in agreement with goal/plan/treatment preferences. Understanding was demonstrated using the teach back method. AVS provided by RN at time of discharge, which includes all necessary medical information pertaining to the patients current course of illness, treatment, post-discharge goals of care, and treatment preferences. IMM Letter  IMM Letter given to Patient/Family/Significant other/Guardian/POA/by[de-identified] admitting  IMM Letter date given[de-identified] 01/17/21  IMM Letter time given[de-identified] [id number]       O2 eval performed today. Pt does not require home O2. Phoned [facility] to confirm they received prescription for nebulizer. They states they did and the pt just needs to bring old nebulizer in and exchange it out for new one. Spouse notified of this. No other home going needs voiced per pt or spouse. Notified Lydia CHATMAN per voicemail that pt is discharged home today.

## 2021-01-19 NOTE — DISCHARGE SUMMARY
Hospitalist Discharge Summary        Patient: Shalini Choudhary  YOB: 1945  MRN: [id number]   Acct: [de-identified]    Primary Care Physician: YESY Buenrostro CNP    Admit date  1/17/2021    Discharge date:  1/19/2021 10:36 AM    Chief Complaint on presentation :-  Shortness of breath    Discharge Assessment and Plan:-     1. Acute hypoxic respiratory failure, COVID-19: Tested positive for COVID-19 on January 8 after he had known exposure. Patient received convalescent plasma on 1/17 and 1/18. Solu-Medrol 40 mg was given on 1/17 and  transitioned to Decadron for 2 additional days during his stay, continue short course of steroid therapy at discharge. Patient also received 3 days of remdesivir and Lovenox and Pepcid BID , as well as vitamin C, vitamin D and zinc daily. Patient also utilized incentive spirometry, Acapella and pulmonary toileting during his stay. · Patient normally on room air at home, successfully weaned back to room air with adequate O2 saturations at rest and with activity per respiratory therapy home O2 evaluation. The patient did not require any supplemental oxygen during the assessment. .  2. Possible acute COPD exacerbation: Doxycycline was started on 1/17, continue at discharge for a total of 5 days of coverage. Continue home inhalers  at discharge. 3. Viral pneumonia (POA): Localized to the right base, continue treatment as described above. 4. Macrocytosis without anemia: History, stable. 5. Essential hypertension, uncontrolled: History, stable. Currently normotensive. Continue hydrochlorothiazide at discharge. 6. Chronic diastolic HFpEF: Echo demonstrated that EF was 55% on December 16, 2020. 7. Nonobstructive CAD: Noted per cardiac catheterization on July 11, 2016.  8. 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   9. CHARIS: On home CPAP at 13 cm H2O.  10. Obesity: BMI 36.42    Initial H and P and Hospital course:-  **Per Chart Review:  \"75 y. o. male who presented to [address] [place] [address] with shortness of breath; patient has a past medical history of COPD, asthma, hypertension, hyperlipidemia, VTE's; he was Covid positive approximately 10 days ago after an exposure and his wife is currently at home ill with Covid; he states he has had a long history of COPD/asthma and was actually discharged from the hospital on December 17 after being admitted on December 14 for COPD/asthma exacerbation; he states he was doing okay however the past week he has noticed increased shortness of breath especially with exertion, he has a chronic cough bringing up yellowish-greenish phlegm; he denies ever smoking however he was exposed to secondhand smoke in his father; he denies any lightheadedness or dizziness, chest pain, headache, nausea, vomiting, diarrhea. In the emergency department he was noted to have an O2 sat on room air of 88%, with ambulation he was audibly short of breath; he was given doxycycline 100 mg IV x1 dose in the emergency department along with Decadron 6 mg IV; on exam he is quite wheezy; he is being admitted to the hospital service for further care and evaluation. \"    During the patient's stay, he was evaluated and treated for an acute hypoxic respiratory failure, secondary to COVID-19. The patient also was treated for a possible acute COPD exacerbation as well. The patient responded well to therapies described above and his previously existing medical conditions were also effectively managed during his stay. At the time of discharge, the patient appears well and is alert and oriented to person, place, time, and situation. He currently denies any chest pain, shortness of breath, nausea, vomiting, abdominal pain, diarrhea, constipation, urinary complaints, lightheadedness, dizziness, headaches, changes in vision, fever, or chills.   He was updated on the discharge plan of care, the importance of following up with his PCP, he verbalized his understanding, and had no other needs or questions at this time. Physical Exam:-  Vitals:   Patient Vitals for the past 24 hrs:   BP Temp Temp src Pulse Resp SpO2   01/19/21 0859 (!) 173/77 97.6 °F (36.4 °C) Oral 53 20 95 %   01/19/21 0732 -- -- -- -- -- 93 %   01/19/21 0331 (!) 120/56 98 °F (36.7 °C) Oral 66 20 94 %   01/18/21 2317 126/68 98.6 °F (37 °C) Oral 70 20 93 %   01/18/21 2100 132/77 98.7 °F (37.1 °C) Oral 75 20 91 %   01/18/21 1512 129/63 98.2 °F (36.8 °C) -- 63 18 --   01/18/21 1331 128/66 98.3 °F (36.8 °C) Oral 65 18 93 %   01/18/21 1305 112/65 97.7 °F (36.5 °C) Oral 65 18 92 %     Weight:   Weight: 283 lb 11.2 oz (128.7 kg)   24 hour intake/output:     Intake/Output Summary (Last 24 hours) at 1/19/2021 1036  Last data filed at 1/19/2021 0331  Gross per 24 hour   Intake 930 ml   Output 0 ml   Net 930 ml     1. General appearance: No apparent distress, appears stated age and cooperative. 2. HEENT: Normal cephalic, atraumatic without obvious deformity. Pupils equal, round, and reactive to light. Extra ocular muscles intact. Conjunctivae/corneas clear. 3. Neck: Supple, with full range of motion. No jugular venous distention. Trachea midline. 4. Respiratory:  Normal respiratory effort. Clear to auscultation, bilaterally without Rales/Wheezes/Rhonchi. 5. Cardiovascular: Regular rate and rhythm with normal S1/S2 without murmurs, rubs or gallops. 6. Abdomen: Soft, non-tender, non-distended with normal bowel sounds. 7. Musculoskeletal:  No clubbing, cyanosis or edema bilaterally. 8. Skin: Skin color, texture, turgor normal.  No rashes or lesions. 9. Neurologic:  Neurovascularly intact without any focal sensory/motor deficits. Cranial nerves: II-XII intact, grossly non-focal.  10. Psychiatric: Alert and oriented, thought content appropriate, normal insight  11. Capillary Refill: Brisk,< 3 seconds   12.  Peripheral Pulses: +2 palpable, equal bilaterally     Discharge Medications:-      Medication List      CONTINUE taking these medications    * albuterol (2.5 MG/3ML) 0.083% nebulizer solution  Commonly known as: PROVENTIL  inhale contents of 1 vial in nebulizer every 6 hours if needed for wheezing shortness of breath     * albuterol sulfate  (90 Base) MCG/ACT inhaler     budesonide-formoterol 160-4.5 MCG/ACT Aero  Commonly known as: Symbicort  Inhale 2 puffs into the lungs 2 times daily     cetirizine 10 MG tablet  Commonly known as: ZYRTEC  Take 1 tablet by mouth daily     CPAP Machine Misc  by Does not apply route Please change CPAP pressure to 13 cm H20. Download 2 weeks     cyanocobalamin 1000 MCG tablet  Take 1 tablet by mouth daily     docusate sodium 100 MG capsule  Commonly known as: COLACE     fluticasone 50 MCG/ACT nasal spray  Commonly known as: FLONASE  USE 1 SPRAY NASALLY DAILY AS NEEDED FOR RHINITIS     hydroCHLOROthiazide 25 MG tablet  Commonly known as: HYDRODIURIL  Take 1 tablet by mouth daily     Mucinex DM  MG Tb12  Take 1 tablet by mouth 2 times daily     omeprazole 20 MG delayed release capsule  Commonly known as: PRILOSEC     pravastatin 20 MG tablet  Commonly known as: PRAVACHOL     rivaroxaban 20 MG Tabs tablet  Commonly known as: Xarelto  Take 1 tablet by mouth daily (with breakfast)     tiotropium 2.5 MCG/ACT Aers inhaler  Commonly known as: SPIRIVA RESPIMAT  Inhale 2 puffs into the lungs daily     verapamil 240 MG extended release capsule  Commonly known as: VERELAN         * This list has 2 medication(s) that are the same as other medications prescribed for you. Read the directions carefully, and ask your doctor or other care provider to review them with you.                  Labs :-  Recent Results (from the past 72 hour(s))   EKG SOB    Collection Time: 01/17/21 12:54 PM   Result Value Ref Range    Ventricular Rate 89 BPM    Atrial Rate 89 BPM    P-R Interval 134 ms    QRS Duration 120 ms    Q-T Interval 386 ms    QTc Calculation Deborahhayesjusta) 469 ms    P Axis 64 degrees    R Axis -40 degrees    T Axis 65 degrees   CBC auto differential    Collection Time: 01/17/21  1:00 PM   Result Value Ref Range    WBC 7.3 4.8 - 10.8 thou/mm3    RBC 4.33 (L) 4.70 - 6.10 mill/mm3    Hemoglobin 14.4 14.0 - 18.0 gm/dl    Hematocrit 43.2 42.0 - 52.0 %    MCV 99.8 (H) 80.0 - 94.0 fL    MCH 33.3 (H) 26.0 - 33.0 pg    MCHC 33.3 32.2 - 35.5 gm/dl    RDW-CV 13.4 11.5 - 14.5 %    RDW-SD 49.6 (H) 35.0 - 45.0 fL    Platelets 109 437 - 715 thou/mm3    MPV 9.9 9.4 - 12.4 fL    Seg Neutrophils 65.4 %    Lymphocytes 12.1 %    Monocytes 7.6 %    Eosinophils 13.5 %    Basophils 1.0 %    Immature Granulocytes 0.4 %    Segs Absolute 4.8 1.8 - 7.7 thou/mm3    Lymphocytes Absolute 0.9 (L) 1.0 - 4.8 thou/mm3    Monocytes Absolute 0.6 0.4 - 1.3 thou/mm3    Eosinophils Absolute 1.0 (H) 0.0 - 0.4 thou/mm3    Basophils Absolute 0.1 0.0 - 0.1 thou/mm3    Immature Grans (Abs) 0.03 0.00 - 0.07 thou/mm3    nRBC 0 /100 wbc   Basic Metabolic Panel    Collection Time: 01/17/21  1:00 PM   Result Value Ref Range    Sodium 142 135 - 145 meq/L    Potassium 3.9 3.5 - 5.2 meq/L    Chloride 102 98 - 111 meq/L    CO2 26 23 - 33 meq/L    Glucose 108 70 - 108 mg/dL    BUN 18 7 - 22 mg/dL    CREATININE 1.1 0.4 - 1.2 mg/dL    Calcium 9.1 8.5 - 10.5 mg/dL   Brain Natriuretic Peptide    Collection Time: 01/17/21  1:00 PM   Result Value Ref Range    Pro-BNP 70.7 0.0 - 1800.0 pg/mL   Procalcitonin    Collection Time: 01/17/21  1:00 PM   Result Value Ref Range    Procalcitonin 0.11 (H) 0.01 - 0.09 ng/mL   Troponin    Collection Time: 01/17/21  1:00 PM   Result Value Ref Range    Troponin T < 0.010 ng/ml   APTT    Collection Time: 01/17/21  1:00 PM   Result Value Ref Range    aPTT 34.0 22.0 - 38.0 seconds   Protime-INR    Collection Time: 01/17/21  1:00 PM   Result Value Ref Range    INR 1.31 (H) 0.85 - 1.13   Fibrinogen    Collection Time: 01/17/21  1:00 PM   Result Value Ref Range    Fibrinogen 517 (H) 155 - 475 mg/100ml   C-Reactive Protein    Collection Time: 01/17/21  1:00 PM   Result Value Ref Range    CRP 0.76 0.00 - 1.00 mg/dl   Lactate Dehydrogenase    Collection Time: 01/17/21  1:00 PM   Result Value Ref Range     (H) 100 - 190 U/L   Ferritin    Collection Time: 01/17/21  1:00 PM   Result Value Ref Range    Ferritin 82 22 - 322 ng/mL   Anion Gap    Collection Time: 01/17/21  1:00 PM   Result Value Ref Range    Anion Gap 14.0 8.0 - 16.0 meq/L   Glomerular Filtration Rate, Estimated    Collection Time: 01/17/21  1:00 PM   Result Value Ref Range    Est, Glom Filt Rate 65 (A) ml/min/1.73m2   Osmolality    Collection Time: 01/17/21  1:00 PM   Result Value Ref Range    Osmolality Calc 285.5 275.0 - 300.0 mOsmol/kg   Fibrinogen    Collection Time: 01/18/21  5:47 AM   Result Value Ref Range    Fibrinogen 468 155 - 475 mg/100ml   Hepatic Function Panel    Collection Time: 01/18/21  5:47 AM   Result Value Ref Range    Alb 3.6 3.5 - 5.1 g/dL    Total Bilirubin 0.5 0.3 - 1.2 mg/dL    Bilirubin, Direct <0.2 0.0 - 0.3 mg/dL    Alkaline Phosphatase 77 38 - 126 U/L    AST 16 5 - 40 U/L    ALT 7 (L) 11 - 66 U/L    Total Protein 6.3 6.1 - 8.0 g/dL   CBC    Collection Time: 01/19/21  5:40 AM   Result Value Ref Range    WBC 11.3 (H) 4.8 - 10.8 thou/mm3    RBC 3.95 (L) 4.70 - 6.10 mill/mm3    Hemoglobin 13.1 (L) 14.0 - 18.0 gm/dl    Hematocrit 40.0 (L) 42.0 - 52.0 %    .3 (H) 80.0 - 94.0 fL    MCH 33.2 (H) 26.0 - 33.0 pg    MCHC 32.8 32.2 - 35.5 gm/dl    RDW-CV 13.2 11.5 - 14.5 %    RDW-SD 49.4 (H) 35.0 - 45.0 fL    Platelets 074 346 - 154 thou/mm3    MPV 10.3 9.4 - 12.4 fL   Basic Metabolic Panel w/ Reflex to MG    Collection Time: 01/19/21  5:40 AM   Result Value Ref Range    Sodium 140 135 - 145 meq/L    Potassium reflex Magnesium 4.0 3.5 - 5.2 meq/L    Chloride 102 98 - 111 meq/L    CO2 29 23 - 33 meq/L    Glucose 101 70 - 108 mg/dL    BUN 29 (H) 7 - 22 mg/dL    CREATININE 0.8 0.4 - 1.2 mg/dL    Calcium 9.0 8.5 - 10.5 mg/dL   Anion Gap    Collection Time: 01/19/21  5:40 AM   Result Value Ref Range    Anion Gap 9.0 8.0 - 16.0 meq/L   Glomerular Filtration Rate, Estimated    Collection Time: 01/19/21  5:40 AM   Result Value Ref Range    Est, Glom Filt Rate >90 ml/min/1.73m2   POCT Glucose    Collection Time: 01/19/21  6:23 AM   Result Value Ref Range    POC Glucose 95 70 - 108 mg/dl        Microbiology:    Blood culture #1:   Lab Results   Component Value Date    BC No growth-preliminary No growth  04/30/2020       Blood culture #2:No results found for: BLOODCULT2    Organism:    Lab Results   Component Value Date    LABGRAM  08/17/2018     Rare segmented neutrophils observed. Few epithelial cells observed. Few gram positive cocci in pairs. Rare gram negative bacilli. Rare gram positive bacilli. MRSA culture only:No results found for: [facility]    Urine culture: No results found for: LABURIN  No results found for: ORG     Respiratory culture: No results found for: CULTRESP    Aerobic and Anaerobic :  No results found for: LABAERO  No results found for: LABANAE    Urinalysis:      Lab Results   Component Value Date    NITRU NEGATIVE 08/17/2018    WBCUA NONE SEEN 08/17/2018    BACTERIA NONE 08/17/2018    RBCUA 0-2 08/17/2018    BLOODU TRACE 08/17/2018    GLUCOSEU NEGATIVE 08/17/2018       Radiology:-  Xr Chest Portable    Result Date: 1/17/2021  PROCEDURE: XR CHEST PORTABLE CLINICAL INFORMATION: cough COMPARISON: 12/28/2020 TECHNIQUE:  AP mobile chest single view  FINDINGS: Mild airspace disease is noted at the medial right lung base which represent mild atelectasis or pneumonia. No pneumothorax or pleural effusion is seen. No acute osseous findings are demonstrated. 1. Mild airspace opacities are present at the medial right lung base which represent mild atelectasis or pneumonia. **This report has been created using voice recognition software.   It may contain minor errors which are inherent in voice recognition technology. ** Final report electronically signed by Dr. Jael Vogel on 1/17/2021 1:34 PM    Liliana Cameron was evaluated today and a DME order was entered for a nebulizer compressor in order to administer Albuterol due to the diagnosis of asthma. The need for this equipment and treatment was discussed with the patient and he understands and is in agreement. Follow-up scheduled after discharge :-    in 1-2 weeks with YESY Nina CNP    Consultations during this hospital stay:-  [x] NONE [] Cardiology  [] Nephrology  [] Hemo onco   [] GI   [] ID  [] Endocrine  [] Pulm    [] Neuro    [] Psych   [] Urology  [] ENT   [] G SURGERY   []Ortho    []CV surg    [] Palliative  [] Hospice [] Pain management   []    []TCU   [] PT/OT  OTHERS:-    Disposition: home  Condition at Discharge: Stable    Time Spent:- 30 minutes    **This report has been created using voice recognition software. It may contain minor errors which are inherent in voice recognition technology. **  Electronically signed by YESY Paz CNP on 1/19/2021 at 10:36 AM  Discharging Hospitalist

## 2021-01-19 NOTE — PROGRESS NOTES
COPD/asthma and was actually discharged from the hospital on December 17 after being admitted on December 14 for COPD/asthma exacerbation; he states he was doing okay however the past week he has noticed increased shortness of breath especially with exertion, he has a chronic cough bringing up yellowish-greenish phlegm; he denies ever smoking however he was exposed to secondhand smoke in his father; he denies any lightheadedness or dizziness, chest pain, headache, nausea, vomiting, diarrhea. In the emergency department he was noted to have an O2 sat on room air of 88%, with ambulation he was audibly short of breath; he was given doxycycline 100 mg IV x1 dose in the emergency department along with Decadron 6 mg IV; on exam he is quite wheezy; he is being admitted to the hospital service for further care and evaluation. \"    Subjective (past 24 hours):   Patient sitting up in chair watching TV at the time of the interview. Currently denies any physical complaints and denied any issues overnight and into the morning. He was updated on the current plan of care, verbalized his understanding, and had no other needs or questions at this time. Past medical history, family history, social history and allergies reviewed again and is unchanged since admission. ROS (14 point review of systems completed. Pertinent positives noted. Otherwise ROS is negative) :  GENERAL: No fever,chills, or night sweats. SKIN: No lesions or rashes. HEAD: No headaches or recent injury. EYES: No acute changes in vision, no diplopia or blurred vision. EARS: No hearing loss, no tinnitus. NOSE/THROAT: No rhinorrhea or pharyngitis, no nasal drainage. NECK: No lumps or unusual neck stiffness. PULMONARY: Respirations easy and non-labored, no acute distress. CARDIAC: No chest pain, pressure, Negative for lower leg edema. GI: Abdomen is soft and non-tender, non-distended.   PERIPHERAL VASCULAR: No intermittent claudication or unusual leg cramps. MUSCULOSKELETAL: Occasional arthralgias, myalgias. NEUROLOGICAL: Denies any headache, near syncope, seizures or syncope. HEMATOLOGIC:  No unusual bruising or bleeding. PSYCH: Denies any homicidal or suicidial ideations. Medications:  Reviewed    Infusion Medications    sodium chloride      sodium chloride       Scheduled Medications    polyvinyl alcohol  1 drop Both Eyes TID    [START ON 1/19/2021] dexamethasone  6 mg Oral Daily    [START ON 1/19/2021] ascorbic acid  1,000 mg Oral Daily    famotidine  20 mg Oral BID    albuterol sulfate HFA  2 puff Inhalation Q6H WA    budesonide-formoterol  2 puff Inhalation BID    cetirizine  10 mg Oral Daily    dextromethorphan-guaiFENesin  1 tablet Oral BID    docusate sodium  100 mg Oral Daily    [Held by provider] hydroCHLOROthiazide  25 mg Oral Daily    pantoprazole  40 mg Oral QAM AC    pravastatin  20 mg Oral Nightly    rivaroxaban  20 mg Oral Daily with breakfast    tiotropium  2 puff Inhalation Daily    verapamil  240 mg Oral QAM    cyanocobalamin  1,000 mcg Oral Daily    sodium chloride flush  10 mL Intravenous 2 times per day    Vitamin D  2,000 Units Oral Daily    zinc sulfate  50 mg Oral Daily    doxycycline hyclate  100 mg Oral 2 times per day    remdesivir IVPB  100 mg Intravenous Q24H     PRN Meds: guaiFENesin, sodium chloride, fluticasone, sodium chloride flush, promethazine **OR** ondansetron, polyethylene glycol, acetaminophen **OR** acetaminophen, sodium chloride, sodium chloride      Intake/Output Summary (Last 24 hours) at 1/18/2021 2109  Last data filed at 1/18/2021 2046  Gross per 24 hour   Intake 1080 ml   Output 0 ml   Net 1080 ml       Diet:  DIET GENERAL;    Exam:  /77   Pulse 75   Temp 98.7 °F (37.1 °C) (Oral)   Resp 20   Ht 6' 2\" (1.88 m)   Wt 283 lb 11.2 oz (128.7 kg)   SpO2 91%   BMI 36.42 kg/m²   General appearance: Alert and appropriate, pleasant .   No apparent distress, appears stated age and cooperative. HEENT: Pupils equal, round, and reactive to light. Conjunctivae/corneas clear. Neck: Supple, with full range of motion. No jugular venous distention. Trachea midline. Respiratory:  Normal respiratory effort. Clear to auscultation, bilaterally without Rales/Wheezes/Rhonchi. Cardiovascular: Regular rate and rhythm with normal S1/S2 without murmurs, rubs or gallops. Abdomen: Soft, non-tender, non-distended with normal bowel sounds. Musculoskeletal: Passive and active ROM x 4 extremities. Skin: Skin color, texture, turgor normal.  No rashes or lesions. Neurologic:  Neurovascularly intact without any focal sensory/motor deficits. Cranial nerves: II-XII intact, grossly non-focal.  Psychiatric: Alert and oriented to person, place, time, and situation. Thought content appropriate, normal insight  Capillary Refill: Brisk,< 3 seconds   Peripheral Pulses: +2 palpable, equal bilaterally     Labs:   Recent Labs     01/17/21  1300   WBC 7.3   HGB 14.4   HCT 43.2        Recent Labs     01/17/21  1300      K 3.9      CO2 26   BUN 18   CREATININE 1.1   CALCIUM 9.1     Recent Labs     01/18/21  0547   AST 16   ALT 7*   BILIDIR <0.2   BILITOT 0.5   ALKPHOS 77     Recent Labs     01/17/21  1300   INR 1.31*     No results for input(s): [place] in the last 72 hours. Microbiology:    Blood culture #1:   Lab Results   Component Value Date    BC No growth-preliminary No growth  04/30/2020       Blood culture #2:No results found for: [facility]    Organism:No results found for: [facility]      Lab Results   Component Value Date    LABGRAM  08/17/2018     Rare segmented neutrophils observed. Few epithelial cells observed. Few gram positive cocci in pairs. Rare gram negative bacilli. Rare gram positive bacilli.          MRSA culture only:No results found for: [facility]    Urine culture: No results found for: LABURIN    Respiratory culture: No results found for: CULTRESP    Aerobic and Anaerobic :  No results found for: LABAERO  No results found for: LABANAE    Urinalysis:      Lab Results   Component Value Date    NITRU NEGATIVE 08/17/2018    WBCUA NONE SEEN 08/17/2018    BACTERIA NONE 08/17/2018    RBCUA 0-2 08/17/2018    BLOODU TRACE 08/17/2018    GLUCOSEU NEGATIVE 08/17/2018       Radiology:  XR CHEST PORTABLE   Final Result   1. Mild airspace opacities are present at the medial right lung base which represent mild atelectasis or pneumonia. **This report has been created using voice recognition software. It may contain minor errors which are inherent in voice recognition technology. **      Final report electronically signed by Dr. Amalia Lovell on 1/17/2021 1:34 PM        Xr Chest Portable    Result Date: 1/17/2021  PROCEDURE: XR CHEST PORTABLE CLINICAL INFORMATION: cough COMPARISON: 12/28/2020 TECHNIQUE:  AP mobile chest single view  FINDINGS: Mild airspace disease is noted at the medial right lung base which represent mild atelectasis or pneumonia. No pneumothorax or pleural effusion is seen. No acute osseous findings are demonstrated. 1. Mild airspace opacities are present at the medial right lung base which represent mild atelectasis or pneumonia. **This report has been created using voice recognition software. It may contain minor errors which are inherent in voice recognition technology. ** Final report electronically signed by Dr. Amalia Lovell on 1/17/2021 1:34 PM      **This report has been created using voice recognition software. It may contain minor errors which are inherent in voice recognition technology. **  Electronically signed by YESY Allen CNP on 1/18/2021 at 9:09 PM

## 2021-01-19 NOTE — PLAN OF CARE
Problem: Falls - Risk of:  Goal: Will remain free from falls  Description: Will remain free from falls  Outcome: Met This Shift  Goal: Absence of physical injury  Description: Absence of physical injury  Outcome: Met This Shift     Problem: Airway Clearance - Ineffective  Goal: Achieve or maintain patent airway  Outcome: Met This Shift     Problem: Gas Exchange - Impaired  Goal: Absence of hypoxia  Outcome: Met This Shift  Goal: Promote optimal lung function  Outcome: Met This Shift     Problem: Breathing Pattern - Ineffective  Goal: Ability to achieve and maintain a regular respiratory rate  Outcome: Met This Shift     Problem:  Body Temperature -  Risk of, Imbalanced  Goal: Ability to maintain a body temperature within defined limits  Outcome: Met This Shift  Goal: Will regain or maintain usual level of consciousness  Outcome: Met This Shift  Goal: Complications related to the disease process, condition or treatment will be avoided or minimized  Outcome: Met This Shift     Problem: Isolation Precautions - Risk of Spread of Infection  Goal: Prevent transmission of infection  Outcome: Met This Shift     Problem: Nutrition Deficits  Goal: Optimize nutritional status  Outcome: Met This Shift     Problem: Risk for Fluid Volume Deficit  Goal: Maintain normal heart rhythm  Outcome: Met This Shift  Goal: Maintain absence of muscle cramping  Outcome: Met This Shift  Goal: Maintain normal serum potassium, sodium, calcium, phosphorus, and pH  Outcome: Met This Shift     Problem: Loneliness or Risk for Loneliness  Goal: Demonstrate positive use of time alone when socialization is not possible  Outcome: Met This Shift     Problem: Fatigue  Goal: Verbalize increase energy and improved vitality  Outcome: Met This Shift     Problem: Patient Education: Go to Patient Education Activity  Goal: Patient/Family Education  Outcome: Met This Shift

## 2021-01-19 NOTE — PROGRESS NOTES
Pt given d/c and follow up instructions, medication instructions, including side effects, and pt given covid-19 instructions. Pt verbalized understanding. All questions answered.   Pt to be taken out to his wife's car per transport tech and w/c with belongings

## 2021-01-20 ENCOUNTER — CARE COORDINATION (OUTPATIENT)
Dept: CASE MANAGEMENT | Age: 76
End: 2021-01-20

## 2021-01-20 NOTE — CARE COORDINATION
Date/Time:  2021 1:05 PM  Attempted to reach patient by telephone. Call within 2 business days of discharge: Yes Left HIPPA compliant message requesting a return call. Will attempt to reach patient again. Patient called writer back, medication was reviewed, patient states he has a cough and fatigue. He does not have any concerns at this time and is agreeable to Loop monitoring system . Patient contacted regarding [id number] diagnosis\". Discussed COVID-19 related testing which was available at this time. Test results were positive. Patient informed of results, if available? Yes    Care Transition Nurse/ Ambulatory Care Manager contacted the patient by telephone to perform post discharge assessment. Call within 2 business days of discharge: Yes. Verified name and  with patient as identifiers. Provided introduction to self, and explanation of the CTN/ACM role, and reason for call due to risk factors for infection and/or exposure to COVID-19. Symptoms reviewed with patient who verbalized the following symptoms: fatigue and cough. Due to no new or worsening symptoms encounter was not routed to provider for escalation. Discussed follow-up appointments. If no appointment was previously scheduled, appointment scheduling offered: Yes  [facility] follow up appointment(s):   Future Appointments   Date Time Provider Arlene Cano   2021  1:30 PM Anil Tamayo   2021  9:00 AM Anil Tamayo   2021 10:15 AM MD SHELDON Portillo [facility] Heart [facility] - [address] [place] [address]   2021 11:45 AM CIELO Ceballos Pulm [facility] [address] [place] [address]     Non-[facility] follow up appointment(s): na    Non-face-to-face services provided:  Obtained and reviewed discharge summary and/or continuity of care documents     Advance Care Planning:   Does patient have an Advance Directive:  reviewed and current. Patient has following risk factors of: COPD and asthma.  CTN/ACM reviewed discharge instructions, medical action plan and red flags such as increased shortness of breath, increasing fever and signs of decompensation with patient who verbalized understanding. Discussed exposure protocols and quarantine with CDC Guidelines What to do if you are sick with coronavirus disease 2019.  Patient was given an opportunity for questions and concerns. The patient agrees to contact the [facility] exposure line [phone], local [facility] department [place] Department [facility] Health: ([phone]) and PCP office for questions related to their healthcare. CTN/ACM provided contact information for future needs. Reviewed and educated patient on any new and changed medications related to discharge diagnosis     Patient/family/caregiver given information for GetWell Loop and agrees to enroll yes  Patient's preferred e-mail: [email or web address]  Patient's preferred phone number:[phone]   Based on Loop alert triggers, patient will be contacted by nurse care manager for worsening symptoms. Pt will be further monitored by COVID Loop Team based on severity of symptoms and risk factors.

## 2021-01-25 NOTE — PROGRESS NOTES
CLINICAL PHARMACY NOTE: MEDS TO [address] Select Patient?: Yes  Total # of Prescriptions Filled: 5   The following medications were delivered to the patient:  Dexamethasone 6mg  Mucus Relief 400mg  Doxycycline Hyclate 100mg  Vitamin C 1000mg  Vitamin D3 50mcg2  Total # of Interventions Completed: 2  Time Spent (min): 30    Additional Documentation:

## 2021-02-02 ENCOUNTER — HOSPITAL ENCOUNTER (OUTPATIENT)
Dept: PULMONOLOGY | Age: 76
Discharge: HOME | End: 2021-02-02
Payer: MEDICARE

## 2021-02-02 ENCOUNTER — HOSPITAL ENCOUNTER (OUTPATIENT)
Age: 76
Discharge: HOME | End: 2021-02-02
Payer: MEDICARE

## 2021-02-02 DIAGNOSIS — R06.00 DYSPNEA, UNSPECIFIED TYPE: ICD-10-CM

## 2021-02-02 LAB — SARS-COV-2, NAAT: NOT DETECTED

## 2021-02-02 PROCEDURE — 94618 PULMONARY STRESS TESTING: CPT

## 2021-02-02 PROCEDURE — U0002 COVID-19 LAB TEST NON-CDC: HCPCS

## 2021-02-05 ENCOUNTER — HOSPITAL ENCOUNTER (OUTPATIENT)
Dept: PULMONOLOGY | Age: 76
Discharge: HOME | End: 2021-02-05
Payer: MEDICARE

## 2021-02-05 DIAGNOSIS — R06.00 DYSPNEA, UNSPECIFIED TYPE: ICD-10-CM

## 2021-02-05 PROCEDURE — 94729 DIFFUSING CAPACITY: CPT

## 2021-02-05 PROCEDURE — 94060 EVALUATION OF WHEEZING: CPT

## 2021-02-05 PROCEDURE — 94726 PLETHYSMOGRAPHY LUNG VOLUMES: CPT

## 2021-02-10 ENCOUNTER — HOSPITAL ENCOUNTER (OUTPATIENT)
Dept: NUCLEAR MEDICINE | Age: 76
Discharge: HOME | End: 2021-02-10
Payer: MEDICARE

## 2021-02-10 ENCOUNTER — HOSPITAL ENCOUNTER (OUTPATIENT)
Dept: GENERAL RADIOLOGY | Age: 76
Discharge: HOME | End: 2021-02-10
Payer: MEDICARE

## 2021-02-10 ENCOUNTER — HOSPITAL ENCOUNTER (OUTPATIENT)
Age: 76
Discharge: HOME | End: 2021-02-10
Payer: MEDICARE

## 2021-02-10 DIAGNOSIS — Z01.818 PRE-OP TESTING: ICD-10-CM

## 2021-02-10 DIAGNOSIS — R06.00 DYSPNEA, UNSPECIFIED TYPE: ICD-10-CM

## 2021-02-10 LAB — SARS-COV-2, NAAT: NOT DETECTED

## 2021-02-10 PROCEDURE — U0002 COVID-19 LAB TEST NON-CDC: HCPCS

## 2021-02-10 PROCEDURE — 78582 LUNG VENTILAT&PERFUS IMAGING: CPT

## 2021-02-10 PROCEDURE — A9558 XE133 XENON 10MCI: HCPCS | Performed by: INTERNAL MEDICINE

## 2021-02-10 PROCEDURE — 71046 X-RAY EXAM CHEST 2 VIEWS: CPT

## 2021-02-10 PROCEDURE — 3430000000 HC RX DIAGNOSTIC RADIOPHARMACEUTICAL: Performed by: INTERNAL MEDICINE

## 2021-02-10 PROCEDURE — A9540 TC99M MAA: HCPCS | Performed by: INTERNAL MEDICINE

## 2021-02-10 RX ORDER — XENON XE-133 10 MCI/1
9.8 GAS RESPIRATORY (INHALATION)
Status: COMPLETED | OUTPATIENT
Start: 2021-02-10 | End: 2021-02-10

## 2021-02-10 RX ADMIN — Medication 3 MILLICURIE: at 11:55

## 2021-02-10 RX ADMIN — XENON XE-133 9.8 MILLICURIE: 10 GAS RESPIRATORY (INHALATION) at 11:50

## 2021-02-22 ENCOUNTER — APPOINTMENT (OUTPATIENT)
Dept: GENERAL RADIOLOGY | Age: 76
End: 2021-02-22
Payer: MEDICARE

## 2021-02-22 ENCOUNTER — APPOINTMENT (OUTPATIENT)
Dept: INTERVENTIONAL RADIOLOGY/VASCULAR | Age: 76
End: 2021-02-22
Payer: MEDICARE

## 2021-02-22 ENCOUNTER — HOSPITAL ENCOUNTER (EMERGENCY)
Age: 76
Discharge: HOME | End: 2021-02-22
Attending: EMERGENCY MEDICINE
Payer: MEDICARE

## 2021-02-22 VITALS
DIASTOLIC BLOOD PRESSURE: 68 MMHG | BODY MASS INDEX: 37.73 KG/M2 | HEART RATE: 64 BPM | OXYGEN SATURATION: 94 % | SYSTOLIC BLOOD PRESSURE: 128 MMHG | HEIGHT: 74 IN | WEIGHT: 294 LBS | TEMPERATURE: 98.1 F | RESPIRATION RATE: 16 BRPM

## 2021-02-22 DIAGNOSIS — Z86.718 HISTORY OF DVT (DEEP VEIN THROMBOSIS): ICD-10-CM

## 2021-02-22 DIAGNOSIS — J44.1 COPD WITH ACUTE EXACERBATION (HCC): Primary | ICD-10-CM

## 2021-02-22 LAB
ALBUMIN SERPL-MCNC: 3.7 G/DL (ref 3.5–5.1)
ALP BLD-CCNC: 73 U/L (ref 38–126)
ALT SERPL-CCNC: 7 U/L (ref 11–66)
ANION GAP SERPL CALCULATED.3IONS-SCNC: 8 MEQ/L (ref 8–16)
APTT: 43.7 SECONDS (ref 22–38)
AST SERPL-CCNC: 17 U/L (ref 5–40)
BASOPHILS # BLD: 1 %
BASOPHILS ABSOLUTE: 0.1 THOU/MM3 (ref 0–0.1)
BILIRUB SERPL-MCNC: 0.5 MG/DL (ref 0.3–1.2)
BUN BLDV-MCNC: 19 MG/DL (ref 7–22)
CALCIUM SERPL-MCNC: 8.8 MG/DL (ref 8.5–10.5)
CHLORIDE BLD-SCNC: 104 MEQ/L (ref 98–111)
CO2: 29 MEQ/L (ref 23–33)
CREAT SERPL-MCNC: 1 MG/DL (ref 0.4–1.2)
D-DIMER QUANTITATIVE: 426 NG/ML FEU (ref 0–500)
EKG ATRIAL RATE: 66 BPM
EKG P AXIS: 59 DEGREES
EKG P-R INTERVAL: 132 MS
EKG Q-T INTERVAL: 506 MS
EKG QRS DURATION: 126 MS
EKG QTC CALCULATION (BAZETT): 530 MS
EKG R AXIS: -34 DEGREES
EKG T AXIS: 78 DEGREES
EKG VENTRICULAR RATE: 66 BPM
EOSINOPHIL # BLD: 4.9 %
EOSINOPHILS ABSOLUTE: 0.4 THOU/MM3 (ref 0–0.4)
ERYTHROCYTE [DISTWIDTH] IN BLOOD BY AUTOMATED COUNT: 13.1 % (ref 11.5–14.5)
ERYTHROCYTE [DISTWIDTH] IN BLOOD BY AUTOMATED COUNT: 48.9 FL (ref 35–45)
GFR SERPL CREATININE-BSD FRML MDRD: 72 ML/MIN/1.73M2
GLUCOSE BLD-MCNC: 106 MG/DL (ref 70–108)
HCT VFR BLD CALC: 40.6 % (ref 42–52)
HEMOGLOBIN: 13.2 GM/DL (ref 14–18)
IMMATURE GRANS (ABS): 0.02 THOU/MM3 (ref 0–0.07)
IMMATURE GRANULOCYTES: 0.3 %
INR BLD: 2.01 (ref 0.85–1.13)
LYMPHOCYTES # BLD: 10.3 %
LYMPHOCYTES ABSOLUTE: 0.8 THOU/MM3 (ref 1–4.8)
MCH RBC QN AUTO: 32.8 PG (ref 26–33)
MCHC RBC AUTO-ENTMCNC: 32.5 GM/DL (ref 32.2–35.5)
MCV RBC AUTO: 100.7 FL (ref 80–94)
MONOCYTES # BLD: 9 %
MONOCYTES ABSOLUTE: 0.7 THOU/MM3 (ref 0.4–1.3)
NUCLEATED RED BLOOD CELLS: 0 /100 WBC
OSMOLALITY CALCULATION: 283.9 MOSMOL/KG (ref 275–300)
PLATELET # BLD: 265 THOU/MM3 (ref 130–400)
PMV BLD AUTO: 10 FL (ref 9.4–12.4)
POTASSIUM SERPL-SCNC: 3.3 MEQ/L (ref 3.5–5.2)
PRO-BNP: 118 PG/ML (ref 0–1800)
RBC # BLD: 4.03 MILL/MM3 (ref 4.7–6.1)
SEG NEUTROPHILS: 74.5 %
SEGMENTED NEUTROPHILS ABSOLUTE COUNT: 5.9 THOU/MM3 (ref 1.8–7.7)
SODIUM BLD-SCNC: 141 MEQ/L (ref 135–145)
TOTAL PROTEIN: 6 G/DL (ref 6.1–8)
WBC # BLD: 7.9 THOU/MM3 (ref 4.8–10.8)

## 2021-02-22 PROCEDURE — 96374 THER/PROPH/DIAG INJ IV PUSH: CPT

## 2021-02-22 PROCEDURE — 6360000002 HC RX W HCPCS: Performed by: STUDENT IN AN ORGANIZED HEALTH CARE EDUCATION/TRAINING PROGRAM

## 2021-02-22 PROCEDURE — 85730 THROMBOPLASTIN TIME PARTIAL: CPT

## 2021-02-22 PROCEDURE — 93010 ELECTROCARDIOGRAM REPORT: CPT | Performed by: NUCLEAR MEDICINE

## 2021-02-22 PROCEDURE — 85379 FIBRIN DEGRADATION QUANT: CPT

## 2021-02-22 PROCEDURE — 99284 EMERGENCY DEPT VISIT MOD MDM: CPT

## 2021-02-22 PROCEDURE — 83880 ASSAY OF NATRIURETIC PEPTIDE: CPT

## 2021-02-22 PROCEDURE — 93970 EXTREMITY STUDY: CPT

## 2021-02-22 PROCEDURE — 85025 COMPLETE CBC W/AUTO DIFF WBC: CPT

## 2021-02-22 PROCEDURE — 36415 COLL VENOUS BLD VENIPUNCTURE: CPT

## 2021-02-22 PROCEDURE — 80053 COMPREHEN METABOLIC PANEL: CPT

## 2021-02-22 PROCEDURE — 93005 ELECTROCARDIOGRAM TRACING: CPT | Performed by: EMERGENCY MEDICINE

## 2021-02-22 PROCEDURE — 71046 X-RAY EXAM CHEST 2 VIEWS: CPT

## 2021-02-22 PROCEDURE — 6370000000 HC RX 637 (ALT 250 FOR IP): Performed by: STUDENT IN AN ORGANIZED HEALTH CARE EDUCATION/TRAINING PROGRAM

## 2021-02-22 PROCEDURE — 85610 PROTHROMBIN TIME: CPT

## 2021-02-22 RX ORDER — PREDNISONE 20 MG/1
20 TABLET ORAL DAILY
Qty: 5 TABLET | Refills: 0 | Status: SHIPPED | OUTPATIENT
Start: 2021-02-22 | End: 2021-02-27

## 2021-02-22 RX ORDER — POTASSIUM CHLORIDE 20 MEQ/1
20 TABLET, EXTENDED RELEASE ORAL ONCE
Status: COMPLETED | OUTPATIENT
Start: 2021-02-22 | End: 2021-02-22

## 2021-02-22 RX ORDER — METHYLPREDNISOLONE SODIUM SUCCINATE 125 MG/2ML
125 INJECTION, POWDER, LYOPHILIZED, FOR SOLUTION INTRAMUSCULAR; INTRAVENOUS ONCE
Status: COMPLETED | OUTPATIENT
Start: 2021-02-22 | End: 2021-02-22

## 2021-02-22 RX ADMIN — METHYLPREDNISOLONE SODIUM SUCCINATE 125 MG: 125 INJECTION, POWDER, FOR SOLUTION INTRAMUSCULAR; INTRAVENOUS at 14:22

## 2021-02-22 RX ADMIN — POTASSIUM CHLORIDE 20 MEQ: 1500 TABLET, EXTENDED RELEASE ORAL at 14:22

## 2021-02-22 ASSESSMENT — ENCOUNTER SYMPTOMS
COUGH: 1
VOMITING: 0
DIARRHEA: 0
SHORTNESS OF BREATH: 1
NAUSEA: 0
ABDOMINAL PAIN: 0
COLOR CHANGE: 0

## 2021-02-22 NOTE — ED NOTES
Patient resting quietly in cot showing no signs of distress at this time. Medicated per MAR. Updated on POC. Will continue to monitor.       Surekha Esparza RN  02/22/21 4873

## 2021-02-22 NOTE — ACP (ADVANCE CARE PLANNING)
Advance Care Planning     Advance Care Planning Activator (Inpatient)  Conversation Note      Date of ACP Conversation: 2/22/2021    Conversation Conducted with: Patient with Decision Making Capacity    ACP Activator: Esthela Guaman, RN, BSN, [address] [facility] Maker:     Current Designated Health Care Decision Maker:     Primary Decision Maker: Gato Martinez - [phone]    Secondary Decision Maker: Elzbieta Bland - [phone]    Supplemental (Other) Decision Maker: Ivet Prado - [phone]    Today we documented Decision Maker(s) consistent with ACP documents on file. Care Preferences    Ventilation: \"If you were in your present state of health and suddenly became very ill and were unable to breathe on your own, what would your preference be about the use of a ventilator (breathing machine) if it were available to you? \"      Would the patient desire the use of ventilator (breathing machine)?: yes    \"If your health worsens and it becomes clear that your chance of recovery is unlikely, what would your preference be about the use of a ventilator (breathing machine) if it were available to you? \"     Would the patient desire the use of ventilator (breathing machine)?: No      Resuscitation  \"CPR works best to restart the heart when there is a sudden event, like a heart attack, in someone who is otherwise healthy. Unfortunately, CPR does not typically restart the heart for people who have serious health conditions or who are very sick. \"    \"In the event your heart stopped as a result of an underlying serious health condition, would you want attempts to be made to restart your heart (answer \"yes\" for attempt to resuscitate) or would you prefer a natural death (answer \"no\" for do not attempt to resuscitate)? \" yes       [x] Yes   [] No   Educated Patient / Olu Roberts regarding differences between Advance Directives and portable DNR orders.     Length of ACP Conversation in minutes:  20  Conversation Outcomes:  [x] ACP discussion completed  [x] Existing advance directive reviewed with patient; no changes to patient's previously recorded wishes  [] New Advance Directive completed  [] Portable Do Not Rescitate prepared for Provider review and signature  [] POLST/POST/MOLST/MOST prepared for Provider review and signature      Follow-up plan:    [] Schedule follow-up conversation to continue planning  [] Referred individual to Provider for additional questions/concerns   [] Advised patient/agent/surrogate to review completed ACP document and update if needed with changes in condition, patient preferences or care setting    [] This note routed to one or more involved healthcare providers     Previous Covid (-). Patient's wife at beside in ED. No changes to HCPOA or LW desired. He wishes to remain Full Code. No consults this date. Added daughter from documents to Epic contacts.

## 2021-02-22 NOTE — ED NOTES
Patient resting quietly in cot showing no signs of distress at this time. Denies any pain. Updated on POC. Will continue to monitor.       Clifton Rowan RN  02/22/21 5856

## 2021-02-22 NOTE — ED NOTES
Patient presents to ED for bilateral leg swelling since this morning and increased SOB since last night. Denies any pain. Shows no signs of distress. Skin warm and dry. Respirations easy and unlabored.       Jean-Claude Kramer RN  02/22/21 0251

## 2021-02-22 NOTE — ED PROVIDER NOTES
[facility] ENCOUNTER          Pt Name: Roderick Styles  MRN: [id number]  [place] 1945  Date of evaluation: 2/22/2021  Treating Resident Physician: Monique Soto MD  Supervising Physician: Gage Cadena, [address] [place] [address]       Chief Complaint   Patient presents with    Leg Swelling    Shortness of Breath     History obtained from the patient. HISTORY OF PRESENT ILLNESS    Roderick Styles is a 66 y.o. male who presents to the emergency department for evaluation of lower extremity swelling and SOB. The patient reports that he noticed right lower extremity swelling about 2 days ago and then noticed his left leg had some swelling yesterday. The patient also reports SOB with exertion for the past 2 days. States that he has a history of asthma/COPD and has been using his inhalers and nebulizer treatments with no relief. He denies any orthopnea, PND, or chest pain. He does have a history of CHARIS and is compliant with his CPAP machine nightly. He also is [address] [place] [address] for a history of DVT and a saddle embolus. Patient concerned as the lower extremity swelling is what led to his DVT in the past. He was also COVID-19 positive approximately 1 month ago. The patient has no other acute complaints at this time. REVIEW OF SYSTEMS   Review of Systems   Constitutional: Negative for chills and fever. Respiratory: Positive for cough and shortness of breath. Cardiovascular: Negative for chest pain and palpitations. Gastrointestinal: Negative for abdominal pain, diarrhea, nausea and vomiting. Skin: Negative for color change and rash. Neurological: Negative for light-headedness and headaches.      PAST MEDICAL AND SURGICAL HISTORY     Past Medical History:   Diagnosis Date    Arthritis     Asthma     Cancer ([facility] [address]     skin    Chickenpox     Hyperlipidemia     Hypertension     Measles     Migraines     Mumps     Sleep apnea      Past Surgical History: Procedure Laterality Date    COLONOSCOPY      CORONARY ANGIOPLASTY N/A 2017/2018    Dr. Beth Gee Left 09/2020    Elberfeld Quiet Left 11/20/2019    PAROTIDECTOMY, LEFT performed by Nasario Lombard, MD at [address] [place] [address]    No current facility-administered medications for this encounter.      Current Outpatient Medications:     predniSONE (DELTASONE) 20 MG tablet, Take 1 tablet by mouth daily for 5 days, Disp: 5 tablet, Rfl: 0    guaiFENesin (ROBITUSSIN) 100 MG/5ML SOLN oral solution, Take 10 mLs by mouth every 4 hours as needed for Cough, Disp: 1200 mL, Rfl: 0    zinc sulfate (ZINCATE) 220 (50 Zn) MG capsule, Take 1 capsule by mouth daily (Patient not taking: Reported on 1/20/2021), Disp: 30 capsule, Rfl: 0    ascorbic acid (VITAMIN C) 1000 MG tablet, Take 1 tablet by mouth daily, Disp: 30 tablet, Rfl: 0    Vitamin D (CHOLECALCIFEROL) 50 MCG (2000 UT) TABS tablet, Take 1 tablet by mouth daily, Disp: 30 tablet, Rfl: 0    fluticasone (FLONASE) 50 MCG/ACT nasal spray, USE 1 SPRAY NASALLY DAILY AS NEEDED FOR RHINITIS, Disp: 48 g, Rfl: 5    budesonide-formoterol (SYMBICORT) 160-4.5 MCG/ACT AERO, Inhale 2 puffs into the lungs 2 times daily, Disp: 1 Inhaler, Rfl: 0    tiotropium (SPIRIVA RESPIMAT) 2.5 MCG/ACT AERS inhaler, Inhale 2 puffs into the lungs daily, Disp: 1 Inhaler, Rfl: 1    cetirizine (ZYRTEC) 10 MG tablet, Take 1 tablet by mouth daily (Patient not taking: Reported on 1/20/2021), Disp: 30 tablet, Rfl: 0    hydroCHLOROthiazide (HYDRODIURIL) 25 MG tablet, Take 1 tablet by mouth daily, Disp: 30 tablet, Rfl: 0    rivaroxaban (XARELTO) 20 MG TABS tablet, Take 1 tablet by mouth daily (with breakfast), Disp: 90 tablet, Rfl: 3    Dextromethorphan-guaiFENesin (MUCINEX DM)  MG TB12, Take 1 tablet by mouth 2 times daily, Disp: 28 tablet, Rfl: 0    vitamin B-12 1000 MCG tablet, Take 1 tablet by mouth daily, Disp: 30 tablet, Rfl: 3    albuterol (PROVENTIL) (2.5 MG/3ML) 0.083% nebulizer solution, inhale contents of 1 vial in nebulizer every 6 hours if needed for wheezing shortness of breath, Disp: 375 mL, Rfl: 5    docusate sodium (COLACE) 100 MG capsule, Take 100 mg by mouth daily, Disp: , Rfl:     CPAP Machine MISC, by Does not apply route Please change CPAP pressure to 13 cm H20. Download 2 weeks, Disp: 1 each, Rfl: 0    albuterol sulfate  (90 Base) MCG/ACT inhaler, Inhale 2 puffs into the lungs every 6 hours as needed for Wheezing, Disp: , Rfl:     omeprazole (PRILOSEC) 20 MG delayed release capsule, Take 20 mg by mouth daily, Disp: , Rfl:     pravastatin (PRAVACHOL) 20 MG tablet, Take 20 mg by mouth daily, Disp: , Rfl:     verapamil (VERELAN PM) 240 MG CR capsule, Take 240 mg by mouth every morning. Indications: Per patient taking medication for migraines prevention. , Disp: , Rfl:       SOCIAL HISTORY     Social History     Social History Narrative    Not on file     Social History     Tobacco Use    Smoking status: Never Smoker    Smokeless tobacco: Never Used   Substance Use Topics    Alcohol use: No     Alcohol/week: 0.0 standard drinks    Drug use: No         ALLERGIES     Allergies   Allergen Reactions    Molds & Smuts Other (See Comments)     congestion    Sulfa Antibiotics Other (See Comments)     congestion         FAMILY HISTORY     Family History   Problem Relation Age of Onset    Diabetes Mother     COPD Mother     Cancer Father     Deep Vein Thrombosis Sister     Crohn's Disease Sister          PREVIOUS RECORDS   Previous records reviewed: Reviewed. PHYSICAL EXAM     ED Triage Vitals [02/22/21 1320]   BP Temp Temp Source Pulse Resp SpO2 Height Weight   138/69 98.1 °F (36.7 °C) Oral 70 16 95 % 6' 2\" (1.88 m) 294 lb (133.4 kg)     Initial vital signs and nursing assessment reviewed and normal. Body mass index is 37.75 kg/m².  Pulsoximetry is normal per my interpretation. Additional Vital Signs:  Vitals:    02/22/21 1558   BP: 128/68   Pulse: 64   Resp: 16   Temp:    SpO2: 94%       Physical Exam  Constitutional:       Appearance: He is obese. HENT:      Head: Normocephalic and atraumatic. Mouth/Throat:      Mouth: Mucous membranes are moist.      Pharynx: Oropharynx is clear. Eyes:      Extraocular Movements: Extraocular movements intact. Pupils: Pupils are equal, round, and reactive to light. Cardiovascular:      Rate and Rhythm: Normal rate and regular rhythm. Pulmonary:      Effort: Pulmonary effort is normal. No respiratory distress. Breath sounds: Examination of the right-upper field reveals wheezing. Examination of the left-upper field reveals wheezing. Examination of the right-middle field reveals wheezing. Examination of the left-middle field reveals wheezing. Wheezing present. Musculoskeletal:      Right lower leg: Edema present. Left lower leg: Edema present. Skin:     General: Skin is warm and dry. Neurological:      Mental Status: He is oriented to person, place, and time. Psychiatric:         Mood and Affect: Mood normal.         Behavior: Behavior normal.             MEDICAL DECISION MAKING   Initial Assessment: 66year old male presents to the ED with complaints of shortness of breath x 2 days and bilateral lower extremity swelling.      Differentials include: CHF exacerbation, COPD/asthma exacerbation, pneumonia, PE    Plan:   - CBC, CMP, BNP, D-Dimer  - VL Doppler Venous Bilaterally   - CXR  - Consider CTA if D-Dimer is elevated    ED RESULTS   Laboratory results:  Labs Reviewed   APTT - Abnormal; Notable for the following components:       Result Value    aPTT 43.7 (*)     All other components within normal limits   PROTIME-INR - Abnormal; Notable for the following components:    INR 2.01 (*)     All other components within normal limits   CBC WITH AUTO DIFFERENTIAL - Abnormal; Notable for the following components:    RBC 4.03 (*)     Hemoglobin 13.2 (*)     Hematocrit 40.6 (*)     .7 (*)     RDW-SD 48.9 (*)     Lymphocytes Absolute 0.8 (*)     All other components within normal limits   COMPREHENSIVE METABOLIC PANEL - Abnormal; Notable for the following components:    Potassium 3.3 (*)     Total Protein 6.0 (*)     ALT 7 (*)     All other components within normal limits   GLOMERULAR FILTRATION RATE, ESTIMATED - Abnormal; Notable for the following components:    Est, Glom Filt Rate 72 (*)     All other components within normal limits   BRAIN NATRIURETIC PEPTIDE   ANION GAP   OSMOLALITY   D-DIMER, QUANTITATIVE       Radiologic studies results:  VL DUP LOWER EXTREMITY VENOUS BILATERAL   Final Result   No evidence of a DVT in the bilateral lower extremities. **This report has been created using voice recognition software. It may contain minor errors which are inherent in voice recognition technology. **      Final report electronically signed by Dr. Krystle Menon on 2/22/2021 3:17 PM      XR CHEST (2 VW)   Final Result   Minimal bibasilar atelectasis. No acute cardiopulmonary disease otherwise. **This report has been created using voice recognition software. It may contain minor errors which are inherent in voice recognition technology. **      Final report electronically signed by Dr. Sanju Siu on 2/22/2021 1:57 PM          ED Medications administered this visit:   Medications   methylPREDNISolone sodium (SOLU-MEDROL) injection 125 mg (125 mg Intravenous Given 2/22/21 1422)   potassium chloride (KLOR-CON M) extended release tablet 20 mEq (20 mEq Oral Given 2/22/21 1422)         ED COURSE        Patient likely has SOB secondary to asthma/COPD exacerbation. On exam, he did have bilateral wheezes. He was given SoluMedrol to help his breathing. Blood work unremarkable, D-Dimer wnl. K+ was noted to be 3.3, replaced with Klor-Con in the ED.  He remained hemodynamically stable while in the ED.     Patient was given a script for Prednisone to help with his exacerbations. Return to the ED if symptoms worsen. Strict return precautions and follow up instructions were discussed with the patient prior to discharge, with which the patient agrees. MEDICATION CHANGES     Discharge Medication List as of 2/22/2021  5:22 PM      START taking these medications    Details   predniSONE (DELTASONE) 20 MG tablet Take 1 tablet by mouth daily for 5 days, Disp-5 tablet, R-0Print               FINAL DISPOSITION     Final diagnoses:   COPD with acute exacerbation (HCC)   History of DVT (deep vein thrombosis)     Condition: condition: fair  Dispo: Discharge to home      This transcription was electronically signed. Parts of this transcriptions may have been dictated by use of voice recognition software and electronically transcribed, and parts may have been transcribed with the assistance of an ED scribe. The transcription may contain errors not detected in proofreading. Please refer to my supervising physician's documentation if my documentation differs.     Electronically Signed: Monique Soto, 02/22/21, 6:49 PM       Monique Soto MD  Resident  02/22/21 0127

## 2021-03-01 ENCOUNTER — TELEPHONE (OUTPATIENT)
Dept: PULMONOLOGY | Age: 76
End: 2021-03-01

## 2021-03-01 NOTE — TELEPHONE ENCOUNTER
[facility] [phone] (ref # [id number] and states they have patient on Dulera and Symbicort. Is he to be on both if not which one.

## 2021-03-02 NOTE — TELEPHONE ENCOUNTER
His family doctor can call [facility] to clarify the mess since they are the ones that decided to change things

## 2021-03-02 NOTE — TELEPHONE ENCOUNTER
Dr Juan Carlton told patient that he thought his problem is more asthma then COPD so he had patient D/C Symbicort and told him that Hank Caldwell is stronger so he prescribed it.

## 2021-03-03 ENCOUNTER — TELEPHONE (OUTPATIENT)
Dept: CARDIAC REHAB | Age: 76
End: 2021-03-03

## 2021-03-03 NOTE — TELEPHONE ENCOUNTER
PULMONARY REHAB: PATIENT NOT ENROLLED       Patient Name: Puma Kumar   Patient YOB: 1945      Referring Provider: Joe Alcocer  Diagnosis: Moderate Persistent Asthma, Disorder of Diaphragm, Chronic Bronchitis  Date Patient Referred: 12/30/2020      Thank you for referring your patient to our Pulmonary Rehab program. At this time, the referred patient is currently not enrolled in our program for the following reason(s):    []  Patient is not interested    []  PFT does not qualify patient for Pulmonary Rehab/ RTR     []  Attempted to call patient, call has not been returned    []  Insurance Issue(s) (i.e. lack of coverage, copay, etc)      Note: Spoke with patient beginning on January, patient stated he was going to [place] and he would call us back. No further update from patient. Thank you for your continued support of our Pulmonary Rehab program. If the patient would like to attend rehab in the future, please send a new referral. Please call us at [phone] if you have any questions or concerns.       Electronically signed by Dionisio Macedo on 3/3/2021 at 3:31 PM     Pulmonary Rehab Staff Signature

## 2021-11-08 ENCOUNTER — OFFICE VISIT (OUTPATIENT)
Dept: CARDIOLOGY CLINIC | Age: 76
End: 2021-11-08
Payer: MEDICARE

## 2021-11-08 VITALS
DIASTOLIC BLOOD PRESSURE: 64 MMHG | HEART RATE: 60 BPM | WEIGHT: 285 LBS | BODY MASS INDEX: 36.57 KG/M2 | SYSTOLIC BLOOD PRESSURE: 132 MMHG | HEIGHT: 74 IN

## 2021-11-08 DIAGNOSIS — I26.92 ACUTE SADDLE PULMONARY EMBOLISM WITHOUT ACUTE COR PULMONALE (HCC): ICD-10-CM

## 2021-11-08 DIAGNOSIS — I25.10 CORONARY ARTERY DISEASE INVOLVING NATIVE CORONARY ARTERY OF NATIVE HEART WITHOUT ANGINA PECTORIS: Primary | ICD-10-CM

## 2021-11-08 PROCEDURE — 99213 OFFICE O/P EST LOW 20 MIN: CPT | Performed by: NUCLEAR MEDICINE

## 2021-11-08 RX ORDER — MONTELUKAST SODIUM 10 MG/1
10 TABLET ORAL NIGHTLY
COMMUNITY

## 2021-11-08 NOTE — PROGRESS NOTES
[address] [place] [address] [address]  [address]  [facility] [address]  Dept: [phone]  Dept Fax: [phone]  Loc: [phone]    Visit Date: 11/8/2021    Hank Luna is a 64 y.o. male who presents todayfor:  Chief Complaint   Patient presents with    1 Year Follow Up    Coronary Artery Disease    Hypertension    Hyperlipidemia     Here for follow up   Known CAD before  Know mild CAD  No chest pain   No changes in breathing  Know DVt and PE  On medical RX  BP is stable   No dizziness  Does have COPD   Recurrent admissions  Admitted for COVID in January     HPI:  HPI  Past Medical History:   Diagnosis Date    Arthritis     Asthma     Cancer ([facility] [address]     skin    Chickenpox     Hyperlipidemia     Hypertension     Measles     Migraines     Mumps     Sleep apnea       Past Surgical History:   Procedure Laterality Date    COLONOSCOPY      CORONARY ANGIOPLASTY N/A 2017/2018    Dr. Yu Garcia Left 09/2020    EYE SURGERY      PAROTIDECTOMY Left 11/20/2019    PAROTIDECTOMY, LEFT performed by Adelina Hand MD at [address]  1990     Family History   Problem Relation Age of Onset    Diabetes Mother     COPD Mother     Cancer Father     Deep Vein Thrombosis Sister     Crohn's Disease Sister      Social History     Tobacco Use    Smoking status: Never Smoker    Smokeless tobacco: Never Used   Substance Use Topics    Alcohol use: No     Alcohol/week: 0.0 standard drinks      Current Outpatient Medications   Medication Sig Dispense Refill    Apoaequorin (PREVAGEN) 10 MG CAPS Take by mouth      montelukast (SINGULAIR) 10 MG tablet Take 10 mg by mouth nightly      mometasone-formoterol (DULERA) 200-5 MCG/ACT inhaler Inhale 2 puffs into the lungs 2 times daily      ascorbic acid (VITAMIN C) 1000 MG tablet Take 1 tablet by mouth daily 30 tablet 0    budesonide-formoterol (SYMBICORT) 160-4.5 MCG/ACT AERO Inhale 2 puffs into the lungs 2 times daily 1 Inhaler 0    tiotropium (SPIRIVA RESPIMAT) 2.5 MCG/ACT AERS inhaler Inhale 2 puffs into the lungs daily 1 Inhaler 1    hydroCHLOROthiazide (HYDRODIURIL) 25 MG tablet Take 1 tablet by mouth daily 30 tablet 0    rivaroxaban (XARELTO) 20 MG TABS tablet Take 1 tablet by mouth daily (with breakfast) 90 tablet 3    Dextromethorphan-guaiFENesin (MUCINEX DM)  MG TB12 Take 1 tablet by mouth 2 times daily 28 tablet 0    vitamin B-12 1000 MCG tablet Take 1 tablet by mouth daily 30 tablet 3    albuterol (PROVENTIL) (2.5 MG/3ML) 0.083% nebulizer solution inhale contents of 1 vial in nebulizer every 6 hours if needed for wheezing shortness of breath 375 mL 5    docusate sodium (COLACE) 100 MG capsule Take 100 mg by mouth daily      CPAP Machine MISC by Does not apply route Please change CPAP pressure to 13 cm H20. Download 2 weeks 1 each 0    albuterol sulfate  (90 Base) MCG/ACT inhaler Inhale 2 puffs into the lungs every 6 hours as needed for Wheezing      omeprazole (PRILOSEC) 20 MG delayed release capsule Take 20 mg by mouth daily      pravastatin (PRAVACHOL) 20 MG tablet Take 20 mg by mouth daily      verapamil (VERELAN PM) 240 MG CR capsule Take 240 mg by mouth every morning. Indications: Per patient taking medication for migraines prevention. No current facility-administered medications for this visit.      Allergies   Allergen Reactions    Molds & Smuts Other (See Comments)     congestion    Sulfa Antibiotics Other (See Comments)     congestion     Health Maintenance   Topic Date Due    Hepatitis C screen  Never done    COVID-19 Vaccine (1) Never done    Shingles Vaccine (2 of 3) 11/05/2014    Annual Wellness Visit (AWV)  Never done    Lipid screen  05/27/2021    Flu vaccine (1) 09/01/2021    Potassium monitoring  02/22/2022    Creatinine monitoring  02/22/2022    DTaP/Tdap/Td vaccine (2 - Td or Tdap) 10/23/2031    Pneumococcal 65+ years Vaccine  Completed    Hepatitis A vaccine  Aged Out    Hepatitis B vaccine  Aged Out    Hib vaccine  Aged Out    Meningococcal (ACWY) vaccine  Aged Out       Subjective:  Review of Systems  General:   No fever, no chills, No fatigue or weight loss  Pulmonary:    No dyspnea, no wheezing  Cardiac:    Denies recent chest pain,   GI:     No nausea or vomiting, no abdominal pain  Neuro:    No dizziness or light headedness,   Musculoskeletal:  No recent active issues  Extremities:   No edema, no obvious claudication       Objective:  Physical Exam  /64   Pulse 60   Ht 6' 2\" (1.88 m)   Wt 285 lb (129.3 kg)   BMI 36.59 kg/m²   General:   Well developed, well nourished  Lungs:   Clear to auscultation  Heart:    Normal S1 S2, Slight murmur. no rubs, no gallops  Abdomen:   Soft, non tender, no organomegalies, positive bowel sounds  Extremities:   No edema, no cyanosis, good peripheral pulses  Neurological:   Awake, alert, oriented. No obvious focal deficits  Musculoskelatal:  No obvious deformities    Assessment:      Diagnosis Orders   1. Coronary artery disease involving native coronary artery of native heart without angina pectoris     2. Acute saddle pulmonary embolism without acute cor pulmonale (HCC)     as above  Cardiac fair for now      Plan:  No follow-ups on file. As above  Cardiac fair for now   Consider a follow up stress test   Continue risk factor modification and medical management  Thank you for allowing me to participate in the care of your patient. Please don't hesitate to contact me regarding any further issues related to the patient care    Orders Placed:  No orders of the defined types were placed in this encounter. Medications Prescribed:  No orders of the defined types were placed in this encounter. Discussed use, benefit, and side effects of prescribed medications. All patient questions answered. Pt voicedunderstanding.  Instructed to continue current medications, diet and exercise. Continue risk factor modification and medical management. Patient agreed with treatment plan. Follow up as directed.     Electronically signedby Mane Mora MD on 11/8/2021 at 10:16 AM

## 2021-12-06 ENCOUNTER — PATIENT MESSAGE (OUTPATIENT)
Dept: RESEARCH | Facility: HOSPITAL | Age: 76
End: 2021-12-06
Payer: MEDICARE

## 2021-12-16 ENCOUNTER — OFFICE VISIT (OUTPATIENT)
Dept: PULMONOLOGY | Age: 76
End: 2021-12-16
Payer: MEDICARE

## 2021-12-16 VITALS
DIASTOLIC BLOOD PRESSURE: 82 MMHG | SYSTOLIC BLOOD PRESSURE: 128 MMHG | OXYGEN SATURATION: 98 % | HEART RATE: 60 BPM | TEMPERATURE: 97.8 F | BODY MASS INDEX: 36.7 KG/M2 | HEIGHT: 74 IN | WEIGHT: 286 LBS

## 2021-12-16 DIAGNOSIS — Z99.89 OSA ON CPAP: Primary | ICD-10-CM

## 2021-12-16 DIAGNOSIS — E66.9 OBESITY (BMI 30-39.9): ICD-10-CM

## 2021-12-16 DIAGNOSIS — E66.01 SEVERE OBESITY (BMI 35.0-35.9 WITH COMORBIDITY) (HCC): ICD-10-CM

## 2021-12-16 DIAGNOSIS — G47.33 OSA ON CPAP: Primary | ICD-10-CM

## 2021-12-16 PROCEDURE — 99213 OFFICE O/P EST LOW 20 MIN: CPT | Performed by: PHYSICIAN ASSISTANT

## 2021-12-16 ASSESSMENT — ENCOUNTER SYMPTOMS
DIARRHEA: 0
EYES NEGATIVE: 1
WHEEZING: 0
SHORTNESS OF BREATH: 1
NAUSEA: 0
STRIDOR: 0
ALLERGIC/IMMUNOLOGIC NEGATIVE: 1
CHEST TIGHTNESS: 0
COUGH: 0
BACK PAIN: 0

## 2021-12-16 NOTE — PROGRESS NOTES
[facility]      Wagner Zazueta         [id number]  12/16/2021   Chief Complaint   Patient presents with    1 Year Follow Up     CHARIS with download         Pt of Dr. Feroz Pratt    PAP Download:   Jimmie Carter or initial AHI: 25.4     Date of initial study: 2013      Compliant  100%     Noncompliant 0 %     PAP Type Autop CPAP Level  13   Avg Hrs/Day 9 hr 30 min  AHI: 2.5   Recorded compliance dates , 11/13/2021  to 12/12/2021   Machine/Mfg:   [] ResMed    [x] Respironics/Dreamstation   Interface:   [] Nasal    [] Nasal pillows   [x] FFM      Provider:      [] [facility]     [][facility]     [x] [employer]    [] [employer]    [] [facility]               [] [facility]      [] Adaptive    [] [address]      [] Other    Neck Size: 17.5  Mallampati Mallampati 4  ESS:  4  SAQLI: 83    Here is a scan of the most recent download:          Presentation:   Kimberly Neely presents for sleep medicine follow up for obstructive sleep apnea  Since the last visit, Kimberly Neely is doing well with PAP. He is sleeping well and feels rested. Mask is fitting ok. Wife hears some mask leak. Equipment issues: The pressure is  acceptable, the mask is acceptable     Sleep issues:  Do you feel better? Yes  More rested? Yes   Better concentration? yes    Progress History:   Since last visit any new medical issues? No  New ER or hospital visits? No  Any new or changes in medicines? No  Any new sleep medicines? No    Review of Systems -   Review of Systems   Constitutional: Negative for activity change, appetite change, chills and fever. HENT: Negative for congestion and postnasal drip. Eyes: Negative. Respiratory: Positive for shortness of breath. Negative for cough, chest tightness, wheezing and stridor. Cardiovascular: Negative for chest pain and leg swelling. Gastrointestinal: Negative for diarrhea and nausea. Endocrine: Negative. Genitourinary: Negative. Musculoskeletal: Negative.   Negative for He  advised to keep good compliance with current recommended pressure to get optimal results and clinical improvement  - Recommend 7-9 hours of sleep with PAP  - He was advised to call DME company regarding supplies if needed.   -He call my office for earlier appointment if needed for worsening of sleep symptoms.   - He was instructed on weight loss  - Radha Middleton was educated about my impression and plan. Patient verbalizesunderstanding.   We will see Parish Alcocer back in: 1 year with download    Information added by my medical assistant/LPN was reviewed today         Telly Ohara PA-C, MPAS  12/16/2021

## 2022-02-24 RX ORDER — FLUTICASONE PROPIONATE 50 MCG
SPRAY, SUSPENSION (ML) NASAL
Qty: 48 G | Refills: 2 | OUTPATIENT
Start: 2022-02-24

## 2022-02-24 NOTE — TELEPHONE ENCOUNTER
Called patient and he said to disregard this prescription request as he has tons of this nasal spray if he needs it. He also stated he does not wish to follow up for pulmonary as he is seeing a doctor in [address]

## 2022-03-07 RX ORDER — RIVAROXABAN 20 MG/1
TABLET, FILM COATED ORAL
Qty: 90 TABLET | Refills: 3 | OUTPATIENT
Start: 2022-03-07

## 2022-04-22 NOTE — TELEPHONE ENCOUNTER
Joe Alcocer called requesting a refill on the following medications:  Requested Prescriptions     Pending Prescriptions Disp Refills    rivaroxaban (XARELTO) 20 MG TABS tablet 90 tablet 0     Pharmacy verified: [facility] in [place], [place]  . pv      Date of last visit: 11/17/2020  Date of next visit (if applicable): 3/7/2339    Pt is requesting a 30 day supply until his appt 5/09/2022

## 2022-05-09 ENCOUNTER — OFFICE VISIT (OUTPATIENT)
Dept: PULMONOLOGY | Age: 77
End: 2022-05-09
Payer: MEDICARE

## 2022-05-09 VITALS
SYSTOLIC BLOOD PRESSURE: 120 MMHG | WEIGHT: 286 LBS | HEART RATE: 65 BPM | BODY MASS INDEX: 36.7 KG/M2 | HEIGHT: 74 IN | OXYGEN SATURATION: 94 % | TEMPERATURE: 97.1 F | DIASTOLIC BLOOD PRESSURE: 64 MMHG

## 2022-05-09 DIAGNOSIS — Z86.718 HISTORY OF DVT IN ADULTHOOD: ICD-10-CM

## 2022-05-09 DIAGNOSIS — J98.6 DISORDER OF DIAPHRAGM: ICD-10-CM

## 2022-05-09 DIAGNOSIS — J42 CHRONIC BRONCHITIS, UNSPECIFIED CHRONIC BRONCHITIS TYPE (HCC): ICD-10-CM

## 2022-05-09 DIAGNOSIS — Z86.711 HISTORY OF PULMONARY EMBOLISM: ICD-10-CM

## 2022-05-09 DIAGNOSIS — E66.9 OBESITY (BMI 30-39.9): ICD-10-CM

## 2022-05-09 DIAGNOSIS — G47.33 OSA ON CPAP: ICD-10-CM

## 2022-05-09 DIAGNOSIS — Z99.89 OSA ON CPAP: ICD-10-CM

## 2022-05-09 DIAGNOSIS — J45.41 MODERATE PERSISTENT ASTHMA WITH EXACERBATION: Primary | ICD-10-CM

## 2022-05-09 PROCEDURE — 99214 OFFICE O/P EST MOD 30 MIN: CPT | Performed by: NURSE PRACTITIONER

## 2022-05-09 ASSESSMENT — ENCOUNTER SYMPTOMS
DIARRHEA: 0
VOMITING: 0
SHORTNESS OF BREATH: 1
NAUSEA: 0
COUGH: 1
SINUS PRESSURE: 1
EYES NEGATIVE: 1
WHEEZING: 0
ABDOMINAL PAIN: 0

## 2022-05-09 NOTE — PROGRESS NOTES
[facility]     Patient: Dixie Estrada, 64 y.o.   : 1945    Pt of Dr. Aleman Farrell   Patient presents with    Follow-up     1yr ashtma and copd f/u        HPI  Audi Foster is here for 1 year follow up for COPD and Asthma   Just completed a 10 day course of Doxycycline for acute sinusitis. Little better but still \"nagging cough\"   Cough occurs during the day, no cough at night  On CPAP for CHARIS, tolerating well   Has moved to [facility], schedules his doctor visits when home to visit family  In Winter he goes to [place]. Had been feeling great in [place], came home about 1 months ago this was start of sinus symptoms   Seasonal allergies; thinks allergic to Pollen  Using allergy nose spray   Cough is sometimes productive , amt declined in past 2 days , \"tickle in throat\", mucous Is yellow   No headaches, no dizziness  CONTRERAS- stable   Using nebulizer several times this past month   Took some Delsym with benefit. Does not tolerate antihistamines, even non drowsy formulas make him sleepy   Current Inhalers:  symbicort 160/4.5 mcg , Spiriva   Follows with Dr Mary Kaplan MD ( allergy) on Singulair POI,     Seen  hematology for h/o DVT RLL requiring mechanical thrombo-aspiration  And h/o saddle PE recommended lifelong anticoags.   Currently on Xarelto 20 mg PO daily, our office manages refills  Denies any excessive/ prolonged bleeding   Denies dark/ tarry stools or blood in urine         SOCIAL HISTORY:  Social History     Tobacco Use    Smoking status: Never Smoker    Smokeless tobacco: Never Used   Vaping Use    Vaping Use: Never used   Substance Use Topics    Alcohol use: No     Alcohol/week: 0.0 standard drinks    Drug use: No         CURRENT MEDICATIONS:  Current Outpatient Medications   Medication Sig Dispense Refill    rivaroxaban (XARELTO) 20 MG TABS tablet TAKE 1 TABLET DAILY WITH BREAKFAST 90 tablet 3    Apoaequorin (PREVAGEN) 10 MG CAPS Take by mouth      montelukast (SINGULAIR) 10 MG tablet Take 10 mg by mouth nightly      mometasone-formoterol (DULERA) 200-5 MCG/ACT inhaler Inhale 2 puffs into the lungs 2 times daily      ascorbic acid (VITAMIN C) 1000 MG tablet Take 1 tablet by mouth daily 30 tablet 0    tiotropium (SPIRIVA RESPIMAT) 2.5 MCG/ACT AERS inhaler Inhale 2 puffs into the lungs daily 1 Inhaler 1    hydroCHLOROthiazide (HYDRODIURIL) 25 MG tablet Take 1 tablet by mouth daily 30 tablet 0    Dextromethorphan-guaiFENesin (MUCINEX DM)  MG TB12 Take 1 tablet by mouth 2 times daily 28 tablet 0    vitamin B-12 1000 MCG tablet Take 1 tablet by mouth daily 30 tablet 3    albuterol (PROVENTIL) (2.5 MG/3ML) 0.083% nebulizer solution inhale contents of 1 vial in nebulizer every 6 hours if needed for wheezing shortness of breath 375 mL 5    docusate sodium (COLACE) 100 MG capsule Take 100 mg by mouth daily      CPAP Machine MISC by Does not apply route Please change CPAP pressure to 13 cm H20. Download 2 weeks 1 each 0    albuterol sulfate  (90 Base) MCG/ACT inhaler Inhale 2 puffs into the lungs every 6 hours as needed for Wheezing      omeprazole (PRILOSEC) 20 MG delayed release capsule Take 20 mg by mouth daily      pravastatin (PRAVACHOL) 20 MG tablet Take 20 mg by mouth daily      verapamil (VERELAN PM) 240 MG CR capsule Take 240 mg by mouth every morning. Indications: Per patient taking medication for migraines prevention. No current facility-administered medications for this visit. Theodore RIDLEY   Review of Systems   Constitutional: Negative for activity change, appetite change, chills, fatigue, fever and unexpected weight change. HENT: Positive for sinus pressure. Eyes: Negative. Respiratory: Positive for cough and shortness of breath. Negative for wheezing. Cardiovascular: Positive for leg swelling (occasional , controlled with use of compression socks ).  Negative for chest pain and palpitations. Gastrointestinal: Negative for abdominal pain, diarrhea, nausea and vomiting. Genitourinary: Negative. Musculoskeletal: Negative. Skin: Negative. Allergic/Immunologic: Positive for environmental allergies. Neurological: Negative. Hematological: Negative. Psychiatric/Behavioral: Negative. Negative for sleep disturbance. Physical exam   /64 (Site: Right Upper Arm, Position: Sitting, Cuff Size: Medium Adult)   Pulse 65   Temp 97.1 °F (36.2 °C) (Temporal)   Ht 6' 2\" (1.88 m)   Wt 286 lb (129.7 kg)   SpO2 94% Comment: on ra  BMI 36.72 kg/m²      Wt Readings from Last 3 Encounters:   05/09/22 286 lb (129.7 kg)   12/16/21 286 lb (129.7 kg)   11/08/21 285 lb (129.3 kg)     Physical Exam  Vitals and nursing note reviewed. Constitutional:       General: He is not in acute distress. Appearance: He is well-developed and overweight. HENT:      Mouth/Throat:      Lips: Pink. Mouth: Mucous membranes are moist.      Pharynx: Oropharynx is clear. No oropharyngeal exudate or posterior oropharyngeal erythema. Eyes:      Conjunctiva/sclera: Conjunctivae normal.   Neck:      Vascular: No JVD. Cardiovascular:      Rate and Rhythm: Normal rate and regular rhythm. Heart sounds: No murmur heard. No friction rub. Pulmonary:      Effort: Pulmonary effort is normal. No accessory muscle usage or respiratory distress. Breath sounds: Normal breath sounds. No wheezing, rhonchi or rales. Chest:      Chest wall: No tenderness. Musculoskeletal:      Right lower leg: No edema. Left lower leg: No edema. Skin:     General: Skin is warm and dry. Capillary Refill: Capillary refill takes less than 2 seconds. Nails: There is no clubbing. Neurological:      Mental Status: He is alert and oriented to person, place, and time. Psychiatric:         Mood and Affect: Mood normal.         Behavior: Behavior normal.         Thought Content:  Thought content normal.         Judgment: Judgment normal.          Test results   Lung Nodule Screening     [] Qualifies    [x]Does not qualify   [] Declined    [] Completed     Assessment      Diagnosis Orders   1. Moderate persistent asthma with exacerbation     2. Chronic bronchitis, unspecified chronic bronchitis type (HCC)     3. Obesity (BMI 30-39.9)     4. Disorder of diaphragm     5. CHARIS on CPAP     6. History of pulmonary embolism     7.  History of DVT in adulthood       Plan    -continue current inhalers, call if needing refills   -continue Xarelto 20 mg PO daily - [facility]  -follows with allergist , they manage refills on Singulair and allergy shots  -2 samples of Delsym cough suppressant cough syrup bottles given   -may try OTC Sudafed, for congestion, caution that this may cause increase in blood pressure   -avoid ill contacts  -keep UTD on recommended vaccinations    Will see Huber Rubin in: 1 year  billing based on medical decision making   Electronically signed by YESY Rodriguez CNP on 5/9/2022 at 3:56 PM

## 2022-12-20 ENCOUNTER — HOSPITAL ENCOUNTER (OUTPATIENT)
Age: 77
Discharge: HOME | End: 2022-12-20
Payer: MEDICARE

## 2022-12-20 ENCOUNTER — OFFICE VISIT (OUTPATIENT)
Dept: PULMONOLOGY | Age: 77
End: 2022-12-20
Payer: MEDICARE

## 2022-12-20 ENCOUNTER — OFFICE VISIT (OUTPATIENT)
Dept: CARDIOLOGY CLINIC | Age: 77
End: 2022-12-20
Payer: MEDICARE

## 2022-12-20 ENCOUNTER — HOSPITAL ENCOUNTER (OUTPATIENT)
Dept: GENERAL RADIOLOGY | Age: 77
Discharge: HOME | End: 2022-12-20
Payer: MEDICARE

## 2022-12-20 VITALS
TEMPERATURE: 98.8 F | OXYGEN SATURATION: 95 % | HEIGHT: 74 IN | WEIGHT: 298.6 LBS | SYSTOLIC BLOOD PRESSURE: 108 MMHG | BODY MASS INDEX: 38.32 KG/M2 | HEART RATE: 50 BPM | DIASTOLIC BLOOD PRESSURE: 60 MMHG

## 2022-12-20 VITALS
SYSTOLIC BLOOD PRESSURE: 136 MMHG | DIASTOLIC BLOOD PRESSURE: 64 MMHG | WEIGHT: 298 LBS | BODY MASS INDEX: 38.24 KG/M2 | HEART RATE: 52 BPM | HEIGHT: 74 IN

## 2022-12-20 DIAGNOSIS — I10 PRIMARY HYPERTENSION: ICD-10-CM

## 2022-12-20 DIAGNOSIS — Z78.9 RECENT TRAVEL ON AIRCRAFT: ICD-10-CM

## 2022-12-20 DIAGNOSIS — G47.33 OSA ON CPAP: ICD-10-CM

## 2022-12-20 DIAGNOSIS — R22.41 LOCALIZED SWELLING OF RIGHT LOWER LEG: Primary | ICD-10-CM

## 2022-12-20 DIAGNOSIS — R04.2 HEMOPTYSIS: ICD-10-CM

## 2022-12-20 DIAGNOSIS — Z86.718 HISTORY OF DVT IN ADULTHOOD: ICD-10-CM

## 2022-12-20 DIAGNOSIS — E78.01 FAMILIAL HYPERCHOLESTEROLEMIA: ICD-10-CM

## 2022-12-20 DIAGNOSIS — Z99.89 OSA ON CPAP: ICD-10-CM

## 2022-12-20 DIAGNOSIS — Z86.711 HISTORY OF PULMONARY EMBOLISM: ICD-10-CM

## 2022-12-20 DIAGNOSIS — I25.10 CORONARY ARTERY DISEASE INVOLVING NATIVE CORONARY ARTERY OF NATIVE HEART WITHOUT ANGINA PECTORIS: Primary | ICD-10-CM

## 2022-12-20 PROCEDURE — 1123F ACP DISCUSS/DSCN MKR DOCD: CPT | Performed by: NUCLEAR MEDICINE

## 2022-12-20 PROCEDURE — 3074F SYST BP LT 130 MM HG: CPT | Performed by: NURSE PRACTITIONER

## 2022-12-20 PROCEDURE — 3074F SYST BP LT 130 MM HG: CPT | Performed by: NUCLEAR MEDICINE

## 2022-12-20 PROCEDURE — 3078F DIAST BP <80 MM HG: CPT | Performed by: NUCLEAR MEDICINE

## 2022-12-20 PROCEDURE — 1123F ACP DISCUSS/DSCN MKR DOCD: CPT | Performed by: NURSE PRACTITIONER

## 2022-12-20 PROCEDURE — 99214 OFFICE O/P EST MOD 30 MIN: CPT | Performed by: NURSE PRACTITIONER

## 2022-12-20 PROCEDURE — 99214 OFFICE O/P EST MOD 30 MIN: CPT | Performed by: NUCLEAR MEDICINE

## 2022-12-20 PROCEDURE — 71046 X-RAY EXAM CHEST 2 VIEWS: CPT

## 2022-12-20 PROCEDURE — 3078F DIAST BP <80 MM HG: CPT | Performed by: NURSE PRACTITIONER

## 2022-12-20 PROCEDURE — 93000 ELECTROCARDIOGRAM COMPLETE: CPT | Performed by: NUCLEAR MEDICINE

## 2022-12-20 ASSESSMENT — ENCOUNTER SYMPTOMS
WHEEZING: 0
VOMITING: 0
NAUSEA: 0
DIARRHEA: 0
SHORTNESS OF BREATH: 1
ABDOMINAL PAIN: 0
COUGH: 1
EYES NEGATIVE: 1

## 2022-12-20 NOTE — PROGRESS NOTES
[facility]       Silver         [id number]  12/20/2022   Chief Complaint   Patient presents with    Follow-up     CHARIS 1 year f/u with DL. Pt of Dr. Andreea Ennis    PAP Download:   Original or initial AHI: 25.4     Date of initial study: 2013      Compliant  96.7%     Noncompliant 3.3 %     PAP Type CPAP Level  13   Avg Hrs/Day 9hrs 5mins 30secs  AHI: 2.2   Recorded compliance dates , 11/13/22  to 12/12/22   Machine/Mfg:   [] ResMed    [x] Respironics/Dreamstation   Interface:   [] Nasal    [] Nasal pillows   [x] FFM      Provider:      [] [facility]     [][facility]     [x] [employer]    [] [address]    [] [facility]               [] [facility] [facility]      [] [employer]ptive    [] [address]      [] Other    Neck Size: 17.5  Mallampati 4  ESS:  4  SAQLI: 88    Here is a scan of the most recent download:            Presentation:   Elvia Hernandez presents for 1 year sleep medicine follow up for obstructive sleep apnea  Since the last visit, Elvia Hernandez sleeping well. Has \"small leaks\" every night from mask, this is causing his wife to have sleep disturbance. C/o soreness to bridge of nose from mask. Has never tried different mask style. Does replace supplies as recommended    Morning cough, streaks of blood in phlegm just in mornings, does not occur during the day , started 3 months ago  Cough is better when in [place], extreme dry mouth    h/o DVT RLL requiring mechanical thrombo-aspiration  And h/o saddle PE recommended lifelong anticoags. Currently on Xarelto 20 mg PO daily, follows with me in our pulmonary clinic. Underlying asthma. Just flew back to [address] [place] [address]  from [place] couple days ago. Weight stable / unchanged    Equipment issues: The pressure is  acceptable, the mask is somewhat acceptable. Sleep issues:  Do you feel better? Yes  More rested? Yes   Better concentration? NA  Difficulty falling sleep? No  Difficulty staying asleep? No  Snoring?   No      Review of Systems - dry.      Capillary Refill: Capillary refill takes less than 2 seconds. Findings: Erythema (redness to bridge of nose.) present. Nails: There is no clubbing. Neurological:      Mental Status: He is alert and oriented to person, place, and time. Psychiatric:         Mood and Affect: Mood normal.         Behavior: Behavior normal.         Thought Content: Thought content normal.         Judgment: Judgment normal.         ASSESSMENT/DIAGNOSIS     Diagnosis Orders   1. Localized swelling of right lower leg  VL DUP LOWER EXTREMITY VENOUS RIGHT      2. CHAIRS on CPAP  DME Order for CPAP as OP    DME Order for CPAP as OP      3. Hemoptysis  XR CHEST (2 VW)      4. History of pulmonary embolism  VL DUP LOWER EXTREMITY VENOUS RIGHT      5. History of DVT in adulthood  VL DUP LOWER EXTREMITY VENOUS RIGHT      6. Recent travel on aircraft  VL DUP LOWER EXTREMITY VENOUS RIGHT    XR CHEST (2 VW)               Plan   Do you need any equipment today? Yes updated rx for supplies,     Rx for DME to do mask refit due to leaks, dry mouth and soreness to bridge of nose  He has tried chin strap in past with no improvement     -Patient's symptoms and AHI are well  controlled with current settings of 13 cmH2O.  -Advised to continue current positive airway pressure therapy with above described pressure. -Advised to keep good compliance with current recommended pressure to get optimal results and clinical improvement  -Recommend 7-9 hours of sleep with PAP  -Instructed to call DME company regarding supplies if needed.   -Patient to call office for earlier appointment if needed for worsening of sleep symptoms. -Advised patient not to drive if feeling sleepy  -Discussed weight loss    For new acute pulmonary issues:call with results   CXR PA/Lateral - r/o PNA  Duplex US right lower leg R/O DVT     -Educated about my impression and plan. Patient verbalizes understanding.     Will see Joe Alcocer back in: 1 year with download In sleep clinic      Information added by my medical assistant/LPN was reviewed today.     Electronically signed by YESY Alexandre CNP on 12/20/2022 at 11:03 AM

## 2022-12-20 NOTE — PROGRESS NOTES
[address]  [address]  [place] [address]  Dept: [phone]  Dept Fax: [phone]  Loc: [phone]    Visit Date: 12/20/2022    Oli Burris is a 76 y.o. male who presents todayfor:  Chief Complaint   Patient presents with    1 Year Follow Up    Hypertension    Coronary Artery Disease    Hyperlipidemia   Known COPD   Some dyspnea   Uses inhalers  Cath before  Mild CAD  No chest pain  BP is stable   No dizziness  No syncope  Previous DVt and PE  On xeralto   On statins for hyperlipidemia  No issues with that       HPI:  HPI  Past Medical History:   Diagnosis Date    Arthritis     Asthma     Cancer ([address]     skin    Chickenpox     Hyperlipidemia     Hypertension     Measles     Migraines     Mumps     Sleep apnea       Past Surgical History:   Procedure Laterality Date    COLONOSCOPY      CORONARY ANGIOPLASTY N/A 2017/2018    Dr. Odette Gee Left 09/2020    EYE SURGERY      PAROTIDECTOMY Left 11/20/2019    PAROTIDECTOMY, LEFT performed by Sim Mckeon MD at [address] [place] [address]     Family History   Problem Relation Age of Onset    Diabetes Mother     COPD Mother     Cancer Father     Deep Vein Thrombosis Sister     Crohn's Disease Sister      Social History     Tobacco Use    Smoking status: Never    Smokeless tobacco: Never   Substance Use Topics    Alcohol use: No     Alcohol/week: 0.0 standard drinks      Current Outpatient Medications   Medication Sig Dispense Refill    rivaroxaban (XARELTO) 20 MG TABS tablet TAKE 1 TABLET DAILY WITH BREAKFAST 90 tablet 3    Apoaequorin (PREVAGEN) 10 MG CAPS Take by mouth      montelukast (SINGULAIR) 10 MG tablet Take 10 mg by mouth nightly      mometasone-formoterol (DULERA) 200-5 MCG/ACT inhaler Inhale 2 puffs into the lungs 2 times daily      ascorbic acid (VITAMIN C) 1000 MG tablet Take 1 tablet by mouth daily 30 tablet 0    tiotropium (SPIRIVA RESPIMAT) 2.5 MCG/ACT AERS inhaler Inhale 2 puffs into the lungs daily 1 Inhaler 1    hydroCHLOROthiazide (HYDRODIURIL) 25 MG tablet Take 1 tablet by mouth daily 30 tablet 0    Dextromethorphan-guaiFENesin (MUCINEX DM)  MG TB12 Take 1 tablet by mouth 2 times daily 28 tablet 0    vitamin B-12 1000 MCG tablet Take 1 tablet by mouth daily 30 tablet 3    albuterol (PROVENTIL) (2.5 MG/3ML) 0.083% nebulizer solution inhale contents of 1 vial in nebulizer every 6 hours if needed for wheezing shortness of breath 375 mL 5    docusate sodium (COLACE) 100 MG capsule Take 100 mg by mouth daily      CPAP Machine MISC by Does not apply route Please change CPAP pressure to 13 cm H20. Download 2 weeks 1 each 0    albuterol sulfate  (90 Base) MCG/ACT inhaler Inhale 2 puffs into the lungs every 6 hours as needed for Wheezing      omeprazole (PRILOSEC) 20 MG delayed release capsule Take 20 mg by mouth daily      pravastatin (PRAVACHOL) 20 MG tablet Take 20 mg by mouth daily      verapamil (VERELAN PM) 240 MG CR capsule Take 240 mg by mouth every morning. Indications: Per patient taking medication for migraines prevention. No current facility-administered medications for this visit.      Allergies   Allergen Reactions    Molds & Smuts Other (See Comments)     congestion    Sulfa Antibiotics Other (See Comments)     congestion     Health Maintenance   Topic Date Due    Depression Screen  Never done    Hepatitis C screen  Never done    Shingles vaccine (2 of 3) 11/05/2014    Annual Wellness Visit (AWV)  Never done    COVID-19 Vaccine (4 - Booster for Moderna series) 02/21/2022    Flu vaccine (1) 08/01/2022    Lipids  06/09/2023    DTaP/Tdap/Td vaccine (3 - Td or Tdap) 10/23/2031    Pneumococcal 65+ years Vaccine  Completed    Hepatitis A vaccine  Aged Out    Hib vaccine  Aged Out    Meningococcal (ACWY) vaccine  Aged Out       Subjective:  Review of Systems  General:   No fever, no chills, No fatigue or weight loss  Pulmonary:    Some more dyspnea, no wheezing  Cardiac:    Denies recent chest pain,   GI:     No nausea or vomiting, no abdominal pain  Neuro:    No dizziness or light headedness,   Musculoskeletal:  No recent active issues  Extremities:   No edema, no obvious claudication     Objective:  Physical Exam  /64   Pulse 52   Ht 6' 2\" (1.88 m)   Wt 298 lb (135.2 kg)   BMI 38.26 kg/m²   General:   Well developed, well nourished  Lungs:   Clear to auscultation  Heart:    Normal S1 S2, Slight murmur. no rubs, no gallops  Abdomen:   Soft, non tender, no organomegalies, positive bowel sounds  Extremities:   No edema, no cyanosis, good peripheral pulses  Neurological:   Awake, alert, oriented. No obvious focal deficits  Musculoskelatal:  No obvious deformities    Assessment:      Diagnosis Orders   1. Coronary artery disease involving native coronary artery of native heart without angina pectoris  EKG 12 lead      2. Primary hypertension        3. Familial hypercholesterolemia        As above  Higher risk patient  Symptoms as above  ECG in office was done today. I reviewed the ECG. No acute findings      Plan:  No follow-ups on file. Discussed  Consider a follow up stress test and echo   Continue risk factor modification and medical management  Thank you for allowing me to participate in the care of your patient. Please don't hesitate to contact me regarding any further issues related to the patient care    Orders Placed:  Orders Placed This Encounter   Procedures    EKG 12 lead     Order Specific Question:   Reason for Exam?     Answer: Other       Medications Prescribed:  No orders of the defined types were placed in this encounter. Discussed use, benefit, and side effects of prescribed medications. All patient questions answered. Pt voicedunderstanding. Instructed to continue current medications, diet and exercise. Continue risk factor modification and medical management.  Patient agreed with treatment plan. Follow up as directed.     Electronically signedby Leonard Killian MD on 12/20/2022 at 12:43 PM

## 2023-04-27 ENCOUNTER — HOSPITAL ENCOUNTER (OUTPATIENT)
Dept: NON INVASIVE DIAGNOSTICS | Age: 78
Discharge: HOME | End: 2023-04-27
Payer: MEDICARE

## 2023-04-27 ENCOUNTER — TELEPHONE (OUTPATIENT)
Dept: CARDIOLOGY CLINIC | Age: 78
End: 2023-04-27

## 2023-04-27 DIAGNOSIS — I20.8 ANGINA OF EFFORT (HCC): ICD-10-CM

## 2023-04-27 DIAGNOSIS — I25.10 CORONARY ARTERY DISEASE INVOLVING NATIVE CORONARY ARTERY OF NATIVE HEART WITHOUT ANGINA PECTORIS: Primary | ICD-10-CM

## 2023-04-27 DIAGNOSIS — I10 ESSENTIAL HYPERTENSION: ICD-10-CM

## 2023-04-27 DIAGNOSIS — I25.10 CORONARY ARTERY DISEASE INVOLVING NATIVE CORONARY ARTERY OF NATIVE HEART WITHOUT ANGINA PECTORIS: ICD-10-CM

## 2023-04-27 LAB
LV EF: 60 %
LVEF MODALITY: NORMAL

## 2023-04-27 PROCEDURE — 93306 TTE W/DOPPLER COMPLETE: CPT

## 2023-04-27 PROCEDURE — 3430000000 HC RX DIAGNOSTIC RADIOPHARMACEUTICAL: Performed by: STUDENT IN AN ORGANIZED HEALTH CARE EDUCATION/TRAINING PROGRAM

## 2023-04-27 PROCEDURE — 78452 HT MUSCLE IMAGE SPECT MULT: CPT | Performed by: NUCLEAR MEDICINE

## 2023-04-27 PROCEDURE — 93017 CV STRESS TEST TRACING ONLY: CPT | Performed by: NUCLEAR MEDICINE

## 2023-04-27 PROCEDURE — 6360000002 HC RX W HCPCS

## 2023-04-27 PROCEDURE — A9500 TC99M SESTAMIBI: HCPCS | Performed by: STUDENT IN AN ORGANIZED HEALTH CARE EDUCATION/TRAINING PROGRAM

## 2023-04-27 RX ORDER — TETRAKIS(2-METHOXYISOBUTYLISOCYANIDE)COPPER(I) TETRAFLUOROBORATE 1 MG/ML
8.9 INJECTION, POWDER, LYOPHILIZED, FOR SOLUTION INTRAVENOUS
Status: COMPLETED | OUTPATIENT
Start: 2023-04-27 | End: 2023-04-27

## 2023-04-27 RX ORDER — TETRAKIS(2-METHOXYISOBUTYLISOCYANIDE)COPPER(I) TETRAFLUOROBORATE 1 MG/ML
30.1 INJECTION, POWDER, LYOPHILIZED, FOR SOLUTION INTRAVENOUS
Status: COMPLETED | OUTPATIENT
Start: 2023-04-27 | End: 2023-04-27

## 2023-04-27 RX ADMIN — Medication 8.9 MILLICURIE: at 11:58

## 2023-04-27 RX ADMIN — Medication 30.1 MILLICURIE: at 12:55

## 2023-05-01 NOTE — TELEPHONE ENCOUNTER
Set patient up for cath on 5/15/23 at 3:00 to arrive at 1:00. Gave patient info and instructions he voiced understanding.   Also mailed instructions

## 2023-05-02 ENCOUNTER — TELEPHONE (OUTPATIENT)
Dept: CARDIOLOGY CLINIC | Age: 78
End: 2023-05-02

## 2023-05-02 NOTE — TELEPHONE ENCOUNTER
PROCEDURE: cardiac cath     DATE OF SERVICE: 05/15/2023    SERVICE LOCATION: [facility]    CPT CODE: 20711    PHYSICIAN: filiberto    DATE PRIOR AUTH SUBMITTED: 05/02/2023    STATUS: APPROVED.      CASE NUMBER: [id number]    AUTH NUMBER: [id number]    VALID:  05/02/2023-10/29/2023

## 2023-05-14 ENCOUNTER — PREP FOR PROCEDURE (OUTPATIENT)
Dept: CARDIOLOGY | Age: 78
End: 2023-05-14

## 2023-05-15 ENCOUNTER — HOSPITAL ENCOUNTER (OUTPATIENT)
Dept: INPATIENT UNIT | Age: 78
Discharge: HOME | End: 2023-05-15
Attending: NUCLEAR MEDICINE | Admitting: NUCLEAR MEDICINE
Payer: MEDICARE

## 2023-05-15 VITALS
SYSTOLIC BLOOD PRESSURE: 122 MMHG | HEART RATE: 52 BPM | DIASTOLIC BLOOD PRESSURE: 70 MMHG | HEIGHT: 74 IN | RESPIRATION RATE: 17 BRPM | BODY MASS INDEX: 38.89 KG/M2 | TEMPERATURE: 98.7 F | OXYGEN SATURATION: 97 % | WEIGHT: 303 LBS

## 2023-05-15 PROBLEM — I20.89 ANGINA OF EFFORT (HCC): Status: ACTIVE | Noted: 2023-05-15

## 2023-05-15 PROBLEM — I20.8 ANGINA OF EFFORT (HCC): Status: ACTIVE | Noted: 2023-05-15

## 2023-05-15 LAB
ABO: NORMAL
ANION GAP SERPL CALC-SCNC: 13 MEQ/L (ref 8–16)
ANTIBODY SCREEN: NORMAL
BUN SERPL-MCNC: 15 MG/DL (ref 7–22)
CALCIUM SERPL-MCNC: 8.8 MG/DL (ref 8.5–10.5)
CHLORIDE SERPL-SCNC: 102 MEQ/L (ref 98–111)
CO2 SERPL-SCNC: 27 MEQ/L (ref 23–33)
CREAT SERPL-MCNC: 0.9 MG/DL (ref 0.4–1.2)
DEPRECATED RDW RBC AUTO: 47.6 FL (ref 35–45)
ERYTHROCYTE [DISTWIDTH] IN BLOOD BY AUTOMATED COUNT: 12.6 % (ref 11.5–14.5)
GFR SERPL CREATININE-BSD FRML MDRD: > 60 ML/MIN/1.73M2
GLUCOSE SERPL-MCNC: 101 MG/DL (ref 70–108)
HCT VFR BLD AUTO: 40.3 % (ref 42–52)
HGB BLD-MCNC: 13.5 GM/DL (ref 14–18)
INR PPP: 1.02 (ref 0.85–1.13)
MCH RBC QN AUTO: 34 PG (ref 26–33)
MCHC RBC AUTO-ENTMCNC: 33.5 GM/DL (ref 32.2–35.5)
MCV RBC AUTO: 101.5 FL (ref 80–94)
PLATELET # BLD AUTO: 233 THOU/MM3 (ref 130–400)
PMV BLD AUTO: 10.4 FL (ref 9.4–12.4)
POTASSIUM SERPL-SCNC: 3.7 MEQ/L (ref 3.5–5.2)
RBC # BLD AUTO: 3.97 MILL/MM3 (ref 4.7–6.1)
RH FACTOR: NORMAL
SODIUM SERPL-SCNC: 142 MEQ/L (ref 135–145)
WBC # BLD AUTO: 7.1 THOU/MM3 (ref 4.8–10.8)

## 2023-05-15 PROCEDURE — 6370000000 HC RX 637 (ALT 250 FOR IP): Performed by: NUCLEAR MEDICINE

## 2023-05-15 PROCEDURE — 93010 ELECTROCARDIOGRAM REPORT: CPT | Performed by: INTERNAL MEDICINE

## 2023-05-15 PROCEDURE — 80048 BASIC METABOLIC PNL TOTAL CA: CPT

## 2023-05-15 PROCEDURE — 2500000003 HC RX 250 WO HCPCS

## 2023-05-15 PROCEDURE — 6360000004 HC RX CONTRAST MEDICATION: Performed by: NUCLEAR MEDICINE

## 2023-05-15 PROCEDURE — 93005 ELECTROCARDIOGRAM TRACING: CPT | Performed by: NUCLEAR MEDICINE

## 2023-05-15 PROCEDURE — 86850 RBC ANTIBODY SCREEN: CPT

## 2023-05-15 PROCEDURE — 85610 PROTHROMBIN TIME: CPT

## 2023-05-15 PROCEDURE — 6360000002 HC RX W HCPCS

## 2023-05-15 PROCEDURE — 36415 COLL VENOUS BLD VENIPUNCTURE: CPT

## 2023-05-15 PROCEDURE — 86900 BLOOD TYPING SEROLOGIC ABO: CPT

## 2023-05-15 PROCEDURE — C1894 INTRO/SHEATH, NON-LASER: HCPCS

## 2023-05-15 PROCEDURE — 85027 COMPLETE CBC AUTOMATED: CPT

## 2023-05-15 PROCEDURE — 2580000003 HC RX 258: Performed by: NUCLEAR MEDICINE

## 2023-05-15 PROCEDURE — C1769 GUIDE WIRE: HCPCS

## 2023-05-15 PROCEDURE — 86901 BLOOD TYPING SEROLOGIC RH(D): CPT

## 2023-05-15 PROCEDURE — 93458 L HRT ARTERY/VENTRICLE ANGIO: CPT | Performed by: NUCLEAR MEDICINE

## 2023-05-15 PROCEDURE — 93458 L HRT ARTERY/VENTRICLE ANGIO: CPT

## 2023-05-15 RX ORDER — SODIUM CHLORIDE 9 MG/ML
INJECTION, SOLUTION INTRAVENOUS CONTINUOUS
Status: DISCONTINUED | OUTPATIENT
Start: 2023-05-15 | End: 2023-05-15 | Stop reason: SDUPTHER

## 2023-05-15 RX ORDER — SODIUM CHLORIDE 9 MG/ML
INJECTION, SOLUTION INTRAVENOUS PRN
Status: DISCONTINUED | OUTPATIENT
Start: 2023-05-15 | End: 2023-05-15 | Stop reason: HOSPADM

## 2023-05-15 RX ORDER — SODIUM CHLORIDE 0.9 % (FLUSH) 0.9 %
5-40 SYRINGE (ML) INJECTION EVERY 12 HOURS SCHEDULED
Status: DISCONTINUED | OUTPATIENT
Start: 2023-05-15 | End: 2023-05-15 | Stop reason: HOSPADM

## 2023-05-15 RX ORDER — SODIUM CHLORIDE 9 MG/ML
INJECTION, SOLUTION INTRAVENOUS CONTINUOUS
Status: DISCONTINUED | OUTPATIENT
Start: 2023-05-15 | End: 2023-05-15 | Stop reason: HOSPADM

## 2023-05-15 RX ORDER — ASPIRIN 325 MG
325 TABLET ORAL ONCE
Status: COMPLETED | OUTPATIENT
Start: 2023-05-15 | End: 2023-05-15

## 2023-05-15 RX ORDER — NITROGLYCERIN 0.4 MG/1
0.4 TABLET SUBLINGUAL EVERY 5 MIN PRN
Status: DISCONTINUED | OUTPATIENT
Start: 2023-05-15 | End: 2023-05-15 | Stop reason: HOSPADM

## 2023-05-15 RX ORDER — ACETAMINOPHEN 325 MG/1
650 TABLET ORAL EVERY 4 HOURS PRN
Status: DISCONTINUED | OUTPATIENT
Start: 2023-05-15 | End: 2023-05-15 | Stop reason: HOSPADM

## 2023-05-15 RX ORDER — ATROPINE SULFATE 0.4 MG/ML
0.5 INJECTION, SOLUTION INTRAVENOUS
Status: DISCONTINUED | OUTPATIENT
Start: 2023-05-15 | End: 2023-05-15 | Stop reason: HOSPADM

## 2023-05-15 RX ORDER — SODIUM CHLORIDE 0.9 % (FLUSH) 0.9 %
5-40 SYRINGE (ML) INJECTION PRN
Status: DISCONTINUED | OUTPATIENT
Start: 2023-05-15 | End: 2023-05-15 | Stop reason: HOSPADM

## 2023-05-15 RX ADMIN — ASPIRIN 325 MG: 325 TABLET ORAL at 13:55

## 2023-05-15 RX ADMIN — IOPAMIDOL 75 ML: 755 INJECTION, SOLUTION INTRAVENOUS at 15:07

## 2023-05-15 RX ADMIN — SODIUM CHLORIDE: 9 INJECTION, SOLUTION INTRAVENOUS at 13:45

## 2023-05-15 NOTE — PROCEDURES
[address] [place] [address]                            CARDIAC CATHETERIZATION    PATIENT NAME: Danay Yates                    :        1945  MED REC NO:   [id number]                           ROOM:       0008  ACCOUNT NO:   [de-identified]                           ADMIT DATE: 05/15/2023  PROVIDER:     Cyndi Lechuga M.D.    DATE OF PROCEDURE:  05/15/2023    CLINICAL HISTORY AND INDICATION FOR PROCEDURE:  This is a 75-year-old  patient with a history of previous cardiac cath, nonobstructive disease;  seen by Barbara Dickerson PA-C; lately had symptoms of fatigue and  tiredness, had a stress test showing acquired inferior mild ischemia;  referred for cardiac cath to evaluate coronary anatomy. PROCEDURES:  1. Left heart cath with left ventriculogram.  2.  Coronary angiogram, right and left. 3.  Sedation, 2 of Versed and 50 of fentanyl between 02:30 and 03:00  p.m. in my presence, under my supervision. 4.  Blood loss less than 10 mL. PROCEDURE DETAILS:  Please refer to my catheterization detailed note. HEMODYNAMIC RESULTS:    LEFT VENTRICULOGRAM:  Left ventricular end-diastolic pressure was 12  mmHg. No significant change before and after contrast injection. No  significant gradient across the aortic valve to signify aortic stenosis. Left ventricular function was globally within lower limits normal.  EF  50%. CORONARY ARTERIOGRAM RESULTS:  1. Left main is patent, gives rise to left anterior descending artery  and left circumflex artery. 2.  Left anterior descending artery is pretty much patent with mild  luminal irregularity. 3.  Left circumflex artery is nondominant and patent. 4.  Right coronary artery is large, dominant, and patent. CONCLUSIONS:  1. Minimal coronary artery disease. 2.  Borderline LV function. 3.  History of aortic stenosis that is followed by an echo.   4.  No

## 2023-05-15 NOTE — PROGRESS NOTES
1300  Pt admitted to  2E08 ambulatory for left heart cath. Pt NPO. Patient accompanied by spouse and daughter. Vital signs obtained. Assessment and data collection initiated. Oriented to room. Policies and procedures for 2E explained   All questions answered with no further questions at this time. Fall prevention and safety precautions discussed with patient. Care plan reviewed with patient and spouse. Patient and spouse verbalize understanding of the plan of care and contribute to goal setting.      1425  to procedure per bed

## 2023-05-15 NOTE — PLAN OF CARE
Discharge instructions given to pt and daughter, both \"voiced understanding\"    Discharged per wc per transport team with personal belongings.  Has cell phone, home pills, CPAP, clothes with him

## 2023-05-15 NOTE — DISCHARGE INSTRUCTIONS
POST RADIAL  Take it easy for 3-4 days and limit vigorous activity (contact sports) for 2 weeks after the procedure. No driving for 2 days after the procedure. No lifting of 5 lbs. or more for 5 days with the affected arm. You can shower after 24 hours. Remove the arm board after 24 hours. Apply a clean band aid to the insertion site for 5 days after the procedure. Wash the site with soap and water daily. No creams, ointments, or powders near the insertion site. No bath tubs, swimming, hot tubs or hand - washing dishes for 1 week after the procedure. Watch for signs of infection (redness, warmth, swelling, pus drainage) or if there is coolness of the extremity. Call the physician if this occurs. If there is bleeding from the incision, apply pressure to it and call 911. Watch for numbness / tingling. If this occurs, go to the Emergency Room as soon as possible. May Apply ice for 15 minutes with an hour break in between if needed for comfort. Alternate with a heat compress for 15 minutes to reduce the swelling. Do this for 3 - 5 days after procedure if needed.

## 2023-05-15 NOTE — H&P
[facility]  Sedation/Analgesia History & Physical    Pt Name: Martha Kawasaki  Account number: [de-identified]  MRN: [id number]  YOB: 1945  Provider Performing Procedure: Marco Mayfield MD MD [facility]  Primary Care Physician: YESY Pardo - CISCO  Date: 5/15/2023    PRE-PROCEDURE    Code Status: FULL CODE  Brief History/Pre-Procedure Diagnosis:   Angina  CAD     Consent: : I have discussed with the patient risks, benefits, and alternatives (and relevant risks, benefits, and side effects related to alternatives or not receiving care), and likelihood of the success. The patient and/or representative appear to understand and agree to proceed. The discussion encompasses risks, benefits, and side effects related to the alternatives and the risks related to not receiving the proposed care, treatment, and services. MEDICAL HISTORY  [x]ASHD/ANGINA/MI/CHF   [x]Hypertension  []Diabetes  []Hyperlipidemia  []Smoking  []Family Hx of ASHD  []Additional information:       has a past medical history of Arthritis, Asthma, Cancer [address] [address] Chickenpox, Hyperlipidemia, Hypertension, Measles, Migraines, Mumps, and Sleep apnea. SURGICAL HISTORY   has a past surgical history that includes Tonsillectomy; TURP (1990); Colonoscopy; eye surgery; skin biopsy; Coronary angioplasty (N/A, 2017/2018); Parotidectomy (Left, 11/20/2019); and Elbow surgery (Left, 09/2020).   Additional information:       ALLERGIES   Allergies as of 05/15/2023 - Reviewed 05/15/2023   Allergen Reaction Noted    Molds & smuts Other (See Comments) 08/19/2015    Sulfa antibiotics Other (See Comments) 08/23/2012     Additional information:       MEDICATIONS   Aspirin  [x] 81 mg  [] 325 mg  [] None  Antiplatelet drug therapy use last 5 days  []No []Yes  Coumadin Use Last 5 Days []No []Yes  Other anticoagulant use last 5 days  []No []Yes    Current Facility-Administered Medications:     aspirin tablet 325 mg, 325 mg, Oral, Once,

## 2023-05-18 LAB
EKG ATRIAL RATE: 62 BPM
EKG P AXIS: 59 DEGREES
EKG P-R INTERVAL: 136 MS
EKG Q-T INTERVAL: 440 MS
EKG QRS DURATION: 132 MS
EKG QTC CALCULATION (BAZETT): 446 MS
EKG R AXIS: -28 DEGREES
EKG T AXIS: 71 DEGREES
EKG VENTRICULAR RATE: 62 BPM

## 2023-07-26 PROBLEM — E66.01 SEVERE OBESITY (HCC): Status: ACTIVE | Noted: 2018-07-29

## 2023-07-26 PROBLEM — R06.02 SHORTNESS OF BREATH: Status: ACTIVE | Noted: 2018-07-26

## 2023-07-26 PROBLEM — E66.01 SEVERE OBESITY WITH BODY MASS INDEX (BMI) OF 35.0 TO 39.9 WITH SERIOUS COMORBIDITY (HCC): Status: ACTIVE | Noted: 2018-07-29

## 2023-07-26 PROBLEM — E66.9 OBESITY: Status: ACTIVE | Noted: 2023-07-26

## 2023-07-26 PROBLEM — J45.40 MODERATE PERSISTENT ASTHMA: Status: ACTIVE | Noted: 2022-12-28

## 2023-07-26 RX ORDER — SILDENAFIL 100 MG/1
TABLET, FILM COATED ORAL
COMMUNITY

## 2023-07-26 RX ORDER — IPRATROPIUM BROMIDE AND ALBUTEROL SULFATE 2.5; .5 MG/3ML; MG/3ML
SOLUTION RESPIRATORY (INHALATION)
COMMUNITY

## 2023-07-26 RX ORDER — INFLUENZA VACCINE, ADJUVANTED 15; 15; 15 UG/.5ML; UG/.5ML; UG/.5ML
INJECTION, SUSPENSION INTRAMUSCULAR
COMMUNITY

## 2023-08-01 ENCOUNTER — TELEPHONE (OUTPATIENT)
Dept: CARDIOLOGY CLINIC | Age: 78
End: 2023-08-01

## 2023-08-01 NOTE — TELEPHONE ENCOUNTER
Please send to Dr Amari Corbin when he returns. Spoke to patient-He wanted to know if it is okay to take Repatha in place of Crestor. He would also like to take Ozempic for weight loss. Please advise.

## 2023-08-03 ENCOUNTER — OFFICE VISIT (OUTPATIENT)
Dept: NEPHROLOGY | Age: 78
End: 2023-08-03
Payer: MEDICARE

## 2023-08-03 VITALS
OXYGEN SATURATION: 95 % | WEIGHT: 299 LBS | BODY MASS INDEX: 38.39 KG/M2 | SYSTOLIC BLOOD PRESSURE: 130 MMHG | DIASTOLIC BLOOD PRESSURE: 70 MMHG | HEART RATE: 52 BPM

## 2023-08-03 DIAGNOSIS — N18.2 CKD (CHRONIC KIDNEY DISEASE), STAGE II: Primary | ICD-10-CM

## 2023-08-03 PROCEDURE — 1123F ACP DISCUSS/DSCN MKR DOCD: CPT | Performed by: INTERNAL MEDICINE

## 2023-08-03 PROCEDURE — 3078F DIAST BP <80 MM HG: CPT | Performed by: INTERNAL MEDICINE

## 2023-08-03 PROCEDURE — 99204 OFFICE O/P NEW MOD 45 MIN: CPT | Performed by: INTERNAL MEDICINE

## 2023-08-03 PROCEDURE — 3075F SYST BP GE 130 - 139MM HG: CPT | Performed by: INTERNAL MEDICINE

## 2023-08-03 NOTE — PROGRESS NOTES
[address]  [facility]  [address] [place] [address]  Dept: [phone]: [phone]  Outpatient Consult  [phone]  8/3/2023 10:44 AM EDT        Pt Name:    Jacque Flores  YOB: 1945  Primary Care Physician:  YESY Prather CNP     Chief Complaint:   Chief Complaint   Patient presents with    New Patient     Ref Aide Ivy         Background Information/Interval History:   The patient is a 78y.o. year old  male referred by PCP for proteinuria and low serum albumin. He has a history of HTN, HLD, asthma, arthritis, CHARIS, DVT/PE, BPH. UA showed trace protein, trace ketones, spec gravity 1.019, no RBCs. Creatinine 1.0  Serum albumin 3.0  Not diabetic. Is overweight. Has never been told he has had proteinuria in the past.  Notices bubbles in his urine.    Has left flank pain which is positional.        Allergies:  Lisinopril, Molds & smuts, and Sulfa antibiotics        Past Medical History:  Past Medical History:   Diagnosis Date    Arthritis     Asthma     Cancer ([facility])     skin    Chickenpox     Hyperlipidemia     Hypertension     Measles     Migraines     Mumps     Sleep apnea         Past Surgical History:  Past Surgical History:   Procedure Laterality Date    COLONOSCOPY      CORONARY ANGIOPLASTY N/A 2017/2018    Dr. Day Wheeler Left 09/2020    EYE SURGERY      PAROTIDECTOMY Left 11/20/2019    PAROTIDECTOMY, LEFT performed by Dontae Saucedo MD at [address] [place] [address]        Family History:  Family History   Problem Relation Age of Onset    Diabetes Mother     COPD Mother     Cancer Father     Deep Vein Thrombosis Sister     Crohn's Disease Sister         Social History:  Social History     Socioeconomic History    Marital status:      Spouse name: Yamilka Haines    Number of children: 2    Years of education: 18    Highest education level:

## 2023-08-07 RX ORDER — EVOLOCUMAB 140 MG/ML
1 INJECTION, SOLUTION SUBCUTANEOUS
Qty: 6 ADJUSTABLE DOSE PRE-FILLED PEN SYRINGE | Refills: 3 | Status: SHIPPED | OUTPATIENT
Start: 2023-08-07

## 2023-08-07 NOTE — TELEPHONE ENCOUNTER
Spoke to patient, notified. Rx for Repatha printed and out for signature. Pt instructed to stay on the Crestor until we know if Garlin Like will be approved. Pt does see Dr Cristina Page on 8/28 and can discuss further at that time. Encounter routed to PCP office for Brigette Sandoval.

## 2023-08-16 ENCOUNTER — HOSPITAL ENCOUNTER (OUTPATIENT)
Dept: ULTRASOUND IMAGING | Age: 78
Discharge: HOME | End: 2023-08-16
Attending: INTERNAL MEDICINE
Payer: MEDICARE

## 2023-08-16 DIAGNOSIS — N18.2 CKD (CHRONIC KIDNEY DISEASE), STAGE II: ICD-10-CM

## 2023-08-16 PROCEDURE — 76770 US EXAM ABDO BACK WALL COMP: CPT

## 2023-08-28 ENCOUNTER — OFFICE VISIT (OUTPATIENT)
Dept: CARDIOLOGY CLINIC | Age: 78
End: 2023-08-28
Payer: MEDICARE

## 2023-08-28 VITALS
HEART RATE: 62 BPM | SYSTOLIC BLOOD PRESSURE: 134 MMHG | WEIGHT: 295 LBS | DIASTOLIC BLOOD PRESSURE: 72 MMHG | HEIGHT: 74 IN | BODY MASS INDEX: 37.86 KG/M2

## 2023-08-28 DIAGNOSIS — I35.0 NONRHEUMATIC AORTIC VALVE STENOSIS: ICD-10-CM

## 2023-08-28 DIAGNOSIS — I25.10 CORONARY ARTERY DISEASE INVOLVING NATIVE CORONARY ARTERY OF NATIVE HEART WITHOUT ANGINA PECTORIS: Primary | ICD-10-CM

## 2023-08-28 DIAGNOSIS — I48.21 PERMANENT ATRIAL FIBRILLATION (HCC): ICD-10-CM

## 2023-08-28 DIAGNOSIS — I10 PRIMARY HYPERTENSION: ICD-10-CM

## 2023-08-28 DIAGNOSIS — E78.01 FAMILIAL HYPERCHOLESTEROLEMIA: ICD-10-CM

## 2023-08-28 PROCEDURE — 3078F DIAST BP <80 MM HG: CPT | Performed by: NUCLEAR MEDICINE

## 2023-08-28 PROCEDURE — 3075F SYST BP GE 130 - 139MM HG: CPT | Performed by: NUCLEAR MEDICINE

## 2023-08-28 PROCEDURE — 1123F ACP DISCUSS/DSCN MKR DOCD: CPT | Performed by: NUCLEAR MEDICINE

## 2023-08-28 PROCEDURE — 99214 OFFICE O/P EST MOD 30 MIN: CPT | Performed by: NUCLEAR MEDICINE

## 2023-08-28 RX ORDER — TIRZEPATIDE 2.5 MG/.5ML
INJECTION, SOLUTION SUBCUTANEOUS
COMMUNITY
Start: 2023-08-07

## 2023-09-05 ENCOUNTER — NURSE ONLY (OUTPATIENT)
Dept: LAB | Age: 78
End: 2023-09-05

## 2023-09-05 DIAGNOSIS — N18.2 CKD (CHRONIC KIDNEY DISEASE), STAGE II: ICD-10-CM

## 2023-09-05 LAB
ALBUMIN SERPL BCG-MCNC: 4 G/DL (ref 3.5–5.1)
ANION GAP SERPL CALC-SCNC: 8 MEQ/L (ref 8–16)
BUN SERPL-MCNC: 18 MG/DL (ref 7–22)
CALCIUM SERPL-MCNC: 8.7 MG/DL (ref 8.5–10.5)
CHLORIDE SERPL-SCNC: 102 MEQ/L (ref 98–111)
CO2 SERPL-SCNC: 32 MEQ/L (ref 23–33)
CREAT SERPL-MCNC: 1 MG/DL (ref 0.4–1.2)
CREAT UR-MCNC: 203.5 MG/DL
CREAT UR-MCNC: 203.5 MG/DL
GFR SERPL CREATININE-BSD FRML MDRD: > 60 ML/MIN/1.73M2
GLUCOSE SERPL-MCNC: 103 MG/DL (ref 70–108)
MICROALBUMIN UR-MCNC: < 1.2 MG/DL
MICROALBUMIN/CREAT RATIO PNL UR: 6 MG/G (ref 0–30)
POTASSIUM SERPL-SCNC: 3.8 MEQ/L (ref 3.5–5.2)
PROT UR-MCNC: 26.8 MG/DL
PROT/CREAT 24H UR: 0.13 MG/G{CREAT}
SODIUM SERPL-SCNC: 142 MEQ/L (ref 135–145)

## 2023-09-06 ENCOUNTER — OFFICE VISIT (OUTPATIENT)
Dept: NEPHROLOGY | Age: 78
End: 2023-09-06
Payer: MEDICARE

## 2023-09-06 VITALS
DIASTOLIC BLOOD PRESSURE: 61 MMHG | BODY MASS INDEX: 38.06 KG/M2 | SYSTOLIC BLOOD PRESSURE: 125 MMHG | WEIGHT: 296.4 LBS | HEART RATE: 65 BPM

## 2023-09-06 DIAGNOSIS — N18.2 CKD (CHRONIC KIDNEY DISEASE), STAGE II: Primary | ICD-10-CM

## 2023-09-06 PROCEDURE — 3078F DIAST BP <80 MM HG: CPT | Performed by: INTERNAL MEDICINE

## 2023-09-06 PROCEDURE — 1123F ACP DISCUSS/DSCN MKR DOCD: CPT | Performed by: INTERNAL MEDICINE

## 2023-09-06 PROCEDURE — 99213 OFFICE O/P EST LOW 20 MIN: CPT | Performed by: INTERNAL MEDICINE

## 2023-09-06 PROCEDURE — 3074F SYST BP LT 130 MM HG: CPT | Performed by: INTERNAL MEDICINE

## 2024-05-04 ENCOUNTER — HOSPITAL ENCOUNTER (EMERGENCY)
Age: 79
Discharge: HOME | End: 2024-05-04
Payer: MEDICARE

## 2024-05-04 VITALS
DIASTOLIC BLOOD PRESSURE: 73 MMHG | HEART RATE: 78 BPM | SYSTOLIC BLOOD PRESSURE: 112 MMHG | BODY MASS INDEX: 35.04 KG/M2 | RESPIRATION RATE: 16 BRPM | OXYGEN SATURATION: 96 % | WEIGHT: 273 LBS | HEIGHT: 74 IN | TEMPERATURE: 99.5 F

## 2024-05-04 DIAGNOSIS — J10.1 INFLUENZA B: Primary | ICD-10-CM

## 2024-05-04 LAB
FLUAV AG SPEC QL: NEGATIVE
FLUBV AG SPEC QL: POSITIVE
S PYO AG THROAT QL: NEGATIVE
SARS-COV-2 RDRP RESP QL NAA+PROBE: NOT  DETECTED

## 2024-05-04 PROCEDURE — 87651 STREP A DNA AMP PROBE: CPT

## 2024-05-04 PROCEDURE — 87635 SARS-COV-2 COVID-19 AMP PRB: CPT

## 2024-05-04 PROCEDURE — 99213 OFFICE O/P EST LOW 20 MIN: CPT

## 2024-05-04 PROCEDURE — 87804 INFLUENZA ASSAY W/OPTIC: CPT

## 2024-05-04 RX ORDER — OSELTAMIVIR PHOSPHATE 75 MG/1
75 CAPSULE ORAL 2 TIMES DAILY
Qty: 10 CAPSULE | Refills: 0 | Status: SHIPPED | OUTPATIENT
Start: 2024-05-04 | End: 2024-05-09

## 2024-05-04 ASSESSMENT — ENCOUNTER SYMPTOMS
ABDOMINAL PAIN: 0
NAUSEA: 0
VOMITING: 0
DIARRHEA: 0
SORE THROAT: 0
SINUS PAIN: 0
RHINORRHEA: 0
SHORTNESS OF BREATH: 0
SINUS PRESSURE: 0
CHEST TIGHTNESS: 0
WHEEZING: 0
COUGH: 0

## 2024-05-04 ASSESSMENT — PAIN - FUNCTIONAL ASSESSMENT: PAIN_FUNCTIONAL_ASSESSMENT: NONE - DENIES PAIN

## 2024-05-04 NOTE — ED PROVIDER NOTES
[facility] URGENT CARE  UrgentCare Encounter      CHIEFCOMPLAINT       Chief Complaint   Patient presents with    Fatigue    Fever       Nurses Notes reviewed and I agree except as noted in the HPI.  HISTORY OF PRESENT ILLNESS   Joe Alcocer is a 78 y.o. male who presents with complaints of not feeling well since yesterday.  Patient states he had a fever of 101.3 °F oral at home.  He has used Tylenol for the fever.  States he generally just does not feel well and is feeling worn down.  He endorses body aches and fatigue.  Patient denies any ear pain, sore throat, sinus pressure congestion, cough, wheezing, shortness of breath, chest pain.  He has not had any abdominal pain, nausea, vomiting, diarrhea.  No changes in memory.  Denies any urinary symptoms.  He has not been around anybody with known illness.    REVIEW OF SYSTEMS     Review of Systems   Constitutional:  Positive for fatigue and fever.   HENT:  Negative for congestion, ear pain, postnasal drip, rhinorrhea, sinus pressure, sinus pain and sore throat.    Respiratory:  Negative for cough, chest tightness, shortness of breath and wheezing.    Cardiovascular:  Negative for chest pain, palpitations and leg swelling.   Gastrointestinal:  Negative for abdominal pain, diarrhea, nausea and vomiting.   Genitourinary:  Negative for dysuria, frequency, hematuria and urgency.   Musculoskeletal:  Positive for myalgias.   Skin:  Negative for rash.   Neurological:  Negative for dizziness, light-headedness, numbness and headaches.       PAST MEDICAL HISTORY         Diagnosis Date    Arthritis     Asthma     Cancer (HCC)     skin    Chickenpox     Hyperlipidemia     Hypertension     Measles     Migraines     Mumps     Sleep apnea        SURGICAL HISTORY     Patient  has a past surgical history that includes Tonsillectomy; TURP (1990); Colonoscopy; eye surgery; skin biopsy; Coronary angioplasty (N/A, 2017/2018); Parotidectomy (Left, 11/20/2019); and Elbow  SARS-CoV-2, CHAPITO NOT  DETECTED NOT DETECTED   Strep Screen Group A Throat   Result Value Ref Range    Rapid Strep A Screen NEGATIVE        IMAGING:  No orders to display     URGENT CARE COURSE:         Medications - No data to display  PROCEDURES:  FINALIMPRESSION      1. Influenza B      DISPOSITION/PLAN   DISPOSITION Decision To Discharge 05/04/2024 03:33:46 PM    Patient has signs and symptoms of upper respiratory infection / viral illness. Patient is afebrile and stable. Patient can be treated with over the counter medications. Patient can use Tylenol over the counter as needed for pain or fever. Antibiotics are not indicated at the present time. Increase fluids and rest. Use salt water gargles as needed. Use saline nasal drops or nasal rinses and humidify the air. Advised to follow up with family doctor or return to urgent care if does not get better or symptoms worsen. Pt can go to ER if high fever greater than 102, vomiting, breathing difficulty, lethargy. Patient understands this approach of home management and is agreeable to the treatment plan.      PATIENT REFERRED TO:  [facility]  [address] [place] [address]  [phone]  Call in 2 days  As needed, If symptoms worsen go to emergency room    DISCHARGE MEDICATIONS:  New Prescriptions    OSELTAMIVIR (TAMIFLU) 75 MG CAPSULE    Take 1 capsule by mouth 2 times daily for 5 days     Current Discharge Medication List          YESY Grant CNP, Valerie A, APRN - CNP  05/04/24 3575

## 2024-05-16 NOTE — PROGRESS NOTES
[facility] PROFESSIONAL SERVICES  [facility] [place] [facility] [address] [address]   [address]   [facility] [address]   Dept: [phone]   Dept Fax: [phone]   Loc: [phone]      Chief Complaint   Patient presents with    Check-Up    Coronary Artery Disease     Cardiologist:  Dr. Brand  79 yo male presents for 8 month f/u. Hx of mild CAD, HTN, HLD, DVT/PE, afib.     Lives in [place] in winter. Breathing has been okay. some sob with minimal activity. No chest pain. Occ dizziness/lightheaded. Some ankle swelling. Had moderate AS on echo last year and had cath with minimal CAD.     General:   No fever, no chills, no weight loss, + fatigue  Pulmonary:    + dyspnea, no wheezing  Cardiac:    Denies recent chest pain   GI:     No nausea or vomiting, no abdominal pain  Neuro:    No dizziness or light headedness  Musculoskeletal:  No recent active issues  Extremities:   mild edema      Past Medical History:   Diagnosis Date    Arthritis     Asthma     Cancer (HCC)     skin    Chickenpox     Hyperlipidemia     Hypertension     Measles     Migraines     Mumps     Sleep apnea        Allergies   Allergen Reactions    Lisinopril      Other reaction(s): cough    Statins      Muscle aches     Molds & Smuts Other (See Comments)     congestion  Other reaction(s): Other (See Comments)  congestion      Sulfa Antibiotics Other (See Comments)     congestion  Other reaction(s): anaphylaxis       Current Outpatient Medications   Medication Sig Dispense Refill    MOUNJARO 2.5 MG/0.5ML SOPN SC injection       Evolocumab (REPATHA SURECLICK) 140 MG/ML SOAJ Inject 1 pen  into the skin every 14 days 6 Adjustable Dose Pre-filled Pen Syringe 3    ipratropium 0.5 mg-albuterol 2.5 mg (DUONEB) 0.5-2.5 (3) MG/3ML SOLN nebulizer solution 3 ml as needed Inhalation every 6 hrs      rivaroxaban (XARELTO) 20 MG TABS tablet TAKE 1 TABLET DAILY WITH BREAKFAST 90 tablet 3    fluticasone (FLONASE) 50 MCG/ACT nasal spray 1 spray by Each Nostril route

## 2024-05-22 ENCOUNTER — OFFICE VISIT (OUTPATIENT)
Dept: CARDIOLOGY CLINIC | Age: 79
End: 2024-05-22
Payer: MEDICARE

## 2024-05-22 VITALS
SYSTOLIC BLOOD PRESSURE: 110 MMHG | BODY MASS INDEX: 35.68 KG/M2 | WEIGHT: 278 LBS | HEIGHT: 74 IN | HEART RATE: 53 BPM | DIASTOLIC BLOOD PRESSURE: 68 MMHG

## 2024-05-22 DIAGNOSIS — I10 ESSENTIAL HYPERTENSION: ICD-10-CM

## 2024-05-22 DIAGNOSIS — I35.0 NONRHEUMATIC AORTIC VALVE STENOSIS: ICD-10-CM

## 2024-05-22 DIAGNOSIS — I25.10 CORONARY ARTERY DISEASE INVOLVING NATIVE CORONARY ARTERY OF NATIVE HEART WITHOUT ANGINA PECTORIS: Primary | ICD-10-CM

## 2024-05-22 PROCEDURE — 99214 OFFICE O/P EST MOD 30 MIN: CPT | Performed by: STUDENT IN AN ORGANIZED HEALTH CARE EDUCATION/TRAINING PROGRAM

## 2024-05-22 PROCEDURE — 1123F ACP DISCUSS/DSCN MKR DOCD: CPT | Performed by: STUDENT IN AN ORGANIZED HEALTH CARE EDUCATION/TRAINING PROGRAM

## 2024-05-22 PROCEDURE — 3078F DIAST BP <80 MM HG: CPT | Performed by: STUDENT IN AN ORGANIZED HEALTH CARE EDUCATION/TRAINING PROGRAM

## 2024-05-22 PROCEDURE — 3074F SYST BP LT 130 MM HG: CPT | Performed by: STUDENT IN AN ORGANIZED HEALTH CARE EDUCATION/TRAINING PROGRAM

## 2024-05-22 PROCEDURE — 93000 ELECTROCARDIOGRAM COMPLETE: CPT | Performed by: STUDENT IN AN ORGANIZED HEALTH CARE EDUCATION/TRAINING PROGRAM

## 2024-05-22 NOTE — PROGRESS NOTES
Pt here for 8 mo check up     EKG done today     Pt continues with swelling in right ankle, sob at times

## 2024-06-04 ENCOUNTER — HOSPITAL ENCOUNTER (OUTPATIENT)
Age: 79
Discharge: HOME | End: 2024-06-06
Payer: MEDICARE

## 2024-06-04 VITALS
WEIGHT: 278 LBS | SYSTOLIC BLOOD PRESSURE: 110 MMHG | DIASTOLIC BLOOD PRESSURE: 68 MMHG | HEIGHT: 74 IN | BODY MASS INDEX: 35.68 KG/M2

## 2024-06-04 DIAGNOSIS — I10 ESSENTIAL HYPERTENSION: ICD-10-CM

## 2024-06-04 DIAGNOSIS — I25.10 CORONARY ARTERY DISEASE INVOLVING NATIVE CORONARY ARTERY OF NATIVE HEART WITHOUT ANGINA PECTORIS: ICD-10-CM

## 2024-06-04 LAB
ECHO AO ASC DIAM: 3.9 CM
ECHO AO ASCENDING AORTA INDEX: 1.56 CM/M2
ECHO AR MAX VEL PISA: 3.2 M/S
ECHO AV AREA PEAK VELOCITY: 1.4 CM2
ECHO AV AREA VTI: 1.6 CM2
ECHO AV AREA/BSA PEAK VELOCITY: 0.6 CM2/M2
ECHO AV AREA/BSA VTI: 0.6 CM2/M2
ECHO AV CUSP MM: 1.9 CM
ECHO AV MEAN GRADIENT: 13 MMHG
ECHO AV MEAN VELOCITY: 1.6 M/S
ECHO AV PEAK GRADIENT: 24 MMHG
ECHO AV PEAK VELOCITY: 2.5 M/S
ECHO AV REGURGITANT PHT: 844 MS
ECHO AV VELOCITY RATIO: 0.36
ECHO AV VTI: 53.5 CM
ECHO BSA: 2.57 M2
ECHO EST RA PRESSURE: 5 MMHG
ECHO LA AREA 2C: 18.4 CM2
ECHO LA AREA 4C: 20.4 CM2
ECHO LA DIAMETER INDEX: 1.52 CM/M2
ECHO LA DIAMETER: 3.8 CM
ECHO LA MAJOR AXIS: 5.3 CM
ECHO LA MINOR AXIS: 5.1 CM
ECHO LA VOL BP: 58 ML (ref 18–58)
ECHO LA VOL MOD A2C: 55 ML (ref 18–58)
ECHO LA VOL MOD A4C: 61 ML (ref 18–58)
ECHO LA VOL/BSA BIPLANE: 23 ML/M2 (ref 16–34)
ECHO LA VOLUME INDEX MOD A2C: 22 ML/M2 (ref 16–34)
ECHO LA VOLUME INDEX MOD A4C: 24 ML/M2 (ref 16–34)
ECHO LV E' LATERAL VELOCITY: 7 CM/S
ECHO LV E' SEPTAL VELOCITY: 6 CM/S
ECHO LV FRACTIONAL SHORTENING: 34 % (ref 28–44)
ECHO LV INTERNAL DIMENSION DIASTOLE INDEX: 2.24 CM/M2
ECHO LV INTERNAL DIMENSION DIASTOLIC: 5.6 CM (ref 4.2–5.9)
ECHO LV INTERNAL DIMENSION SYSTOLIC INDEX: 1.48 CM/M2
ECHO LV INTERNAL DIMENSION SYSTOLIC: 3.7 CM
ECHO LV ISOVOLUMETRIC RELAXATION TIME (IVRT): 42 MS
ECHO LV IVSD: 1 CM (ref 0.6–1)
ECHO LV MASS 2D: 219.7 G (ref 88–224)
ECHO LV MASS INDEX 2D: 87.9 G/M2 (ref 49–115)
ECHO LV POSTERIOR WALL DIASTOLIC: 1 CM (ref 0.6–1)
ECHO LV RELATIVE WALL THICKNESS RATIO: 0.36
ECHO LVOT AREA: 3.8 CM2
ECHO LVOT AV VTI INDEX: 0.43
ECHO LVOT DIAM: 2.2 CM
ECHO LVOT MEAN GRADIENT: 2 MMHG
ECHO LVOT PEAK GRADIENT: 3 MMHG
ECHO LVOT PEAK VELOCITY: 0.9 M/S
ECHO LVOT STROKE VOLUME INDEX: 35 ML/M2
ECHO LVOT SV: 87.4 ML
ECHO LVOT VTI: 23 CM
ECHO MV A VELOCITY: 0.7 M/S
ECHO MV E DECELERATION TIME (DT): 307 MS
ECHO MV E VELOCITY: 0.69 M/S
ECHO MV E/A RATIO: 0.99
ECHO MV E/E' LATERAL: 9.86
ECHO MV E/E' RATIO (AVERAGED): 10.68
ECHO MV E/E' SEPTAL: 11.5
ECHO PULMONARY ARTERY END DIASTOLIC PRESSURE: 7 MMHG
ECHO PV MAX VELOCITY: 0.9 M/S
ECHO PV PEAK GRADIENT: 3 MMHG
ECHO PV REGURGITANT MAX VELOCITY: 1.3 M/S
ECHO RIGHT VENTRICULAR SYSTOLIC PRESSURE (RVSP): 34 MMHG
ECHO RV INTERNAL DIMENSION: 3.1 CM
ECHO RV TAPSE: 2.1 CM (ref 1.7–?)
ECHO TV E WAVE: 0.5 M/S
ECHO TV REGURGITANT MAX VELOCITY: 2.67 M/S
ECHO TV REGURGITANT PEAK GRADIENT: 29 MMHG

## 2024-06-04 PROCEDURE — 93306 TTE W/DOPPLER COMPLETE: CPT | Performed by: NUCLEAR MEDICINE

## 2024-06-04 PROCEDURE — 93306 TTE W/DOPPLER COMPLETE: CPT

## 2024-06-25 RX ORDER — EVOLOCUMAB 140 MG/ML
1 INJECTION, SOLUTION SUBCUTANEOUS
Qty: 6 ADJUSTABLE DOSE PRE-FILLED PEN SYRINGE | Refills: 3 | Status: SHIPPED | OUTPATIENT
Start: 2024-06-25

## 2024-07-02 RX ORDER — EVOLOCUMAB 140 MG/ML
1 INJECTION, SOLUTION SUBCUTANEOUS
Qty: 6 ADJUSTABLE DOSE PRE-FILLED PEN SYRINGE | Refills: 3 | Status: SHIPPED | OUTPATIENT
Start: 2024-07-02

## 2024-08-01 ENCOUNTER — LAB (OUTPATIENT)
Dept: LAB | Age: 79
End: 2024-08-01

## 2024-08-01 DIAGNOSIS — N18.2 CKD (CHRONIC KIDNEY DISEASE), STAGE II: ICD-10-CM

## 2024-08-01 LAB
ANION GAP SERPL CALC-SCNC: 9 MEQ/L (ref 8–16)
BUN SERPL-MCNC: 18 MG/DL (ref 7–22)
CALCIUM SERPL-MCNC: 8.4 MG/DL (ref 8.5–10.5)
CHLORIDE SERPL-SCNC: 102 MEQ/L (ref 98–111)
CO2 SERPL-SCNC: 30 MEQ/L (ref 23–33)
CREAT SERPL-MCNC: 1.1 MG/DL (ref 0.4–1.2)
CREAT UR-MCNC: 239.8 MG/DL
GFR SERPL CREATININE-BSD FRML MDRD: 67 ML/MIN/1.73M2
GLUCOSE SERPL-MCNC: 95 MG/DL (ref 70–108)
MICROALBUMIN UR-MCNC: < 1.2 MG/DL
MICROALBUMIN/CREAT RATIO PNL UR: 5 MG/G (ref 0–30)
POTASSIUM SERPL-SCNC: 4.3 MEQ/L (ref 3.5–5.2)
SODIUM SERPL-SCNC: 141 MEQ/L (ref 135–145)

## 2024-08-06 ENCOUNTER — OFFICE VISIT (OUTPATIENT)
Dept: NEPHROLOGY | Age: 79
End: 2024-08-06
Payer: MEDICARE

## 2024-08-06 VITALS
DIASTOLIC BLOOD PRESSURE: 64 MMHG | BODY MASS INDEX: 35.05 KG/M2 | OXYGEN SATURATION: 95 % | HEART RATE: 54 BPM | WEIGHT: 273 LBS | SYSTOLIC BLOOD PRESSURE: 116 MMHG

## 2024-08-06 DIAGNOSIS — N18.2 CKD (CHRONIC KIDNEY DISEASE), STAGE II: Primary | ICD-10-CM

## 2024-08-06 PROCEDURE — 99214 OFFICE O/P EST MOD 30 MIN: CPT | Performed by: INTERNAL MEDICINE

## 2024-08-06 PROCEDURE — 3078F DIAST BP <80 MM HG: CPT | Performed by: INTERNAL MEDICINE

## 2024-08-06 PROCEDURE — 1123F ACP DISCUSS/DSCN MKR DOCD: CPT | Performed by: INTERNAL MEDICINE

## 2024-08-06 PROCEDURE — 3074F SYST BP LT 130 MM HG: CPT | Performed by: INTERNAL MEDICINE

## 2024-08-06 NOTE — PROGRESS NOTES
06/26/2023 10:00 AM    BILIRUBINUR NEGATIVE 06/26/2023 10:00 AM    BLOODU NEGATIVE 06/26/2023 10:00 AM    GLUCOSEU NEGATIVE 06/26/2023 10:00 AM    GLUCOSEU NEGATIVE 08/17/2018 03:14 PM    KETUA TRACE 06/26/2023 10:00 AM      Microalbumen/Creatinine ratio:  No components found for: \"RUCREAT\"        Impression/Plan:   1.  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 HTN  3. B/L renal cysts  4. BPH  5. CHARIS  6. Obesity  7. Hx DVT/PE        Bloodwork and medications were reviewed and plan of care discussed with the patient.  Return to clinic in 1year  or sooner if the need arises.      Kamryn Valerio DO  [facility]

## 2024-08-13 PROBLEM — I35.0 MODERATE AORTIC STENOSIS: Status: ACTIVE | Noted: 2024-08-13

## 2024-08-13 NOTE — PROGRESS NOTES
[facility] [place] [facility]  [facility] CARDIOLOGY  [address]  [address]  [facility] [address]  Dept: [phone]  Dept Fax: [phone]  Loc: [phone]    Visit Date: 8/15/2024    Joe Alcocer is a 79 y.o. male who presents todayfor:  Chief Complaint   Patient presents with    Follow-up     3 month follow up.     Cardiologist: Cathie Yee of mild CAD, HTN, HLD, DVT/PE, afib, moderate AS    HPI: 3 month f/u   Occasional sob with exertion. None at rest. No palpitations. Orthopnea or PND.     Past Surgical History:   Procedure Laterality Date    COLONOSCOPY      CORONARY ANGIOPLASTY N/A 2017/2018    Dr. Brand    ELBOW SURGERY Left 09/2020    EYE SURGERY      PAROTIDECTOMY Left 11/20/2019    PAROTIDECTOMY, LEFT performed by Unruly Manuel MD at [facility] OR    SKIN BIOPSY      TONSILLECTOMY      [facility]  1990     Family History   Problem Relation Age of Onset    Diabetes Mother     COPD Mother     Cancer Father     Deep Vein Thrombosis Sister     Crohn's Disease Sister      Social History     Tobacco Use    Smoking status: Never     Passive exposure: Never    Smokeless tobacco: Never   Substance Use Topics    Alcohol use: No     Alcohol/week: 0.0 standard drinks of alcohol      Current Outpatient Medications   Medication Sig Dispense Refill    Evolocumab (REPATHA SURECLICK) 140 MG/ML SOAJ Inject 1 pen  into the skin every 14 days 6 Adjustable Dose Pre-filled Pen Syringe 3    MOUNJARO 2.5 MG/0.5ML SOPN SC injection       ipratropium 0.5 mg-albuterol 2.5 mg (DUONEB) 0.5-2.5 (3) MG/3ML SOLN nebulizer solution 3 ml as needed Inhalation every 6 hrs      sildenafil (VIAGRA) 100 MG tablet 1 tablet as needed Orally Once a day      rivaroxaban (XARELTO) 20 MG TABS tablet TAKE 1 TABLET DAILY WITH BREAKFAST 90 tablet 3    fluticasone (FLONASE) 50 MCG/ACT nasal spray 1 spray by Each Nostril route daily      montelukast (SINGULAIR) 10 MG tablet Take 1 tablet by mouth nightly      mometasone-formoterol

## 2024-08-15 ENCOUNTER — OFFICE VISIT (OUTPATIENT)
Dept: CARDIOLOGY CLINIC | Age: 79
End: 2024-08-15

## 2024-08-15 VITALS
WEIGHT: 272.6 LBS | BODY MASS INDEX: 34.98 KG/M2 | HEART RATE: 59 BPM | DIASTOLIC BLOOD PRESSURE: 74 MMHG | HEIGHT: 74 IN | SYSTOLIC BLOOD PRESSURE: 135 MMHG

## 2024-08-15 DIAGNOSIS — I10 ESSENTIAL HYPERTENSION: ICD-10-CM

## 2024-08-15 DIAGNOSIS — I25.10 CORONARY ARTERY DISEASE INVOLVING NATIVE CORONARY ARTERY OF NATIVE HEART WITHOUT ANGINA PECTORIS: Primary | ICD-10-CM

## 2024-08-15 DIAGNOSIS — I35.0 MODERATE AORTIC STENOSIS: ICD-10-CM

## 2024-08-15 NOTE — PROGRESS NOTES
3 month follow up.    Last EKG done on 05/22/2024.    Patient had an ECHO done on 06/04/2024.    Denies chest pain, palpitations, dizziness, shortness of breath, and edema.     No cardiac concerns at this time.

## 2024-09-03 ENCOUNTER — HOSPITAL ENCOUNTER (EMERGENCY)
Age: 79
Discharge: HOME | End: 2024-09-03
Payer: MEDICARE

## 2024-09-03 VITALS
HEART RATE: 58 BPM | TEMPERATURE: 98 F | RESPIRATION RATE: 16 BRPM | OXYGEN SATURATION: 95 % | SYSTOLIC BLOOD PRESSURE: 125 MMHG | DIASTOLIC BLOOD PRESSURE: 87 MMHG

## 2024-09-03 DIAGNOSIS — R05.1 ACUTE COUGH: ICD-10-CM

## 2024-09-03 DIAGNOSIS — J01.90 ACUTE NON-RECURRENT SINUSITIS, UNSPECIFIED LOCATION: Primary | ICD-10-CM

## 2024-09-03 PROCEDURE — 99213 OFFICE O/P EST LOW 20 MIN: CPT

## 2024-09-03 PROCEDURE — 99213 OFFICE O/P EST LOW 20 MIN: CPT | Performed by: NURSE PRACTITIONER

## 2024-09-03 RX ORDER — GUAIFENESIN AND PSEUDOEPHEDRINE HCL 1200; 120 MG/1; MG/1
1 TABLET, EXTENDED RELEASE ORAL 2 TIMES DAILY PRN
Qty: 20 TABLET | Refills: 0 | Status: SHIPPED | OUTPATIENT
Start: 2024-09-03

## 2024-09-03 RX ORDER — AZITHROMYCIN 250 MG/1
TABLET, FILM COATED ORAL
Qty: 1 PACKET | Refills: 0 | Status: SHIPPED | OUTPATIENT
Start: 2024-09-03 | End: 2024-09-07

## 2024-09-03 ASSESSMENT — PAIN - FUNCTIONAL ASSESSMENT: PAIN_FUNCTIONAL_ASSESSMENT: NONE - DENIES PAIN

## 2024-09-03 ASSESSMENT — ENCOUNTER SYMPTOMS
SINUS PAIN: 0
COUGH: 1
SORE THROAT: 0
RHINORRHEA: 0
SHORTNESS OF BREATH: 0
SINUS PRESSURE: 1

## 2024-09-03 NOTE — ED NOTES
Pt presents to UC for c/o nasal congestion and chest congestion x 10 days     Maetusz Childers, BRICE  09/03/24 0907

## 2024-09-03 NOTE — ED PROVIDER NOTES
09/03/24.    IMAGING:  None    EKG:  None    URGENT CARE COURSE:     Vitals:    09/03/24 1219   BP: 125/87   Pulse: 58   Resp: 16   Temp: 98 °F (36.7 °C)   TempSrc: Oral   SpO2: 95%       Medications - No data to display       PROCEDURES:  None    FINAL IMPRESSION      1. Acute non-recurrent sinusitis, unspecified location    2. Acute cough      DISPOSITION/ PLAN   DISPOSITION Decision To Discharge 09/03/2024 12:39:07 PM     Patient here with acute sinusitis and cough.  Will treat patient with azithromycin and Mucinex D.  Recommend over-the-counter antipyretics if needed.  Push oral fluids.  Follow-up with PCP as needed.    PATIENT REFERRED TO:  Gina Jerome APRN - NP  [address] [facility] / [facility] [address]      DISCHARGE MEDICATIONS:  New Prescriptions    AZITHROMYCIN (ZITHROMAX Z-CLAUDETTE) 250 MG TABLET    Take 2 tablets (500 mg) on Day 1, and then take 1 tablet (250 mg) on days 2 through 5.    PSEUDOEPHEDRINE-GUAIFENESIN (MUCINEX D MAX STRENGTH) 120-1200 MG TB12    Take 1 tablet by mouth 2 times daily as needed (cough/congestion)       Discontinued Medications    No medications on file       Current Discharge Medication List          YESY Rene CNP    (Please note that portions of this note were completed with a voice recognition program. Efforts were made to edit the dictations but occasionally words are mis-transcribed.)            Natalio Morales APRN - CNP  09/03/24 [id number]

## 2025-02-10 NOTE — TELEPHONE ENCOUNTER
Repatha PA approved through [employer] (Key: [id number])  PA Case ID #: [id number]  Need Help? Call us at [phone]  Outcome  Approved today by [employer] NCPDP 2017  Your request has been approved  Authorization Expiration Date: 2/10/2026  Drug  Repatha SureClick 140MG/ML auto-injectors    Form  [employer] Electronic PA Form (2017 NCPDP)

## 2025-07-10 ENCOUNTER — TELEPHONE (OUTPATIENT)
Dept: CARDIOLOGY CLINIC | Age: 80
End: 2025-07-10

## 2025-07-10 NOTE — TELEPHONE ENCOUNTER
Pre op Risk Assessment    Procedure colonoscopy   [facility]  Date of surgery/procedure 7/10/25    Last OV 8/15/24- Baljit   Last Stress 4/13/23  Last Echo 6/4/24  Last Cath 5/15/23  Last Stent ?  Is patient on blood thinners xarelto   Hold Meds/how many days  2    Fax [phone]

## 2025-08-05 ENCOUNTER — LAB (OUTPATIENT)
Dept: LAB | Age: 80
End: 2025-08-05

## 2025-08-05 DIAGNOSIS — N18.2 CKD (CHRONIC KIDNEY DISEASE), STAGE II: ICD-10-CM

## 2025-08-05 LAB
ANION GAP SERPL CALC-SCNC: 14 MEQ/L (ref 8–16)
BUN SERPL-MCNC: 21 MG/DL (ref 8–23)
CALCIUM SERPL-MCNC: 9.2 MG/DL (ref 8.8–10.2)
CHLORIDE SERPL-SCNC: 105 MEQ/L (ref 98–111)
CO2 SERPL-SCNC: 24 MEQ/L (ref 22–29)
CREAT SERPL-MCNC: 1 MG/DL (ref 0.7–1.2)
CREAT UR-MCNC: 365 MG/DL
GFR SERPL CREATININE-BSD FRML MDRD: 75 ML/MIN/1.73M2
GLUCOSE SERPL-MCNC: 86 MG/DL (ref 74–109)
MICROALBUMIN UR-MCNC: 2.06 MG/DL
MICROALBUMIN/CREAT RATIO PNL UR: 6 MG/G (ref 0–30)
POTASSIUM SERPL-SCNC: 3.8 MEQ/L (ref 3.5–5.2)
SODIUM SERPL-SCNC: 143 MEQ/L (ref 135–145)

## 2025-08-06 ENCOUNTER — OFFICE VISIT (OUTPATIENT)
Dept: NEPHROLOGY | Age: 80
End: 2025-08-06
Payer: MEDICARE

## 2025-08-06 VITALS
BODY MASS INDEX: 33.51 KG/M2 | WEIGHT: 261 LBS | OXYGEN SATURATION: 96 % | HEART RATE: 60 BPM | SYSTOLIC BLOOD PRESSURE: 110 MMHG | DIASTOLIC BLOOD PRESSURE: 60 MMHG

## 2025-08-06 DIAGNOSIS — N18.2 CKD (CHRONIC KIDNEY DISEASE), STAGE II: Primary | ICD-10-CM

## 2025-08-06 PROCEDURE — 3078F DIAST BP <80 MM HG: CPT | Performed by: INTERNAL MEDICINE

## 2025-08-06 PROCEDURE — 3074F SYST BP LT 130 MM HG: CPT | Performed by: INTERNAL MEDICINE

## 2025-08-06 PROCEDURE — 1159F MED LIST DOCD IN RCRD: CPT | Performed by: INTERNAL MEDICINE

## 2025-08-06 PROCEDURE — 1123F ACP DISCUSS/DSCN MKR DOCD: CPT | Performed by: INTERNAL MEDICINE

## 2025-08-06 PROCEDURE — 99214 OFFICE O/P EST MOD 30 MIN: CPT | Performed by: INTERNAL MEDICINE

## 2025-08-06 PROCEDURE — 1160F RVW MEDS BY RX/DR IN RCRD: CPT | Performed by: INTERNAL MEDICINE

## 2025-08-06 RX ORDER — BUDESONIDE, GLYCOPYRROLATE, AND FORMOTEROL FUMARATE 160; 9; 4.8 UG/1; UG/1; UG/1
2 AEROSOL, METERED RESPIRATORY (INHALATION) 2 TIMES DAILY
COMMUNITY
Start: 2025-06-12

## 2025-08-15 RX ORDER — EVOLOCUMAB 140 MG/ML
140 INJECTION, SOLUTION SUBCUTANEOUS
Qty: 6 ADJUSTABLE DOSE PRE-FILLED PEN SYRINGE | Refills: 3 | Status: SHIPPED | OUTPATIENT
Start: 2025-08-15

## 2025-08-19 ENCOUNTER — OFFICE VISIT (OUTPATIENT)
Dept: CARDIOLOGY CLINIC | Age: 80
End: 2025-08-19
Payer: MEDICARE

## 2025-08-19 VITALS
SYSTOLIC BLOOD PRESSURE: 133 MMHG | HEIGHT: 74 IN | HEART RATE: 53 BPM | DIASTOLIC BLOOD PRESSURE: 77 MMHG | WEIGHT: 266.6 LBS | BODY MASS INDEX: 34.22 KG/M2

## 2025-08-19 DIAGNOSIS — I48.21 PERMANENT ATRIAL FIBRILLATION (HCC): ICD-10-CM

## 2025-08-19 DIAGNOSIS — I25.10 CORONARY ARTERY DISEASE INVOLVING NATIVE CORONARY ARTERY OF NATIVE HEART WITHOUT ANGINA PECTORIS: Primary | ICD-10-CM

## 2025-08-19 DIAGNOSIS — E78.01 FAMILIAL HYPERCHOLESTEROLEMIA: ICD-10-CM

## 2025-08-19 DIAGNOSIS — I10 PRIMARY HYPERTENSION: ICD-10-CM

## 2025-08-19 PROCEDURE — 1123F ACP DISCUSS/DSCN MKR DOCD: CPT | Performed by: NUCLEAR MEDICINE

## 2025-08-19 PROCEDURE — 1159F MED LIST DOCD IN RCRD: CPT | Performed by: NUCLEAR MEDICINE

## 2025-08-19 PROCEDURE — 3075F SYST BP GE 130 - 139MM HG: CPT | Performed by: NUCLEAR MEDICINE

## 2025-08-19 PROCEDURE — 93000 ELECTROCARDIOGRAM COMPLETE: CPT | Performed by: NUCLEAR MEDICINE

## 2025-08-19 PROCEDURE — 99214 OFFICE O/P EST MOD 30 MIN: CPT | Performed by: NUCLEAR MEDICINE

## 2025-08-19 PROCEDURE — 3078F DIAST BP <80 MM HG: CPT | Performed by: NUCLEAR MEDICINE

## (undated) DEVICE — SYRINGE IRRIG 60ML SFT PLIABLE BLB EZ TO GRP 1 HND USE W/

## (undated) DEVICE — PAD,NON-ADHERENT,3X8,STERILE,LF,1/PK: Brand: MEDLINE

## (undated) DEVICE — EVACUATOR SURG 100CC SIL BLB SUCT RESVR FOR CLS WND DRNGE

## (undated) DEVICE — CONMED DISPOSABLE BIPOLAR CABLE, 10' (3.05M): Brand: CONMED

## (undated) DEVICE — GLOVE ORANGE PI 7   MSG9070

## (undated) DEVICE — GOWN,SIRUS,NONRNF,SETINSLV,XL,20/CS: Brand: MEDLINE

## (undated) DEVICE — APPLIER CLP L9.38IN M LIG TI DISP STR RNG HNDL LIGACLP

## (undated) DEVICE — DRAPE,INSTRUMENT,MAGNETIC,10X16: Brand: MEDLINE

## (undated) DEVICE — BANDAGE ADH W1XL3IN NAT FAB WVN FLX DURABLE N ADH PD SEAL

## (undated) DEVICE — TOTAL TRAY, DB, 100% SILI FOLEY, 16FR 10: Brand: MEDLINE

## (undated) DEVICE — 3M™ STERI-DRAPE™ INSTRUMENT POUCH 1018: Brand: STERI-DRAPE™

## (undated) DEVICE — DRAIN SURG 10FR PVC TB W/ TRCR MID PERF NO RESVR HUBLESS

## (undated) DEVICE — BLADE ES ELASTOMERIC COAT INSUL DURABLE BEND UPTO 90DEG

## (undated) DEVICE — Z INACTIVE USE 2735373 APPLICATOR FBR LAIN COT WOOD TIP ECONOMICAL

## (undated) DEVICE — KIT EVAC 400CC PVC RADPQ Y CONN

## (undated) DEVICE — GOWN,SIRUS,NON REINFRCD,LARGE,SET IN SL: Brand: MEDLINE

## (undated) DEVICE — PROBE STIM 3 MM FOR PEDCL SCREW DISP

## (undated) DEVICE — CONTAINER,SPECIMEN,PNEU TUBE,4OZ,OR STRL: Brand: MEDLINE

## (undated) DEVICE — PATIENT RETURN ELECTRODE, SINGLE-USE, CONTACT QUALITY MONITORING, ADULT, WITH 9FT CORD, FOR PATIENTS WEIGING OVER 33LBS. (15KG): Brand: MEGADYNE

## (undated) DEVICE — GLOVE SURG SZ 65 THK91MIL LTX FREE SYN POLYISOPRENE

## (undated) DEVICE — CHLORAPREP 26ML CLEAR

## (undated) DEVICE — 3M™ IOBAN™ 2 ANTIMICROBIAL INCISE DRAPE 6650EZ: Brand: IOBAN™ 2

## (undated) DEVICE — SHEARS ENDOSCP L9CM CRV HARM FOCS +

## (undated) DEVICE — APPLIER LIG CLP M L11IN TI STR RNG HNDL FOR 20 CLP DISP

## (undated) DEVICE — COVER,MAYO STAND,STERILE: Brand: MEDLINE

## (undated) DEVICE — SOLUTION IV IRRIG POUR BRL 0.9% SODIUM CHL 2F7124

## (undated) DEVICE — COVER ARMBRD W13XL28.5IN IMPERV BLU FOR OP RM

## (undated) DEVICE — SKIN AFFIX SURG ADHESIVE 72/CS 0.55ML: Brand: MEDLINE

## (undated) DEVICE — LINER SUCT CANSTR 1500CC SEMI RIG W/ POR HYDROPHOBIC SHUT

## (undated) DEVICE — SPONGE,PEANUT,XRAY,ST,SM,3/8",5/CARD: Brand: MEDLINE INDUSTRIES, INC.

## (undated) DEVICE — GAUZE,SPONGE,8"X4",12PLY,XRAY,STRL,LF: Brand: MEDLINE

## (undated) DEVICE — YANKAUER,BULB TIP,W/O VENT,RIGID,STERILE: Brand: MEDLINE

## (undated) DEVICE — Device

## (undated) DEVICE — STERILE COTTON BALLS LARGE 5/P: Brand: MEDLINE

## (undated) DEVICE — SOLUTION IV 1000ML 0.9% SOD CHL PH 5 INJ USP VIAFLX PLAS

## (undated) DEVICE — 1010 S-DRAPE TOWEL DRAPE 10/BX: Brand: STERI-DRAPE™

## (undated) DEVICE — INTENDED FOR TISSUE SEPARATION, AND OTHER PROCEDURES THAT REQUIRE A SHARP SURGICAL BLADE TO PUNCTURE OR CUT.: Brand: BARD-PARKER ® CARBON RIB-BACK BLADES

## (undated) DEVICE — DRAIN SURG FLAT W7MMXL20CM FULL PERF

## (undated) DEVICE — SOLUTION IV IRRIG WATER 1000ML POUR BRL 2F7114